# Patient Record
Sex: MALE | Race: WHITE | NOT HISPANIC OR LATINO | Employment: OTHER | URBAN - METROPOLITAN AREA
[De-identification: names, ages, dates, MRNs, and addresses within clinical notes are randomized per-mention and may not be internally consistent; named-entity substitution may affect disease eponyms.]

---

## 2017-01-05 ENCOUNTER — GENERIC CONVERSION - ENCOUNTER (OUTPATIENT)
Dept: OTHER | Facility: OTHER | Age: 67
End: 2017-01-05

## 2017-02-06 ENCOUNTER — GENERIC CONVERSION - ENCOUNTER (OUTPATIENT)
Dept: OTHER | Facility: OTHER | Age: 67
End: 2017-02-06

## 2017-02-28 ENCOUNTER — GENERIC CONVERSION - ENCOUNTER (OUTPATIENT)
Dept: OTHER | Facility: OTHER | Age: 67
End: 2017-02-28

## 2017-03-20 ENCOUNTER — GENERIC CONVERSION - ENCOUNTER (OUTPATIENT)
Dept: OTHER | Facility: OTHER | Age: 67
End: 2017-03-20

## 2017-03-20 ENCOUNTER — ALLSCRIPTS OFFICE VISIT (OUTPATIENT)
Dept: OTHER | Facility: OTHER | Age: 67
End: 2017-03-20

## 2017-03-30 ENCOUNTER — GENERIC CONVERSION - ENCOUNTER (OUTPATIENT)
Dept: OTHER | Facility: OTHER | Age: 67
End: 2017-03-30

## 2017-03-30 LAB
LEFT EYE DIABETIC RETINOPATHY: NORMAL
RIGHT EYE DIABETIC RETINOPATHY: NORMAL

## 2017-04-06 ENCOUNTER — ALLSCRIPTS OFFICE VISIT (OUTPATIENT)
Dept: OTHER | Facility: OTHER | Age: 67
End: 2017-04-06

## 2017-04-06 DIAGNOSIS — E11.9 TYPE 2 DIABETES MELLITUS WITHOUT COMPLICATIONS (HCC): ICD-10-CM

## 2017-04-06 DIAGNOSIS — I95.1 ORTHOSTATIC HYPOTENSION: ICD-10-CM

## 2017-04-06 DIAGNOSIS — N18.30 CHRONIC KIDNEY DISEASE, STAGE III (MODERATE) (HCC): ICD-10-CM

## 2017-04-06 DIAGNOSIS — E11.40 TYPE 2 DIABETES MELLITUS WITH DIABETIC NEUROPATHY (HCC): ICD-10-CM

## 2017-04-06 LAB
GLUCOSE SERPL-MCNC: 200 MG/DL
HBA1C MFR BLD HPLC: 6.8 %

## 2017-04-11 ENCOUNTER — LAB CONVERSION - ENCOUNTER (OUTPATIENT)
Dept: OTHER | Facility: OTHER | Age: 67
End: 2017-04-11

## 2017-04-11 LAB
25(OH)D3 SERPL-MCNC: 24 NG/ML (ref 30–100)
HBA1C MFR BLD HPLC: 6.9 % OF TOTAL HGB

## 2017-04-17 ENCOUNTER — GENERIC CONVERSION - ENCOUNTER (OUTPATIENT)
Dept: OTHER | Facility: OTHER | Age: 67
End: 2017-04-17

## 2017-05-10 ENCOUNTER — GENERIC CONVERSION - ENCOUNTER (OUTPATIENT)
Dept: OTHER | Facility: OTHER | Age: 67
End: 2017-05-10

## 2017-05-31 ENCOUNTER — GENERIC CONVERSION - ENCOUNTER (OUTPATIENT)
Dept: OTHER | Facility: OTHER | Age: 67
End: 2017-05-31

## 2017-06-07 ENCOUNTER — GENERIC CONVERSION - ENCOUNTER (OUTPATIENT)
Dept: OTHER | Facility: OTHER | Age: 67
End: 2017-06-07

## 2017-06-08 ENCOUNTER — GENERIC CONVERSION - ENCOUNTER (OUTPATIENT)
Dept: OTHER | Facility: OTHER | Age: 67
End: 2017-06-08

## 2017-06-19 ENCOUNTER — GENERIC CONVERSION - ENCOUNTER (OUTPATIENT)
Dept: OTHER | Facility: OTHER | Age: 67
End: 2017-06-19

## 2017-07-18 ENCOUNTER — GENERIC CONVERSION - ENCOUNTER (OUTPATIENT)
Dept: OTHER | Facility: OTHER | Age: 67
End: 2017-07-18

## 2017-07-18 ENCOUNTER — ALLSCRIPTS OFFICE VISIT (OUTPATIENT)
Dept: OTHER | Facility: OTHER | Age: 67
End: 2017-07-18

## 2017-07-30 ENCOUNTER — OFFICE VISIT (OUTPATIENT)
Dept: URGENT CARE | Facility: MEDICAL CENTER | Age: 67
End: 2017-07-30
Payer: MEDICARE

## 2017-07-30 PROCEDURE — 12013 RPR F/E/E/N/L/M 2.6-5.0 CM: CPT

## 2017-07-30 PROCEDURE — 99203 OFFICE O/P NEW LOW 30 MIN: CPT

## 2017-07-30 PROCEDURE — G0463 HOSPITAL OUTPT CLINIC VISIT: HCPCS

## 2017-08-07 ENCOUNTER — ALLSCRIPTS OFFICE VISIT (OUTPATIENT)
Dept: OTHER | Facility: OTHER | Age: 67
End: 2017-08-07

## 2017-08-07 ENCOUNTER — GENERIC CONVERSION - ENCOUNTER (OUTPATIENT)
Dept: OTHER | Facility: OTHER | Age: 67
End: 2017-08-07

## 2017-08-07 DIAGNOSIS — R42 DIZZINESS AND GIDDINESS: ICD-10-CM

## 2017-08-07 DIAGNOSIS — I95.1 ORTHOSTATIC HYPOTENSION: ICD-10-CM

## 2017-08-07 DIAGNOSIS — E11.40 TYPE 2 DIABETES MELLITUS WITH DIABETIC NEUROPATHY (HCC): ICD-10-CM

## 2017-08-07 DIAGNOSIS — N18.30 CHRONIC KIDNEY DISEASE, STAGE III (MODERATE) (HCC): ICD-10-CM

## 2017-09-08 ENCOUNTER — ALLSCRIPTS OFFICE VISIT (OUTPATIENT)
Dept: OTHER | Facility: OTHER | Age: 67
End: 2017-09-08

## 2017-09-20 ENCOUNTER — GENERIC CONVERSION - ENCOUNTER (OUTPATIENT)
Dept: OTHER | Facility: OTHER | Age: 67
End: 2017-09-20

## 2017-09-30 ENCOUNTER — HOSPITAL ENCOUNTER (INPATIENT)
Facility: HOSPITAL | Age: 67
LOS: 4 days | Discharge: HOME | DRG: 074 | End: 2017-10-04
Attending: EMERGENCY MEDICINE | Admitting: FAMILY MEDICINE
Payer: MEDICARE

## 2017-09-30 DIAGNOSIS — Z93.59 SUPRAPUBIC CATHETER (HCC): ICD-10-CM

## 2017-09-30 DIAGNOSIS — N39.0 UTI (URINARY TRACT INFECTION): Primary | ICD-10-CM

## 2017-09-30 DIAGNOSIS — N17.9 AKI (ACUTE KIDNEY INJURY) (HCC): ICD-10-CM

## 2017-09-30 LAB
ANION GAP SERPL CALCULATED.3IONS-SCNC: 5 MMOL/L (ref 4–13)
BACTERIA UR QL AUTO: ABNORMAL /HPF
BASOPHILS # BLD AUTO: 0.02 THOUSANDS/ΜL (ref 0–0.1)
BASOPHILS NFR BLD AUTO: 0 % (ref 0–1)
BILIRUB UR QL STRIP: NEGATIVE
BUN SERPL-MCNC: 26 MG/DL (ref 5–25)
CALCIUM SERPL-MCNC: 9.4 MG/DL (ref 8.3–10.1)
CHLORIDE SERPL-SCNC: 100 MMOL/L (ref 100–108)
CLARITY UR: CLEAR
CO2 SERPL-SCNC: 32 MMOL/L (ref 21–32)
COLOR UR: YELLOW
CREAT SERPL-MCNC: 1.71 MG/DL (ref 0.6–1.3)
EOSINOPHIL # BLD AUTO: 0.3 THOUSAND/ΜL (ref 0–0.61)
EOSINOPHIL NFR BLD AUTO: 4 % (ref 0–6)
ERYTHROCYTE [DISTWIDTH] IN BLOOD BY AUTOMATED COUNT: 13.9 % (ref 11.6–15.1)
GFR SERPL CREATININE-BSD FRML MDRD: 40 ML/MIN/1.73SQ M
GLUCOSE SERPL-MCNC: 192 MG/DL (ref 65–140)
GLUCOSE SERPL-MCNC: 303 MG/DL (ref 65–140)
GLUCOSE UR STRIP-MCNC: ABNORMAL MG/DL
HCT VFR BLD AUTO: 37.3 % (ref 36.5–49.3)
HGB BLD-MCNC: 12.3 G/DL (ref 12–17)
HGB UR QL STRIP.AUTO: ABNORMAL
KETONES UR STRIP-MCNC: NEGATIVE MG/DL
LACTATE SERPL-SCNC: 1.2 MMOL/L (ref 0.5–2)
LEUKOCYTE ESTERASE UR QL STRIP: ABNORMAL
LYMPHOCYTES # BLD AUTO: 2.04 THOUSANDS/ΜL (ref 0.6–4.47)
LYMPHOCYTES NFR BLD AUTO: 27 % (ref 14–44)
MCH RBC QN AUTO: 31.1 PG (ref 26.8–34.3)
MCHC RBC AUTO-ENTMCNC: 33 G/DL (ref 31.4–37.4)
MCV RBC AUTO: 94 FL (ref 82–98)
MONOCYTES # BLD AUTO: 0.55 THOUSAND/ΜL (ref 0.17–1.22)
MONOCYTES NFR BLD AUTO: 7 % (ref 4–12)
NEUTROPHILS # BLD AUTO: 4.69 THOUSANDS/ΜL (ref 1.85–7.62)
NEUTS SEG NFR BLD AUTO: 62 % (ref 43–75)
NITRITE UR QL STRIP: NEGATIVE
NON-SQ EPI CELLS URNS QL MICRO: ABNORMAL /HPF
NRBC BLD AUTO-RTO: 0 /100 WBCS
OTHER STN SPEC: ABNORMAL
PH UR STRIP.AUTO: 6.5 [PH] (ref 4.5–8)
PLATELET # BLD AUTO: 225 THOUSANDS/UL (ref 149–390)
PMV BLD AUTO: 10.9 FL (ref 8.9–12.7)
POTASSIUM SERPL-SCNC: 4.6 MMOL/L (ref 3.5–5.3)
PROT UR STRIP-MCNC: ABNORMAL MG/DL
RBC # BLD AUTO: 3.96 MILLION/UL (ref 3.88–5.62)
RBC #/AREA URNS AUTO: ABNORMAL /HPF
SODIUM SERPL-SCNC: 137 MMOL/L (ref 136–145)
SP GR UR STRIP.AUTO: 1.01 (ref 1–1.03)
UROBILINOGEN UR QL STRIP.AUTO: 0.2 E.U./DL
WBC # BLD AUTO: 7.6 THOUSAND/UL (ref 4.31–10.16)
WBC #/AREA URNS AUTO: ABNORMAL /HPF

## 2017-09-30 PROCEDURE — 87086 URINE CULTURE/COLONY COUNT: CPT

## 2017-09-30 PROCEDURE — 82948 REAGENT STRIP/BLOOD GLUCOSE: CPT

## 2017-09-30 PROCEDURE — 36415 COLL VENOUS BLD VENIPUNCTURE: CPT | Performed by: EMERGENCY MEDICINE

## 2017-09-30 PROCEDURE — 81001 URINALYSIS AUTO W/SCOPE: CPT

## 2017-09-30 PROCEDURE — 87040 BLOOD CULTURE FOR BACTERIA: CPT | Performed by: EMERGENCY MEDICINE

## 2017-09-30 PROCEDURE — 85025 COMPLETE CBC W/AUTO DIFF WBC: CPT | Performed by: EMERGENCY MEDICINE

## 2017-09-30 PROCEDURE — 80048 BASIC METABOLIC PNL TOTAL CA: CPT | Performed by: EMERGENCY MEDICINE

## 2017-09-30 PROCEDURE — 99285 EMERGENCY DEPT VISIT HI MDM: CPT

## 2017-09-30 PROCEDURE — 83605 ASSAY OF LACTIC ACID: CPT | Performed by: EMERGENCY MEDICINE

## 2017-09-30 PROCEDURE — 87186 SC STD MICRODIL/AGAR DIL: CPT

## 2017-09-30 PROCEDURE — 93005 ELECTROCARDIOGRAM TRACING: CPT | Performed by: EMERGENCY MEDICINE

## 2017-09-30 PROCEDURE — 96365 THER/PROPH/DIAG IV INF INIT: CPT

## 2017-09-30 PROCEDURE — 87077 CULTURE AEROBIC IDENTIFY: CPT

## 2017-09-30 RX ORDER — MORPHINE SULFATE 15 MG/1
TABLET, FILM COATED, EXTENDED RELEASE ORAL
COMMUNITY
Start: 2014-12-08 | End: 2018-03-22 | Stop reason: ALTCHOICE

## 2017-09-30 RX ORDER — GABAPENTIN 600 MG/1
TABLET ORAL
COMMUNITY
Start: 2011-02-26 | End: 2018-02-21

## 2017-09-30 RX ORDER — BACLOFEN 10 MG/1
TABLET ORAL
COMMUNITY
Start: 2017-06-19 | End: 2018-03-22 | Stop reason: SDUPTHER

## 2017-09-30 RX ORDER — DULOXETIN HYDROCHLORIDE 30 MG/1
CAPSULE, DELAYED RELEASE ORAL
COMMUNITY
Start: 2016-08-04 | End: 2018-03-22 | Stop reason: DRUGHIGH

## 2017-09-30 RX ORDER — TRAZODONE HYDROCHLORIDE 50 MG/1
TABLET ORAL
COMMUNITY
Start: 2013-10-23 | End: 2018-04-05 | Stop reason: SDUPTHER

## 2017-09-30 RX ORDER — NICOTINE 21 MG/24HR
1 PATCH, TRANSDERMAL 24 HOURS TRANSDERMAL DAILY
Status: DISCONTINUED | OUTPATIENT
Start: 2017-10-01 | End: 2017-10-04 | Stop reason: HOSPADM

## 2017-09-30 RX ADMIN — SODIUM CHLORIDE 1000 ML: 0.9 INJECTION, SOLUTION INTRAVENOUS at 20:40

## 2017-09-30 RX ADMIN — CEFTRIAXONE 1000 MG: 1 INJECTION, POWDER, FOR SOLUTION INTRAMUSCULAR; INTRAVENOUS at 20:34

## 2017-10-01 ENCOUNTER — APPOINTMENT (INPATIENT)
Dept: CT IMAGING | Facility: HOSPITAL | Age: 67
DRG: 074 | End: 2017-10-01
Payer: MEDICARE

## 2017-10-01 PROBLEM — N39.0 UTI (URINARY TRACT INFECTION): Status: ACTIVE | Noted: 2017-10-01

## 2017-10-01 PROBLEM — N17.9 AKI (ACUTE KIDNEY INJURY) (HCC): Status: ACTIVE | Noted: 2017-10-01

## 2017-10-01 PROBLEM — E78.5 TYPE 2 DIABETES MELLITUS WITH HYPERLIPIDEMIA (HCC): Status: ACTIVE | Noted: 2017-10-01

## 2017-10-01 PROBLEM — I10 ESSENTIAL HYPERTENSION: Status: ACTIVE | Noted: 2017-10-01

## 2017-10-01 PROBLEM — E11.69 TYPE 2 DIABETES MELLITUS WITH HYPERLIPIDEMIA (HCC): Status: ACTIVE | Noted: 2017-10-01

## 2017-10-01 PROBLEM — R29.6 FREQUENT FALLS: Status: ACTIVE | Noted: 2017-10-01

## 2017-10-01 PROBLEM — Z93.59 SUPRAPUBIC CATHETER (HCC): Status: ACTIVE | Noted: 2017-10-01

## 2017-10-01 PROBLEM — G60.0 CHARCOT-MARIE-TOOTH DISEASE: Status: ACTIVE | Noted: 2017-10-01

## 2017-10-01 PROBLEM — R53.1 WEAKNESS: Status: ACTIVE | Noted: 2017-10-01

## 2017-10-01 LAB
ANION GAP SERPL CALCULATED.3IONS-SCNC: 7 MMOL/L (ref 4–13)
BUN SERPL-MCNC: 22 MG/DL (ref 5–25)
CALCIUM SERPL-MCNC: 9.1 MG/DL (ref 8.3–10.1)
CHLORIDE SERPL-SCNC: 103 MMOL/L (ref 100–108)
CO2 SERPL-SCNC: 30 MMOL/L (ref 21–32)
CREAT SERPL-MCNC: 1.52 MG/DL (ref 0.6–1.3)
ERYTHROCYTE [DISTWIDTH] IN BLOOD BY AUTOMATED COUNT: 13.8 % (ref 11.6–15.1)
GFR SERPL CREATININE-BSD FRML MDRD: 46 ML/MIN/1.73SQ M
GLUCOSE SERPL-MCNC: 142 MG/DL (ref 65–140)
GLUCOSE SERPL-MCNC: 145 MG/DL (ref 65–140)
GLUCOSE SERPL-MCNC: 152 MG/DL (ref 65–140)
GLUCOSE SERPL-MCNC: 162 MG/DL (ref 65–140)
GLUCOSE SERPL-MCNC: 183 MG/DL (ref 65–140)
GLUCOSE SERPL-MCNC: 192 MG/DL (ref 65–140)
HCT VFR BLD AUTO: 35.4 % (ref 36.5–49.3)
HGB BLD-MCNC: 11.6 G/DL (ref 12–17)
MCH RBC QN AUTO: 30.8 PG (ref 26.8–34.3)
MCHC RBC AUTO-ENTMCNC: 32.8 G/DL (ref 31.4–37.4)
MCV RBC AUTO: 94 FL (ref 82–98)
PLATELET # BLD AUTO: 189 THOUSANDS/UL (ref 149–390)
PMV BLD AUTO: 10.7 FL (ref 8.9–12.7)
POTASSIUM SERPL-SCNC: 3.9 MMOL/L (ref 3.5–5.3)
RBC # BLD AUTO: 3.77 MILLION/UL (ref 3.88–5.62)
SODIUM SERPL-SCNC: 140 MMOL/L (ref 136–145)
WBC # BLD AUTO: 7.11 THOUSAND/UL (ref 4.31–10.16)

## 2017-10-01 PROCEDURE — 70450 CT HEAD/BRAIN W/O DYE: CPT

## 2017-10-01 PROCEDURE — 82948 REAGENT STRIP/BLOOD GLUCOSE: CPT

## 2017-10-01 PROCEDURE — 85027 COMPLETE CBC AUTOMATED: CPT | Performed by: FAMILY MEDICINE

## 2017-10-01 PROCEDURE — 80048 BASIC METABOLIC PNL TOTAL CA: CPT | Performed by: FAMILY MEDICINE

## 2017-10-01 RX ORDER — DULOXETIN HYDROCHLORIDE 30 MG/1
30 CAPSULE, DELAYED RELEASE ORAL DAILY
Status: DISCONTINUED | OUTPATIENT
Start: 2017-10-01 | End: 2017-10-04 | Stop reason: HOSPADM

## 2017-10-01 RX ORDER — GABAPENTIN 300 MG/1
600 CAPSULE ORAL
Status: DISCONTINUED | OUTPATIENT
Start: 2017-10-02 | End: 2017-10-04 | Stop reason: HOSPADM

## 2017-10-01 RX ORDER — HYDRALAZINE HYDROCHLORIDE 20 MG/ML
10 INJECTION INTRAMUSCULAR; INTRAVENOUS EVERY 6 HOURS PRN
Status: DISCONTINUED | OUTPATIENT
Start: 2017-10-01 | End: 2017-10-03

## 2017-10-01 RX ORDER — TRAZODONE HYDROCHLORIDE 50 MG/1
50 TABLET ORAL
Status: DISCONTINUED | OUTPATIENT
Start: 2017-10-01 | End: 2017-10-04 | Stop reason: HOSPADM

## 2017-10-01 RX ORDER — SODIUM CHLORIDE 450 MG/100ML
75 INJECTION, SOLUTION INTRAVENOUS CONTINUOUS
Status: DISCONTINUED | OUTPATIENT
Start: 2017-10-01 | End: 2017-10-03

## 2017-10-01 RX ORDER — MINERAL OIL AND PETROLATUM 150; 830 MG/G; MG/G
OINTMENT OPHTHALMIC
Status: DISCONTINUED | OUTPATIENT
Start: 2017-10-01 | End: 2017-10-01

## 2017-10-01 RX ORDER — BACLOFEN 10 MG/1
10 TABLET ORAL 3 TIMES DAILY PRN
Status: DISCONTINUED | OUTPATIENT
Start: 2017-10-01 | End: 2017-10-04 | Stop reason: HOSPADM

## 2017-10-01 RX ORDER — GABAPENTIN 300 MG/1
600 CAPSULE ORAL 2 TIMES DAILY
Status: DISCONTINUED | OUTPATIENT
Start: 2017-10-01 | End: 2017-10-01

## 2017-10-01 RX ORDER — INSULIN ASPART 100 [IU]/ML
15 INJECTION, SUSPENSION SUBCUTANEOUS
Status: DISCONTINUED | OUTPATIENT
Start: 2017-10-01 | End: 2017-10-04 | Stop reason: HOSPADM

## 2017-10-01 RX ORDER — ACETAMINOPHEN 325 MG/1
650 TABLET ORAL EVERY 6 HOURS PRN
Status: DISCONTINUED | OUTPATIENT
Start: 2017-10-01 | End: 2017-10-04 | Stop reason: HOSPADM

## 2017-10-01 RX ORDER — POLYVINYL ALCOHOL 14 MG/ML
1 SOLUTION/ DROPS OPHTHALMIC 4 TIMES DAILY
Status: DISCONTINUED | OUTPATIENT
Start: 2017-10-01 | End: 2017-10-04 | Stop reason: HOSPADM

## 2017-10-01 RX ORDER — AMLODIPINE BESYLATE 5 MG/1
5 TABLET ORAL DAILY
Status: DISCONTINUED | OUTPATIENT
Start: 2017-10-01 | End: 2017-10-02

## 2017-10-01 RX ORDER — ERYTHROMYCIN 5 MG/G
OINTMENT OPHTHALMIC
Status: COMPLETED | OUTPATIENT
Start: 2017-10-01 | End: 2017-10-01

## 2017-10-01 RX ORDER — OXYCODONE HYDROCHLORIDE 10 MG/1
10 TABLET ORAL EVERY 8 HOURS PRN
Status: DISCONTINUED | OUTPATIENT
Start: 2017-10-01 | End: 2017-10-04 | Stop reason: HOSPADM

## 2017-10-01 RX ORDER — MORPHINE SULFATE 15 MG/1
15 TABLET, FILM COATED, EXTENDED RELEASE ORAL EVERY 12 HOURS SCHEDULED
Status: DISCONTINUED | OUTPATIENT
Start: 2017-10-01 | End: 2017-10-04 | Stop reason: HOSPADM

## 2017-10-01 RX ORDER — ONDANSETRON 2 MG/ML
4 INJECTION INTRAMUSCULAR; INTRAVENOUS EVERY 4 HOURS PRN
Status: DISCONTINUED | OUTPATIENT
Start: 2017-10-01 | End: 2017-10-03

## 2017-10-01 RX ORDER — SODIUM CHLORIDE 9 MG/ML
100 INJECTION, SOLUTION INTRAVENOUS CONTINUOUS
Status: DISCONTINUED | OUTPATIENT
Start: 2017-10-01 | End: 2017-10-01

## 2017-10-01 RX ORDER — BACLOFEN 10 MG/1
10 TABLET ORAL 2 TIMES DAILY
Status: DISCONTINUED | OUTPATIENT
Start: 2017-10-01 | End: 2017-10-01

## 2017-10-01 RX ORDER — LABETALOL HYDROCHLORIDE 5 MG/ML
20 INJECTION, SOLUTION INTRAVENOUS ONCE
Status: COMPLETED | OUTPATIENT
Start: 2017-10-01 | End: 2017-10-01

## 2017-10-01 RX ADMIN — INSULIN LISPRO 2 UNITS: 100 INJECTION, SOLUTION INTRAVENOUS; SUBCUTANEOUS at 12:03

## 2017-10-01 RX ADMIN — INSULIN LISPRO 1 UNITS: 100 INJECTION, SOLUTION INTRAVENOUS; SUBCUTANEOUS at 02:06

## 2017-10-01 RX ADMIN — TRAZODONE HYDROCHLORIDE 50 MG: 50 TABLET ORAL at 21:11

## 2017-10-01 RX ADMIN — GABAPENTIN 1800 MG: 400 CAPSULE ORAL at 21:11

## 2017-10-01 RX ADMIN — MORPHINE SULFATE 15 MG: 15 TABLET, EXTENDED RELEASE ORAL at 21:11

## 2017-10-01 RX ADMIN — SODIUM CHLORIDE 75 ML/HR: 0.45 INJECTION, SOLUTION INTRAVENOUS at 22:19

## 2017-10-01 RX ADMIN — LABETALOL 20 MG/4 ML (5 MG/ML) INTRAVENOUS SYRINGE 20 MG: at 00:38

## 2017-10-01 RX ADMIN — OXYCODONE HYDROCHLORIDE 10 MG: 10 TABLET ORAL at 14:07

## 2017-10-01 RX ADMIN — CEFTRIAXONE 1000 MG: 1 INJECTION, POWDER, FOR SOLUTION INTRAMUSCULAR; INTRAVENOUS at 02:00

## 2017-10-01 RX ADMIN — BACLOFEN 10 MG: 10 TABLET ORAL at 08:15

## 2017-10-01 RX ADMIN — SODIUM CHLORIDE 75 ML/HR: 0.45 INJECTION, SOLUTION INTRAVENOUS at 08:16

## 2017-10-01 RX ADMIN — BACLOFEN 10 MG: 10 TABLET ORAL at 17:20

## 2017-10-01 RX ADMIN — ENOXAPARIN SODIUM 40 MG: 40 INJECTION SUBCUTANEOUS at 08:15

## 2017-10-01 RX ADMIN — MINERAL OIL AND WHITE PETROLATUM: 150; 830 OINTMENT OPHTHALMIC at 01:54

## 2017-10-01 RX ADMIN — DULOXETINE 30 MG: 30 CAPSULE, DELAYED RELEASE ORAL at 08:15

## 2017-10-01 RX ADMIN — INSULIN LISPRO 2 UNITS: 100 INJECTION, SOLUTION INTRAVENOUS; SUBCUTANEOUS at 17:16

## 2017-10-01 RX ADMIN — TRAZODONE HYDROCHLORIDE 50 MG: 50 TABLET ORAL at 01:25

## 2017-10-01 RX ADMIN — GABAPENTIN 600 MG: 300 CAPSULE ORAL at 17:16

## 2017-10-01 RX ADMIN — MORPHINE SULFATE 15 MG: 15 TABLET, EXTENDED RELEASE ORAL at 01:25

## 2017-10-01 RX ADMIN — INSULIN ASPART 15 UNITS: 100 INJECTION, SUSPENSION SUBCUTANEOUS at 17:19

## 2017-10-01 RX ADMIN — INSULIN LISPRO 1 UNITS: 100 INJECTION, SOLUTION INTRAVENOUS; SUBCUTANEOUS at 21:11

## 2017-10-01 RX ADMIN — AMLODIPINE BESYLATE 5 MG: 5 TABLET ORAL at 19:00

## 2017-10-01 RX ADMIN — ERYTHROMYCIN: 5 OINTMENT OPHTHALMIC at 01:55

## 2017-10-01 RX ADMIN — INSULIN ASPART 15 UNITS: 100 INJECTION, SUSPENSION SUBCUTANEOUS at 08:15

## 2017-10-01 RX ADMIN — MORPHINE SULFATE 15 MG: 15 TABLET, EXTENDED RELEASE ORAL at 08:15

## 2017-10-01 RX ADMIN — GABAPENTIN 600 MG: 300 CAPSULE ORAL at 08:15

## 2017-10-01 NOTE — CASE MANAGEMENT
Initial Clinical Review    Admission: Date/Time/Statement: 9/30/17 @ 2116     Orders Placed This Encounter   Procedures    Inpatient Admission (expected length of stay for this patient is greater than two midnights)     Standing Status:   Standing     Number of Occurrences:   1     Order Specific Question:   Admitting Physician     Answer:   Adriana Kee [[id number]]     Order Specific Question:   Level of Care     Answer:   Med Surg [16]     Order Specific Question:   Estimated length of stay     Answer:   More than 2 Midnights     Order Specific Question:   Certification     Answer:   I certify that inpatient services are medically necessary for this patient for a duration of greater than two midnights  See H&P and MD Progress Notes for additional information about the patient's course of treatment  ED: Date/Time/Mode of Arrival:   ED Arrival Information     Expected Arrival Acuity Means of Arrival Escorted By Service Admission Type    - 9/30/2017 19:26 Urgent Ambulance [facility] Medicine Urgent    Arrival Complaint    Weakness          Chief Complaint:   Chief Complaint   Patient presents with    Weakness - Generalized     fell yesterday at home onto corner of furniture, arrives with healing laceration to left eye brow, also c/o burning with urination and generalized weakness and fatigue  states "I passed out for a second"       History of Illness: Yahir Edge is a 75 y o  male   Patient states he noted some redness around the suprapubic catheter tube insertion site with purulent discharge  He is to have some discomfort in the suprapubic area  He felt cold and shaky  Yesterday he had blacked out and hit his head on his furniture  Sustained a laceration on his face above his left eyebrow  He got right up and  called for his brother  However he did not come to the emergency room    Patient states he feels weak      Mild suprapubic tenderness to palpation   Redness around site of suprapubic catheter insertion   Laceration above the left eye brow   ED Vital Signs:   ED Triage Vitals   Temperature Pulse Respirations Blood Pressure SpO2   09/30/17 1927 09/30/17 1927 09/30/17 1927 09/30/17 1927 09/30/17 1927   99 1 °F (37 3 °C) 84 16 (!) 194/92 95 %      Temp Source Heart Rate Source Patient Position - Orthostatic VS BP Location FiO2 (%)   09/30/17 1927 09/30/17 1927 09/30/17 2043 09/30/17 1927 --   Oral Monitor Lying Right arm       Pain Score       09/30/17 1927       7        Wt Readings from Last 1 Encounters:   09/30/17 86 3 kg (190 lb 4 1 oz)       Vital Signs (abnormal):   10/01/17 0700   96 5 °F (35 8 °C)  70  18  119/58  95 %  None (Room air)  Lying - Orthostatic VS   10/01/17 0124  --  77  --  170/85  --  --  Lying   09/30/17 2329  --  --  --   202/102  --  --  --   09/30/17 2159   96 9 °F (36 1 °C)  82  18   187/96  97 %  None (Room air)  Sitting   09/30/17 2133  --  83  16   182/102  99 %  None (Room air)  Lying   09/30/17 2043  98 4 °F (36 9 °C)  86  16   172/96           Abnormal Labs/Diagnostic Test Results:   CT head: nothing acute  9/30: bun 26   Creat 1 71   Gluc 303, 192  UA: 500 gluc   Tr bld   sm leuks   +2 prot   1-2 rbc   20-30 wbc   occ bacteria  +yeast  Occ epithelials  bld c&s pending  Urine c&s pending    10/1: gluc 162, 145, 142, 192    Creat 1 52   hgb 11 6   hct 35 4  ED Treatment:   Medication Administration from 09/30/2017 1926 to 09/30/2017 2158       Date/Time Order Dose Route Action Action by Comments     09/30/2017 2040 sodium chloride 0 9 % bolus 1,000 mL 1,000 mL Intravenous Shadi Izaguirre RN      09/30/2017 2034 ceftriaxone (ROCEPHIN) 1 g/50 mL in dextrose IVPB 1,000 mg Intravenous New Bag Denice Izaguirre RN           Past Medical/Surgical History:    Active Ambulatory Problems     Diagnosis Date Noted    No Active Ambulatory Problems     Resolved Ambulatory Problems     Diagnosis Date Noted    No Resolved Ambulatory Problems     Past Medical History:   Diagnosis Date    Diabetes mellitus     Neuropathy        Admitting Diagnosis: UTI (urinary tract infection) [N39 0]  Weakness [R53 1]  Suprapubic catheter [Z93 59]  CYDNEY (acute kidney injury) [N17 9]    Age/Sex: 75 y o  male    Assessment/Plan: Principal Problem:  UTI (urinary tract infection)  Active Problems:  CYDNEY (acute kidney injury)    Suprapubic catheter    Weakness   Syncope status post fall/ head laceration  Patient has had recurrent syncope and was recently worked up for syncope in a different hospital   Will place patient on fall precaution  Will check orthostatic vitals  Will request records from the outside hospital  Will  Get a head CT without contrast     Hypertensive crisis  Patient is not on any antihypertensive  States his blood pressure only elevated in  hospitals and doctors offices  Control BP  IV antiherpertensives as necessary  Allow for permissive hypertension     Urinary tract infection  Continue ceftriaxone  Follow-up urine cultures     Acute kidney injury  Likely due to volume depletion  Hydrate and monitor     Diabetes mellitus  Poor control  Resume Novolog 70/30  Start Sliding scale insulin     Neuropathy  Resume Neurontin, duloxetine,      Chronic back pain  On narcotics     VTE Prophylaxis: Heparin  / sequential compression device   Code Status:  Full code  POLST: POLST form is not discussed and not completed at this time      Anticipated Length of Stay:  Patient will be admitted on an Inpatient basis with an anticipated length of stay of  > 2 midnights        Admission Orders:  Scheduled Meds:   baclofen 10 mg Oral BID   cefTRIAXone 1,000 mg Intravenous Q24H   DULoxetine 30 mg Oral Daily   enoxaparin 40 mg Subcutaneous Daily   gabapentin 600 mg Oral BID   insulin aspart protamine-insulin aspart 15 Units Subcutaneous BID AC   insulin lispro 1-6 Units Subcutaneous HS   insulin lispro 2-12 Units Subcutaneous TID AC   morphine 15 mg Oral Q12H [address]   nicotine 1 patch Transdermal Daily   polyvinyl alcohol 1 drop Both Eyes 4x Daily   traZODone 50 mg Oral HS     Continuous Infusions:   sodium chloride 75 mL/hr Last Rate: 75 mL/hr (10/01/17 0816)     PRN Meds: hydrALAZINE    oxyCODONE    hse diet  Fall prec  Bedrest/activity as rhina  Gluc checks ac / hs  Orthostatics x 1  Cons case mgmt  SCD's

## 2017-10-01 NOTE — PROGRESS NOTES
[facility] Progress Note  Patient: Sherine Caba 75 y o  male   MRN: [id number]  PCP: Abi Natarajan MD  Unit/Bed#: [place] [address] 2 [facility] [id number] Encounter: [id number]  Date Of Visit: 10/01/17    Assessment  Principal Problem:    CYDNEY (acute kidney injury)  Active Problems:    Suprapubic catheter    UTI (urinary tract infection)    Charcot-Bre-Tooth disease    Frequent falls    Type 2 diabetes mellitus with hyperlipidemia  Resolved Problems:    * No resolved hospital problems  *        Plan  1  Acute kidney injury  Secondary to possible urinary tract infection and possible dehydration however orthostasis negative  Improving, continue IV fluids  2  Possible urine tract infection versus asymptomatic bacteriuria  Patient has been feeling lousy prior to admission  Status post suprapubic catheter, follow up on cultures and continue empiric ceftriaxone  3  Frequent falls have Physical therapy evaluate  4  Charcot-Bre-Tooth disease  Continue gabapentin and Cymbalta for neuropathy  5  Type 2 diabetes mellitus with hyperlipidemia  Continue 70/30  6  Continues opioid dependence  Continue MS Contin  7  Essential hypertension  No history of blood pressure elevation  Start amlodipine given persistence      VTE Prophylaxis: Enoxaparin (Lovenox)  Education and Discussions:   Discharge Plan:  Have Physical therapy evaluate for discharge needs  Time Spent for Care:  30 Mins  More than 50% of total time spent on counseling and coordination of care as described above    ______________________________________________________________________________    Subjective:   Patient seen and examined  States that fell on Thursday on to a antiperspirant and hit his head  Has been feeling weak since then but denies poor appetite  Objective:   Vitals: Blood pressure 163/74, pulse 67, temperature (!) 97 1 °F (36 2 °C), temperature source Tympanic, resp   rate 18, height 6' 1" (1 854 m), weight 86 3 kg (190 lb 4 1 oz), SpO2 96 %  Physical Exam:   General appearance: alert, appears stated age and cooperative  Head: Normocephalic, without obvious abnormality, atraumatic  Lungs: clear to auscultation bilaterally  Heart: regular rate and rhythm  Abdomen: Soft nontender nondistended  Back: negative, range of motion normal  Extremities: atrophy of lower extremities  Neurologic: Grossly normal    Additional Data:   Labs:    Results from last 7 days  Lab Units 10/01/17  0512 09/30/17 2029   WBC Thousand/uL 7 11 7 60   HEMOGLOBIN g/dL 11 6* 12 3   HEMATOCRIT % 35 4* 37 3   MCV fL 94 94   PLATELETS Thousands/uL 189 225       Results from last 7 days  Lab Units 10/01/17  0512 09/30/17 2029   SODIUM mmol/L 140 137   POTASSIUM mmol/L 3 9 4 6   CHLORIDE mmol/L 103 100   CO2 mmol/L 30 32   ANION GAP mmol/L 7 5   BUN mg/dL 22 26*   CREATININE mg/dL 1 52* 1 71*   CALCIUM mg/dL 9 1 9 4   EGFR ml/min/1 73sq m 46 40   GLUCOSE RANDOM mg/dL 145* 303*                    Results from last 7 days  Lab Units 09/30/17  2031   LACTIC ACID mmol/L 1 2       Results from last 7 days  Lab Units 10/01/17  1617 10/01/17  1110 10/01/17  0810 10/01/17  0206 09/30/17  2310   POC GLUCOSE mg/dl 183* 192* 142* 162* 192*             * I Have Reviewed All Lab Data Listed Above      Cultures:     Results from last 7 days  Lab Units 09/30/17  2002   URINE CULTURE  Culture too young- will reincubate         Imaging:  Imaging Reports Reviewed Today Include:       Scheduled Meds:  cefTRIAXone 1,000 mg Intravenous Q24H   DULoxetine 30 mg Oral Daily   enoxaparin 40 mg Subcutaneous Daily   gabapentin 1,800 mg Oral HS   [START ON 10/2/2017] gabapentin 600 mg Oral BID (AM & Afternoon)   insulin aspart protamine-insulin aspart 15 Units Subcutaneous BID AC   insulin lispro 1-6 Units Subcutaneous HS   insulin lispro 2-12 Units Subcutaneous TID AC   morphine 15 mg Oral Q12H [facility]   nicotine 1 patch Transdermal Daily   polyvinyl alcohol 1 drop Both Eyes 4x Daily   traZODone 50 mg Oral HS     Continuous Infusions:  sodium chloride 75 mL/hr Last Rate: 75 mL/hr (10/01/17 0816)     PRN Meds:    acetaminophen    baclofen    hydrALAZINE    ondansetron    oxyCODONE     Carie Melton DO

## 2017-10-01 NOTE — PLAN OF CARE
DISCHARGE PLANNING     Discharge to home or other facility with appropriate resources Progressing        GENITOURINARY - ADULT     Maintains or returns to baseline urinary function Progressing     Absence of urinary retention Progressing     Urinary catheter remains patent Progressing        INFECTION - ADULT     Absence or prevention of progression during hospitalization Progressing     Absence of fever/infection during neutropenic period Progressing        Knowledge Deficit     Patient/family/caregiver demonstrates understanding of disease process, treatment plan, medications, and discharge instructions Progressing        METABOLIC, FLUID AND ELECTROLYTES - ADULT     Glucose maintained within target range Progressing        PAIN - ADULT     Verbalizes/displays adequate comfort level or baseline comfort level Progressing        Potential for Falls     Patient will remain free of falls Progressing        SAFETY ADULT     Maintain or return to baseline ADL function Progressing     Maintain or return mobility status to optimal level Progressing

## 2017-10-01 NOTE — ED ATTENDING ATTESTATION
Severo Molina MD, saw and evaluated the patient  I have discussed the patient with the resident/non-physician practitioner and agree with the resident's/non-physician practitioner's findings, Plan of Care, and MDM as documented in the resident's/non-physician practitioner's note, except where noted  All available labs and Radiology studies were reviewed  At this point I agree with the current assessment done in the Emergency Department  I have conducted an independent evaluation of this patient a history and physical is as follows:    74 YO male presents with generalized weakness, fatigue, suprapubic discomfort, lightheadedness for the last day  Pt notes he became lightheaded yesterday morning while walking and fell, denies LOC  Pt states his fatigue and weakness has increased since that time  Pt denies fevers  Pt has a suprapubic catheter, denies erythema around the site  Pt denies CP/SOB/F/C/N/V/D/C, no dysuria, burning on urination or blood in urine  Gen: Pt is in NAD  HEENT: Head is atraumatic, EOM's intact, neck has FROM, Healing abrasion to the forehead, cervical tenderness which pt states are at baseline  Chest: CTAB, non-tender  Heart: RRR  Abdomen: Soft, catheter site without erythema, suprapubic tenderness, mild drainage from the site  Musculoskeletal: FROM in all extremities  Skin: No rash, no ecchymosis  Neuro: Awake, alert, oriented x4; Cranial nerves II-XII intact  Psych: Normal affect    MDM - Pt with indwelling SPC, Likely UTI  Concern as pt had lightheadedness yesterday with collapse   Will admit for IV Abx as pt has history of urosepsis from similar in the past      Critical Care Time  CritCare Time

## 2017-10-01 NOTE — ED PROVIDER NOTES
History  Chief Complaint   Patient presents with    Weakness - Generalized     fell yesterday at home onto corner of furniture, arrives with healing laceration to left eye brow, also c/o burning with urination and generalized weakness and fatigue  states "I passed out for a second"     75year-old man with a history of diabetes and BPH with chronic indwelling suprapubic catheter presents for evaluation of generalized weakness  Onset of symptoms was yesterday morning after the patient had a fall  He reports feeling lightheaded before falling, hitting his head on an object on the counter without LOC  No blood thinner use  No preceding chest pain, dyspnea, or palpitations  He subsequently developed generalized weakness and suprapubic pain  No fevers, chills, or purulence/erythema involving the catheter site  Patient follows with Dr Essie Prakash of urology with last catheter change 3 weeks ago  On arrival, patient is afebrile, hypertensive to 194/92, with otherwise normal vital signs  Physical exam shows mild TTP of the suprapubic region without peritoneal signs  He has a 1 cm healing abrasion on the forehead with a non-focal neuro exam  There is no purulence or streaking erythema from the catheter site  Will check urine dip and basic labs  Will give dose of ceftriaxone with further disposition based on above workup  Prior to Admission Medications   Prescriptions Last Dose Informant Patient Reported? Taking?    DULoxetine (CYMBALTA) 30 mg delayed release capsule   Yes Yes   Sig: Take by mouth   ERYTHROMYCIN OP  Self Yes Yes   Sig: Apply 5 mg to eye Medrol Dose Pack scheduling ONLY   OXYCODONE HCL PO   Yes Yes   Sig: Take by mouth   Polyethylene Glycol 3350 (MIRALAX PO)   Yes No   Sig: Take by mouth   baclofen 10 mg tablet   Yes Yes   Sig: Take by mouth   gabapentin (NEURONTIN) 600 MG tablet   Yes Yes   Sig: Take by mouth   insulin NPH-insulin regular (NOVOLIN 70/30) 100 units/mL subcutaneous injection   Yes Yes Sig: Inject under the skin   morphine (MS CONTIN) 15 mg 12 hr tablet   Yes Yes   Sig: Take by mouth   polyethylene glycol-propylene glycol (SYSTANE ULTRA) 0 4-0 3 %  Self Yes Yes   Sig: Apply to eye 3 (three) times a day   traZODone (DESYREL) 50 mg tablet   Yes Yes   Sig: Take by mouth      Facility-Administered Medications: None       Past Medical History:   Diagnosis Date    Diabetes mellitus     Neuropathy     charcot- Bre tooth       History reviewed  No pertinent surgical history  History reviewed  No pertinent family history  I have reviewed and agree with the history as documented  Social History   Substance Use Topics    Smoking status: Current Some Day Smoker     Types: Cigars    Smokeless tobacco: Never Used    Alcohol use No        Review of Systems   Constitutional: Positive for fatigue  Negative for chills and fever  HENT: Negative for rhinorrhea and sore throat  Eyes: Negative for photophobia and visual disturbance  Respiratory: Negative for cough and shortness of breath  Cardiovascular: Negative for chest pain and leg swelling  Gastrointestinal: Positive for abdominal pain  Negative for diarrhea, nausea and vomiting  Genitourinary: Negative for dysuria, frequency and hematuria  Musculoskeletal: Positive for neck pain (Chronic and unchanged)  Negative for back pain  Skin: Negative for rash and wound  Neurological: Positive for weakness and light-headedness  Negative for syncope and headaches         Physical Exam  ED Triage Vitals   Temperature Pulse Respirations Blood Pressure SpO2   09/30/17 1927 09/30/17 1927 09/30/17 1927 09/30/17 1927 09/30/17 1927   99 1 °F (37 3 °C) 84 16 (!) 194/92 95 %      Temp Source Heart Rate Source Patient Position - Orthostatic VS BP Location FiO2 (%)   09/30/17 1927 09/30/17 1927 09/30/17 2043 09/30/17 1927 --   Oral Monitor Lying Right arm       Pain Score       09/30/17 1927       7           Physical Exam   Constitutional: He is oriented to person, place, and time  He appears well-developed and well-nourished  No distress  HENT:   Head: Normocephalic  1 cm healing abrasion to left forehead, no hemotympanum bilaterally   Eyes: EOM are normal  Pupils are equal, round, and reactive to light  Neck: Neck supple  TTP to left paraspinal muscles which is his baseline   Cardiovascular: Normal rate, regular rhythm and normal heart sounds  Exam reveals no gallop and no friction rub  No murmur heard  Pulmonary/Chest: Effort normal and breath sounds normal  No respiratory distress  He has no wheezes  He has no rales  Abdominal: Soft  He exhibits no distension  There is tenderness (Suprapubic)  There is no rebound and no guarding  Genitourinary:   Genitourinary Comments: Suprapubic catheter site without purulence or streaking erythema   Musculoskeletal: He exhibits no edema or tenderness  Neurological: He is alert and oriented to person, place, and time  No cranial nerve deficit  No gross motor or sensory deficits, normal cerebellar testing, visual fields intact   Skin: Skin is warm and dry  He is not diaphoretic  Psychiatric: He has a normal mood and affect  His behavior is normal  Thought content normal    Vitals reviewed        ED Medications  Medications   nicotine (NICODERM CQ) 14 mg/24hr TD 24 hr patch 1 patch (1 patch Transdermal Not Given 10/1/17 0818)   enoxaparin (LOVENOX) subcutaneous injection 40 mg (40 mg Subcutaneous Given 10/1/17 0815)   baclofen tablet 10 mg (10 mg Oral Given 10/1/17 0815)   DULoxetine (CYMBALTA) delayed release capsule 30 mg (30 mg Oral Given 10/1/17 0815)   gabapentin (NEURONTIN) capsule 600 mg (600 mg Oral Given 10/1/17 0815)   morphine (MS CONTIN) ER tablet 15 mg (15 mg Oral Given 10/1/17 0815)   insulin aspart protamine-insulin aspart (NovoLOG 70/30) 100 units/mL subcutaneous injection 15 Units (15 Units Subcutaneous Given 10/1/17 0815)   oxyCODONE (ROXICODONE) immediate release tablet 10 mg (not administered)   traZODone (DESYREL) tablet 50 mg (50 mg Oral Given 10/1/17 0125)   artificial tear (LUBRIFRESH P M ) ophthalmic ointment ( Both Eyes Given 10/1/17 0154)   cefTRIAXone (ROCEPHIN) 1,000 mg in dextrose 5 % 50 mL IVPB (1,000 mg Intravenous New Bag 10/1/17 0200)   insulin lispro (HumaLOG) 100 units/mL subcutaneous injection 2-12 Units (2 Units Subcutaneous Not Given 10/1/17 0818)   insulin lispro (HumaLOG) 100 units/mL subcutaneous injection 1-6 Units (1 Units Subcutaneous Given 10/1/17 0206)   hydrALAZINE (APRESOLINE) injection 10 mg (not administered)   sodium chloride infusion 0 45 % (75 mL/hr Intravenous New Bag 10/1/17 0816)   sodium chloride 0 9 % bolus 1,000 mL (0 mL Intravenous Stopped 9/30/17 2259)   ceftriaxone (ROCEPHIN) 1 g/50 mL in dextrose IVPB (0 mg Intravenous Stopped 9/30/17 2131)   labetalol (NORMODYNE) injection 20 mg (20 mg Intravenous Given 10/1/17 0038)   erythromycin (ILOTYCIN) 0 5 % ophthalmic ointment ( Both Eyes Given 10/1/17 0155)       Diagnostic Studies  Labs Reviewed   URINE MICROSCOPIC - Abnormal        Result Value Ref Range Status    RBC, UA 1-2 (*) None Seen, 0-5 /hpf Final    WBC, UA 20-30 (*) None Seen, 0-5, 5-55, 5-65 /hpf Final    Epithelial Cells Occasional  None Seen, Occasional /hpf Final    Bacteria, UA Occasional  None Seen, Occasional /hpf Final    OTHER OBSERVATIONS Yeast Cells Present   Final   BASIC METABOLIC PANEL - Abnormal     BUN 26 (*) 5 - 25 mg/dL Final    Creatinine 1 71 (*) 0 60 - 1 30 mg/dL Final    Comment: Standardized to IDMS reference method    Glucose 303 (*) 65 - 140 mg/dL Final    Comment:   If the patient is fasting, the ADA then defines impaired fasting glucose as > 100 mg/dL and diabetes as > or equal to 123 mg/dL  Specimen collection should occur prior to Sulfasalazine administration due to the potential for falsely depressed results   Specimen collection should occur prior to Sulfapyridine administration due to the potential for falsely elevated results  Sodium 137  136 - 145 mmol/L Final    Potassium 4 6  3 5 - 5 3 mmol/L Final    Chloride 100  100 - 108 mmol/L Final    CO2 32  21 - 32 mmol/L Final    Anion Gap 5  4 - 13 mmol/L Final    Calcium 9 4  8 3 - 10 1 mg/dL Final    eGFR 40  ml/min/1 73sq m Final    Narrative:     National Kidney Disease Education Program recommendations are as follows:  GFR calculation is accurate only with a steady state creatinine  Chronic Kidney disease less than 60 ml/min/1 73 sq  meters  Kidney failure less than 15 ml/min/1 73 sq  meters     ED URINE MACROSCOPIC - Abnormal     Leukocytes, UA Small (*) Negative Final    Protein,  (2+) (*) Negative mg/dl Final    Glucose,  (1/2%) (*) Negative mg/dl Final    Blood, UA Trace (*) Negative Final    Color, UA Yellow   Final    Clarity, UA Clear   Final    pH, UA 6 5  4 5 - 8 0 Final    Nitrite, UA Negative  Negative Final    Ketones, UA Negative  Negative mg/dl Final    Urobilinogen, UA 0 2  0 2, 1 0 E U /dl E U /dl Final    Bilirubin, UA Negative  Negative Final    Specific Sentinel, UA 1 015  1 003 - 1 030 Final    Narrative:     CLINITEK RESULT   CBC AND DIFFERENTIAL - Normal    WBC 7 60  4 31 - 10 16 Thousand/uL Final    RBC 3 96  3 88 - 5 62 Million/uL Final    Hemoglobin 12 3  12 0 - 17 0 g/dL Final    Hematocrit 37 3  36 5 - 49 3 % Final    MCV 94  82 - 98 fL Final    MCH 31 1  26 8 - 34 3 pg Final    MCHC 33 0  31 4 - 37 4 g/dL Final    RDW 13 9  11 6 - 15 1 % Final    MPV 10 9  8 9 - 12 7 fL Final    Platelets 598  694 - 390 Thousands/uL Final    nRBC 0  /100 WBCs Final    Neutrophils Relative 62  43 - 75 % Final    Lymphocytes Relative 27  14 - 44 % Final    Monocytes Relative 7  4 - 12 % Final    Eosinophils Relative 4  0 - 6 % Final    Basophils Relative 0  0 - 1 % Final    Neutrophils Absolute 4 69  1 85 - 7 62 Thousands/µL Final    Lymphocytes Absolute 2 04  0 60 - 4 47 Thousands/µL Final    Monocytes Absolute 0 55  0 17 - 1 22 Thousand/µL Final    Eosinophils Absolute 0 30  0 00 - 0 61 Thousand/µL Final    Basophils Absolute 0 02  0 00 - 0 10 Thousands/µL Final   LACTIC ACID, PLASMA - Normal    LACTIC ACID 1 2  0 5 - 2 0 mmol/L Final    Narrative:     Result may be elevated if tourniquet was used during collection  URINE CULTURE   BLOOD CULTURE   BLOOD CULTURE       CT head wo contrast    (Results Pending)       Procedures  Procedures      Phone Consults  ED Phone Contact    ED Course  ED Course                                MDM  Number of Diagnoses or Management Options  CYDNEY (acute kidney injury):   Suprapubic catheter:   UTI (urinary tract infection):   Diagnosis management comments: Workup remarkable for UTI and CYDNEY with creatinine greater than 1 7 from normal baseline on last check  No signs of infection of suprapubic catheter site  Patient afebrile with an otherwise normal laboratory workup  He was given a dose of ceftriaxone, IV fluids, and admitted to [facility] for further management  CritCare Time    Disposition  Final diagnoses:   UTI (urinary tract infection)   CYDNEY (acute kidney injury)   Suprapubic catheter     ED Disposition     ED Disposition Condition Comment    Admit  Case was discussed with Dr Yael Reddy and the patient's admission status was agreed to be Admission Status: inpatient status to the service of Dr Yael Reddy           Follow-up Information    None       Current Discharge Medication List      CONTINUE these medications which have NOT CHANGED    Details   baclofen 10 mg tablet Take by mouth      DULoxetine (CYMBALTA) 30 mg delayed release capsule Take by mouth      ERYTHROMYCIN OP Apply 5 mg to eye Medrol Dose Pack scheduling ONLY      gabapentin (NEURONTIN) 600 MG tablet Take by mouth      insulin NPH-insulin regular (NOVOLIN 70/30) 100 units/mL subcutaneous injection Inject under the skin      morphine (MS CONTIN) 15 mg 12 hr tablet Take by mouth      OXYCODONE HCL PO Take by mouth      polyethylene glycol-propylene glycol (SYSTANE ULTRA) 0 4-0 3 % Apply to eye 3 (three) times a day      traZODone (DESYREL) 50 mg tablet Take by mouth      Polyethylene Glycol 3350 (MIRALAX PO) Take by mouth           No discharge procedures on file  ED Provider  Attending physically available and evaluated Bruce Browne I managed the patient along with the ED Attending      Electronically Signed by       Serafin Reyes MD  Resident  10/01/17 0223

## 2017-10-01 NOTE — H&P
History and Physical - [facility]    Patient Information: Yeyo Lucio 75 y o  male MRN: [id number]  Unit/Bed#: [facility] 87 221-01 Encounter: [id number]  Admitting Physician: Kathi Mckenna MD  PCP: Sulaiman Valenzuela MD  Date of Admission:  10/01/17    Assessment/Plan:    Hospital Problem List:     Principal Problem:    UTI (urinary tract infection)  Active Problems:    CYDNEY (acute kidney injury)    Suprapubic catheter    Weakness    Syncope status post fall/ head laceration  Patient has had recurrent syncope and was recently worked up for syncope in a different hospital   Will place patient on fall precaution  Will check orthostatic vitals  Will request records from the outside hospital  Will  Get a head CT without contrast    Hypertensive crisis  Patient is not on any antihypertensive  States his blood pressure only elevated in  hospitals and doctors offices  Control BP  IV antiherpertensives as necessary  Allow for permissive hypertension    Urinary tract infection  Continue ceftriaxone  Follow-up urine cultures    Acute kidney injury  Likely due to volume depletion  Hydrate and monitor    Diabetes mellitus  Poor control  Resume Novolog 70/30  Start Sliding scale insulin    Neuropathy  Resume Neurontin, duloxetine,     Chronic back pain  On narcotics    VTE Prophylaxis: Heparin  / sequential compression device   Code Status:  Full code  POLST: POLST form is not discussed and not completed at this time  Anticipated Length of Stay:  Patient will be admitted on an Inpatient basis with an anticipated length of stay of  > 2 midnights  Justification for Hospital Stay:     Total Time for Visit, including Counseling / Coordination of Care: 1 hour  Greater than 50% of this total time spent on direct patient counseling and coordination of care      Chief Complaint:   syncope, suprapubic pain,  redness and discharge at site of insertion of suprapubic catheter    History of Present Illness:    [place] Elie Morales is a 75 y o  male who presents with above chief complaints  Patient states he noted some redness around the suprapubic catheter tube insertion site with purulent discharge  He is to have some discomfort in the suprapubic area  He felt cold and shaky  Yesterday he had blacked out and hit his head on his furniture  Sustained a laceration on his face above his left eyebrow  He got right up and  called for his brother  However he did not come to the emergency room  Patient states he feels weak  Review of Systems:    Review of Systems   Constitutional: Positive for chills and fatigue  Gastrointestinal:        Suprapubic discomfort   Skin: Positive for color change  Redness around site of suprapubic catheter insertion   All other systems reviewed and are negative  Past Medical and Surgical History:     Past Medical History:   Diagnosis Date    Diabetes mellitus     Neuropathy     charcot- Bre tooth       History reviewed  No pertinent surgical history  Meds/Allergies:    Prior to Admission medications    Medication Sig Start Date End Date Taking?  Authorizing Provider   baclofen 10 mg tablet Take by mouth 6/19/17  Yes Historical Provider, MD   DULoxetine (CYMBALTA) 30 mg delayed release capsule Take by mouth 8/4/16  Yes Historical Provider, MD   ERYTHROMYCIN OP Apply 5 mg to eye Medrol Dose Pack scheduling ONLY   Yes Historical Provider, MD   gabapentin (NEURONTIN) 600 MG tablet Take by mouth 2/26/11  Yes Historical Provider, MD   insulin NPH-insulin regular (NOVOLIN 70/30) 100 units/mL subcutaneous injection Inject under the skin 12/23/11  Yes Historical Provider, MD   morphine (MS CONTIN) 15 mg 12 hr tablet Take by mouth 12/8/14  Yes Historical Provider, MD   OXYCODONE HCL PO Take by mouth 4/7/12  Yes Historical Provider, MD   polyethylene glycol-propylene glycol (SYSTANE ULTRA) 0 4-0 3 % Apply to eye 3 (three) times a day   Yes Historical Provider, MD   traZODone (DESYREL) 50 mg tablet Take by mouth 10/23/13  Yes Historical Provider, MD   Polyethylene Glycol 3350 (MIRALAX PO) Take by mouth    Historical Provider, MD     I have reviewed home medications with patient personally  Allergies: Allergies   Allergen Reactions    Temazepam        Social History:     Marital Status: Single   Occupation:   Patient Pre-hospital Living Situation:   Patient Pre-hospital Level of Mobility:   Patient Pre-hospital Diet Restrictions:   Substance Use History:   History   Alcohol Use No     History   Smoking Status    Current Some Day Smoker    Types: Cigars   Smokeless Tobacco    Never Used     History   Drug Use No       Family History:    History reviewed  No pertinent family history  Physical Exam:     Vitals:   Blood Pressure: (!) 202/102 (09/30/17 2329)  Pulse: 82 (09/30/17 2159)  Temperature: (!) 96 9 °F (36 1 °C) (09/30/17 2159)  Temp Source: Tympanic (09/30/17 2159)  Respirations: 18 (09/30/17 2159)  Height: 6' 1" (185 4 cm) (09/30/17 2159)  Weight - Scale: 86 3 kg (190 lb 4 1 oz) (09/30/17 2159)  SpO2: 97 % (09/30/17 2159)    Physical Exam   Constitutional: He is oriented to person, place, and time  He appears well-developed and well-nourished  HENT:   Head: Normocephalic  Right Ear: External ear normal    Left Ear: External ear normal    Laceration above the left eye brow   Eyes: Conjunctivae and EOM are normal  Pupils are equal, round, and reactive to light  Neck: Normal range of motion  Neck supple  Cardiovascular: Normal rate, regular rhythm, normal heart sounds and intact distal pulses  Pulmonary/Chest: Effort normal and breath sounds normal    Abdominal: Soft  Bowel sounds are normal  There is tenderness  Mild suprapubic tenderness to palpation   Musculoskeletal: Normal range of motion  Neurological: He is alert and oriented to person, place, and time  He has normal reflexes  Skin: Skin is warm and dry  There is erythema     Erythema around the site of insertion of suprapubic catheter   Psychiatric: He has a normal mood and affect  His behavior is normal  Judgment and thought content normal    Nursing note and vitals reviewed  Additional Data:     Lab Results: I have personally reviewed pertinent reports  Results from last 7 days  Lab Units 09/30/17 2029   WBC Thousand/uL 7 60   HEMOGLOBIN g/dL 12 3   HEMATOCRIT % 37 3   PLATELETS Thousands/uL 225   NEUTROS PCT % 62   LYMPHS PCT % 27   MONOS PCT % 7   EOS PCT % 4       Results from last 7 days  Lab Units 09/30/17 2029   SODIUM mmol/L 137   POTASSIUM mmol/L 4 6   CHLORIDE mmol/L 100   CO2 mmol/L 32   BUN mg/dL 26*   CREATININE mg/dL 1 71*   CALCIUM mg/dL 9 4   GLUCOSE RANDOM mg/dL 303*           Imaging: I have personally reviewed pertinent reports  No results found  EKG, Pathology, and Other Studies Reviewed on Admission:   · EKG:     Allscripts/Epic Records Reviewed: Yes     ** Please Note: Dragon 360 Dictation voice to text software may have been used in the creation of this document   **

## 2017-10-02 ENCOUNTER — APPOINTMENT (INPATIENT)
Dept: MRI IMAGING | Facility: HOSPITAL | Age: 67
DRG: 074 | End: 2017-10-02
Payer: MEDICARE

## 2017-10-02 LAB
ANION GAP SERPL CALCULATED.3IONS-SCNC: 5 MMOL/L (ref 4–13)
BUN SERPL-MCNC: 22 MG/DL (ref 5–25)
CALCIUM SERPL-MCNC: 9.2 MG/DL (ref 8.3–10.1)
CHLORIDE SERPL-SCNC: 103 MMOL/L (ref 100–108)
CO2 SERPL-SCNC: 32 MMOL/L (ref 21–32)
CREAT SERPL-MCNC: 1.36 MG/DL (ref 0.6–1.3)
ERYTHROCYTE [DISTWIDTH] IN BLOOD BY AUTOMATED COUNT: 13.9 % (ref 11.6–15.1)
GFR SERPL CREATININE-BSD FRML MDRD: 53 ML/MIN/1.73SQ M
GLUCOSE SERPL-MCNC: 130 MG/DL (ref 65–140)
GLUCOSE SERPL-MCNC: 143 MG/DL (ref 65–140)
GLUCOSE SERPL-MCNC: 159 MG/DL (ref 65–140)
GLUCOSE SERPL-MCNC: 237 MG/DL (ref 65–140)
HCT VFR BLD AUTO: 35.8 % (ref 36.5–49.3)
HGB BLD-MCNC: 11.8 G/DL (ref 12–17)
MCH RBC QN AUTO: 31.1 PG (ref 26.8–34.3)
MCHC RBC AUTO-ENTMCNC: 33 G/DL (ref 31.4–37.4)
MCV RBC AUTO: 94 FL (ref 82–98)
PLATELET # BLD AUTO: 203 THOUSANDS/UL (ref 149–390)
PMV BLD AUTO: 10.6 FL (ref 8.9–12.7)
POTASSIUM SERPL-SCNC: 4 MMOL/L (ref 3.5–5.3)
RBC # BLD AUTO: 3.8 MILLION/UL (ref 3.88–5.62)
SODIUM SERPL-SCNC: 140 MMOL/L (ref 136–145)
WBC # BLD AUTO: 7.03 THOUSAND/UL (ref 4.31–10.16)

## 2017-10-02 PROCEDURE — 72141 MRI NECK SPINE W/O DYE: CPT

## 2017-10-02 PROCEDURE — 85027 COMPLETE CBC AUTOMATED: CPT | Performed by: INTERNAL MEDICINE

## 2017-10-02 PROCEDURE — 97110 THERAPEUTIC EXERCISES: CPT

## 2017-10-02 PROCEDURE — 97163 PT EVAL HIGH COMPLEX 45 MIN: CPT

## 2017-10-02 PROCEDURE — 80048 BASIC METABOLIC PNL TOTAL CA: CPT | Performed by: INTERNAL MEDICINE

## 2017-10-02 PROCEDURE — G8978 MOBILITY CURRENT STATUS: HCPCS

## 2017-10-02 PROCEDURE — G8979 MOBILITY GOAL STATUS: HCPCS

## 2017-10-02 PROCEDURE — 82948 REAGENT STRIP/BLOOD GLUCOSE: CPT

## 2017-10-02 RX ADMIN — AMLODIPINE BESYLATE 5 MG: 5 TABLET ORAL at 08:37

## 2017-10-02 RX ADMIN — INSULIN ASPART 15 UNITS: 100 INJECTION, SUSPENSION SUBCUTANEOUS at 08:38

## 2017-10-02 RX ADMIN — POLYVINYL ALCOHOL 1 DROP: 14 SOLUTION/ DROPS OPHTHALMIC at 18:17

## 2017-10-02 RX ADMIN — SODIUM CHLORIDE 75 ML/HR: 0.45 INJECTION, SOLUTION INTRAVENOUS at 22:31

## 2017-10-02 RX ADMIN — SODIUM CHLORIDE 75 ML/HR: 0.45 INJECTION, SOLUTION INTRAVENOUS at 13:23

## 2017-10-02 RX ADMIN — MORPHINE SULFATE 15 MG: 15 TABLET, EXTENDED RELEASE ORAL at 21:52

## 2017-10-02 RX ADMIN — ENOXAPARIN SODIUM 40 MG: 40 INJECTION SUBCUTANEOUS at 08:36

## 2017-10-02 RX ADMIN — MORPHINE SULFATE 15 MG: 15 TABLET, EXTENDED RELEASE ORAL at 08:37

## 2017-10-02 RX ADMIN — DULOXETINE 30 MG: 30 CAPSULE, DELAYED RELEASE ORAL at 18:17

## 2017-10-02 RX ADMIN — GABAPENTIN 600 MG: 300 CAPSULE ORAL at 13:26

## 2017-10-02 RX ADMIN — OXYCODONE HYDROCHLORIDE 10 MG: 10 TABLET ORAL at 08:44

## 2017-10-02 RX ADMIN — BACLOFEN 10 MG: 10 TABLET ORAL at 22:09

## 2017-10-02 RX ADMIN — INSULIN LISPRO 4 UNITS: 100 INJECTION, SOLUTION INTRAVENOUS; SUBCUTANEOUS at 18:18

## 2017-10-02 RX ADMIN — OXYCODONE HYDROCHLORIDE 10 MG: 10 TABLET ORAL at 18:17

## 2017-10-02 RX ADMIN — POLYVINYL ALCOHOL 1 DROP: 14 SOLUTION/ DROPS OPHTHALMIC at 11:45

## 2017-10-02 RX ADMIN — CEFTRIAXONE 1000 MG: 1 INJECTION, POWDER, FOR SOLUTION INTRAMUSCULAR; INTRAVENOUS at 02:05

## 2017-10-02 RX ADMIN — GABAPENTIN 600 MG: 300 CAPSULE ORAL at 08:37

## 2017-10-02 RX ADMIN — BACLOFEN 10 MG: 10 TABLET ORAL at 08:44

## 2017-10-02 RX ADMIN — INSULIN LISPRO 2 UNITS: 100 INJECTION, SOLUTION INTRAVENOUS; SUBCUTANEOUS at 11:45

## 2017-10-02 RX ADMIN — POLYVINYL ALCOHOL 1 DROP: 14 SOLUTION/ DROPS OPHTHALMIC at 08:44

## 2017-10-02 RX ADMIN — INSULIN ASPART 15 UNITS: 100 INJECTION, SUSPENSION SUBCUTANEOUS at 18:18

## 2017-10-02 RX ADMIN — POLYVINYL ALCOHOL 1 DROP: 14 SOLUTION/ DROPS OPHTHALMIC at 22:01

## 2017-10-02 RX ADMIN — TRAZODONE HYDROCHLORIDE 50 MG: 50 TABLET ORAL at 21:53

## 2017-10-02 RX ADMIN — GABAPENTIN 1800 MG: 400 CAPSULE ORAL at 21:52

## 2017-10-02 NOTE — PHYSICAL THERAPY NOTE
PT Progress Note (15min)  (7:50-8:05)       10/02/17 0805   Pain Assessment   Pain Assessment 0-10   Pain Score 9   Pain Type Chronic pain   Pain Location Back   Pain Orientation Bilateral   Hospital Pain Intervention(s) Repositioned   Restrictions/Precautions   Other Precautions Multiple lines; Fall Risk;Pain   General   Chart Reviewed Yes   Response to Previous Treatment Patient with no complaints from previous session  Family/Caregiver Present No   Cognition   Orientation Level Oriented X4   Subjective   Subjective pt agreeable to ther ex education  sitting EOB upon arrival  "I'd like to sit up and the exercises"  Bed Mobility   Sit to Supine 6  Modified independent   Balance   Static Sitting Good   Dynamic Sitting Good   Activity Tolerance   Activity Tolerance Patient limited by fatigue;Patient limited by pain   Exercises   Heelslides Sitting;10 reps;AROM; Bilateral   Glute Sets Sitting;10 reps;AROM; Bilateral   Hip Abduction Sitting;10 reps;AROM; Bilateral   Knee AROM Long Arc Quad Sitting;10 reps;AROM; Bilateral   Ankle Pumps Sitting;10 reps;AROM; Bilateral   Marching Sitting;10 reps;AROM; Bilateral   Assessment   Prognosis Good   Problem List Decreased strength;Decreased endurance; Impaired balance;Decreased mobility; Impaired sensation   Assessment pt able to initiate B/L LE ther ex x10 reps c AROM  min verbal cues required for technique  able to return self to supine mod (I)  will cont skilled PT to maximize functional mobility + improve quality of life  upon d/c, recommend hhpt for home safely eval    Barriers to Discharge Inaccessible home environment;Decreased caregiver support   Goals   Patient Goals "to go home"  STG Expiration Date 10/12/17   Treatment Day 1   Plan   Treatment/Interventions Functional transfer training;LE strengthening/ROM; Elevations; Therapeutic exercise; Endurance training;Patient/family training;Gait training;Bed mobility;Spoke to nursing   Progress Progressing toward goals   PT Frequency 5x/wk   Recommendation   Recommendation Home PT   PT - OK to Discharge Yes     Ismael Goodson, PT

## 2017-10-02 NOTE — PLAN OF CARE
Problem: PHYSICAL THERAPY ADULT  Goal: Performs mobility at highest level of function for planned discharge setting  See evaluation for individualized goals  Treatment/Interventions: Functional transfer training, LE strengthening/ROM, Elevations, Therapeutic exercise, Endurance training, Patient/family training, Bed mobility, Gait training, Spoke to nursing          See flowsheet documentation for full assessment, interventions and recommendations  Prognosis: Good  Problem List: Decreased strength, Decreased endurance, Impaired balance, Decreased mobility, Impaired sensation, Pain  Assessment: pt is a 66y/o m who presents to [facility] c CYDNEY  PMH significant for suprapubic catheter, charcot vel tooth disease, + falls  pt s/p syncopal episode PTA resulting in forehead laceration  at baseline, ambulates mod (I) c RW  resides c brother in ranch home c 1 RADHA  admits to h/o 3-4 falls in past 6 months  currently requires min (A)x1 for functional mobility tasks OOB 2* deficits in strength, balance, gait quality, sensation + activity tolerance noted in PT exam above  Barthel Index 55/100  at high fall risk evidenced by Tinetti score of 18/28  ambulated distance of 100'x2 c RW c min verbal cues for technique  reports being limited by back pain  would benefit from skilled PT to maximize functional mobility + return home safely  upon d/c, recommend hhpt for home safety eval  PT eval of high complexity 2* above impairments + pt requiring ongoing medical management s/p syncopal episode  pt has multiple co-morbidities above impacting PT  alone during the day while brother at work + has 1 RADHA  also c h/o multiple falls  Barriers to Discharge: Inaccessible home environment, Decreased caregiver support     Recommendation: Home PT     PT - OK to Discharge: Yes    See flowsheet documentation for full assessment

## 2017-10-02 NOTE — PHYSICIAN ADVISOR
Current patient class: Inpatient  The patient is currently on Hospital Day: 3      The patient was admitted to the hospital at 2116 on 9/30/17 for the following diagnosis:  UTI (urinary tract infection) [N39 0]  Weakness [R53 1]  Suprapubic catheter [Z93 59]  CYDNEY (acute kidney injury) [N17 9]       There is documentation in the medical record of an expected length of stay of at least 2 midnights  The patient is therefore expected to satisfy the 2 midnight benchmark and given the 2 midnight presumption is appropriate for INPATIENT ADMISSION  Given this expectation of a satisfying stay, CMS instructs us that the patient is most often appropriate for inpatient admission under part A provided medical necessity is documented in the chart  After review of the relevant documentation, labs, vital signs and test results, the patient is appropriate for INPATIENT ADMISSION  Admission to the hospital as an inpatient is a complex decision making process which requires the practitioner to consider the patients presenting complaint, history and physical examination and all relevant testing  With this in mind, in this case, the patient was deemed appropriate for INPATIENT ADMISSION  After review of the documentation and testing available at the time of the admission I concur with this clinical determination of medical necessity  Rationale is as follows: The patient is a 75 yrs old Male who presented to the ED at 9/30/2017  7:26 PM with a chief complaint of Weakness - Generalized (fell yesterday at home onto corner of furniture, arrives with healing laceration to left eye brow, also c/o burning with urination and generalized weakness and fatigue   states "I passed out for a second")    The patients vitals on arrival were ED Triage Vitals   Temperature Pulse Respirations Blood Pressure SpO2   09/30/17 1927 09/30/17 1927 09/30/17 1927 09/30/17 1927 09/30/17 1927   99 1 °F (37 3 °C) 84 16 (!) 194/92 95 %      Temp Source Heart Rate Source Patient Position - Orthostatic VS BP Location FiO2 (%)   09/30/17 1927 09/30/17 1927 09/30/17 2043 09/30/17 1927 --   Oral Monitor Lying Right arm       Pain Score       09/30/17 1927       7           Past Medical History:   Diagnosis Date    Diabetes mellitus     Neuropathy     charcot- Bre tooth     History reviewed  No pertinent surgical history          Consults have been placed to:   IP CONSULT TO CASE MANAGEMENT    Vitals:    10/01/17 0702 10/01/17 0705 10/01/17 1515 10/01/17 2330   BP: 128/57 129/59 163/74 154/81   Pulse:   67 67   Resp:   18 16   Temp:   (!) 97 1 °F (36 2 °C) (!) 96 4 °F (35 8 °C)   TempSrc:   Tympanic Tympanic   SpO2:   96% 94%   Weight:       Height:           Most recent labs:    Recent Labs      10/01/17   0512   WBC  7 11   HGB  11 6*   HCT  35 4*   PLT  189   K  3 9   NA  140   CALCIUM  9 1   BUN  22   CREATININE  1 52*       Scheduled Meds:  amLODIPine 5 mg Oral Daily   cefTRIAXone 1,000 mg Intravenous Q24H   DULoxetine 30 mg Oral Daily   enoxaparin 40 mg Subcutaneous Daily   gabapentin 1,800 mg Oral HS   gabapentin 600 mg Oral BID (AM & Afternoon)   insulin aspart protamine-insulin aspart 15 Units Subcutaneous BID AC   insulin lispro 1-6 Units Subcutaneous HS   insulin lispro 2-12 Units Subcutaneous TID AC   morphine 15 mg Oral Q12H KIKA   nicotine 1 patch Transdermal Daily   polyvinyl alcohol 1 drop Both Eyes 4x Daily   traZODone 50 mg Oral HS     Continuous Infusions:  sodium chloride 75 mL/hr Last Rate: 75 mL/hr (10/01/17 2219)     PRN Meds:   acetaminophen    baclofen    hydrALAZINE    ondansetron    oxyCODONE    Surgical procedures (if appropriate):

## 2017-10-02 NOTE — PLAN OF CARE
Problem: PHYSICAL THERAPY ADULT  Goal: Performs mobility at highest level of function for planned discharge setting  See evaluation for individualized goals  Treatment/Interventions: Functional transfer training, LE strengthening/ROM, Elevations, Therapeutic exercise, Endurance training, Patient/family training, Bed mobility, Gait training, Spoke to nursing          See flowsheet documentation for full assessment, interventions and recommendations  Outcome: Progressing  Prognosis: Good  Problem List: Decreased strength, Decreased endurance, Impaired balance, Decreased mobility, Impaired sensation  Assessment: pt able to initiate B/L LE ther ex x10 reps c AROM  min verbal cues required for technique  able to return self to supine mod (I)  will cont skilled PT to maximize functional mobility + improve quality of life  upon d/c, recommend hhpt for home safely eval   Barriers to Discharge: Inaccessible home environment, Decreased caregiver support     Recommendation: Home PT     PT - OK to Discharge: Yes    See flowsheet documentation for full assessment

## 2017-10-02 NOTE — PHYSICAL THERAPY NOTE
PT Evaluation (15min)  (7:35-7:50)    Past Medical History:   Diagnosis Date    Diabetes mellitus     Neuropathy     charcot- Bre tooth      10/02/17 0750   Note Type   Note type Eval/Treat   Pain Assessment   Pain Assessment 0-10   Pain Score 9   Pain Type Chronic pain   Pain Location Back   Pain Orientation Bilateral   Hospital Pain Intervention(s) Ambulation/increased activity;Repositioned   Home Living   Type of [address] One level;Stairs to enter without rails  (1 steep RADHA)   Home Equipment Walker;Cane   Prior Function   Level of [place] Independent with ADLs and functional mobility  (ambulates c RW)   Lives With Family  (resides c brother who works)   [address] [place] [address] in the last 6 months (pt reports 3-4)   Vocational Retired   Comments (-) drive 2* poor vision   Restrictions/Precautions   Other Precautions Multiple lines; Fall Risk;Pain   General   Additional Pertinent History pt presents to [facility] s/p syncopal episode c pt falling fwd + hitting head  pt reports h/o multiple falls  PT consulted for mobility + d/c planning  activity as tolerated  Family/Caregiver Present No   RUE Assessment   RUE Assessment WFL  (4+/5)   LUE Assessment   LUE Assessment WFL  (4+/5)   RLE Assessment   RLE Assessment WFL  (4/5)   LLE Assessment   LLE Assessment WFL  (4/5)   Coordination   Sensation X   Light Touch   RLE Light Touch Impaired   RLE Light Touch Comments 2* charcot foot   LLE Light Touch Impaired   LLE Light Touch Comments 2* charcot foot   Proprioception   RLE Proprioception Grossly intact   LLE Proprioception Grossly Intact   Wound 10/01/17 Abrasion(s) Face   Date First Assessed/Time First Assessed: 10/01/17 0815   Pre-Existing Wound: Yes  Wound Type: Abrasion(s)  Location: Face   Wound Description Dry; Intact   Dressing Open to air   Bed Mobility   Supine to Sit 5  Supervision   Sit to Supine 5  Supervision   Transfers   Sit to Stand 4  Minimal assistance   Additional items Assist x 1;Verbal cues   Stand to Sit 5  Supervision   Additional items Verbal cues   Ambulation/Elevation   Gait pattern Narrow DOUG; Forward Flexion;Decreased foot clearance   Gait Assistance 4  Minimal assist   Additional items Assist x 1;Verbal cues   Assistive Device Rolling walker   Distance 100'x2   Balance   Static Sitting Good   Dynamic Sitting Good   Static Standing Poor +   Dynamic Standing Poor +   Ambulatory Poor +   Higher level balance (Tinetti score: 18/28 (high fall risk))   Activity Tolerance   Activity Tolerance Patient limited by fatigue;Patient limited by pain   Nurse Made Aware Mary   Assessment   Prognosis Good   Problem List Decreased strength;Decreased endurance; Impaired balance;Decreased mobility; Impaired sensation;Pain   Assessment pt is a 66y/o m who presents to [facility] c CYDNEY  PMH significant for suprapubic catheter, charcot vel tooth disease, + falls  pt s/p syncopal episode PTA resulting in forehead laceration  at baseline, ambulates mod (I) c RW  resides c brother in ranch home c 1 RADHA  admits to h/o 3-4 falls in past 6 months  currently requires min (A)x1 for functional mobility tasks OOB 2* deficits in strength, balance, gait quality, sensation + activity tolerance noted in PT exam above  Barthel Index 55/100  at high fall risk evidenced by Tinetti score of 18/28  ambulated distance of 100'x2 c RW c min verbal cues for technique  reports being limited by back pain  would benefit from skilled PT to maximize functional mobility + return home safely  upon d/c, recommend hhpt for home safety eval  PT eval of high complexity 2* above impairments + pt requiring ongoing medical management s/p syncopal episode  pt has multiple co-morbidities above impacting PT  alone during the day while brother at work + has 1 RADHA  also c h/o multiple falls  Barriers to Discharge Inaccessible home environment;Decreased caregiver support   Goals   Patient Goals "to go home"     STG Expiration Date 10/12/17   Short Term Goal #1 1  increase strength 1/2 grade, 2  perform transfers mod (I), 3  ambulate 300' mod (I) c RW s overt loss of balance, 4  negotiate 1 step mod (I) to safely enter home, 5  improve Tinetti score to 22/28 to decrease fall risk   Plan   Treatment/Interventions Functional transfer training;LE strengthening/ROM; Elevations; Therapeutic exercise; Endurance training;Patient/family training;Bed mobility;Gait training;Spoke to nursing   PT Frequency 5x/wk   Recommendation   Recommendation Home PT   PT - OK to Discharge Yes   Barthel Index   Feeding 10   Bathing 0   Grooming Score 5   Dressing Score 5   Bladder Score 0   Bowels Score 10   Toilet Use Score 5   Transfers (Bed/Chair) Score 10   Mobility (Level Surface) Score 10   Stairs Score 0   Barthel Index Score 55     Zuleika Distance, PT

## 2017-10-02 NOTE — PLAN OF CARE

## 2017-10-02 NOTE — PROGRESS NOTES
[facility] Progress Note  Patient: Yahir Edge 75 y o  male   MRN: [id number]  PCP: Patricia Calderon MD  Unit/Bed#: [place] [address] [id number] Encounter: [id number]  Date Of Visit: 10/02/17    Assessment:    Principal Problem:    CYDNEY (acute kidney injury)  Active Problems:    Suprapubic catheter    UTI (urinary tract infection)    Charcot-Bre-Tooth disease    Frequent falls    Type 2 diabetes mellitus with hyperlipidemia    Essential hypertension      Plan:    · CYDNEY - 2/2 possible urinary tract infection, and possible dehydration  Improving, will continue with IV fluids  · Possible urinary tract infection versus asymptomatic bacteriuria - S/p post suprapubic catheter  Blood Cx NG24H  Urine Cx will reincubate  C/w empiric ceftriaxone  · Frequent falls - Physical therapy evaluation pending  · Charcot Bre tooth disease - C/w gabapentin and Cymbalta for autonomic neuropathy  · Type 2 diabetes mellitus with hyperlipidemia - Continue 70/30  · Continue opioid dependence - C/w MS Contin  · Essential hypertension - Stable  Will discontinue amlodipine  · Neck pain - Cervical spine MRI ordered  Results pending  VTE Pharmacologic Prophylaxis:   Pharmacologic: Enoxaparin (Lovenox)  Education and Discussions with Family / Patient: Yes  Time Spent for Care: 15 minutes  More than 50% of total time spent on counseling and coordination of care as described above  Current Length of Stay: 2 day(s)  Current Patient Status: Inpatient   Discharge Plan / Estimated Discharge Date: Pending physical therapy recommendation     Code Status: Level 3 - DNAR and DNI      Subjective:   Patient seen and examined at bedside  In no acute distress, nontoxic in appearance  Complains of neck pain today  He states that he had always had neck pain, but had gotten worse since he fell on Friday morning  He denies any recent nausea, vomiting, fever, chills, shortness of breath, or chest pains      Objective:     Vitals:   Temp (24hrs), Av °F (36 1 °C), Min:96 4 °F (35 8 °C), Max:97 4 °F (36 3 °C)    HR:  [63-67] 63  Resp:  [16-18] 17  BP: (131-163)/(60-81) 131/60  SpO2:  [94 %-96 %] 94 %  Body mass index is 25 1 kg/m²  Input and Output Summary (last 24 hours): Intake/Output Summary (Last 24 hours) at 10/02/17 1008  Last data filed at 10/02/17 0801   Gross per 24 hour   Intake             1360 ml   Output             3400 ml   Net            -2040 ml       Physical Exam:     Physical Exam    General appearance:  Alert, and cooperative  Head:  normocephalic, without obvious abnormality  Lungs:  clear to auscultation bilaterally  No wheezing, or rales noted bilaterally  Heart:  Regular rate and rhythm  Abdomen:  Soft, nontender, no masses noted  Back:  Within normal limits, normal range of motion  Extremities:  atrophy of the lower extremities bilaterally  Neurologic:  gross sensation is intact bilaterally    Additional Data:     Labs:      Results from last 7 days  Lab Units 10/02/17  0454  09/30/17  2029   WBC Thousand/uL 7 03  < > 7 60   HEMOGLOBIN g/dL 11 8*  < > 12 3   HEMATOCRIT % 35 8*  < > 37 3   PLATELETS Thousands/uL 203  < > 225   NEUTROS PCT %  --   --  62   LYMPHS PCT %  --   --  27   MONOS PCT %  --   --  7   EOS PCT %  --   --  4   < > = values in this interval not displayed  Results from last 7 days  Lab Units 10/02/17  0454   SODIUM mmol/L 140   POTASSIUM mmol/L 4 0   CHLORIDE mmol/L 103   CO2 mmol/L 32   BUN mg/dL 22   CREATININE mg/dL 1 36*   CALCIUM mg/dL 9 2   GLUCOSE RANDOM mg/dL 143*           * I Have Reviewed All Lab Data Listed Above  Imaging:    Imaging Reports Reviewed Today Include:     Recent Cultures (last 7 days):       Results from last 7 days  Lab Units 17   BLOOD CULTURE  No Growth at 24 hrs   No Growth at 24 hrs   --    URINE CULTURE   --   --  Culture too young- will reincubate       Last 24 Hours Medication List:     amLODIPine 5 mg Oral Daily cefTRIAXone 1,000 mg Intravenous Q24H   DULoxetine 30 mg Oral Daily   enoxaparin 40 mg Subcutaneous Daily   gabapentin 1,800 mg Oral HS   gabapentin 600 mg Oral BID (AM & Afternoon)   insulin aspart protamine-insulin aspart 15 Units Subcutaneous BID AC   insulin lispro 1-6 Units Subcutaneous HS   insulin lispro 2-12 Units Subcutaneous TID AC   morphine 15 mg Oral Q12H [address]   nicotine 1 patch Transdermal Daily   polyvinyl alcohol 1 drop Both Eyes 4x Daily   traZODone 50 mg Oral HS        Today, Patient Was Seen By: Eilene Schwab, DPM    ** Please Note: This note has been constructed using a voice recognition system   **

## 2017-10-02 NOTE — SOCIAL WORK
CM met with patient to address d/c needs  Patient lives in a home with his brother and sister  He is independent with ADLs, uses rw, has hx of VNA through [facility]  Patient drives and has transportation home upon d/c  Has help at home and rx coverage, uses [facility] [address]  PCP is Dr Marli Stern  PT recommending HHPT, however patient declined  CM to follow as needed

## 2017-10-03 LAB
GLUCOSE SERPL-MCNC: 116 MG/DL (ref 65–140)
GLUCOSE SERPL-MCNC: 175 MG/DL (ref 65–140)
GLUCOSE SERPL-MCNC: 205 MG/DL (ref 65–140)
GLUCOSE SERPL-MCNC: 98 MG/DL (ref 65–140)

## 2017-10-03 PROCEDURE — 97116 GAIT TRAINING THERAPY: CPT

## 2017-10-03 PROCEDURE — 97110 THERAPEUTIC EXERCISES: CPT

## 2017-10-03 PROCEDURE — 82948 REAGENT STRIP/BLOOD GLUCOSE: CPT

## 2017-10-03 RX ORDER — BACITRACIN 500 [USP'U]/G
OINTMENT OPHTHALMIC 2 TIMES DAILY
Status: DISCONTINUED | OUTPATIENT
Start: 2017-10-03 | End: 2017-10-04 | Stop reason: HOSPADM

## 2017-10-03 RX ADMIN — POLYVINYL ALCOHOL 1 DROP: 14 SOLUTION/ DROPS OPHTHALMIC at 21:21

## 2017-10-03 RX ADMIN — DULOXETINE 30 MG: 30 CAPSULE, DELAYED RELEASE ORAL at 18:22

## 2017-10-03 RX ADMIN — INSULIN LISPRO 2 UNITS: 100 INJECTION, SOLUTION INTRAVENOUS; SUBCUTANEOUS at 16:47

## 2017-10-03 RX ADMIN — GABAPENTIN 600 MG: 300 CAPSULE ORAL at 13:06

## 2017-10-03 RX ADMIN — ENOXAPARIN SODIUM 40 MG: 40 INJECTION SUBCUTANEOUS at 09:05

## 2017-10-03 RX ADMIN — POLYVINYL ALCOHOL 1 DROP: 14 SOLUTION/ DROPS OPHTHALMIC at 09:05

## 2017-10-03 RX ADMIN — MORPHINE SULFATE 15 MG: 15 TABLET, EXTENDED RELEASE ORAL at 09:05

## 2017-10-03 RX ADMIN — TRAZODONE HYDROCHLORIDE 50 MG: 50 TABLET ORAL at 21:17

## 2017-10-03 RX ADMIN — GABAPENTIN 600 MG: 300 CAPSULE ORAL at 09:06

## 2017-10-03 RX ADMIN — GABAPENTIN 1800 MG: 400 CAPSULE ORAL at 21:16

## 2017-10-03 RX ADMIN — POLYVINYL ALCOHOL 1 DROP: 14 SOLUTION/ DROPS OPHTHALMIC at 18:22

## 2017-10-03 RX ADMIN — BACLOFEN 10 MG: 10 TABLET ORAL at 09:30

## 2017-10-03 RX ADMIN — INSULIN ASPART 15 UNITS: 100 INJECTION, SUSPENSION SUBCUTANEOUS at 16:46

## 2017-10-03 RX ADMIN — OXYCODONE HYDROCHLORIDE 10 MG: 10 TABLET ORAL at 16:55

## 2017-10-03 RX ADMIN — BACLOFEN 10 MG: 10 TABLET ORAL at 23:25

## 2017-10-03 RX ADMIN — BACITRACIN: 500 OINTMENT OPHTHALMIC at 18:22

## 2017-10-03 RX ADMIN — INSULIN LISPRO 4 UNITS: 100 INJECTION, SOLUTION INTRAVENOUS; SUBCUTANEOUS at 13:03

## 2017-10-03 RX ADMIN — CEFTRIAXONE 1000 MG: 1 INJECTION, POWDER, FOR SOLUTION INTRAMUSCULAR; INTRAVENOUS at 02:20

## 2017-10-03 RX ADMIN — INSULIN ASPART 15 UNITS: 100 INJECTION, SUSPENSION SUBCUTANEOUS at 09:06

## 2017-10-03 RX ADMIN — POLYVINYL ALCOHOL 1 DROP: 14 SOLUTION/ DROPS OPHTHALMIC at 13:03

## 2017-10-03 RX ADMIN — MORPHINE SULFATE 15 MG: 15 TABLET, EXTENDED RELEASE ORAL at 20:25

## 2017-10-03 NOTE — PLAN OF CARE
Problem: PHYSICAL THERAPY ADULT  Goal: Performs mobility at highest level of function for planned discharge setting  See evaluation for individualized goals  Treatment/Interventions: Functional transfer training, LE strengthening/ROM, Elevations, Therapeutic exercise, Endurance training, Patient/family training, Bed mobility, Gait training, Spoke to nursing          See flowsheet documentation for full assessment, interventions and recommendations  Outcome: Progressing  Prognosis: Good  Problem List: Decreased strength, Decreased endurance, Impaired balance, Decreased mobility, Impaired sensation, Decreased skin integrity, Pain  Assessment: pt demonstrating progress c PT  performs mobility tasks c (S)  ambulated distances of 110'x2 c RW  upon initiating ambulation during PT session, pt requested seated rest 2* weakness + ?dizziness  BP stable at 118/61 c pt able to commence c ambulation  pt reported just feeling "off" today  performed B/L LE ther ex in sitting x15 reps c AROM in pain free range  will cont skilled PT to further maximize functional mobility + return to PLOF  upon d/c, would benefit from hhpt, however pt declining at this time  will initiate stairs next session  Barriers to Discharge: Inaccessible home environment, Decreased caregiver support     Recommendation: Home PT (pt declining)     PT - OK to Discharge: Yes    See flowsheet documentation for full assessment

## 2017-10-03 NOTE — PHYSICAL THERAPY NOTE
PT Progress Note (42min)  (10:50-11:32)       10/03/17 1132   Pain Assessment   Pain Assessment 0-10   Pain Score 4   Pain Location Abdomen   Pain Orientation Bilateral   Hospital Pain Intervention(s) Ambulation/increased activity   Restrictions/Precautions   Other Precautions Multiple lines; Fall Risk;Pain   General   Chart Reviewed Yes   Response to Previous Treatment Patient with no complaints from previous session  Family/Caregiver Present No   Cognition   Orientation Level Oriented X4   Subjective   Subjective pt agreeable to PT  "I'm worried there is something going on with my eyes "   Transfers   Sit to Stand 5  Supervision  (/71 seated, p standing 118/61)   Additional items Armrests; Verbal cues   Stand to Sit 5  Supervision   Additional items Armrests; Verbal cues   Ambulation/Elevation   Gait pattern Forward Flexion;Decreased foot clearance   Gait Assistance 5  Supervision   Additional items Verbal cues   Assistive Device Rolling walker   Distance 20'x2 + 5'x2 + 110'x2 c seated rests  (pt requested to sit c mobility trials; BP stable at 118/61)   Balance   Static Sitting Good   Dynamic Sitting Good   Static Standing Fair -   Dynamic Standing Fair -   Ambulatory Fair -   Endurance Deficit   Endurance Deficit Yes   Endurance Deficit Description weakness, ?dizziness   Activity Tolerance   Activity Tolerance Patient limited by fatigue;Patient limited by pain   Nurse Made Aware Damion   Exercises   Heelslides Sitting;15 reps;AROM; Bilateral   Hip Abduction Sitting;15 reps;AROM; Bilateral   Knee AROM Long Arc Quad Sitting;15 reps;AROM; Bilateral   Marching Sitting;15 reps;AROM; Bilateral   Assessment   Prognosis Good   Problem List Decreased strength;Decreased endurance; Impaired balance;Decreased mobility; Impaired sensation;Decreased skin integrity;Pain   Assessment pt demonstrating progress c PT  performs mobility tasks c (S)   ambulated distances of 110'x2 c RW  upon initiating ambulation during PT session, pt requested seated rest 2* weakness + ?dizziness  BP stable at 118/61 c pt able to commence c ambulation  pt reported just feeling "off" today  performed B/L LE ther ex in sitting x15 reps c AROM in pain free range  will cont skilled PT to further maximize functional mobility + return to PLOF  upon d/c, would benefit from hhpt, however pt declining at this time  will initiate stairs next session  Barriers to Discharge Inaccessible home environment;Decreased caregiver support   Goals   Patient Goals "to go home"  STG Expiration Date 10/12/17   Treatment Day 2   Plan   Treatment/Interventions Functional transfer training;LE strengthening/ROM; Elevations; Therapeutic exercise; Endurance training;Patient/family training;Bed mobility;Gait training;Spoke to nursing   Progress Progressing toward goals   PT Frequency 5x/wk   Recommendation   Recommendation Home PT  (pt declining)   PT - OK to Discharge Yes     Lázaro Walker, PT

## 2017-10-03 NOTE — PROGRESS NOTES
[facility] Progress Note  Patient: Robin Nolasco 75 y o  male   MRN: [id number]  PCP: Celina Faye MD  Unit/Bed#: [facility] 222-02 Encounter: [id number]  Date Of Visit: 10/03/17    Assessment:    Principal Problem:    CYDNEY (acute kidney injury)  Active Problems:    Suprapubic catheter    UTI (urinary tract infection)    Charcot-Bre-Tooth disease    Frequent falls    Type 2 diabetes mellitus with hyperlipidemia    Essential hypertension      Plan:    · CYDNEY - 2/2 possible urinary tract infection, and possible dehydration  Improving, will continue with IV fluids  C/w antibiotics  · Possible urinary tract infection versus asymptomatic bacteriuria - S/p post suprapubic catheter  Blood Cx NG48H  Urine Cx show preliminary results of yeast, and GNR  · Frequent falls - Physical therapy recommends home Pt, but patient is declining   · Charcot Bre tooth disease - C/w gabapentin and Cymbalta for autonomic neuropathy  · Type 2 diabetes mellitus with hyperlipidemia - Continue 70/30  · Continue opioid dependence - C/w MS Contin  · Essential hypertension - Stable  Will discontinue amlodipine  · Neck pain - Cervical spine MRI ordered  Results show mild compression of the cord at C2-C3, and dural flattening at C3-C4, and indentation on the right lateral cord at C5-C6  No associated cord signal abnormality  Moderate to severe degree of foraminal stenosis  VTE Pharmacologic Prophylaxis:   Pharmacologic: Enoxaparin (Lovenox)    Education and Discussions with Family / Patient: Yes    Time Spent for Care: 20 minutes  More than 50% of total time spent on counseling and coordination of care as described above  Current Length of Stay: 3 day(s)    Current Patient Status: Inpatient     Discharge Plan / Estimated Discharge Date:     Code Status: Level 3 - DNAR and DNI      Subjective:   Patient seen and examined at bedside  In no acute distress, nontoxic in appearance    Patient still complains of neck pain today  He states that the MRI was done yesterday afternoon  He states that he had a fever this morning of 100 9, but has gone down to 99 3  He states that he has not received any Tylenol to reduce the fever  He also states that he has had some minor chills  He denies any recent nausea, vomiting, shortness of breath, chest pains  Objective:     Vitals:   Temp (24hrs), Av 6 °F (37 6 °C), Min:98 7 °F (37 1 °C), Max:100 9 °F (38 3 °C)    HR:  [81-85] 81  Resp:  [18-19] 19  BP: (129-177)/(60-84) 129/60  SpO2:  [94 %-96 %] 94 %  Body mass index is 25 1 kg/m²  Input and Output Summary (last 24 hours): Intake/Output Summary (Last 24 hours) at 10/03/17 1343  Last data filed at 10/03/17 0608   Gross per 24 hour   Intake                0 ml   Output             3200 ml   Net            -3200 ml       Physical Exam:     Physical Exam 10/3/17    General appearance:  Alert, and cooperative  Head:  normocephalic, without obvious abnormality  Lungs:  clear to auscultation bilaterally  No wheezing, or rales noted bilaterally  Heart:  Regular rate and rhythm  Abdomen:  Soft, nontender, no masses noted  Back:  Within normal limits, normal range of motion  Extremities:  atrophy of the lower extremities bilaterally  Neurologic:  gross sensation is intact bilaterally    Additional Data:     Labs:      Results from last 7 days  Lab Units 10/02/17  0454  17  2029   WBC Thousand/uL 7 03  < > 7 60   HEMOGLOBIN g/dL 11 8*  < > 12 3   HEMATOCRIT % 35 8*  < > 37 3   PLATELETS Thousands/uL 203  < > 225   NEUTROS PCT %  --   --  62   LYMPHS PCT %  --   --  27   MONOS PCT %  --   --  7   EOS PCT %  --   --  4   < > = values in this interval not displayed      Results from last 7 days  Lab Units 10/02/17  0454   SODIUM mmol/L 140   POTASSIUM mmol/L 4 0   CHLORIDE mmol/L 103   CO2 mmol/L 32   BUN mg/dL 22   CREATININE mg/dL 1 36*   CALCIUM mg/dL 9 2   GLUCOSE RANDOM mg/dL 143*           * I Have Reviewed All Lab Data Listed Above  * Additional Pertinent Lab Tests Reviewed: All Labs Within Last 24 Hours Reviewed    Imaging:    Imaging Reports Reviewed Today Include: MRI  Results above  Recent Cultures (last 7 days):       Results from last 7 days  Lab Units 09/30/17 2034 09/30/17 2031 09/30/17 2002   BLOOD CULTURE  No Growth at 48 hrs  No Growth at 48 hrs  --    URINE CULTURE   --   --  >100,000 cfu/ml Yeast*  50,000-59,000 cfu/ml Gram Negative Moris       Last 24 Hours Medication List:     cefTRIAXone 1,000 mg Intravenous Q24H   DULoxetine 30 mg Oral Daily   enoxaparin 40 mg Subcutaneous Daily   gabapentin 1,800 mg Oral HS   gabapentin 600 mg Oral BID (AM & Afternoon)   insulin aspart protamine-insulin aspart 15 Units Subcutaneous BID AC   insulin lispro 1-6 Units Subcutaneous HS   insulin lispro 2-12 Units Subcutaneous TID AC   morphine 15 mg Oral Q12H KIKA   nicotine 1 patch Transdermal Daily   polyvinyl alcohol 1 drop Both Eyes 4x Daily   traZODone 50 mg Oral HS        Today, Patient Was Seen By: Andrew Byers DPM    ** Please Note: This note has been constructed using a voice recognition system   **

## 2017-10-03 NOTE — CONSULTS
Consultation - Urology   Adelfo Cuellar 75 y o  male MRN: [id number]  Unit/Bed#: [facility] [address] 222-02 Encounter: [id number] 10/3/2017           Assessment/Plan   Chronic urinary colonization due to indwelling SP tube for > 3 yrs  Not certain if has truly symptomatic UTI at this time  Culture - GNR, and yeast   On ceftriaxone  SP due to be changed in one week, we will follow while here and change tube before discharge  Pt says he used to take cipro as needed at home, will discourage this to prevent reisistant organisms  History of Present Illness     Attending: Saw Resendiz MD    Reason for Consult / SP tube    Medications  Meds/Allergies        acetaminophen    baclofen    oxyCODONE  Prior to Admission Medications   Prescriptions Last Dose Informant Patient Reported? Taking?    DULoxetine (CYMBALTA) 30 mg delayed release capsule   Yes Yes   Sig: Take by mouth   ERYTHROMYCIN OP  Self Yes Yes   Sig: Apply 5 mg to eye Medrol Dose Pack scheduling ONLY   OXYCODONE HCL PO   Yes Yes   Sig: Take by mouth   Polyethylene Glycol 3350 (MIRALAX PO)   Yes No   Sig: Take by mouth   baclofen 10 mg tablet   Yes Yes   Sig: Take by mouth   gabapentin (NEURONTIN) 600 MG tablet   Yes Yes   Sig: Take by mouth   insulin NPH-insulin regular (NOVOLIN 70/30) 100 units/mL subcutaneous injection   Yes Yes   Sig: Inject under the skin   morphine (MS CONTIN) 15 mg 12 hr tablet   Yes Yes   Sig: Take by mouth   polyethylene glycol-propylene glycol (SYSTANE ULTRA) 0 4-0 3 %  Self Yes Yes   Sig: Apply to eye 3 (three) times a day   traZODone (DESYREL) 50 mg tablet   Yes Yes   Sig: Take by mouth      Facility-Administered Medications: None       Current Facility-Administered Medications:     acetaminophen (TYLENOL) tablet 650 mg, 650 mg, Oral, Q6H PRN, Mari Luna DO    bacitracin ophthalmic ointment, , Both Eyes, BID, Saw Resendiz MD    baclofen tablet 10 mg, 10 mg, Oral, TID PRN, Magali Ordoñez DO, 10 mg at 10/03/17 0930   cefTRIAXone (ROCEPHIN) 1,000 mg in dextrose 5 % 50 mL IVPB, 1,000 mg, Intravenous, Q24H, Kary Grajeda MD, Stopped at 10/03/17 0250    DULoxetine (CYMBALTA) delayed release capsule 30 mg, 30 mg, Oral, Daily, Kary Grajeda MD, 30 mg at 10/02/17 1817    enoxaparin (LOVENOX) subcutaneous injection 40 mg, 40 mg, Subcutaneous, Daily, Kary Grajeda MD, 40 mg at 10/03/17 0905    gabapentin (NEURONTIN) capsule 1,800 mg, 1,800 mg, Oral, HS, Maxi Ordoñez DO, 1,800 mg at 10/02/17 2152    gabapentin (NEURONTIN) capsule 600 mg, 600 mg, Oral, BID (AM & Afternoon), Maxi Ordoñez DO, 600 mg at 10/03/17 1306    insulin aspart protamine-insulin aspart (NovoLOG 70/30) 100 units/mL subcutaneous injection 15 Units, 15 Units, Subcutaneous, BID AC, Lanny Quinones MD, 15 Units at 10/03/17 0906    insulin lispro (HumaLOG) 100 units/mL subcutaneous injection 1-6 Units, 1-6 Units, Subcutaneous, HS, Lanny Quinones MD, 1 Units at 10/01/17 2111    insulin lispro (HumaLOG) 100 units/mL subcutaneous injection 2-12 Units, 2-12 Units, Subcutaneous, TID AC, 4 Units at 10/03/17 1303 **AND** Fingerstick Glucose (POCT), , , TID AC, Kary Grajeda MD    morphine (MS CONTIN) ER tablet 15 mg, 15 mg, Oral, Q12H KIKA, Kary Grajeda MD, 15 mg at 10/03/17 0905    nicotine (NICODERM CQ) 14 mg/24hr TD 24 hr patch 1 patch, 1 patch, Transdermal, Daily, Kary Grajeda MD    oxyCODONE (ROXICODONE) immediate release tablet 10 mg, 10 mg, Oral, Q8H PRN, Lanny Quinones MD, 10 mg at 10/02/17 1817    polyvinyl alcohol (LIQUIFILM TEARS) 1 4 % ophthalmic solution 1 drop, 1 drop, Both Eyes, 4x Daily, Maxi Ordoñez DO, 1 drop at 10/03/17 1303    traZODone (DESYREL) tablet 50 mg, 50 mg, Oral, HS, Kary Reed MD, 50 mg at 10/02/17 9653  Review of Systems  10 point review of systems negative except as noted in HPI  Allergies  No Known Allergies  PMH  Past Medical History:   Diagnosis Date    Diabetes mellitus     Neuropathy     charcotMario Phillip tooth     Past surgical history  History reviewed  No pertinent surgical history  Social history  Social History   Substance Use Topics    Smoking status: Current Some Day Smoker     Types: Cigars    Smokeless tobacco: Never Used    Alcohol use No     ?  Physical Exam  General appearance: alert, appears stated age and cooperative  Head: Normocephalic, without obvious abnormality, atraumatic  Neck: no adenopathy, no carotid bruit, no JVD, supple, symmetrical, trachea midline and thyroid not enlarged, symmetric, no tenderness/mass/nodules  Lungs: clear to auscultation bilaterally  Heart: regular rate and rhythm, S1, S2 normal, no murmur, click, rub or gallop  Abdomen: soft, non-tender; bowel sounds normal; no masses,  no organomegaly and SP site clean  Male genitalia: normal    Consults    Review of Systems    Historical Information   No Known Allergies  Past Medical History:   Diagnosis Date    Diabetes mellitus     Neuropathy     marincojason- Bre tooth     History reviewed  No pertinent surgical history  Social History   History   Alcohol Use No     History   Drug Use No     History   Smoking Status    Current Some Day Smoker    Types: Cigars   Smokeless Tobacco    Never Used     Family History: non-contributory        Lab Results: CBC: No results found for: WBC, HGB, HCT, MCV, PLT, ADJUSTEDWBC, MCH, MCHC, RDW, MPV, NRBC  CMP: No results found for: NA, CL, CO2, ANIONGAP, BUN, CREATININE, GLUCOSE, CALCIUM, AST, ALT, ALKPHOS, PROT, ALBUMIN, BILITOT, EGFR    Imaging Studies: I have personally reviewed pertinent reports  Ct Head Wo Contrast    Result Date: 10/1/2017  Narrative: CT BRAIN - WITHOUT CONTRAST INDICATION:  Head injury COMPARISON:  None  TECHNIQUE:  CT examination of the brain was performed  In addition to axial images, coronal reformatted images were created and submitted for interpretation  Radiation dose length product (DLP) for this visit:  1047 mGy-cm     This examination, like all CT scans performed in the [facility], was performed utilizing techniques to minimize radiation dose exposure, including the use of iterative reconstruction and automated exposure control  IMAGE QUALITY:  Diagnostic  FINDINGS:  PARENCHYMA:  No intracranial mass, mass effect or midline shift  No CT signs of acute infarction  There is no parenchymal hemorrhage  Age-appropriate volume loss  Chronic small vessel disease  VENTRICLES AND EXTRA-AXIAL SPACES:  Normal for patient's age  VISUALIZED ORBITS AND PARANASAL SINUSES:  Scattered trace mucosal disease CALVARIUM AND EXTRACRANIAL SOFT TISSUES:  Chronic White root deviation of the nasal bridge likely related to remote injury  Impression: No acute intracranial abnormality  Workstation performed: [id number]     Mri Cervical Spine Wo Contrast    Result Date: 10/2/2017  Narrative: MRI CERVICAL SPINE WITHOUT CONTRAST INDICATION:  Neck pain COMPARISON:  None  TECHNIQUE:  Sagittal T1, sagittal T2, sagittal inversion recovery, axial T2, axial  2D merge     IMAGE QUALITY:  Diagnostic FINDINGS: ALIGNMENT:  Reversal of the normal cervical lordosis  There is fusion of the C4-C7 vertebral bodies  Large bulky anterior osteophyte at C3-C4 without clear osseous fusion  Articular pillars appear relatively maintained  MARROW SIGNAL:  Marrow signal is relatively preserved  No evidence for marrow edema pattern  CERVICAL AND VISUALIZED THORACIC CORD:  The thoracic cord, while compressed as it appeared normal in signal  PREVERTEBRAL AND PARASPINAL SOFT TISSUES:   Normal         VISUALIZED POSTERIOR FOSSA:  The visualized posterior fossa demonstrates no abnormal signal  CERVICAL DISC SPACES:     C2-C3:  Disc osteophyte complex, uncovertebral hypertrophy, and facet arthropathy  Mild compression on the cord  Moderate right and moderate left neural foraminal stenosis   C3-C4:  Disc osteophyte complex, uncovertebral hypertrophy, and facet arthropathy  Flattening of the cord without compression  Moderate right and severe left neural foraminal stenosis  C4-C5:  Dorsal bony hypertrophy from fusion  Indentation on the ventral and dorsal thecal sac without cord deformity  Moderate right and mild left neural foraminal stenosis  C5-C6:  Dorsal bony hypertrophy from fusion  Indentation on the right ventral cord  Moderate to severe right and moderate to severe left neural foraminal stenosis  C6-C7:  Dorsal bony hypertrophy from fusion  No significant spinal canal stenosis  Moderate right and moderate left neural foraminal stenosis  C7-T1:  Disc osteophyte complex, uncovertebral hypertrophy, and facet arthropathy  No significant spinal canal stenosis  Moderate right and moderate left neural foraminal stenosis  UPPER THORACIC DISC SPACES:  Scattered spondylotic changes without cord compression  Impression: Multilevel spondylosis as detailed level by level  Mild compression of the cord at C2-C3, ventral flattening at C3-C4, and indentation on the right lateral cord at C5-C6  No associated cord signal abnormality  Moderate to severe degrees of foraminal stenosis  Workstation performed: [id number]       EKG, Pathology, and Other Studies: I have personally reviewed pertinent reports  Counseling / Coordination of Care  Total floor / unit time spent today 30 minutes  Greater than 50% of total time was spent with the patient and / or family counseling and / or coordination of care       Jayden Joiner MD

## 2017-10-04 VITALS
RESPIRATION RATE: 19 BRPM | DIASTOLIC BLOOD PRESSURE: 80 MMHG | HEART RATE: 75 BPM | OXYGEN SATURATION: 94 % | BODY MASS INDEX: 25.22 KG/M2 | WEIGHT: 190.26 LBS | HEIGHT: 73 IN | TEMPERATURE: 98.8 F | SYSTOLIC BLOOD PRESSURE: 165 MMHG

## 2017-10-04 LAB
BACTERIA UR CULT: ABNORMAL
BACTERIA UR CULT: ABNORMAL
GLUCOSE SERPL-MCNC: 115 MG/DL (ref 65–140)
GLUCOSE SERPL-MCNC: 181 MG/DL (ref 65–140)
GLUCOSE SERPL-MCNC: 220 MG/DL (ref 65–140)

## 2017-10-04 PROCEDURE — 82948 REAGENT STRIP/BLOOD GLUCOSE: CPT

## 2017-10-04 PROCEDURE — 97116 GAIT TRAINING THERAPY: CPT

## 2017-10-04 PROCEDURE — 97530 THERAPEUTIC ACTIVITIES: CPT

## 2017-10-04 PROCEDURE — 90686 IIV4 VACC NO PRSV 0.5 ML IM: CPT | Performed by: INTERNAL MEDICINE

## 2017-10-04 PROCEDURE — G0008 ADMIN INFLUENZA VIRUS VAC: HCPCS | Performed by: INTERNAL MEDICINE

## 2017-10-04 RX ORDER — NICOTINE 21 MG/24HR
1 PATCH, TRANSDERMAL 24 HOURS TRANSDERMAL DAILY
Qty: 28 PATCH | Refills: 0 | Status: SHIPPED | OUTPATIENT
Start: 2017-10-05 | End: 2018-03-22 | Stop reason: ALTCHOICE

## 2017-10-04 RX ADMIN — POLYVINYL ALCOHOL 1 DROP: 14 SOLUTION/ DROPS OPHTHALMIC at 18:06

## 2017-10-04 RX ADMIN — INSULIN LISPRO 4 UNITS: 100 INJECTION, SOLUTION INTRAVENOUS; SUBCUTANEOUS at 11:51

## 2017-10-04 RX ADMIN — POLYVINYL ALCOHOL 1 DROP: 14 SOLUTION/ DROPS OPHTHALMIC at 08:35

## 2017-10-04 RX ADMIN — CEFTRIAXONE 1000 MG: 1 INJECTION, POWDER, FOR SOLUTION INTRAMUSCULAR; INTRAVENOUS at 01:54

## 2017-10-04 RX ADMIN — INSULIN ASPART 15 UNITS: 100 INJECTION, SUSPENSION SUBCUTANEOUS at 09:33

## 2017-10-04 RX ADMIN — INSULIN ASPART 15 UNITS: 100 INJECTION, SUSPENSION SUBCUTANEOUS at 16:57

## 2017-10-04 RX ADMIN — ACETAMINOPHEN 650 MG: 325 TABLET, FILM COATED ORAL at 08:30

## 2017-10-04 RX ADMIN — GABAPENTIN 600 MG: 300 CAPSULE ORAL at 08:31

## 2017-10-04 RX ADMIN — INSULIN LISPRO 2 UNITS: 100 INJECTION, SOLUTION INTRAVENOUS; SUBCUTANEOUS at 16:58

## 2017-10-04 RX ADMIN — MORPHINE SULFATE 15 MG: 15 TABLET, EXTENDED RELEASE ORAL at 08:30

## 2017-10-04 RX ADMIN — ENOXAPARIN SODIUM 40 MG: 40 INJECTION SUBCUTANEOUS at 08:31

## 2017-10-04 RX ADMIN — BACITRACIN: 500 OINTMENT OPHTHALMIC at 08:35

## 2017-10-04 RX ADMIN — DULOXETINE 30 MG: 30 CAPSULE, DELAYED RELEASE ORAL at 18:05

## 2017-10-04 RX ADMIN — BACLOFEN 10 MG: 10 TABLET ORAL at 08:35

## 2017-10-04 RX ADMIN — POLYVINYL ALCOHOL 1 DROP: 14 SOLUTION/ DROPS OPHTHALMIC at 11:51

## 2017-10-04 RX ADMIN — GABAPENTIN 600 MG: 300 CAPSULE ORAL at 13:47

## 2017-10-04 RX ADMIN — INFLUENZA VIRUS VACCINE 0.5 ML: 15; 15; 15; 15 SUSPENSION INTRAMUSCULAR at 18:06

## 2017-10-04 RX ADMIN — BACITRACIN: 500 OINTMENT OPHTHALMIC at 18:05

## 2017-10-04 NOTE — DISCHARGE SUMMARY
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  7  Chronic pain syndrome related to previous cervical spine fracture    Consulting Physicians:  None    Procedures Performed:   None    HPI:  The patient is a 50-year-old man with a long history of type 2 diabetes complicated by peripheral and autonomic neuropathy  He has had long history of falls  Many of these have been caused by syncope  On the day of admission the patient had a syncopal episode and fell  He was brought to the emergency room for further evaluation and was admitted  The patient does have a history of a neurogenic bladder and has a chronic suprapubic tube in  The possibility of urinary tract infection contributing to his symptoms was considered  Hospital Course: The patient was admitted to the hospital and hydrated with intravenous fluid  With this, his acute kidney injury resolved  Urine culture was obtained  The patient was started empirically on ceftriaxone  Ultimately, the patient's urine culture grew E bacteria resistant to ceftriaxone  One hundred thousand colonies of yeast also grew  I do not believe that the patient's clinical improvement during his stay was related to antibiotic therapy  I suspect that he has asymptomatic bacteriuria  The patient was noted to be hypertensive  He was given 1 dose of amlodipine because of this    Later that day, he was noted to have a blood pressure of 70 systolic while standing  This is consistent with his clinical course over many years  I therefore stopped all antihypertensives  Unfortunately, I believe there is no alternative but tolerates supine hypertension in order to minimize the patient's syncopal episodes  The patient was given physical therapy during his stay and his functional status improved  His diabetic control was adequate during his stay  The patient felt reasonably well on the day of discharge  Vital signs were stable  Lungs were clear  Cardiac exam was normal   The abdomen was soft and nontender  There was no edema  Disposition:  The patient was discharged home on October 4th  He was asked to continue a diabetic diet  His activity will be as tolerated  He will follow up in our office within 1 week  He was given an influenza vaccine during his hospitalization  Discharge instructions/Information to patient and family:   See after visit summary for information provided to patient and family  Provisions for Follow-Up Care:  See after visit summary for information related to follow-up care and any pertinent home health orders  Planned Readmission: No    Discharge Statement   I spent 40 minutes discharging the patient  This time was spent on the day of discharge  I had direct contact with the patient on the day of discharge  Discharge Medications:  See after visit summary for reconciled discharge medications provided to patient and family

## 2017-10-04 NOTE — PROGRESS NOTES
Patient discharged from Dr Laine Case standpoint  He was awaiting urology to see him and possible change out his super pubic catheter  Made several attempts to touch base with Dr Stan Dempsey office and paged the NP Janusz Bush  No response, patient informed me of his appt  In the office on Friday  Patient discharged

## 2017-10-04 NOTE — PHYSICAL THERAPY NOTE
Physical Therapy Treatment Note     10/04/17 1105   Pain Assessment   Pain Assessment 0-10   Pain Score 8   Pain Type Chronic pain   Pain Location Back;Neck   Pain Orientation Bilateral   Response to Interventions tolerated satisified   Restrictions/Precautions   Other Precautions Multiple lines; Fall Risk;Pain  (suprapubic catheter)   General   Chart Reviewed Yes   Response to Previous Treatment Patient with no complaints from previous session  Family/Caregiver Present No   Cognition   Overall Cognitive Status WFL   Arousal/Participation Alert; Cooperative   Attention Within functional limits   Orientation Level Oriented X4   Memory Within functional limits   Following Commands Follows all commands and directions without difficulty   Subjective   Subjective Pt offers no new complaints  Pt  agreeable to PT  Transfers   Sit to Stand 5  Supervision   Additional items Assist x 1; Armrests; Verbal cues   Stand to Sit 5  Supervision   Additional items Assist x 1; Armrests; Verbal cues   Ambulation/Elevation   Gait pattern Forward Flexion;Decreased foot clearance; Ataxia; Inconsistent princess   Gait Assistance 4  Minimal assist  (min- close supervision)   Additional items Assist x 1;Verbal cues   Assistive Device Rolling walker   Distance 245'x2   Stair Management Assistance 5  Supervision  (close supervision  )   Additional items Assist x 1;Verbal cues   Stair Management Technique Two rails; Foreward;Reciprocal   Number of Stairs 5  (5 steps x 2   )   Balance   Static Sitting Normal   Dynamic Sitting Normal   Static Standing Fair +   Dynamic Standing Fair   Ambulatory Fair   Endurance Deficit   Endurance Deficit Yes   Activity Tolerance   Activity Tolerance Patient limited by pain   Nurse [id number] [id number]   Equipment Use   Comments transer, stair training   Assessment   Prognosis Good   Problem List Decreased strength;Decreased endurance; Impaired balance;Decreased mobility; Decreased coordination; Impaired sensation;Decreased skin integrity;Pain   Assessment PT demonstrates ability to transfer to and from various surface heights with supervision and verbal cues  PT demonstrates decreased balance and decreased coordination evidenced by ataxia in b/l le's with ambulation, though no gross lob noted  tp  Requiring cg- min assist occasionally when ambulating due to ataxia, close supervision approximately 80% of the time  PT progressed with ambulation distances to 245'x2, no sob noted,mild fatigue requiring 2 brief rest breaks  Pt  Progressed to stair climbing with use of b/l rails and close supervision  PT encouraged to ambulate with nursing staff with use of rw  Anticipate pt will continue to improve and recommend d/c to home at d/c with HHPT and family support and assist PRN  Goals   Patient Goals " to go home "   STG Expiration Date 10/12/17   Treatment Day 3   Plan   Treatment/Interventions Functional transfer training;LE strengthening/ROM; Elevations; Therapeutic exercise; Endurance training;Patient/family training;Equipment eval/education; Bed mobility;Gait training;Spoke to nursing   Progress Progressing toward goals   PT Frequency 5x/wk   Recommendation   Recommendation Home PT; Home with family support   PT - OK to Discharge (to home when medically cleared  )       Deanna Sepulveda, PTA

## 2017-10-04 NOTE — PROGRESS NOTES
[facility] Internal Medicine Progress Note  Patient: Andrew Delacruz 75 y o  male   MRN: [id number]  PCP: Barrington William MD  Unit/Bed#: [facility] 2 [facility] 87 222-02 Encounter: [id number]  Date Of Visit: 10/04/17    Assessment:    Principal Problem:    CYDNEY (acute kidney injury) ([facility] [address] )  Active Problems:    Suprapubic catheter ([facility] [address] )    UTI (urinary tract infection)    Charcot-Bre-Tooth disease    Frequent falls    Type 2 diabetes mellitus with hyperlipidemia    Essential hypertension    Low grade fever    Plan:  - CYDNEY - 2/2 possible urinary tract infection, and possible dehydration   IV fluids are discontinued  C/w antibiotics  - Possible urinary tract infection versus asymptomatic bacteriuria - S/p post suprapubic catheter  Blood Cx NG72H  Urine Cx show preliminary results of over 100,000 colonies of yeast, and 50,000-59,000 cfu/ml Stenotrophomonas maltophilia (A)  - Frequent falls - Physical therapy recommends home Pt, but patient is declining   - Charcot Bre tooth disease - C/w gabapentin and Cymbalta for autonomic neuropathy  - Type 2 diabetes mellitus with hyperlipidemia - Continue 70/30  - Continue opioid dependence - C/w MS Contin  - Essential hypertension - Stable  - Neck pain - Cervical spine MRI ordered  Results show mild compression of the cord at C2-C3, and dural flattening at C3-C4, and indentation on the right lateral cord at C5-C6  No associated cord signal abnormality  Moderate to severe degree of foraminal stenosis  - Low grade fever has resolved since yesterday  Currently 97 5  VTE Pharmacologic Prophylaxis:   Pharmacologic: Enoxaparin (Lovenox)    Education and Discussions with Family / Patient: Yes    Time Spent for Care: 20 minutes  More than 50% of total time spent on counseling and coordination of care as described above      Current Length of Stay: 4 day(s)    Current Patient Status: Inpatient     Discharge Plan / Estimated Discharge Date:     Code Status: Level 3 - DNAR and DNI      Subjective:   Patient seen and examined at bedside  He states he had no overnight events  He states that he is feeling much better than yesterday  He states that his fever has gone away, and he does not feel lethargic anymore  He feels that he is ready to be discharged from the hospital   He denies any recent nausea, vomiting, fever, chills, shortness of breath, chest pains  Objective:     Vitals:   Temp (24hrs), Av 2 °F (36 8 °C), Min:97 3 °F (36 3 °C), Max:99 3 °F (37 4 °C)    HR:  [63-80] 63  Resp:  [17-18] 17  BP: (142-176)/(69-87) 142/69  SpO2:  [94 %-96 %] 96 %  Body mass index is 25 1 kg/m²  Input and Output Summary (last 24 hours): Intake/Output Summary (Last 24 hours) at 10/04/17 0830  Last data filed at 10/04/17 0501   Gross per 24 hour   Intake                0 ml   Output             3700 ml   Net            -3700 ml       Physical Exam: 10/4/17    Physical Exam   Constitutional: He is oriented to person, place, and time  He appears well-developed and well-nourished  HENT:   Head: Normocephalic  Eyes: Right eye exhibits no discharge  Left eye exhibits no discharge  Neck: Normal range of motion  Cardiovascular: Normal rate, regular rhythm and normal heart sounds  Pulmonary/Chest: Effort normal and breath sounds normal  No respiratory distress  He has no wheezes  He has no rales  He exhibits no tenderness  Abdominal: Soft  He exhibits no distension  There is no tenderness  Musculoskeletal: Normal range of motion  He exhibits no edema or tenderness  Neurological: He is alert and oriented to person, place, and time  No cranial nerve deficit  Coordination normal    Skin: Skin is warm  No erythema  Psychiatric: He has a normal mood and affect  His behavior is normal  Judgment and thought content normal    Vitals reviewed        Additional Data:     Labs:      Results from last 7 days  Lab Units 10/02/17  0454  17   WBC Thousand/uL 7 03  < > 7 60 HEMOGLOBIN g/dL 11 8*  < > 12 3   HEMATOCRIT % 35 8*  < > 37 3   PLATELETS Thousands/uL 203  < > 225   NEUTROS PCT %  --   --  62   LYMPHS PCT %  --   --  27   MONOS PCT %  --   --  7   EOS PCT %  --   --  4   < > = values in this interval not displayed  Results from last 7 days  Lab Units 10/02/17  0454   SODIUM mmol/L 140   POTASSIUM mmol/L 4 0   CHLORIDE mmol/L 103   CO2 mmol/L 32   BUN mg/dL 22   CREATININE mg/dL 1 36*   CALCIUM mg/dL 9 2   GLUCOSE RANDOM mg/dL 143*           * I Have Reviewed All Lab Data Listed Above  * Additional Pertinent Lab Tests Reviewed: All Labs Within Last 24 Hours Reviewed    Imaging:    Imaging Reports Reviewed Today Include:       Recent Cultures (last 7 days):       Results from last 7 days  Lab Units 09/30/17 2034 09/30/17 2031 09/30/17 2002   BLOOD CULTURE  No Growth at 72 hrs  No Growth at 72 hrs   --    URINE CULTURE   --   --  >100,000 cfu/ml Yeast*  50,000-59,000 cfu/ml Stenotrophomonas maltophilia*       Last 24 Hours Medication List:     bacitracin  Both Eyes BID   cefTRIAXone 1,000 mg Intravenous Q24H   DULoxetine 30 mg Oral Daily   enoxaparin 40 mg Subcutaneous Daily   gabapentin 1,800 mg Oral HS   gabapentin 600 mg Oral BID (AM & Afternoon)   insulin aspart protamine-insulin aspart 15 Units Subcutaneous BID AC   insulin lispro 1-6 Units Subcutaneous HS   insulin lispro 2-12 Units Subcutaneous TID AC   morphine 15 mg Oral Q12H [address]   nicotine 1 patch Transdermal Daily   polyvinyl alcohol 1 drop Both Eyes 4x Daily   traZODone 50 mg Oral HS        Today, Patient Was Seen By: Romayne Piedra, DPM    ** Please Note: This note has been constructed using a voice recognition system   **

## 2017-10-04 NOTE — PLAN OF CARE
Problem: PHYSICAL THERAPY ADULT  Goal: Performs mobility at highest level of function for planned discharge setting  See evaluation for individualized goals  Treatment/Interventions: Functional transfer training, LE strengthening/ROM, Elevations, Therapeutic exercise, Endurance training, Patient/family training, Bed mobility, Gait training, Spoke to nursing          See flowsheet documentation for full assessment, interventions and recommendations  Outcome: Progressing  Prognosis: Good  Problem List: Decreased strength, Decreased endurance, Impaired balance, Decreased mobility, Decreased coordination, Impaired sensation, Decreased skin integrity, Pain  Assessment: PT demonstrates ability to transfer to and from various surface heights with supervision and verbal cues  PT demonstrates decreased balance and decreased coordination evidenced by ataxia in b/l le's with ambulation, though no gross lob noted  tp  Requiring cg- min assist occasionally when ambulating due to ataxia, close supervision approximately 80% of the time  PT progressed with ambulation distances to 245'x2, no sob noted,mild fatigue requiring 2 brief rest breaks  Pt  Progressed to stair climbing with use of b/l rails and close supervision  PT encouraged to ambulate with nursing staff with use of rw  Anticipate pt will continue to improve and recommend d/c to home at d/c with HHPT and family support and assist PRN  Barriers to Discharge: Inaccessible home environment, Decreased caregiver support     Recommendation: Home PT, Home with family support     PT - OK to Discharge:  (to home when medically cleared  )    See flowsheet documentation for full assessment

## 2017-10-04 NOTE — PROGRESS NOTES
Spubic tube draining, due to be changed within week  Let urology know, we will change before discharge

## 2017-10-04 NOTE — PROGRESS NOTES
Patient assessed at the bedside  Pt AOX3  Pleasant and cooperative with his care  No significant changes from previous shift assessment  Suprapubic catheter intact with yellow output/sediment  Bedside table and call bell within reach to promote safety and prevent injury  Pt has been ringing appropriately  Will continue to monitor   SD 10/3/2017

## 2017-10-04 NOTE — CASE MANAGEMENT
Continued Stay Review    Date:    10/4/2017    Vital Signs: /69   Pulse 63   Temp 97 5 °F (36 4 °C) (Tympanic)   Resp 17   Ht 6' 1" (1 854 m)   Wt 86 3 kg (190 lb 4 1 oz)   SpO2 96%   BMI 25 10 kg/m²     Medications:   Scheduled Meds:   bacitracin  Both Eyes BID   cefTRIAXone 1,000 mg Intravenous Q24H   DULoxetine 30 mg Oral Daily   enoxaparin 40 mg Subcutaneous Daily   gabapentin 1,800 mg Oral HS   gabapentin 600 mg Oral BID (AM & Afternoon)   insulin aspart protamine-insulin aspart 15 Units Subcutaneous BID AC   insulin lispro 1-6 Units Subcutaneous HS   insulin lispro 2-12 Units Subcutaneous TID AC   morphine 15 mg Oral Q12H KIKA   nicotine 1 patch Transdermal Daily   polyvinyl alcohol 1 drop Both Eyes 4x Daily   traZODone 50 mg Oral HS     Continuous Infusions:    PRN Meds:   acetaminophen    baclofen    oxyCODONE    Abnormal Labs/Diagnostic Results:    BS  220    Age/Sex: 75 y o  male     · Assessment/Plan:    CYDNEY - 2/2 possible urinary tract infection, and possible dehydration   Improving, will continue with IV fluids  C/w antibiotics  · Possible urinary tract infection versus asymptomatic bacteriuria - S/p post suprapubic catheter  Blood Cx NG48H  Urine Cx show preliminary results of yeast, and GNR  · Frequent falls - Physical therapy recommends home Pt, but patient is declining   · Charcot Bre tooth disease - C/w gabapentin and Cymbalta for autonomic neuropathy  · Type 2 diabetes mellitus with hyperlipidemia - Continue 70/30  · Continue opioid dependence - C/w MS Contin  · Essential hypertension - Stable  Will discontinue amlodipine  Neck pain - Cervical spine MRI ordered  Results show mild compression of the cord at C2-C3, and dural flattening at C3-C4, and indentation on the right lateral cord at C5-C6  No associated cord signal abnormality  Moderate to severe degree of foraminal stenosis      Discharge Plan:     home

## 2017-10-05 LAB
BACTERIA BLD CULT: NORMAL
BACTERIA BLD CULT: NORMAL

## 2017-10-09 ENCOUNTER — GENERIC CONVERSION - ENCOUNTER (OUTPATIENT)
Dept: OTHER | Facility: OTHER | Age: 67
End: 2017-10-09

## 2017-10-11 ENCOUNTER — ALLSCRIPTS OFFICE VISIT (OUTPATIENT)
Dept: OTHER | Facility: OTHER | Age: 67
End: 2017-10-11

## 2017-10-11 ENCOUNTER — GENERIC CONVERSION - ENCOUNTER (OUTPATIENT)
Dept: OTHER | Facility: OTHER | Age: 67
End: 2017-10-11

## 2017-10-19 ENCOUNTER — GENERIC CONVERSION - ENCOUNTER (OUTPATIENT)
Dept: OTHER | Facility: OTHER | Age: 67
End: 2017-10-19

## 2017-11-10 ENCOUNTER — ALLSCRIPTS OFFICE VISIT (OUTPATIENT)
Dept: OTHER | Facility: OTHER | Age: 67
End: 2017-11-10

## 2017-12-28 ENCOUNTER — ALLSCRIPTS OFFICE VISIT (OUTPATIENT)
Dept: OTHER | Facility: OTHER | Age: 67
End: 2017-12-28

## 2018-01-01 ENCOUNTER — APPOINTMENT (EMERGENCY)
Dept: CT IMAGING | Facility: HOSPITAL | Age: 68
DRG: 698 | End: 2018-01-01
Payer: MEDICARE

## 2018-01-01 ENCOUNTER — TELEPHONE (OUTPATIENT)
Dept: UROLOGY | Facility: MEDICAL CENTER | Age: 68
End: 2018-01-01

## 2018-01-01 ENCOUNTER — TELEPHONE (OUTPATIENT)
Dept: OTHER | Facility: HOSPITAL | Age: 68
End: 2018-01-01

## 2018-01-01 ENCOUNTER — APPOINTMENT (EMERGENCY)
Dept: RADIOLOGY | Facility: HOSPITAL | Age: 68
DRG: 698 | End: 2018-01-01
Payer: MEDICARE

## 2018-01-01 ENCOUNTER — CLINICAL SUPPORT (OUTPATIENT)
Dept: UROLOGY | Facility: MEDICAL CENTER | Age: 68
End: 2018-01-01
Payer: MEDICARE

## 2018-01-01 ENCOUNTER — APPOINTMENT (INPATIENT)
Dept: RADIOLOGY | Facility: HOSPITAL | Age: 68
DRG: 871 | End: 2018-01-01
Payer: MEDICARE

## 2018-01-01 ENCOUNTER — TRANSCRIBE ORDERS (OUTPATIENT)
Dept: ADMINISTRATIVE | Facility: HOSPITAL | Age: 68
End: 2018-01-01

## 2018-01-01 ENCOUNTER — TRANSITIONAL CARE MANAGEMENT (OUTPATIENT)
Dept: INTERNAL MEDICINE CLINIC | Facility: CLINIC | Age: 68
End: 2018-01-01

## 2018-01-01 ENCOUNTER — APPOINTMENT (INPATIENT)
Dept: MRI IMAGING | Facility: HOSPITAL | Age: 68
DRG: 871 | End: 2018-01-01
Payer: MEDICARE

## 2018-01-01 ENCOUNTER — HOSPITAL ENCOUNTER (EMERGENCY)
Facility: HOSPITAL | Age: 68
Discharge: HOME | End: 2018-08-28
Attending: EMERGENCY MEDICINE | Admitting: EMERGENCY MEDICINE
Payer: MEDICARE

## 2018-01-01 ENCOUNTER — OFFICE VISIT (OUTPATIENT)
Dept: UROLOGY | Facility: MEDICAL CENTER | Age: 68
End: 2018-01-01
Payer: MEDICARE

## 2018-01-01 ENCOUNTER — APPOINTMENT (EMERGENCY)
Dept: RADIOLOGY | Facility: HOSPITAL | Age: 68
DRG: 871 | End: 2018-01-01
Payer: MEDICARE

## 2018-01-01 ENCOUNTER — OFFICE VISIT (OUTPATIENT)
Dept: INTERNAL MEDICINE CLINIC | Facility: CLINIC | Age: 68
End: 2018-01-01
Payer: MEDICARE

## 2018-01-01 ENCOUNTER — HOSPITAL ENCOUNTER (INPATIENT)
Facility: HOSPITAL | Age: 68
LOS: 8 days | Discharge: HOME | DRG: 871 | End: 2018-11-30
Attending: EMERGENCY MEDICINE | Admitting: INTERNAL MEDICINE
Payer: MEDICARE

## 2018-01-01 ENCOUNTER — HOSPITAL ENCOUNTER (INPATIENT)
Facility: HOSPITAL | Age: 68
LOS: 6 days | Discharge: NURSING, REHAB OR LONG-TERM CARE | DRG: 698 | End: 2018-11-16
Attending: EMERGENCY MEDICINE | Admitting: INTERNAL MEDICINE
Payer: MEDICARE

## 2018-01-01 ENCOUNTER — APPOINTMENT (INPATIENT)
Dept: CT IMAGING | Facility: HOSPITAL | Age: 68
DRG: 871 | End: 2018-01-01
Payer: MEDICARE

## 2018-01-01 VITALS
TEMPERATURE: 97.7 F | WEIGHT: 176 LBS | HEART RATE: 64 BPM | DIASTOLIC BLOOD PRESSURE: 87 MMHG | BODY MASS INDEX: 23.22 KG/M2 | OXYGEN SATURATION: 97 % | RESPIRATION RATE: 19 BRPM | SYSTOLIC BLOOD PRESSURE: 141 MMHG

## 2018-01-01 VITALS
WEIGHT: 199.74 LBS | OXYGEN SATURATION: 91 % | HEIGHT: 72 IN | HEART RATE: 74 BPM | RESPIRATION RATE: 20 BRPM | BODY MASS INDEX: 27.05 KG/M2 | TEMPERATURE: 98.4 F | SYSTOLIC BLOOD PRESSURE: 145 MMHG | DIASTOLIC BLOOD PRESSURE: 72 MMHG

## 2018-01-01 VITALS
HEIGHT: 72 IN | BODY MASS INDEX: 23.49 KG/M2 | HEART RATE: 96 BPM | TEMPERATURE: 98.5 F | RESPIRATION RATE: 16 BRPM | WEIGHT: 173.4 LBS | SYSTOLIC BLOOD PRESSURE: 120 MMHG | DIASTOLIC BLOOD PRESSURE: 70 MMHG

## 2018-01-01 VITALS
WEIGHT: 190 LBS | HEIGHT: 73 IN | DIASTOLIC BLOOD PRESSURE: 74 MMHG | BODY MASS INDEX: 25.18 KG/M2 | SYSTOLIC BLOOD PRESSURE: 116 MMHG

## 2018-01-01 VITALS
SYSTOLIC BLOOD PRESSURE: 112 MMHG | DIASTOLIC BLOOD PRESSURE: 68 MMHG | BODY MASS INDEX: 24.78 KG/M2 | HEIGHT: 73 IN | WEIGHT: 187 LBS

## 2018-01-01 VITALS
HEART RATE: 80 BPM | RESPIRATION RATE: 15 BRPM | WEIGHT: 177 LBS | BODY MASS INDEX: 23.46 KG/M2 | DIASTOLIC BLOOD PRESSURE: 82 MMHG | HEIGHT: 73 IN | SYSTOLIC BLOOD PRESSURE: 132 MMHG | TEMPERATURE: 97.5 F

## 2018-01-01 VITALS
SYSTOLIC BLOOD PRESSURE: 138 MMHG | WEIGHT: 176 LBS | HEIGHT: 72 IN | BODY MASS INDEX: 23.84 KG/M2 | DIASTOLIC BLOOD PRESSURE: 98 MMHG | HEART RATE: 88 BPM

## 2018-01-01 VITALS
OXYGEN SATURATION: 94 % | DIASTOLIC BLOOD PRESSURE: 67 MMHG | SYSTOLIC BLOOD PRESSURE: 140 MMHG | HEART RATE: 77 BPM | TEMPERATURE: 98.1 F | RESPIRATION RATE: 18 BRPM

## 2018-01-01 VITALS
SYSTOLIC BLOOD PRESSURE: 130 MMHG | DIASTOLIC BLOOD PRESSURE: 64 MMHG | WEIGHT: 175 LBS | HEIGHT: 73 IN | BODY MASS INDEX: 23.19 KG/M2

## 2018-01-01 DIAGNOSIS — N13.8 BENIGN PROSTATIC HYPERPLASIA WITH URINARY OBSTRUCTION: ICD-10-CM

## 2018-01-01 DIAGNOSIS — E11.42 DIABETIC POLYNEUROPATHY ASSOCIATED WITH TYPE 2 DIABETES MELLITUS (HCC): ICD-10-CM

## 2018-01-01 DIAGNOSIS — K21.9 ESOPHAGEAL REFLUX: ICD-10-CM

## 2018-01-01 DIAGNOSIS — Z23 NEED FOR INFLUENZA VACCINATION: Primary | ICD-10-CM

## 2018-01-01 DIAGNOSIS — N18.30 CHRONIC KIDNEY DISEASE, STAGE 3 (HCC): ICD-10-CM

## 2018-01-01 DIAGNOSIS — I10 ESSENTIAL HYPERTENSION: ICD-10-CM

## 2018-01-01 DIAGNOSIS — J18.9 PNEUMONIA DUE TO INFECTIOUS ORGANISM, UNSPECIFIED LATERALITY, UNSPECIFIED PART OF LUNG: Primary | ICD-10-CM

## 2018-01-01 DIAGNOSIS — J96.01 ACUTE RESPIRATORY FAILURE WITH HYPOXIA (HCC): ICD-10-CM

## 2018-01-01 DIAGNOSIS — R09.02 HYPOXIA: ICD-10-CM

## 2018-01-01 DIAGNOSIS — N31.9 NEUROGENIC BLADDER: ICD-10-CM

## 2018-01-01 DIAGNOSIS — E78.5 TYPE 2 DIABETES MELLITUS WITH HYPERLIPIDEMIA (HCC): ICD-10-CM

## 2018-01-01 DIAGNOSIS — R33.8 URINARY RETENTION DUE TO BENIGN PROSTATIC HYPERPLASIA: Primary | ICD-10-CM

## 2018-01-01 DIAGNOSIS — J18.9 PNEUMONIA DUE TO INFECTIOUS ORGANISM: ICD-10-CM

## 2018-01-01 DIAGNOSIS — Z93.59 SUPRAPUBIC CATHETER (HCC): ICD-10-CM

## 2018-01-01 DIAGNOSIS — G62.9 NEUROPATHY: ICD-10-CM

## 2018-01-01 DIAGNOSIS — M47.812 CERVICAL SPONDYLOSIS WITHOUT MYELOPATHY: ICD-10-CM

## 2018-01-01 DIAGNOSIS — E11.69 TYPE 2 DIABETES MELLITUS WITH HYPERLIPIDEMIA (HCC): ICD-10-CM

## 2018-01-01 DIAGNOSIS — A41.9 SEVERE SEPSIS (HCC): ICD-10-CM

## 2018-01-01 DIAGNOSIS — N28.9 RENAL INSUFFICIENCY: ICD-10-CM

## 2018-01-01 DIAGNOSIS — S81.811A LACERATION OF RIGHT LOWER EXTREMITY, INITIAL ENCOUNTER: Primary | ICD-10-CM

## 2018-01-01 DIAGNOSIS — R33.9 URINARY RETENTION: Primary | ICD-10-CM

## 2018-01-01 DIAGNOSIS — R41.0 CONFUSION: ICD-10-CM

## 2018-01-01 DIAGNOSIS — M48.00 SPINAL STENOSIS, UNSPECIFIED SPINAL REGION: ICD-10-CM

## 2018-01-01 DIAGNOSIS — M79.671 RIGHT FOOT PAIN: ICD-10-CM

## 2018-01-01 DIAGNOSIS — N39.0 URINARY TRACT INFECTION ASSOCIATED WITH CYSTOSTOMY CATHETER, INITIAL ENCOUNTER (HCC): ICD-10-CM

## 2018-01-01 DIAGNOSIS — R33.8 ACUTE URINARY RETENTION: Primary | ICD-10-CM

## 2018-01-01 DIAGNOSIS — N40.1 URINARY RETENTION DUE TO BENIGN PROSTATIC HYPERPLASIA: Primary | ICD-10-CM

## 2018-01-01 DIAGNOSIS — R65.20 SEVERE SEPSIS (HCC): ICD-10-CM

## 2018-01-01 DIAGNOSIS — T83.510A URINARY TRACT INFECTION ASSOCIATED WITH CYSTOSTOMY CATHETER, INITIAL ENCOUNTER (HCC): ICD-10-CM

## 2018-01-01 DIAGNOSIS — G89.4 CHRONIC PAIN SYNDROME: ICD-10-CM

## 2018-01-01 DIAGNOSIS — B18.2 CHRONIC HEPATITIS C WITHOUT HEPATIC COMA (HCC): ICD-10-CM

## 2018-01-01 DIAGNOSIS — H04.129 DRY EYE: ICD-10-CM

## 2018-01-01 DIAGNOSIS — G47.00 INSOMNIA, UNSPECIFIED TYPE: ICD-10-CM

## 2018-01-01 DIAGNOSIS — N17.9 AKI (ACUTE KIDNEY INJURY) (HCC): ICD-10-CM

## 2018-01-01 DIAGNOSIS — R53.81 PHYSICAL DEBILITY: ICD-10-CM

## 2018-01-01 DIAGNOSIS — N40.1 BENIGN PROSTATIC HYPERPLASIA WITH URINARY OBSTRUCTION: ICD-10-CM

## 2018-01-01 DIAGNOSIS — J18.9 PNEUMONIA: Primary | ICD-10-CM

## 2018-01-01 DIAGNOSIS — R29.6 RECURRENT FALLS: ICD-10-CM

## 2018-01-01 DIAGNOSIS — G60.0 CHARCOT-MARIE-TOOTH DISEASE: ICD-10-CM

## 2018-01-01 DIAGNOSIS — N17.9 AKI (ACUTE KIDNEY INJURY) (HCC): Primary | ICD-10-CM

## 2018-01-01 DIAGNOSIS — M54.17 LUMBOSACRAL NEURITIS: Primary | ICD-10-CM

## 2018-01-01 LAB
ALBUMIN SERPL BCP-MCNC: 2 G/DL (ref 3.5–5)
ALBUMIN SERPL BCP-MCNC: 2.9 G/DL (ref 3.5–5)
ALBUMIN SERPL BCP-MCNC: 2.9 G/DL (ref 3.5–5)
ALBUMIN SERPL BCP-MCNC: 3.5 G/DL (ref 3.5–5)
ALP SERPL-CCNC: 100 U/L (ref 46–116)
ALP SERPL-CCNC: 78 U/L (ref 46–116)
ALP SERPL-CCNC: 82 U/L (ref 46–116)
ALP SERPL-CCNC: 83 U/L (ref 46–116)
ALT SERPL W P-5'-P-CCNC: 16 U/L (ref 12–78)
ALT SERPL W P-5'-P-CCNC: 19 U/L (ref 12–78)
ALT SERPL W P-5'-P-CCNC: 20 U/L (ref 12–78)
ALT SERPL W P-5'-P-CCNC: 24 U/L (ref 12–78)
AMMONIA PLAS-SCNC: 16 UMOL/L (ref 11–35)
AMMONIA PLAS-SCNC: 19 UMOL/L (ref 11–35)
ANION GAP SERPL CALCULATED.3IONS-SCNC: 10 MMOL/L (ref 4–13)
ANION GAP SERPL CALCULATED.3IONS-SCNC: 10 MMOL/L (ref 4–13)
ANION GAP SERPL CALCULATED.3IONS-SCNC: 7 MMOL/L (ref 4–13)
ANION GAP SERPL CALCULATED.3IONS-SCNC: 7 MMOL/L (ref 4–13)
ANION GAP SERPL CALCULATED.3IONS-SCNC: 8 MMOL/L (ref 4–13)
ANION GAP SERPL CALCULATED.3IONS-SCNC: 9 MMOL/L (ref 4–13)
APTT PPP: 33 SECONDS (ref 26–38)
APTT PPP: 34 SECONDS (ref 24–36)
AST SERPL W P-5'-P-CCNC: 17 U/L (ref 5–45)
AST SERPL W P-5'-P-CCNC: 23 U/L (ref 5–45)
AST SERPL W P-5'-P-CCNC: 25 U/L (ref 5–45)
AST SERPL W P-5'-P-CCNC: 28 U/L (ref 5–45)
ATRIAL RATE: 73 BPM
ATRIAL RATE: 99 BPM
BACTERIA BLD CULT: NORMAL
BACTERIA SPT RESP CULT: ABNORMAL
BACTERIA SPT RESP CULT: ABNORMAL
BACTERIA UR CULT: ABNORMAL
BACTERIA UR CULT: ABNORMAL
BACTERIA UR CULT: NORMAL
BACTERIA UR QL AUTO: ABNORMAL /HPF
BACTERIA UR QL AUTO: ABNORMAL /HPF
BASOPHILS # BLD AUTO: 0.02 THOUSANDS/ΜL (ref 0–0.1)
BASOPHILS # BLD AUTO: 0.02 THOUSANDS/ΜL (ref 0–0.1)
BASOPHILS # BLD AUTO: 0.03 THOUSANDS/ΜL (ref 0–0.1)
BASOPHILS # BLD MANUAL: 0 THOUSAND/UL (ref 0–0.1)
BASOPHILS NFR BLD AUTO: 0 % (ref 0–1)
BASOPHILS NFR MAR MANUAL: 0 % (ref 0–1)
BILIRUB SERPL-MCNC: 0.4 MG/DL (ref 0.2–1)
BILIRUB SERPL-MCNC: 0.41 MG/DL (ref 0.2–1)
BILIRUB SERPL-MCNC: 0.44 MG/DL (ref 0.2–1)
BILIRUB SERPL-MCNC: 0.45 MG/DL (ref 0.2–1)
BILIRUB UR QL STRIP: NEGATIVE
BILIRUB UR QL STRIP: NEGATIVE
BUN SERPL-MCNC: 29 MG/DL (ref 5–25)
BUN SERPL-MCNC: 35 MG/DL (ref 5–25)
BUN SERPL-MCNC: 35 MG/DL (ref 5–25)
BUN SERPL-MCNC: 36 MG/DL (ref 5–25)
BUN SERPL-MCNC: 37 MG/DL (ref 5–25)
BUN SERPL-MCNC: 37 MG/DL (ref 5–25)
BUN SERPL-MCNC: 38 MG/DL (ref 5–25)
BUN SERPL-MCNC: 40 MG/DL (ref 5–25)
BUN SERPL-MCNC: 40 MG/DL (ref 5–25)
BUN SERPL-MCNC: 42 MG/DL (ref 5–25)
BUN SERPL-MCNC: 45 MG/DL (ref 5–25)
BUN SERPL-MCNC: 46 MG/DL (ref 5–25)
CA-I BLD-SCNC: 1.11 MMOL/L (ref 1.12–1.32)
CALCIUM SERPL-MCNC: 8.4 MG/DL (ref 8.3–10.1)
CALCIUM SERPL-MCNC: 8.7 MG/DL (ref 8.3–10.1)
CALCIUM SERPL-MCNC: 8.8 MG/DL (ref 8.3–10.1)
CALCIUM SERPL-MCNC: 8.9 MG/DL (ref 8.3–10.1)
CALCIUM SERPL-MCNC: 9 MG/DL (ref 8.3–10.1)
CALCIUM SERPL-MCNC: 9 MG/DL (ref 8.3–10.1)
CALCIUM SERPL-MCNC: 9.2 MG/DL (ref 8.3–10.1)
CALCIUM SERPL-MCNC: 9.3 MG/DL (ref 8.3–10.1)
CALCIUM SERPL-MCNC: 9.4 MG/DL (ref 8.3–10.1)
CALCIUM SERPL-MCNC: 9.5 MG/DL (ref 8.3–10.1)
CHLORIDE SERPL-SCNC: 100 MMOL/L (ref 100–108)
CHLORIDE SERPL-SCNC: 101 MMOL/L (ref 100–108)
CHLORIDE SERPL-SCNC: 101 MMOL/L (ref 100–108)
CHLORIDE SERPL-SCNC: 102 MMOL/L (ref 100–108)
CHLORIDE SERPL-SCNC: 103 MMOL/L (ref 100–108)
CHLORIDE SERPL-SCNC: 103 MMOL/L (ref 100–108)
CHLORIDE SERPL-SCNC: 104 MMOL/L (ref 100–108)
CHLORIDE SERPL-SCNC: 106 MMOL/L (ref 100–108)
CHLORIDE SERPL-SCNC: 99 MMOL/L (ref 100–108)
CHLORIDE SERPL-SCNC: 99 MMOL/L (ref 100–108)
CLARITY UR: ABNORMAL
CLARITY UR: CLEAR
CO2 SERPL-SCNC: 23 MMOL/L (ref 21–32)
CO2 SERPL-SCNC: 24 MMOL/L (ref 21–32)
CO2 SERPL-SCNC: 25 MMOL/L (ref 21–32)
CO2 SERPL-SCNC: 26 MMOL/L (ref 21–32)
CO2 SERPL-SCNC: 26 MMOL/L (ref 21–32)
CO2 SERPL-SCNC: 27 MMOL/L (ref 21–32)
CO2 SERPL-SCNC: 28 MMOL/L (ref 21–32)
CO2 SERPL-SCNC: 29 MMOL/L (ref 21–32)
COLOR UR: YELLOW
COLOR UR: YELLOW
CREAT SERPL-MCNC: 1.86 MG/DL (ref 0.6–1.3)
CREAT SERPL-MCNC: 1.89 MG/DL (ref 0.6–1.3)
CREAT SERPL-MCNC: 1.96 MG/DL (ref 0.6–1.3)
CREAT SERPL-MCNC: 2.04 MG/DL (ref 0.6–1.3)
CREAT SERPL-MCNC: 2.05 MG/DL (ref 0.6–1.3)
CREAT SERPL-MCNC: 2.06 MG/DL (ref 0.6–1.3)
CREAT SERPL-MCNC: 2.2 MG/DL (ref 0.6–1.3)
CREAT SERPL-MCNC: 2.32 MG/DL (ref 0.6–1.3)
CREAT SERPL-MCNC: 2.33 MG/DL (ref 0.6–1.3)
CREAT SERPL-MCNC: 2.34 MG/DL (ref 0.6–1.3)
CREAT SERPL-MCNC: 2.35 MG/DL (ref 0.6–1.3)
CREAT SERPL-MCNC: 2.47 MG/DL (ref 0.6–1.3)
CREAT UR-MCNC: 91.7 MG/DL
EOSINOPHIL # BLD AUTO: 0.1 THOUSAND/ΜL (ref 0–0.61)
EOSINOPHIL # BLD AUTO: 0.3 THOUSAND/ΜL (ref 0–0.61)
EOSINOPHIL # BLD AUTO: 0.32 THOUSAND/ΜL (ref 0–0.61)
EOSINOPHIL # BLD AUTO: 0.63 THOUSAND/ΜL (ref 0–0.61)
EOSINOPHIL # BLD AUTO: 0.69 THOUSAND/ΜL (ref 0–0.61)
EOSINOPHIL # BLD MANUAL: 0 THOUSAND/UL (ref 0–0.4)
EOSINOPHIL # BLD MANUAL: 0 THOUSAND/UL (ref 0–0.4)
EOSINOPHIL # BLD MANUAL: 0.21 THOUSAND/UL (ref 0–0.4)
EOSINOPHIL # BLD MANUAL: 0.35 THOUSAND/UL (ref 0–0.4)
EOSINOPHIL NFR BLD AUTO: 1 % (ref 0–6)
EOSINOPHIL NFR BLD AUTO: 3 % (ref 0–6)
EOSINOPHIL NFR BLD AUTO: 3 % (ref 0–6)
EOSINOPHIL NFR BLD AUTO: 8 % (ref 0–6)
EOSINOPHIL NFR BLD AUTO: 8 % (ref 0–6)
EOSINOPHIL NFR BLD MANUAL: 0 % (ref 0–6)
EOSINOPHIL NFR BLD MANUAL: 0 % (ref 0–6)
EOSINOPHIL NFR BLD MANUAL: 1 % (ref 0–6)
EOSINOPHIL NFR BLD MANUAL: 3 % (ref 0–6)
ERYTHROCYTE [DISTWIDTH] IN BLOOD BY AUTOMATED COUNT: 13.5 % (ref 11.6–15.1)
ERYTHROCYTE [DISTWIDTH] IN BLOOD BY AUTOMATED COUNT: 13.7 % (ref 11.6–15.1)
ERYTHROCYTE [DISTWIDTH] IN BLOOD BY AUTOMATED COUNT: 13.8 % (ref 11.6–15.1)
ERYTHROCYTE [DISTWIDTH] IN BLOOD BY AUTOMATED COUNT: 14.4 % (ref 11.6–15.1)
ERYTHROCYTE [DISTWIDTH] IN BLOOD BY AUTOMATED COUNT: 14.5 % (ref 11.6–15.1)
EST. AVERAGE GLUCOSE BLD GHB EST-MCNC: 134 MG/DL
EST. AVERAGE GLUCOSE BLD GHB EST-MCNC: 146 MG/DL
FLUAV AG SPEC QL IA: NEGATIVE
FLUAV AG SPEC QL: NORMAL
FLUBV AG SPEC QL IA: NEGATIVE
FLUBV AG SPEC QL: NORMAL
GFR SERPL CREATININE-BSD FRML MDRD: 26 ML/MIN/1.73SQ M
GFR SERPL CREATININE-BSD FRML MDRD: 27 ML/MIN/1.73SQ M
GFR SERPL CREATININE-BSD FRML MDRD: 28 ML/MIN/1.73SQ M
GFR SERPL CREATININE-BSD FRML MDRD: 29 ML/MIN/1.73SQ M
GFR SERPL CREATININE-BSD FRML MDRD: 32 ML/MIN/1.73SQ M
GFR SERPL CREATININE-BSD FRML MDRD: 34 ML/MIN/1.73SQ M
GFR SERPL CREATININE-BSD FRML MDRD: 35 ML/MIN/1.73SQ M
GFR SERPL CREATININE-BSD FRML MDRD: 36 ML/MIN/1.73SQ M
GLUCOSE SERPL-MCNC: 102 MG/DL (ref 65–140)
GLUCOSE SERPL-MCNC: 103 MG/DL (ref 65–140)
GLUCOSE SERPL-MCNC: 103 MG/DL (ref 65–140)
GLUCOSE SERPL-MCNC: 105 MG/DL (ref 65–140)
GLUCOSE SERPL-MCNC: 106 MG/DL (ref 65–140)
GLUCOSE SERPL-MCNC: 107 MG/DL (ref 65–140)
GLUCOSE SERPL-MCNC: 107 MG/DL (ref 65–140)
GLUCOSE SERPL-MCNC: 108 MG/DL (ref 65–140)
GLUCOSE SERPL-MCNC: 110 MG/DL (ref 65–140)
GLUCOSE SERPL-MCNC: 111 MG/DL (ref 65–140)
GLUCOSE SERPL-MCNC: 111 MG/DL (ref 65–140)
GLUCOSE SERPL-MCNC: 112 MG/DL (ref 65–140)
GLUCOSE SERPL-MCNC: 113 MG/DL (ref 65–140)
GLUCOSE SERPL-MCNC: 114 MG/DL (ref 65–140)
GLUCOSE SERPL-MCNC: 115 MG/DL (ref 65–140)
GLUCOSE SERPL-MCNC: 117 MG/DL (ref 65–140)
GLUCOSE SERPL-MCNC: 118 MG/DL (ref 65–140)
GLUCOSE SERPL-MCNC: 122 MG/DL (ref 65–140)
GLUCOSE SERPL-MCNC: 125 MG/DL (ref 65–140)
GLUCOSE SERPL-MCNC: 125 MG/DL (ref 65–140)
GLUCOSE SERPL-MCNC: 126 MG/DL (ref 65–140)
GLUCOSE SERPL-MCNC: 127 MG/DL (ref 65–140)
GLUCOSE SERPL-MCNC: 127 MG/DL (ref 65–140)
GLUCOSE SERPL-MCNC: 130 MG/DL (ref 65–140)
GLUCOSE SERPL-MCNC: 132 MG/DL (ref 65–140)
GLUCOSE SERPL-MCNC: 134 MG/DL (ref 65–140)
GLUCOSE SERPL-MCNC: 135 MG/DL (ref 65–140)
GLUCOSE SERPL-MCNC: 136 MG/DL (ref 65–140)
GLUCOSE SERPL-MCNC: 139 MG/DL (ref 65–140)
GLUCOSE SERPL-MCNC: 140 MG/DL (ref 65–140)
GLUCOSE SERPL-MCNC: 143 MG/DL (ref 65–140)
GLUCOSE SERPL-MCNC: 145 MG/DL (ref 65–140)
GLUCOSE SERPL-MCNC: 147 MG/DL (ref 65–140)
GLUCOSE SERPL-MCNC: 148 MG/DL (ref 65–140)
GLUCOSE SERPL-MCNC: 148 MG/DL (ref 65–140)
GLUCOSE SERPL-MCNC: 149 MG/DL (ref 65–140)
GLUCOSE SERPL-MCNC: 150 MG/DL (ref 65–140)
GLUCOSE SERPL-MCNC: 151 MG/DL (ref 65–140)
GLUCOSE SERPL-MCNC: 153 MG/DL (ref 65–140)
GLUCOSE SERPL-MCNC: 157 MG/DL (ref 65–140)
GLUCOSE SERPL-MCNC: 158 MG/DL (ref 65–140)
GLUCOSE SERPL-MCNC: 167 MG/DL (ref 65–140)
GLUCOSE SERPL-MCNC: 170 MG/DL (ref 65–140)
GLUCOSE SERPL-MCNC: 181 MG/DL (ref 65–140)
GLUCOSE SERPL-MCNC: 185 MG/DL (ref 65–140)
GLUCOSE SERPL-MCNC: 191 MG/DL (ref 65–140)
GLUCOSE SERPL-MCNC: 211 MG/DL (ref 65–140)
GLUCOSE SERPL-MCNC: 215 MG/DL (ref 65–140)
GLUCOSE SERPL-MCNC: 33 MG/DL (ref 65–140)
GLUCOSE SERPL-MCNC: 42 MG/DL (ref 65–140)
GLUCOSE SERPL-MCNC: 53 MG/DL (ref 65–140)
GLUCOSE SERPL-MCNC: 61 MG/DL (ref 65–140)
GLUCOSE SERPL-MCNC: 67 MG/DL (ref 65–140)
GLUCOSE SERPL-MCNC: 68 MG/DL (ref 65–140)
GLUCOSE SERPL-MCNC: 68 MG/DL (ref 65–140)
GLUCOSE SERPL-MCNC: 74 MG/DL (ref 65–140)
GLUCOSE SERPL-MCNC: 76 MG/DL (ref 65–140)
GLUCOSE SERPL-MCNC: 78 MG/DL (ref 65–140)
GLUCOSE SERPL-MCNC: 78 MG/DL (ref 65–140)
GLUCOSE SERPL-MCNC: 81 MG/DL (ref 65–140)
GLUCOSE SERPL-MCNC: 83 MG/DL (ref 65–140)
GLUCOSE SERPL-MCNC: 83 MG/DL (ref 65–140)
GLUCOSE SERPL-MCNC: 84 MG/DL (ref 65–140)
GLUCOSE SERPL-MCNC: 85 MG/DL (ref 65–140)
GLUCOSE SERPL-MCNC: 86 MG/DL (ref 65–140)
GLUCOSE SERPL-MCNC: 86 MG/DL (ref 65–140)
GLUCOSE SERPL-MCNC: 91 MG/DL (ref 65–140)
GLUCOSE SERPL-MCNC: 92 MG/DL (ref 65–140)
GLUCOSE SERPL-MCNC: 96 MG/DL (ref 65–140)
GLUCOSE UR STRIP-MCNC: NEGATIVE MG/DL
GLUCOSE UR STRIP-MCNC: NEGATIVE MG/DL
GRAM STN SPEC: ABNORMAL
HBA1C MFR BLD: 6.3 % (ref 4.2–6.3)
HBA1C MFR BLD: 6.7 % (ref 4.2–6.3)
HBV CORE AB SER QL: REACTIVE
HBV CORE IGM SER QL: ABNORMAL
HBV SURFACE AB SER-ACNC: 7.67 MIU/ML
HBV SURFACE AG SER QL: ABNORMAL
HCT VFR BLD AUTO: 24.4 % (ref 36.5–49.3)
HCT VFR BLD AUTO: 24.7 % (ref 36.5–49.3)
HCT VFR BLD AUTO: 24.9 % (ref 36.5–49.3)
HCT VFR BLD AUTO: 25.1 % (ref 36.5–49.3)
HCT VFR BLD AUTO: 26 % (ref 36.5–49.3)
HCT VFR BLD AUTO: 26.2 % (ref 36.5–49.3)
HCT VFR BLD AUTO: 26.9 % (ref 36.5–49.3)
HCT VFR BLD AUTO: 29.8 % (ref 36.5–49.3)
HCT VFR BLD AUTO: 33 % (ref 36.5–49.3)
HCT VFR BLD AUTO: 33.5 % (ref 36.5–49.3)
HCT VFR BLD AUTO: 36.2 % (ref 36.5–49.3)
HCV AB SER QL: ABNORMAL
HCV RNA SERPL NAA+PROBE-ACNC: NORMAL IU/ML
HCV RNA SERPL NAA+PROBE-ACNC: NORMAL IU/ML
HCV RNA SERPL NAA+PROBE-LOG IU: 7.06 LOG10 IU/ML
HGB BLD-MCNC: 10.5 G/DL (ref 12–17)
HGB BLD-MCNC: 10.6 G/DL (ref 12–17)
HGB BLD-MCNC: 11.5 G/DL (ref 12–17)
HGB BLD-MCNC: 7.8 G/DL (ref 12–17)
HGB BLD-MCNC: 7.9 G/DL (ref 12–17)
HGB BLD-MCNC: 8 G/DL (ref 12–17)
HGB BLD-MCNC: 8.3 G/DL (ref 12–17)
HGB BLD-MCNC: 8.3 G/DL (ref 12–17)
HGB BLD-MCNC: 9.6 G/DL (ref 12–17)
HGB UR QL STRIP.AUTO: ABNORMAL
HGB UR QL STRIP.AUTO: ABNORMAL
IMM GRANULOCYTES # BLD AUTO: 0.04 THOUSAND/UL (ref 0–0.2)
IMM GRANULOCYTES # BLD AUTO: 0.07 THOUSAND/UL (ref 0–0.2)
IMM GRANULOCYTES # BLD AUTO: 0.07 THOUSAND/UL (ref 0–0.2)
IMM GRANULOCYTES # BLD AUTO: 0.09 THOUSAND/UL (ref 0–0.2)
IMM GRANULOCYTES # BLD AUTO: 0.09 THOUSAND/UL (ref 0–0.2)
IMM GRANULOCYTES NFR BLD AUTO: 0 % (ref 0–2)
IMM GRANULOCYTES NFR BLD AUTO: 1 % (ref 0–2)
INR PPP: 1.05 (ref 0.86–1.17)
INR PPP: 1.09 (ref 0.86–1.17)
KETONES UR STRIP-MCNC: NEGATIVE MG/DL
KETONES UR STRIP-MCNC: NEGATIVE MG/DL
L PNEUMO1 AG UR QL IA.RAPID: NEGATIVE
LACTATE SERPL-SCNC: 1.5 MMOL/L (ref 0.5–2)
LEUKOCYTE ESTERASE UR QL STRIP: ABNORMAL
LEUKOCYTE ESTERASE UR QL STRIP: ABNORMAL
LIPASE SERPL-CCNC: 178 U/L (ref 73–393)
LYMPHOCYTES # BLD AUTO: 0.69 THOUSANDS/ΜL (ref 0.6–4.47)
LYMPHOCYTES # BLD AUTO: 0.83 THOUSAND/UL (ref 0.6–4.47)
LYMPHOCYTES # BLD AUTO: 0.93 THOUSAND/UL (ref 0.6–4.47)
LYMPHOCYTES # BLD AUTO: 1.27 THOUSAND/UL (ref 0.6–4.47)
LYMPHOCYTES # BLD AUTO: 1.27 THOUSANDS/ΜL (ref 0.6–4.47)
LYMPHOCYTES # BLD AUTO: 1.46 THOUSAND/UL (ref 0.6–4.47)
LYMPHOCYTES # BLD AUTO: 1.47 THOUSANDS/ΜL (ref 0.6–4.47)
LYMPHOCYTES # BLD AUTO: 1.62 THOUSANDS/ΜL (ref 0.6–4.47)
LYMPHOCYTES # BLD AUTO: 2.1 THOUSANDS/ΜL (ref 0.6–4.47)
LYMPHOCYTES # BLD AUTO: 4 % (ref 14–44)
LYMPHOCYTES # BLD AUTO: 6 % (ref 14–44)
LYMPHOCYTES # BLD AUTO: 8 % (ref 14–44)
LYMPHOCYTES # BLD AUTO: 8 % (ref 14–44)
LYMPHOCYTES NFR BLD AUTO: 12 % (ref 14–44)
LYMPHOCYTES NFR BLD AUTO: 16 % (ref 14–44)
LYMPHOCYTES NFR BLD AUTO: 19 % (ref 14–44)
LYMPHOCYTES NFR BLD AUTO: 21 % (ref 14–44)
LYMPHOCYTES NFR BLD AUTO: 5 % (ref 14–44)
MAGNESIUM SERPL-MCNC: 1.6 MG/DL (ref 1.6–2.6)
MAGNESIUM SERPL-MCNC: 1.7 MG/DL (ref 1.6–2.6)
MCH RBC QN AUTO: 29.5 PG (ref 26.8–34.3)
MCH RBC QN AUTO: 29.8 PG (ref 26.8–34.3)
MCH RBC QN AUTO: 30 PG (ref 26.8–34.3)
MCH RBC QN AUTO: 30 PG (ref 26.8–34.3)
MCH RBC QN AUTO: 30.1 PG (ref 26.8–34.3)
MCH RBC QN AUTO: 30.2 PG (ref 26.8–34.3)
MCH RBC QN AUTO: 30.3 PG (ref 26.8–34.3)
MCH RBC QN AUTO: 30.5 PG (ref 26.8–34.3)
MCH RBC QN AUTO: 30.5 PG (ref 26.8–34.3)
MCHC RBC AUTO-ENTMCNC: 30.5 G/DL (ref 31.4–37.4)
MCHC RBC AUTO-ENTMCNC: 30.9 G/DL (ref 31.4–37.4)
MCHC RBC AUTO-ENTMCNC: 31.3 G/DL (ref 31.4–37.4)
MCHC RBC AUTO-ENTMCNC: 31.3 G/DL (ref 31.4–37.4)
MCHC RBC AUTO-ENTMCNC: 31.5 G/DL (ref 31.4–37.4)
MCHC RBC AUTO-ENTMCNC: 31.6 G/DL (ref 31.4–37.4)
MCHC RBC AUTO-ENTMCNC: 31.8 G/DL (ref 31.4–37.4)
MCHC RBC AUTO-ENTMCNC: 31.9 G/DL (ref 31.4–37.4)
MCHC RBC AUTO-ENTMCNC: 32 G/DL (ref 31.4–37.4)
MCHC RBC AUTO-ENTMCNC: 32.1 G/DL (ref 31.4–37.4)
MCHC RBC AUTO-ENTMCNC: 32.2 G/DL (ref 31.4–37.4)
MCV RBC AUTO: 93 FL (ref 82–98)
MCV RBC AUTO: 94 FL (ref 82–98)
MCV RBC AUTO: 95 FL (ref 82–98)
MCV RBC AUTO: 97 FL (ref 82–98)
MICROALBUMIN UR-MCNC: 1410 MG/L (ref 0–20)
MICROALBUMIN/CREAT 24H UR: 1538 MG/G CREATININE (ref 0–30)
MONOCYTES # BLD AUTO: 0 THOUSAND/UL (ref 0–1.22)
MONOCYTES # BLD AUTO: 0.48 THOUSAND/UL (ref 0–1.22)
MONOCYTES # BLD AUTO: 0.72 THOUSAND/ΜL (ref 0.17–1.22)
MONOCYTES # BLD AUTO: 0.82 THOUSAND/ΜL (ref 0.17–1.22)
MONOCYTES # BLD AUTO: 0.93 THOUSAND/UL (ref 0–1.22)
MONOCYTES # BLD AUTO: 1 THOUSAND/ΜL (ref 0.17–1.22)
MONOCYTES # BLD AUTO: 1 THOUSAND/ΜL (ref 0.17–1.22)
MONOCYTES # BLD AUTO: 1.11 THOUSAND/ΜL (ref 0.17–1.22)
MONOCYTES # BLD AUTO: 1.24 THOUSAND/UL (ref 0–1.22)
MONOCYTES NFR BLD AUTO: 11 % (ref 4–12)
MONOCYTES NFR BLD AUTO: 12 % (ref 4–12)
MONOCYTES NFR BLD AUTO: 13 % (ref 4–12)
MONOCYTES NFR BLD AUTO: 5 % (ref 4–12)
MONOCYTES NFR BLD AUTO: 7 % (ref 4–12)
MONOCYTES NFR BLD: 0 % (ref 4–12)
MONOCYTES NFR BLD: 3 % (ref 4–12)
MONOCYTES NFR BLD: 6 % (ref 4–12)
MONOCYTES NFR BLD: 8 % (ref 4–12)
MRSA NOSE QL CULT: NORMAL
NEUTROPHILS # BLD AUTO: 11.68 THOUSANDS/ΜL (ref 1.85–7.62)
NEUTROPHILS # BLD AUTO: 4.78 THOUSANDS/ΜL (ref 1.85–7.62)
NEUTROPHILS # BLD AUTO: 5.04 THOUSANDS/ΜL (ref 1.85–7.62)
NEUTROPHILS # BLD AUTO: 6.6 THOUSANDS/ΜL (ref 1.85–7.62)
NEUTROPHILS # BLD AUTO: 9.69 THOUSANDS/ΜL (ref 1.85–7.62)
NEUTROPHILS # BLD MANUAL: 14.12 THOUSAND/UL (ref 1.85–7.62)
NEUTROPHILS # BLD MANUAL: 18.36 THOUSAND/UL (ref 1.85–7.62)
NEUTROPHILS # BLD MANUAL: 22.95 THOUSAND/UL (ref 1.85–7.62)
NEUTROPHILS # BLD MANUAL: 9.4 THOUSAND/UL (ref 1.85–7.62)
NEUTS BAND NFR BLD MANUAL: 3 % (ref 0–8)
NEUTS BAND NFR BLD MANUAL: 4 % (ref 0–8)
NEUTS BAND NFR BLD MANUAL: 5 % (ref 0–8)
NEUTS SEG NFR BLD AUTO: 60 % (ref 43–75)
NEUTS SEG NFR BLD AUTO: 62 % (ref 43–75)
NEUTS SEG NFR BLD AUTO: 65 % (ref 43–75)
NEUTS SEG NFR BLD AUTO: 77 % (ref 43–75)
NEUTS SEG NFR BLD AUTO: 81 % (ref 43–75)
NEUTS SEG NFR BLD AUTO: 84 % (ref 43–75)
NEUTS SEG NFR BLD AUTO: 86 % (ref 43–75)
NEUTS SEG NFR BLD AUTO: 88 % (ref 43–75)
NEUTS SEG NFR BLD AUTO: 90 % (ref 43–75)
NITRITE UR QL STRIP: NEGATIVE
NITRITE UR QL STRIP: NEGATIVE
NON-SQ EPI CELLS URNS QL MICRO: ABNORMAL /HPF
NON-SQ EPI CELLS URNS QL MICRO: ABNORMAL /HPF
NRBC BLD AUTO-RTO: 0 /100 WBCS
NT-PROBNP SERPL-MCNC: 1922 PG/ML
P AXIS: 68 DEGREES
P AXIS: 86 DEGREES
PH UR STRIP.AUTO: 5 [PH] (ref 4.5–8)
PH UR STRIP.AUTO: 6 [PH] (ref 4.5–8)
PHOSPHATE SERPL-MCNC: 3.8 MG/DL (ref 2.3–4.1)
PLATELET # BLD AUTO: 228 THOUSANDS/UL (ref 149–390)
PLATELET # BLD AUTO: 231 THOUSANDS/UL (ref 149–390)
PLATELET # BLD AUTO: 232 THOUSANDS/UL (ref 149–390)
PLATELET # BLD AUTO: 234 THOUSANDS/UL (ref 149–390)
PLATELET # BLD AUTO: 234 THOUSANDS/UL (ref 149–390)
PLATELET # BLD AUTO: 244 THOUSANDS/UL (ref 149–390)
PLATELET # BLD AUTO: 249 THOUSANDS/UL (ref 149–390)
PLATELET # BLD AUTO: 262 THOUSANDS/UL (ref 149–390)
PLATELET # BLD AUTO: 269 THOUSANDS/UL (ref 149–390)
PLATELET # BLD AUTO: 278 THOUSANDS/UL (ref 149–390)
PLATELET # BLD AUTO: 328 THOUSANDS/UL (ref 149–390)
PLATELET # BLD AUTO: 334 THOUSANDS/UL (ref 149–390)
PLATELET BLD QL SMEAR: ADEQUATE
PMV BLD AUTO: 8.8 FL (ref 8.9–12.7)
PMV BLD AUTO: 9.1 FL (ref 8.9–12.7)
PMV BLD AUTO: 9.4 FL (ref 8.9–12.7)
PMV BLD AUTO: 9.5 FL (ref 8.9–12.7)
PMV BLD AUTO: 9.5 FL (ref 8.9–12.7)
PMV BLD AUTO: 9.6 FL (ref 8.9–12.7)
PMV BLD AUTO: 9.7 FL (ref 8.9–12.7)
PMV BLD AUTO: 9.8 FL (ref 8.9–12.7)
PMV BLD AUTO: 9.9 FL (ref 8.9–12.7)
PMV BLD AUTO: 9.9 FL (ref 8.9–12.7)
POTASSIUM SERPL-SCNC: 3.8 MMOL/L (ref 3.5–5.3)
POTASSIUM SERPL-SCNC: 3.8 MMOL/L (ref 3.5–5.3)
POTASSIUM SERPL-SCNC: 3.9 MMOL/L (ref 3.5–5.3)
POTASSIUM SERPL-SCNC: 4 MMOL/L (ref 3.5–5.3)
POTASSIUM SERPL-SCNC: 4.1 MMOL/L (ref 3.5–5.3)
POTASSIUM SERPL-SCNC: 4.2 MMOL/L (ref 3.5–5.3)
POTASSIUM SERPL-SCNC: 4.2 MMOL/L (ref 3.5–5.3)
POTASSIUM SERPL-SCNC: 4.3 MMOL/L (ref 3.5–5.3)
POTASSIUM SERPL-SCNC: 4.7 MMOL/L (ref 3.5–5.3)
PR INTERVAL: 178 MS
PR INTERVAL: 186 MS
PROCALCITONIN SERPL-MCNC: 0.38 NG/ML
PROCALCITONIN SERPL-MCNC: 0.55 NG/ML
PROCALCITONIN SERPL-MCNC: 1 NG/ML
PROCALCITONIN SERPL-MCNC: 2.8 NG/ML
PROCALCITONIN SERPL-MCNC: 2.95 NG/ML
PROCALCITONIN SERPL-MCNC: 4.31 NG/ML
PROCALCITONIN SERPL-MCNC: 5.95 NG/ML
PROT SERPL-MCNC: 6.5 G/DL (ref 6.4–8.2)
PROT SERPL-MCNC: 7.3 G/DL (ref 6.4–8.2)
PROT SERPL-MCNC: 7.3 G/DL (ref 6.4–8.2)
PROT SERPL-MCNC: 8.1 G/DL (ref 6.4–8.2)
PROT UR STRIP-MCNC: ABNORMAL MG/DL
PROT UR STRIP-MCNC: ABNORMAL MG/DL
PROTHROMBIN TIME: 13.8 SECONDS (ref 11.8–14.2)
PROTHROMBIN TIME: 14.2 SECONDS (ref 11.8–14.2)
QRS AXIS: 18 DEGREES
QRS AXIS: 27 DEGREES
QRSD INTERVAL: 130 MS
QRSD INTERVAL: 144 MS
QT INTERVAL: 358 MS
QT INTERVAL: 450 MS
QTC INTERVAL: 459 MS
QTC INTERVAL: 495 MS
RBC # BLD AUTO: 2.56 MILLION/UL (ref 3.88–5.62)
RBC # BLD AUTO: 2.59 MILLION/UL (ref 3.88–5.62)
RBC # BLD AUTO: 2.62 MILLION/UL (ref 3.88–5.62)
RBC # BLD AUTO: 2.63 MILLION/UL (ref 3.88–5.62)
RBC # BLD AUTO: 2.71 MILLION/UL (ref 3.88–5.62)
RBC # BLD AUTO: 2.76 MILLION/UL (ref 3.88–5.62)
RBC # BLD AUTO: 2.77 MILLION/UL (ref 3.88–5.62)
RBC # BLD AUTO: 3.19 MILLION/UL (ref 3.88–5.62)
RBC # BLD AUTO: 3.46 MILLION/UL (ref 3.88–5.62)
RBC # BLD AUTO: 3.48 MILLION/UL (ref 3.88–5.62)
RBC # BLD AUTO: 3.81 MILLION/UL (ref 3.88–5.62)
RBC #/AREA URNS AUTO: ABNORMAL /HPF
RBC #/AREA URNS AUTO: ABNORMAL /HPF
RBC MORPH BLD: NORMAL
RSV B RNA SPEC QL NAA+PROBE: NORMAL
S PNEUM AG UR QL: NEGATIVE
SODIUM SERPL-SCNC: 133 MMOL/L (ref 136–145)
SODIUM SERPL-SCNC: 134 MMOL/L (ref 136–145)
SODIUM SERPL-SCNC: 135 MMOL/L (ref 136–145)
SODIUM SERPL-SCNC: 135 MMOL/L (ref 136–145)
SODIUM SERPL-SCNC: 136 MMOL/L (ref 136–145)
SODIUM SERPL-SCNC: 137 MMOL/L (ref 136–145)
SODIUM SERPL-SCNC: 137 MMOL/L (ref 136–145)
SODIUM SERPL-SCNC: 138 MMOL/L (ref 136–145)
SODIUM SERPL-SCNC: 139 MMOL/L (ref 136–145)
SODIUM SERPL-SCNC: 141 MMOL/L (ref 136–145)
SP GR UR STRIP.AUTO: 1.02 (ref 1–1.03)
SP GR UR STRIP.AUTO: 1.02 (ref 1–1.03)
T WAVE AXIS: 38 DEGREES
T WAVE AXIS: 54 DEGREES
TEST INFORMATION: NORMAL
TOTAL CELLS COUNTED SPEC: 100
TROPONIN I SERPL-MCNC: 0.02 NG/ML
TROPONIN I SERPL-MCNC: <0.02 NG/ML
UROBILINOGEN UR QL STRIP.AUTO: 0.2 E.U./DL
UROBILINOGEN UR QL STRIP.AUTO: 0.2 E.U./DL
VENTRICULAR RATE: 73 BPM
VENTRICULAR RATE: 99 BPM
WBC # BLD AUTO: 10.17 THOUSAND/UL (ref 4.31–10.16)
WBC # BLD AUTO: 10.8 THOUSAND/UL (ref 4.31–10.16)
WBC # BLD AUTO: 11.61 THOUSAND/UL (ref 4.31–10.16)
WBC # BLD AUTO: 12.4 THOUSAND/UL (ref 4.31–10.16)
WBC # BLD AUTO: 13.28 THOUSAND/UL (ref 4.31–10.16)
WBC # BLD AUTO: 15.86 THOUSAND/UL (ref 4.31–10.16)
WBC # BLD AUTO: 20.63 THOUSAND/UL (ref 4.31–10.16)
WBC # BLD AUTO: 24.41 THOUSAND/UL (ref 4.31–10.16)
WBC # BLD AUTO: 7.8 THOUSAND/UL (ref 4.31–10.16)
WBC # BLD AUTO: 8.02 THOUSAND/UL (ref 4.31–10.16)
WBC # BLD AUTO: 8.47 THOUSAND/UL (ref 4.31–10.16)
WBC #/AREA URNS AUTO: ABNORMAL /HPF
WBC #/AREA URNS AUTO: ABNORMAL /HPF

## 2018-01-01 PROCEDURE — 87086 URINE CULTURE/COLONY COUNT: CPT | Performed by: EMERGENCY MEDICINE

## 2018-01-01 PROCEDURE — 51705 CHANGE OF BLADDER TUBE: CPT | Performed by: NURSE PRACTITIONER

## 2018-01-01 PROCEDURE — 82948 REAGENT STRIP/BLOOD GLUCOSE: CPT

## 2018-01-01 PROCEDURE — 99223 1ST HOSP IP/OBS HIGH 75: CPT | Performed by: INTERNAL MEDICINE

## 2018-01-01 PROCEDURE — 99232 SBSQ HOSP IP/OBS MODERATE 35: CPT | Performed by: INTERNAL MEDICINE

## 2018-01-01 PROCEDURE — 97530 THERAPEUTIC ACTIVITIES: CPT

## 2018-01-01 PROCEDURE — G8978 MOBILITY CURRENT STATUS: HCPCS

## 2018-01-01 PROCEDURE — 87081 CULTURE SCREEN ONLY: CPT | Performed by: NURSE PRACTITIONER

## 2018-01-01 PROCEDURE — 97535 SELF CARE MNGMENT TRAINING: CPT

## 2018-01-01 PROCEDURE — 94668 MNPJ CHEST WALL SBSQ: CPT

## 2018-01-01 PROCEDURE — 99211 OFF/OP EST MAY X REQ PHY/QHP: CPT

## 2018-01-01 PROCEDURE — 86705 HEP B CORE ANTIBODY IGM: CPT | Performed by: INTERNAL MEDICINE

## 2018-01-01 PROCEDURE — G8988 SELF CARE GOAL STATUS: HCPCS

## 2018-01-01 PROCEDURE — G0515 COGNITIVE SKILLS DEVELOPMENT: HCPCS

## 2018-01-01 PROCEDURE — 94760 N-INVAS EAR/PLS OXIMETRY 1: CPT

## 2018-01-01 PROCEDURE — 83036 HEMOGLOBIN GLYCOSYLATED A1C: CPT | Performed by: NURSE PRACTITIONER

## 2018-01-01 PROCEDURE — 97110 THERAPEUTIC EXERCISES: CPT

## 2018-01-01 PROCEDURE — 80048 BASIC METABOLIC PNL TOTAL CA: CPT | Performed by: INTERNAL MEDICINE

## 2018-01-01 PROCEDURE — 83605 ASSAY OF LACTIC ACID: CPT | Performed by: EMERGENCY MEDICINE

## 2018-01-01 PROCEDURE — 85730 THROMBOPLASTIN TIME PARTIAL: CPT | Performed by: EMERGENCY MEDICINE

## 2018-01-01 PROCEDURE — 93010 ELECTROCARDIOGRAM REPORT: CPT | Performed by: INTERNAL MEDICINE

## 2018-01-01 PROCEDURE — 84484 ASSAY OF TROPONIN QUANT: CPT | Performed by: EMERGENCY MEDICINE

## 2018-01-01 PROCEDURE — 84145 PROCALCITONIN (PCT): CPT | Performed by: NURSE PRACTITIONER

## 2018-01-01 PROCEDURE — 99239 HOSP IP/OBS DSCHRG MGMT >30: CPT | Performed by: PHYSICIAN ASSISTANT

## 2018-01-01 PROCEDURE — 87086 URINE CULTURE/COLONY COUNT: CPT

## 2018-01-01 PROCEDURE — 85610 PROTHROMBIN TIME: CPT | Performed by: EMERGENCY MEDICINE

## 2018-01-01 PROCEDURE — 71046 X-RAY EXAM CHEST 2 VIEWS: CPT

## 2018-01-01 PROCEDURE — 85025 COMPLETE CBC W/AUTO DIFF WBC: CPT | Performed by: EMERGENCY MEDICINE

## 2018-01-01 PROCEDURE — 82140 ASSAY OF AMMONIA: CPT | Performed by: EMERGENCY MEDICINE

## 2018-01-01 PROCEDURE — 86803 HEPATITIS C AB TEST: CPT | Performed by: INTERNAL MEDICINE

## 2018-01-01 PROCEDURE — 99214 OFFICE O/P EST MOD 30 MIN: CPT | Performed by: INTERNAL MEDICINE

## 2018-01-01 PROCEDURE — 99232 SBSQ HOSP IP/OBS MODERATE 35: CPT | Performed by: PHYSICIAN ASSISTANT

## 2018-01-01 PROCEDURE — 85025 COMPLETE CBC W/AUTO DIFF WBC: CPT | Performed by: PHYSICIAN ASSISTANT

## 2018-01-01 PROCEDURE — 80053 COMPREHEN METABOLIC PANEL: CPT | Performed by: EMERGENCY MEDICINE

## 2018-01-01 PROCEDURE — 94640 AIRWAY INHALATION TREATMENT: CPT

## 2018-01-01 PROCEDURE — 86706 HEP B SURFACE ANTIBODY: CPT | Performed by: PHYSICIAN ASSISTANT

## 2018-01-01 PROCEDURE — 97116 GAIT TRAINING THERAPY: CPT

## 2018-01-01 PROCEDURE — 87449 NOS EACH ORGANISM AG IA: CPT | Performed by: NURSE PRACTITIONER

## 2018-01-01 PROCEDURE — 85049 AUTOMATED PLATELET COUNT: CPT | Performed by: NURSE PRACTITIONER

## 2018-01-01 PROCEDURE — G8996 SWALLOW CURRENT STATUS: HCPCS

## 2018-01-01 PROCEDURE — 87631 RESP VIRUS 3-5 TARGETS: CPT | Performed by: EMERGENCY MEDICINE

## 2018-01-01 PROCEDURE — 99222 1ST HOSP IP/OBS MODERATE 55: CPT | Performed by: UROLOGY

## 2018-01-01 PROCEDURE — 73521 X-RAY EXAM HIPS BI 2 VIEWS: CPT

## 2018-01-01 PROCEDURE — 85027 COMPLETE CBC AUTOMATED: CPT | Performed by: PHYSICIAN ASSISTANT

## 2018-01-01 PROCEDURE — 0T2BX0Z CHANGE DRAINAGE DEVICE IN BLADDER, EXTERNAL APPROACH: ICD-10-PCS | Performed by: UROLOGY

## 2018-01-01 PROCEDURE — G8979 MOBILITY GOAL STATUS: HCPCS

## 2018-01-01 PROCEDURE — 80048 BASIC METABOLIC PNL TOTAL CA: CPT | Performed by: PHYSICIAN ASSISTANT

## 2018-01-01 PROCEDURE — 84145 PROCALCITONIN (PCT): CPT | Performed by: PHYSICIAN ASSISTANT

## 2018-01-01 PROCEDURE — 73610 X-RAY EXAM OF ANKLE: CPT

## 2018-01-01 PROCEDURE — 70450 CT HEAD/BRAIN W/O DYE: CPT

## 2018-01-01 PROCEDURE — 84145 PROCALCITONIN (PCT): CPT | Performed by: EMERGENCY MEDICINE

## 2018-01-01 PROCEDURE — 74176 CT ABD & PELVIS W/O CONTRAST: CPT

## 2018-01-01 PROCEDURE — 83735 ASSAY OF MAGNESIUM: CPT | Performed by: NURSE PRACTITIONER

## 2018-01-01 PROCEDURE — 96360 HYDRATION IV INFUSION INIT: CPT

## 2018-01-01 PROCEDURE — 99214 OFFICE O/P EST MOD 30 MIN: CPT | Performed by: UROLOGY

## 2018-01-01 PROCEDURE — 72125 CT NECK SPINE W/O DYE: CPT

## 2018-01-01 PROCEDURE — 85027 COMPLETE CBC AUTOMATED: CPT | Performed by: INTERNAL MEDICINE

## 2018-01-01 PROCEDURE — 97167 OT EVAL HIGH COMPLEX 60 MIN: CPT

## 2018-01-01 PROCEDURE — 94761 N-INVAS EAR/PLS OXIMETRY MLT: CPT

## 2018-01-01 PROCEDURE — 74230 X-RAY XM SWLNG FUNCJ C+: CPT

## 2018-01-01 PROCEDURE — 85007 BL SMEAR W/DIFF WBC COUNT: CPT | Performed by: NURSE PRACTITIONER

## 2018-01-01 PROCEDURE — 87205 SMEAR GRAM STAIN: CPT | Performed by: NURSE PRACTITIONER

## 2018-01-01 PROCEDURE — 93005 ELECTROCARDIOGRAM TRACING: CPT

## 2018-01-01 PROCEDURE — 82570 ASSAY OF URINE CREATININE: CPT | Performed by: INTERNAL MEDICINE

## 2018-01-01 PROCEDURE — 51710 CHANGE OF BLADDER TUBE: CPT | Performed by: UROLOGY

## 2018-01-01 PROCEDURE — G8997 SWALLOW GOAL STATUS: HCPCS

## 2018-01-01 PROCEDURE — G8987 SELF CARE CURRENT STATUS: HCPCS

## 2018-01-01 PROCEDURE — 71250 CT THORAX DX C-: CPT

## 2018-01-01 PROCEDURE — 92610 EVALUATE SWALLOWING FUNCTION: CPT

## 2018-01-01 PROCEDURE — 87070 CULTURE OTHR SPECIMN AEROBIC: CPT | Performed by: NURSE PRACTITIONER

## 2018-01-01 PROCEDURE — 72148 MRI LUMBAR SPINE W/O DYE: CPT

## 2018-01-01 PROCEDURE — 99285 EMERGENCY DEPT VISIT HI MDM: CPT

## 2018-01-01 PROCEDURE — 99282 EMERGENCY DEPT VISIT SF MDM: CPT

## 2018-01-01 PROCEDURE — G0439 PPPS, SUBSEQ VISIT: HCPCS | Performed by: INTERNAL MEDICINE

## 2018-01-01 PROCEDURE — 85007 BL SMEAR W/DIFF WBC COUNT: CPT | Performed by: PHYSICIAN ASSISTANT

## 2018-01-01 PROCEDURE — 80048 BASIC METABOLIC PNL TOTAL CA: CPT | Performed by: NURSE PRACTITIONER

## 2018-01-01 PROCEDURE — 81001 URINALYSIS AUTO W/SCOPE: CPT

## 2018-01-01 PROCEDURE — 80053 COMPREHEN METABOLIC PANEL: CPT | Performed by: INTERNAL MEDICINE

## 2018-01-01 PROCEDURE — G0008 ADMIN INFLUENZA VIRUS VAC: HCPCS | Performed by: INTERNAL MEDICINE

## 2018-01-01 PROCEDURE — 96374 THER/PROPH/DIAG INJ IV PUSH: CPT

## 2018-01-01 PROCEDURE — 83880 ASSAY OF NATRIURETIC PEPTIDE: CPT | Performed by: EMERGENCY MEDICINE

## 2018-01-01 PROCEDURE — 90662 IIV NO PRSV INCREASED AG IM: CPT | Performed by: INTERNAL MEDICINE

## 2018-01-01 PROCEDURE — 87340 HEPATITIS B SURFACE AG IA: CPT | Performed by: INTERNAL MEDICINE

## 2018-01-01 PROCEDURE — 73564 X-RAY EXAM KNEE 4 OR MORE: CPT

## 2018-01-01 PROCEDURE — 97163 PT EVAL HIGH COMPLEX 45 MIN: CPT

## 2018-01-01 PROCEDURE — 82330 ASSAY OF CALCIUM: CPT | Performed by: NURSE PRACTITIONER

## 2018-01-01 PROCEDURE — 86704 HEP B CORE ANTIBODY TOTAL: CPT | Performed by: INTERNAL MEDICINE

## 2018-01-01 PROCEDURE — 87186 SC STD MICRODIL/AGAR DIL: CPT | Performed by: EMERGENCY MEDICINE

## 2018-01-01 PROCEDURE — 87147 CULTURE TYPE IMMUNOLOGIC: CPT | Performed by: EMERGENCY MEDICINE

## 2018-01-01 PROCEDURE — 85027 COMPLETE CBC AUTOMATED: CPT | Performed by: NURSE PRACTITIONER

## 2018-01-01 PROCEDURE — 85025 COMPLETE CBC W/AUTO DIFF WBC: CPT | Performed by: INTERNAL MEDICINE

## 2018-01-01 PROCEDURE — 99495 TRANSJ CARE MGMT MOD F2F 14D: CPT | Performed by: INTERNAL MEDICINE

## 2018-01-01 PROCEDURE — 99221 1ST HOSP IP/OBS SF/LOW 40: CPT | Performed by: INTERNAL MEDICINE

## 2018-01-01 PROCEDURE — 83735 ASSAY OF MAGNESIUM: CPT | Performed by: EMERGENCY MEDICINE

## 2018-01-01 PROCEDURE — 83036 HEMOGLOBIN GLYCOSYLATED A1C: CPT | Performed by: INTERNAL MEDICINE

## 2018-01-01 PROCEDURE — 51705 CHANGE OF BLADDER TUBE: CPT

## 2018-01-01 PROCEDURE — 84145 PROCALCITONIN (PCT): CPT | Performed by: INTERNAL MEDICINE

## 2018-01-01 PROCEDURE — 87077 CULTURE AEROBIC IDENTIFY: CPT | Performed by: EMERGENCY MEDICINE

## 2018-01-01 PROCEDURE — 82043 UR ALBUMIN QUANTITATIVE: CPT | Performed by: INTERNAL MEDICINE

## 2018-01-01 PROCEDURE — 92611 MOTION FLUOROSCOPY/SWALLOW: CPT

## 2018-01-01 PROCEDURE — 81001 URINALYSIS AUTO W/SCOPE: CPT | Performed by: EMERGENCY MEDICINE

## 2018-01-01 PROCEDURE — 99239 HOSP IP/OBS DSCHRG MGMT >30: CPT | Performed by: INTERNAL MEDICINE

## 2018-01-01 PROCEDURE — 36415 COLL VENOUS BLD VENIPUNCTURE: CPT

## 2018-01-01 PROCEDURE — 87522 HEPATITIS C REVRS TRNSCRPJ: CPT | Performed by: PHYSICIAN ASSISTANT

## 2018-01-01 PROCEDURE — 36415 COLL VENOUS BLD VENIPUNCTURE: CPT | Performed by: EMERGENCY MEDICINE

## 2018-01-01 PROCEDURE — 87040 BLOOD CULTURE FOR BACTERIA: CPT | Performed by: INTERNAL MEDICINE

## 2018-01-01 PROCEDURE — 84100 ASSAY OF PHOSPHORUS: CPT | Performed by: NURSE PRACTITIONER

## 2018-01-01 PROCEDURE — 87040 BLOOD CULTURE FOR BACTERIA: CPT | Performed by: EMERGENCY MEDICINE

## 2018-01-01 PROCEDURE — 83690 ASSAY OF LIPASE: CPT | Performed by: EMERGENCY MEDICINE

## 2018-01-01 RX ORDER — ACETAMINOPHEN 325 MG/1
650 TABLET ORAL EVERY 6 HOURS PRN
Status: DISCONTINUED | OUTPATIENT
Start: 2018-01-01 | End: 2018-01-01 | Stop reason: HOSPADM

## 2018-01-01 RX ORDER — GUAIFENESIN 600 MG
600 TABLET, EXTENDED RELEASE 12 HR ORAL EVERY 12 HOURS SCHEDULED
Status: DISCONTINUED | OUTPATIENT
Start: 2018-01-01 | End: 2018-01-01 | Stop reason: HOSPADM

## 2018-01-01 RX ORDER — POLYETHYLENE GLYCOL 3350 17 G/17G
17 POWDER, FOR SOLUTION ORAL DAILY PRN
Status: DISCONTINUED | OUTPATIENT
Start: 2018-01-01 | End: 2018-01-01 | Stop reason: HOSPADM

## 2018-01-01 RX ORDER — DEXTROSE MONOHYDRATE 25 G/50ML
50 INJECTION, SOLUTION INTRAVENOUS ONCE
Status: COMPLETED | OUTPATIENT
Start: 2018-01-01 | End: 2018-01-01

## 2018-01-01 RX ORDER — MORPHINE SULFATE 15 MG/1
15 TABLET, FILM COATED, EXTENDED RELEASE ORAL EVERY 12 HOURS
Status: DISCONTINUED | OUTPATIENT
Start: 2018-01-01 | End: 2018-01-01

## 2018-01-01 RX ORDER — DULOXETIN HYDROCHLORIDE 60 MG/1
60 CAPSULE, DELAYED RELEASE ORAL DAILY
Status: DISCONTINUED | OUTPATIENT
Start: 2018-01-01 | End: 2018-01-01 | Stop reason: HOSPADM

## 2018-01-01 RX ORDER — INSULIN ASPART 100 [IU]/ML
INJECTION, SUSPENSION SUBCUTANEOUS 4 TIMES DAILY
COMMUNITY
End: 2018-01-01 | Stop reason: SDUPTHER

## 2018-01-01 RX ORDER — LACTULOSE 20 G/30ML
20 SOLUTION ORAL 3 TIMES DAILY
Status: COMPLETED | OUTPATIENT
Start: 2018-01-01 | End: 2018-01-01

## 2018-01-01 RX ORDER — CEFEPIME HYDROCHLORIDE 2 G/1
1000 INJECTION, POWDER, FOR SOLUTION INTRAVENOUS EVERY 12 HOURS
Status: DISCONTINUED | OUTPATIENT
Start: 2018-01-01 | End: 2018-01-01 | Stop reason: SDUPTHER

## 2018-01-01 RX ORDER — OXYCODONE HYDROCHLORIDE 15 MG/1
15 TABLET ORAL 3 TIMES DAILY PRN
Refills: 0 | COMMUNITY
Start: 2018-01-01 | End: 2019-01-01 | Stop reason: HOSPADM

## 2018-01-01 RX ORDER — ONDANSETRON 2 MG/ML
4 INJECTION INTRAMUSCULAR; INTRAVENOUS EVERY 4 HOURS PRN
Status: DISCONTINUED | OUTPATIENT
Start: 2018-01-01 | End: 2018-01-01 | Stop reason: HOSPADM

## 2018-01-01 RX ORDER — ALBUTEROL SULFATE 2.5 MG/3ML
5 SOLUTION RESPIRATORY (INHALATION) ONCE
Status: COMPLETED | OUTPATIENT
Start: 2018-01-01 | End: 2018-01-01

## 2018-01-01 RX ORDER — MORPHINE SULFATE 15 MG/1
15 TABLET, FILM COATED, EXTENDED RELEASE ORAL EVERY 12 HOURS
Qty: 10 TABLET | Refills: 0 | Status: SHIPPED | OUTPATIENT
Start: 2018-01-01 | End: 2018-01-01 | Stop reason: HOSPADM

## 2018-01-01 RX ORDER — FAMOTIDINE 10 MG
10 TABLET ORAL 2 TIMES DAILY
Qty: 60 TABLET | Refills: 0 | Status: ON HOLD | OUTPATIENT
Start: 2018-01-01 | End: 2019-01-01 | Stop reason: CLARIF

## 2018-01-01 RX ORDER — FAMOTIDINE 10 MG
10 TABLET ORAL 2 TIMES DAILY
Qty: 60 TABLET | Refills: 0 | Status: SHIPPED | OUTPATIENT
Start: 2018-01-01 | End: 2018-01-01

## 2018-01-01 RX ORDER — INSULIN ASPART 100 [IU]/ML
20 INJECTION, SUSPENSION SUBCUTANEOUS
Refills: 0
Start: 2018-01-01 | End: 2019-01-01

## 2018-01-01 RX ORDER — GABAPENTIN 600 MG/1
TABLET ORAL
Qty: 450 TABLET | Refills: 0 | Status: SHIPPED | OUTPATIENT
Start: 2018-01-01 | End: 2018-01-01 | Stop reason: SDUPTHER

## 2018-01-01 RX ORDER — SODIUM CHLORIDE 9 MG/ML
75 INJECTION, SOLUTION INTRAVENOUS CONTINUOUS
Status: DISCONTINUED | OUTPATIENT
Start: 2018-01-01 | End: 2018-01-01

## 2018-01-01 RX ORDER — TRAZODONE HYDROCHLORIDE 100 MG/1
100 TABLET ORAL
Status: DISCONTINUED | OUTPATIENT
Start: 2018-01-01 | End: 2018-01-01 | Stop reason: HOSPADM

## 2018-01-01 RX ORDER — FAMOTIDINE 10 MG/1
TABLET, FILM COATED ORAL
Refills: 0 | Status: ON HOLD | COMMUNITY
Start: 2018-01-01 | End: 2019-01-01 | Stop reason: CLARIF

## 2018-01-01 RX ORDER — ACETAMINOPHEN 325 MG/1
650 TABLET ORAL EVERY 4 HOURS PRN
Qty: 30 TABLET | Refills: 0
Start: 2018-01-01 | End: 2018-01-01

## 2018-01-01 RX ORDER — ACETAMINOPHEN 325 MG/1
650 TABLET ORAL ONCE
Status: COMPLETED | OUTPATIENT
Start: 2018-01-01 | End: 2018-01-01

## 2018-01-01 RX ORDER — OXYCODONE HYDROCHLORIDE 10 MG/1
10 TABLET ORAL EVERY 6 HOURS PRN
Status: DISCONTINUED | OUTPATIENT
Start: 2018-01-01 | End: 2018-01-01 | Stop reason: HOSPADM

## 2018-01-01 RX ORDER — DEXTROSE MONOHYDRATE 25 G/50ML
INJECTION, SOLUTION INTRAVENOUS
Status: COMPLETED
Start: 2018-01-01 | End: 2018-01-01

## 2018-01-01 RX ORDER — OXYCODONE HYDROCHLORIDE 10 MG/1
10 TABLET ORAL EVERY 6 HOURS PRN
Status: DISCONTINUED | OUTPATIENT
Start: 2018-01-01 | End: 2018-01-01

## 2018-01-01 RX ORDER — FAMOTIDINE 20 MG/1
10 TABLET, FILM COATED ORAL 2 TIMES DAILY
Status: DISCONTINUED | OUTPATIENT
Start: 2018-01-01 | End: 2018-01-01 | Stop reason: HOSPADM

## 2018-01-01 RX ORDER — SODIUM CHLORIDE 9 MG/ML
50 INJECTION, SOLUTION INTRAVENOUS CONTINUOUS
Status: DISCONTINUED | OUTPATIENT
Start: 2018-01-01 | End: 2018-01-01

## 2018-01-01 RX ORDER — SODIUM CHLORIDE FOR INHALATION 0.9 %
3 VIAL, NEBULIZER (ML) INHALATION
Status: DISCONTINUED | OUTPATIENT
Start: 2018-01-01 | End: 2018-01-01 | Stop reason: HOSPADM

## 2018-01-01 RX ORDER — GABAPENTIN 300 MG/1
300 CAPSULE ORAL 3 TIMES DAILY
Status: DISCONTINUED | OUTPATIENT
Start: 2018-01-01 | End: 2018-01-01

## 2018-01-01 RX ORDER — LEVOFLOXACIN 500 MG/1
500 TABLET, FILM COATED ORAL EVERY 24 HOURS
Qty: 5 TABLET | Refills: 0 | Status: SHIPPED | OUTPATIENT
Start: 2018-01-01 | End: 2018-01-01 | Stop reason: HOSPADM

## 2018-01-01 RX ORDER — TRAZODONE HYDROCHLORIDE 100 MG/1
100 TABLET ORAL
Qty: 90 TABLET | Refills: 1 | Status: SHIPPED | OUTPATIENT
Start: 2018-01-01 | End: 2019-01-01 | Stop reason: SDUPTHER

## 2018-01-01 RX ORDER — FUROSEMIDE 10 MG/ML
20 INJECTION INTRAMUSCULAR; INTRAVENOUS ONCE
Status: COMPLETED | OUTPATIENT
Start: 2018-01-01 | End: 2018-01-01

## 2018-01-01 RX ORDER — OXYCODONE HYDROCHLORIDE 10 MG/1
10 TABLET ORAL EVERY 6 HOURS PRN
Qty: 10 TABLET | Refills: 0 | Status: SHIPPED | OUTPATIENT
Start: 2018-01-01 | End: 2018-01-01 | Stop reason: HOSPADM

## 2018-01-01 RX ORDER — HYDROMORPHONE HCL/PF 1 MG/ML
0.5 SYRINGE (ML) INJECTION EVERY 4 HOURS PRN
Status: DISCONTINUED | OUTPATIENT
Start: 2018-01-01 | End: 2018-01-01

## 2018-01-01 RX ORDER — PANTOPRAZOLE SODIUM 40 MG/1
40 TABLET, DELAYED RELEASE ORAL
Qty: 30 TABLET | Refills: 0 | Status: SHIPPED | OUTPATIENT
Start: 2018-01-01 | End: 2018-01-01

## 2018-01-01 RX ORDER — PANTOPRAZOLE SODIUM 40 MG/1
40 TABLET, DELAYED RELEASE ORAL
Status: DISCONTINUED | OUTPATIENT
Start: 2018-01-01 | End: 2018-01-01 | Stop reason: HOSPADM

## 2018-01-01 RX ORDER — BACLOFEN 10 MG/1
10 TABLET ORAL 3 TIMES DAILY
Qty: 270 TABLET | Refills: 1 | Status: SHIPPED | OUTPATIENT
Start: 2018-01-01 | End: 2018-01-01 | Stop reason: HOSPADM

## 2018-01-01 RX ORDER — INSULIN ASPART 100 [IU]/ML
20 INJECTION, SUSPENSION SUBCUTANEOUS
Status: DISCONTINUED | OUTPATIENT
Start: 2018-01-01 | End: 2018-01-01

## 2018-01-01 RX ORDER — PANTOPRAZOLE SODIUM 40 MG/1
40 TABLET, DELAYED RELEASE ORAL
Qty: 30 TABLET | Refills: 0 | Status: SHIPPED | OUTPATIENT
Start: 2018-01-01 | End: 2019-01-01

## 2018-01-01 RX ORDER — HYDRALAZINE HYDROCHLORIDE 20 MG/ML
15 INJECTION INTRAMUSCULAR; INTRAVENOUS ONCE
Status: COMPLETED | OUTPATIENT
Start: 2018-01-01 | End: 2018-01-01

## 2018-01-01 RX ORDER — METOPROLOL SUCCINATE 25 MG/1
25 TABLET, EXTENDED RELEASE ORAL DAILY
Status: DISCONTINUED | OUTPATIENT
Start: 2018-01-01 | End: 2018-01-01 | Stop reason: HOSPADM

## 2018-01-01 RX ORDER — BIOTIN 1 MG
TABLET ORAL
Status: ON HOLD | COMMUNITY
End: 2019-01-01 | Stop reason: CLARIF

## 2018-01-01 RX ORDER — HEPARIN SODIUM 5000 [USP'U]/ML
5000 INJECTION, SOLUTION INTRAVENOUS; SUBCUTANEOUS EVERY 8 HOURS SCHEDULED
Status: DISCONTINUED | OUTPATIENT
Start: 2018-01-01 | End: 2018-01-01 | Stop reason: HOSPADM

## 2018-01-01 RX ORDER — INSULIN GLARGINE 100 [IU]/ML
30 INJECTION, SOLUTION SUBCUTANEOUS
Status: DISCONTINUED | OUTPATIENT
Start: 2018-01-01 | End: 2018-01-01

## 2018-01-01 RX ORDER — GABAPENTIN 600 MG/1
TABLET ORAL
Qty: 450 TABLET | Refills: 0 | Status: SHIPPED | OUTPATIENT
Start: 2018-01-01 | End: 2019-01-01 | Stop reason: SDUPTHER

## 2018-01-01 RX ORDER — INSULIN ASPART 100 [IU]/ML
18 INJECTION, SUSPENSION SUBCUTANEOUS
Status: DISCONTINUED | OUTPATIENT
Start: 2018-01-01 | End: 2018-01-01

## 2018-01-01 RX ORDER — GABAPENTIN 300 MG/1
600 CAPSULE ORAL 3 TIMES DAILY
Status: DISCONTINUED | OUTPATIENT
Start: 2018-01-01 | End: 2018-01-01 | Stop reason: HOSPADM

## 2018-01-01 RX ORDER — SODIUM CHLORIDE FOR INHALATION 0.9 %
VIAL, NEBULIZER (ML) INHALATION
Status: DISPENSED
Start: 2018-01-01 | End: 2018-01-01

## 2018-01-01 RX ORDER — MORPHINE SULFATE 15 MG/1
15 TABLET, FILM COATED, EXTENDED RELEASE ORAL EVERY 12 HOURS SCHEDULED
Status: DISCONTINUED | OUTPATIENT
Start: 2018-01-01 | End: 2018-01-01 | Stop reason: HOSPADM

## 2018-01-01 RX ORDER — GABAPENTIN 300 MG/1
600 CAPSULE ORAL 2 TIMES DAILY
Status: DISCONTINUED | OUTPATIENT
Start: 2018-01-01 | End: 2018-01-01 | Stop reason: HOSPADM

## 2018-01-01 RX ORDER — INSULIN ASPART 100 [IU]/ML
18 INJECTION, SUSPENSION SUBCUTANEOUS
Status: DISCONTINUED | OUTPATIENT
Start: 2018-01-01 | End: 2018-01-01 | Stop reason: HOSPADM

## 2018-01-01 RX ORDER — SODIUM CHLORIDE 9 MG/ML
125 INJECTION, SOLUTION INTRAVENOUS CONTINUOUS
Status: DISCONTINUED | OUTPATIENT
Start: 2018-01-01 | End: 2018-01-01

## 2018-01-01 RX ORDER — AMLODIPINE BESYLATE 5 MG/1
5 TABLET ORAL DAILY
Refills: 0 | Status: ON HOLD
Start: 2018-01-01 | End: 2019-01-01 | Stop reason: CLARIF

## 2018-01-01 RX ORDER — SODIUM CHLORIDE FOR INHALATION 0.9 %
3 VIAL, NEBULIZER (ML) INHALATION
Status: DISCONTINUED | OUTPATIENT
Start: 2018-01-01 | End: 2018-01-01

## 2018-01-01 RX ORDER — INSULIN GLARGINE 100 [IU]/ML
15 INJECTION, SOLUTION SUBCUTANEOUS
Status: DISCONTINUED | OUTPATIENT
Start: 2018-01-01 | End: 2018-01-01 | Stop reason: HOSPADM

## 2018-01-01 RX ORDER — DULOXETIN HYDROCHLORIDE 60 MG/1
CAPSULE, DELAYED RELEASE ORAL
Qty: 90 CAPSULE | Refills: 1 | Status: SHIPPED | OUTPATIENT
Start: 2018-01-01 | End: 2019-01-01 | Stop reason: SDUPTHER

## 2018-01-01 RX ORDER — SODIUM CHLORIDE 9 MG/ML
125 INJECTION, SOLUTION INTRAVENOUS CONTINUOUS
Status: CANCELLED | OUTPATIENT
Start: 2018-01-01

## 2018-01-01 RX ORDER — AMOXICILLIN 250 MG
1 CAPSULE ORAL 2 TIMES DAILY
Status: DISCONTINUED | OUTPATIENT
Start: 2018-01-01 | End: 2018-01-01 | Stop reason: HOSPADM

## 2018-01-01 RX ORDER — VANCOMYCIN HYDROCHLORIDE 1 G/200ML
12.5 INJECTION, SOLUTION INTRAVENOUS EVERY 24 HOURS
Status: DISCONTINUED | OUTPATIENT
Start: 2018-01-01 | End: 2018-01-01

## 2018-01-01 RX ORDER — FAMOTIDINE 20 MG/1
20 TABLET, FILM COATED ORAL 2 TIMES DAILY
Status: DISCONTINUED | OUTPATIENT
Start: 2018-01-01 | End: 2018-01-01 | Stop reason: DRUGHIGH

## 2018-01-01 RX ORDER — CHLORHEXIDINE GLUCONATE 0.12 MG/ML
15 RINSE ORAL EVERY 12 HOURS SCHEDULED
Status: DISCONTINUED | OUTPATIENT
Start: 2018-01-01 | End: 2018-01-01

## 2018-01-01 RX ORDER — LEVOFLOXACIN 500 MG/1
500 TABLET, FILM COATED ORAL EVERY 24 HOURS
Status: DISCONTINUED | OUTPATIENT
Start: 2018-01-01 | End: 2018-01-01 | Stop reason: HOSPADM

## 2018-01-01 RX ORDER — AMLODIPINE BESYLATE 5 MG/1
5 TABLET ORAL DAILY
Status: DISCONTINUED | OUTPATIENT
Start: 2018-01-01 | End: 2018-01-01 | Stop reason: HOSPADM

## 2018-01-01 RX ORDER — MORPHINE SULFATE 15 MG/1
15 TABLET, FILM COATED, EXTENDED RELEASE ORAL EVERY 12 HOURS
Refills: 0 | COMMUNITY
Start: 2018-01-01 | End: 2019-01-01 | Stop reason: HOSPADM

## 2018-01-01 RX ORDER — METOPROLOL SUCCINATE 25 MG/1
25 TABLET, EXTENDED RELEASE ORAL DAILY
Refills: 0
Start: 2018-01-01 | End: 2019-01-01

## 2018-01-01 RX ORDER — PRAVASTATIN SODIUM 40 MG
40 TABLET ORAL
Status: DISCONTINUED | OUTPATIENT
Start: 2018-01-01 | End: 2018-01-01 | Stop reason: HOSPADM

## 2018-01-01 RX ORDER — SODIUM CHLORIDE, SODIUM LACTATE, POTASSIUM CHLORIDE, CALCIUM CHLORIDE 600; 310; 30; 20 MG/100ML; MG/100ML; MG/100ML; MG/100ML
125 INJECTION, SOLUTION INTRAVENOUS CONTINUOUS
Status: DISCONTINUED | OUTPATIENT
Start: 2018-01-01 | End: 2018-01-01

## 2018-01-01 RX ORDER — LEVALBUTEROL 1.25 MG/.5ML
1.25 SOLUTION, CONCENTRATE RESPIRATORY (INHALATION)
Status: DISCONTINUED | OUTPATIENT
Start: 2018-01-01 | End: 2018-01-01

## 2018-01-01 RX ORDER — BACLOFEN 10 MG/1
10 TABLET ORAL 3 TIMES DAILY
Status: DISCONTINUED | OUTPATIENT
Start: 2018-01-01 | End: 2018-01-01

## 2018-01-01 RX ORDER — LEVALBUTEROL 1.25 MG/.5ML
1.25 SOLUTION, CONCENTRATE RESPIRATORY (INHALATION)
Status: DISCONTINUED | OUTPATIENT
Start: 2018-01-01 | End: 2018-01-01 | Stop reason: HOSPADM

## 2018-01-01 RX ORDER — BACLOFEN 10 MG/1
10 TABLET ORAL 3 TIMES DAILY PRN
Refills: 0
Start: 2018-01-01 | End: 2019-01-01 | Stop reason: HOSPADM

## 2018-01-01 RX ADMIN — AMLODIPINE BESYLATE 5 MG: 5 TABLET ORAL at 09:21

## 2018-01-01 RX ADMIN — INSULIN ASPART 20 UNITS: 100 INJECTION, SUSPENSION SUBCUTANEOUS at 16:59

## 2018-01-01 RX ADMIN — INSULIN LISPRO 10 UNITS: 100 INJECTION, SOLUTION INTRAVENOUS; SUBCUTANEOUS at 12:13

## 2018-01-01 RX ADMIN — INSULIN ASPART 18 UNITS: 100 INJECTION, SUSPENSION SUBCUTANEOUS at 09:21

## 2018-01-01 RX ADMIN — GABAPENTIN 300 MG: 300 CAPSULE ORAL at 16:41

## 2018-01-01 RX ADMIN — HEPARIN SODIUM 5000 UNITS: 5000 INJECTION INTRAVENOUS; SUBCUTANEOUS at 15:56

## 2018-01-01 RX ADMIN — ISODIUM CHLORIDE 3 ML: 0.03 SOLUTION RESPIRATORY (INHALATION) at 09:14

## 2018-01-01 RX ADMIN — INSULIN GLARGINE 30 UNITS: 100 INJECTION, SOLUTION SUBCUTANEOUS at 21:11

## 2018-01-01 RX ADMIN — METOPROLOL SUCCINATE 25 MG: 25 TABLET, EXTENDED RELEASE ORAL at 09:21

## 2018-01-01 RX ADMIN — GABAPENTIN 600 MG: 300 CAPSULE ORAL at 17:39

## 2018-01-01 RX ADMIN — SODIUM CHLORIDE 50 ML/HR: 0.9 INJECTION, SOLUTION INTRAVENOUS at 22:52

## 2018-01-01 RX ADMIN — PIPERACILLIN SODIUM,TAZOBACTAM SODIUM 2.25 G: 2; .25 INJECTION, POWDER, FOR SOLUTION INTRAVENOUS at 13:08

## 2018-01-01 RX ADMIN — ISODIUM CHLORIDE 3 ML: 0.03 SOLUTION RESPIRATORY (INHALATION) at 08:35

## 2018-01-01 RX ADMIN — LACTULOSE 20 G: 10 SOLUTION ORAL at 21:41

## 2018-01-01 RX ADMIN — HEPARIN SODIUM 5000 UNITS: 5000 INJECTION INTRAVENOUS; SUBCUTANEOUS at 21:42

## 2018-01-01 RX ADMIN — INSULIN LISPRO 10 UNITS: 100 INJECTION, SOLUTION INTRAVENOUS; SUBCUTANEOUS at 17:34

## 2018-01-01 RX ADMIN — METOPROLOL SUCCINATE 25 MG: 25 TABLET, EXTENDED RELEASE ORAL at 08:07

## 2018-01-01 RX ADMIN — CEFAZOLIN SODIUM 1000 MG: 1 SOLUTION INTRAVENOUS at 18:07

## 2018-01-01 RX ADMIN — ISODIUM CHLORIDE 3 ML: 0.03 SOLUTION RESPIRATORY (INHALATION) at 19:25

## 2018-01-01 RX ADMIN — SODIUM CHLORIDE 75 ML/HR: 0.9 INJECTION, SOLUTION INTRAVENOUS at 08:05

## 2018-01-01 RX ADMIN — LEVALBUTEROL 1.25 MG: 1.25 SOLUTION, CONCENTRATE RESPIRATORY (INHALATION) at 13:13

## 2018-01-01 RX ADMIN — DEXTROSE MONOHYDRATE 25 ML: 25 INJECTION, SOLUTION INTRAVENOUS at 14:39

## 2018-01-01 RX ADMIN — GABAPENTIN 600 MG: 300 CAPSULE ORAL at 07:55

## 2018-01-01 RX ADMIN — TRAZODONE HYDROCHLORIDE 100 MG: 100 TABLET ORAL at 21:13

## 2018-01-01 RX ADMIN — INSULIN LISPRO 10 UNITS: 100 INJECTION, SOLUTION INTRAVENOUS; SUBCUTANEOUS at 11:49

## 2018-01-01 RX ADMIN — LACTULOSE 20 G: 10 SOLUTION ORAL at 08:13

## 2018-01-01 RX ADMIN — INSULIN LISPRO 1 UNITS: 100 INJECTION, SOLUTION INTRAVENOUS; SUBCUTANEOUS at 21:43

## 2018-01-01 RX ADMIN — MORPHINE SULFATE 15 MG: 15 TABLET, EXTENDED RELEASE ORAL at 10:33

## 2018-01-01 RX ADMIN — STANDARDIZED SENNA CONCENTRATE AND DOCUSATE SODIUM 1 TABLET: 8.6; 5 TABLET, FILM COATED ORAL at 08:13

## 2018-01-01 RX ADMIN — GABAPENTIN 300 MG: 300 CAPSULE ORAL at 21:20

## 2018-01-01 RX ADMIN — AMLODIPINE BESYLATE 5 MG: 5 TABLET ORAL at 08:56

## 2018-01-01 RX ADMIN — MORPHINE SULFATE 15 MG: 15 TABLET, EXTENDED RELEASE ORAL at 21:20

## 2018-01-01 RX ADMIN — LEVALBUTEROL HYDROCHLORIDE 1.25 MG: 1.25 SOLUTION, CONCENTRATE RESPIRATORY (INHALATION) at 14:28

## 2018-01-01 RX ADMIN — DULOXETINE HYDROCHLORIDE 60 MG: 30 CAPSULE, DELAYED RELEASE ORAL at 10:32

## 2018-01-01 RX ADMIN — MORPHINE SULFATE 15 MG: 15 TABLET, EXTENDED RELEASE ORAL at 08:06

## 2018-01-01 RX ADMIN — INSULIN LISPRO 4 UNITS: 100 INJECTION, SOLUTION INTRAVENOUS; SUBCUTANEOUS at 12:49

## 2018-01-01 RX ADMIN — OXYCODONE HYDROCHLORIDE 10 MG: 10 TABLET ORAL at 14:13

## 2018-01-01 RX ADMIN — ISODIUM CHLORIDE 3 ML: 0.03 SOLUTION RESPIRATORY (INHALATION) at 13:48

## 2018-01-01 RX ADMIN — TRAZODONE HYDROCHLORIDE 100 MG: 100 TABLET ORAL at 21:41

## 2018-01-01 RX ADMIN — LEVALBUTEROL HYDROCHLORIDE 1.25 MG: 1.25 SOLUTION, CONCENTRATE RESPIRATORY (INHALATION) at 20:06

## 2018-01-01 RX ADMIN — GABAPENTIN 300 MG: 300 CAPSULE ORAL at 21:41

## 2018-01-01 RX ADMIN — DULOXETINE HYDROCHLORIDE 60 MG: 30 CAPSULE, DELAYED RELEASE ORAL at 08:49

## 2018-01-01 RX ADMIN — GABAPENTIN 600 MG: 300 CAPSULE ORAL at 08:11

## 2018-01-01 RX ADMIN — HEPARIN SODIUM 5000 UNITS: 5000 INJECTION INTRAVENOUS; SUBCUTANEOUS at 05:08

## 2018-01-01 RX ADMIN — OXYCODONE HYDROCHLORIDE 10 MG: 10 TABLET ORAL at 03:47

## 2018-01-01 RX ADMIN — DEXTRAN 70 AND HYPROMELLOSE 2910 1 DROP: 1; 3 SOLUTION/ DROPS OPHTHALMIC at 10:28

## 2018-01-01 RX ADMIN — GABAPENTIN 300 MG: 300 CAPSULE ORAL at 15:30

## 2018-01-01 RX ADMIN — HEPARIN SODIUM 5000 UNITS: 5000 INJECTION INTRAVENOUS; SUBCUTANEOUS at 13:05

## 2018-01-01 RX ADMIN — GABAPENTIN 300 MG: 300 CAPSULE ORAL at 08:10

## 2018-01-01 RX ADMIN — PRAVASTATIN SODIUM 40 MG: 40 TABLET ORAL at 16:41

## 2018-01-01 RX ADMIN — DULOXETINE HYDROCHLORIDE 60 MG: 60 CAPSULE, DELAYED RELEASE ORAL at 08:00

## 2018-01-01 RX ADMIN — HEPARIN SODIUM 5000 UNITS: 5000 INJECTION INTRAVENOUS; SUBCUTANEOUS at 13:13

## 2018-01-01 RX ADMIN — INSULIN LISPRO 2 UNITS: 100 INJECTION, SOLUTION INTRAVENOUS; SUBCUTANEOUS at 12:42

## 2018-01-01 RX ADMIN — GABAPENTIN 300 MG: 300 CAPSULE ORAL at 09:21

## 2018-01-01 RX ADMIN — DULOXETINE HYDROCHLORIDE 60 MG: 30 CAPSULE, DELAYED RELEASE ORAL at 07:55

## 2018-01-01 RX ADMIN — LEVALBUTEROL HYDROCHLORIDE 1.25 MG: 1.25 SOLUTION, CONCENTRATE RESPIRATORY (INHALATION) at 08:11

## 2018-01-01 RX ADMIN — ISODIUM CHLORIDE 3 ML: 0.03 SOLUTION RESPIRATORY (INHALATION) at 08:31

## 2018-01-01 RX ADMIN — HEPARIN SODIUM 5000 UNITS: 5000 INJECTION INTRAVENOUS; SUBCUTANEOUS at 22:00

## 2018-01-01 RX ADMIN — CHLORHEXIDINE GLUCONATE 0.12% ORAL RINSE 15 ML: 1.2 LIQUID ORAL at 10:32

## 2018-01-01 RX ADMIN — LEVALBUTEROL 1.25 MG: 1.25 SOLUTION, CONCENTRATE RESPIRATORY (INHALATION) at 13:50

## 2018-01-01 RX ADMIN — INSULIN ASPART 18 UNITS: 100 INJECTION, SUSPENSION SUBCUTANEOUS at 16:55

## 2018-01-01 RX ADMIN — CEFAZOLIN SODIUM 1000 MG: 1 SOLUTION INTRAVENOUS at 06:07

## 2018-01-01 RX ADMIN — FAMOTIDINE 10 MG: 20 TABLET ORAL at 18:11

## 2018-01-01 RX ADMIN — GABAPENTIN 600 MG: 300 CAPSULE ORAL at 09:32

## 2018-01-01 RX ADMIN — MORPHINE SULFATE 15 MG: 15 TABLET, EXTENDED RELEASE ORAL at 08:10

## 2018-01-01 RX ADMIN — ONDANSETRON 4 MG: 2 INJECTION INTRAMUSCULAR; INTRAVENOUS at 06:56

## 2018-01-01 RX ADMIN — CEFAZOLIN SODIUM 1000 MG: 1 SOLUTION INTRAVENOUS at 05:46

## 2018-01-01 RX ADMIN — OXYCODONE HYDROCHLORIDE 10 MG: 10 TABLET ORAL at 08:52

## 2018-01-01 RX ADMIN — OXYCODONE HYDROCHLORIDE 10 MG: 10 TABLET ORAL at 12:34

## 2018-01-01 RX ADMIN — DEXTROSE MONOHYDRATE 25 ML: 25 INJECTION, SOLUTION INTRAVENOUS at 14:27

## 2018-01-01 RX ADMIN — LACTULOSE 20 G: 10 SOLUTION ORAL at 08:06

## 2018-01-01 RX ADMIN — PRAVASTATIN SODIUM 40 MG: 40 TABLET ORAL at 18:44

## 2018-01-01 RX ADMIN — HEPARIN SODIUM 5000 UNITS: 5000 INJECTION INTRAVENOUS; SUBCUTANEOUS at 05:29

## 2018-01-01 RX ADMIN — GUAIFENESIN 600 MG: 600 TABLET, EXTENDED RELEASE ORAL at 22:40

## 2018-01-01 RX ADMIN — AMLODIPINE BESYLATE 5 MG: 5 TABLET ORAL at 13:46

## 2018-01-01 RX ADMIN — LEVALBUTEROL HYDROCHLORIDE 1.25 MG: 1.25 SOLUTION, CONCENTRATE RESPIRATORY (INHALATION) at 13:47

## 2018-01-01 RX ADMIN — PIPERACILLIN SODIUM,TAZOBACTAM SODIUM 2.25 G: 2; .25 INJECTION, POWDER, FOR SOLUTION INTRAVENOUS at 06:22

## 2018-01-01 RX ADMIN — SODIUM CHLORIDE, SODIUM LACTATE, POTASSIUM CHLORIDE, AND CALCIUM CHLORIDE 125 ML/HR: .6; .31; .03; .02 INJECTION, SOLUTION INTRAVENOUS at 07:56

## 2018-01-01 RX ADMIN — INSULIN LISPRO 1 UNITS: 100 INJECTION, SOLUTION INTRAVENOUS; SUBCUTANEOUS at 08:17

## 2018-01-01 RX ADMIN — STANDARDIZED SENNA CONCENTRATE AND DOCUSATE SODIUM 1 TABLET: 8.6; 5 TABLET, FILM COATED ORAL at 09:21

## 2018-01-01 RX ADMIN — INSULIN ASPART 18 UNITS: 100 INJECTION, SUSPENSION SUBCUTANEOUS at 16:41

## 2018-01-01 RX ADMIN — GABAPENTIN 600 MG: 300 CAPSULE ORAL at 18:11

## 2018-01-01 RX ADMIN — ONDANSETRON 4 MG: 2 INJECTION INTRAMUSCULAR; INTRAVENOUS at 10:14

## 2018-01-01 RX ADMIN — INSULIN GLARGINE 30 UNITS: 100 INJECTION, SOLUTION SUBCUTANEOUS at 22:39

## 2018-01-01 RX ADMIN — HYDRALAZINE HYDROCHLORIDE 15 MG: 20 INJECTION INTRAMUSCULAR; INTRAVENOUS at 17:38

## 2018-01-01 RX ADMIN — ISODIUM CHLORIDE 3 ML: 0.03 SOLUTION RESPIRATORY (INHALATION) at 13:50

## 2018-01-01 RX ADMIN — HEPARIN SODIUM 5000 UNITS: 5000 INJECTION INTRAVENOUS; SUBCUTANEOUS at 14:07

## 2018-01-01 RX ADMIN — TRAZODONE HYDROCHLORIDE 100 MG: 100 TABLET ORAL at 21:35

## 2018-01-01 RX ADMIN — DEXTRAN 70 AND HYPROMELLOSE 2910 1 DROP: 1; 3 SOLUTION/ DROPS OPHTHALMIC at 10:10

## 2018-01-01 RX ADMIN — AMLODIPINE BESYLATE 5 MG: 5 TABLET ORAL at 08:49

## 2018-01-01 RX ADMIN — MORPHINE SULFATE 15 MG: 15 TABLET, EXTENDED RELEASE ORAL at 09:39

## 2018-01-01 RX ADMIN — LEVALBUTEROL 1.25 MG: 1.25 SOLUTION, CONCENTRATE RESPIRATORY (INHALATION) at 09:15

## 2018-01-01 RX ADMIN — HEPARIN SODIUM 5000 UNITS: 5000 INJECTION INTRAVENOUS; SUBCUTANEOUS at 06:19

## 2018-01-01 RX ADMIN — INSULIN LISPRO 2 UNITS: 100 INJECTION, SOLUTION INTRAVENOUS; SUBCUTANEOUS at 15:09

## 2018-01-01 RX ADMIN — HEPARIN SODIUM 5000 UNITS: 5000 INJECTION INTRAVENOUS; SUBCUTANEOUS at 06:22

## 2018-01-01 RX ADMIN — GABAPENTIN 600 MG: 300 CAPSULE ORAL at 17:06

## 2018-01-01 RX ADMIN — LEVALBUTEROL HYDROCHLORIDE 1.25 MG: 1.25 SOLUTION, CONCENTRATE RESPIRATORY (INHALATION) at 20:18

## 2018-01-01 RX ADMIN — INSULIN LISPRO 10 UNITS: 100 INJECTION, SOLUTION INTRAVENOUS; SUBCUTANEOUS at 12:43

## 2018-01-01 RX ADMIN — GABAPENTIN 300 MG: 300 CAPSULE ORAL at 08:00

## 2018-01-01 RX ADMIN — OXYCODONE HYDROCHLORIDE 10 MG: 10 TABLET ORAL at 20:58

## 2018-01-01 RX ADMIN — METOPROLOL SUCCINATE 25 MG: 25 TABLET, EXTENDED RELEASE ORAL at 09:32

## 2018-01-01 RX ADMIN — LACTULOSE 20 G: 10 SOLUTION ORAL at 16:41

## 2018-01-01 RX ADMIN — INSULIN LISPRO 10 UNITS: 100 INJECTION, SOLUTION INTRAVENOUS; SUBCUTANEOUS at 12:42

## 2018-01-01 RX ADMIN — TRAZODONE HYDROCHLORIDE 100 MG: 100 TABLET ORAL at 21:48

## 2018-01-01 RX ADMIN — ISODIUM CHLORIDE 3 ML: 0.03 SOLUTION RESPIRATORY (INHALATION) at 19:07

## 2018-01-01 RX ADMIN — HEPARIN SODIUM 5000 UNITS: 5000 INJECTION INTRAVENOUS; SUBCUTANEOUS at 14:30

## 2018-01-01 RX ADMIN — SODIUM CHLORIDE 50 ML/HR: 0.9 INJECTION, SOLUTION INTRAVENOUS at 13:07

## 2018-01-01 RX ADMIN — INSULIN ASPART 18 UNITS: 100 INJECTION, SUSPENSION SUBCUTANEOUS at 17:55

## 2018-01-01 RX ADMIN — DULOXETINE HYDROCHLORIDE 60 MG: 60 CAPSULE, DELAYED RELEASE ORAL at 08:41

## 2018-01-01 RX ADMIN — INSULIN ASPART 20 UNITS: 100 INJECTION, SUSPENSION SUBCUTANEOUS at 08:13

## 2018-01-01 RX ADMIN — MORPHINE SULFATE 15 MG: 15 TABLET, EXTENDED RELEASE ORAL at 08:41

## 2018-01-01 RX ADMIN — MORPHINE SULFATE 15 MG: 15 TABLET, EXTENDED RELEASE ORAL at 08:00

## 2018-01-01 RX ADMIN — METOPROLOL SUCCINATE 25 MG: 25 TABLET, EXTENDED RELEASE ORAL at 08:41

## 2018-01-01 RX ADMIN — HEPARIN SODIUM 5000 UNITS: 5000 INJECTION INTRAVENOUS; SUBCUTANEOUS at 13:20

## 2018-01-01 RX ADMIN — ISODIUM CHLORIDE 3 ML: 0.03 SOLUTION RESPIRATORY (INHALATION) at 20:05

## 2018-01-01 RX ADMIN — INSULIN GLARGINE 30 UNITS: 100 INJECTION, SOLUTION SUBCUTANEOUS at 21:35

## 2018-01-01 RX ADMIN — LEVALBUTEROL 1.25 MG: 1.25 SOLUTION, CONCENTRATE RESPIRATORY (INHALATION) at 19:07

## 2018-01-01 RX ADMIN — TRAZODONE HYDROCHLORIDE 100 MG: 100 TABLET ORAL at 21:04

## 2018-01-01 RX ADMIN — LEVOFLOXACIN 500 MG: 500 TABLET, FILM COATED ORAL at 08:41

## 2018-01-01 RX ADMIN — AMLODIPINE BESYLATE 5 MG: 5 TABLET ORAL at 18:44

## 2018-01-01 RX ADMIN — INSULIN ASPART 18 UNITS: 100 INJECTION, SUSPENSION SUBCUTANEOUS at 08:59

## 2018-01-01 RX ADMIN — INSULIN LISPRO 10 UNITS: 100 INJECTION, SOLUTION INTRAVENOUS; SUBCUTANEOUS at 09:05

## 2018-01-01 RX ADMIN — GABAPENTIN 600 MG: 300 CAPSULE ORAL at 08:47

## 2018-01-01 RX ADMIN — MORPHINE SULFATE 15 MG: 15 TABLET, EXTENDED RELEASE ORAL at 09:53

## 2018-01-01 RX ADMIN — PIPERACILLIN SODIUM,TAZOBACTAM SODIUM 2.25 G: 2; .25 INJECTION, POWDER, FOR SOLUTION INTRAVENOUS at 14:31

## 2018-01-01 RX ADMIN — PIPERACILLIN SODIUM,TAZOBACTAM SODIUM 2.25 G: 2; .25 INJECTION, POWDER, FOR SOLUTION INTRAVENOUS at 13:25

## 2018-01-01 RX ADMIN — PIPERACILLIN SODIUM,TAZOBACTAM SODIUM 2.25 G: 2; .25 INJECTION, POWDER, FOR SOLUTION INTRAVENOUS at 12:42

## 2018-01-01 RX ADMIN — TRAZODONE HYDROCHLORIDE 100 MG: 100 TABLET ORAL at 21:38

## 2018-01-01 RX ADMIN — HEPARIN SODIUM 5000 UNITS: 5000 INJECTION INTRAVENOUS; SUBCUTANEOUS at 21:49

## 2018-01-01 RX ADMIN — AMLODIPINE BESYLATE 5 MG: 5 TABLET ORAL at 08:00

## 2018-01-01 RX ADMIN — OXYCODONE HYDROCHLORIDE 10 MG: 10 TABLET ORAL at 03:52

## 2018-01-01 RX ADMIN — ISODIUM CHLORIDE 3 ML: 0.03 SOLUTION RESPIRATORY (INHALATION) at 14:08

## 2018-01-01 RX ADMIN — GUAIFENESIN 600 MG: 600 TABLET, EXTENDED RELEASE ORAL at 08:49

## 2018-01-01 RX ADMIN — GUAIFENESIN 600 MG: 600 TABLET, EXTENDED RELEASE ORAL at 20:04

## 2018-01-01 RX ADMIN — INSULIN LISPRO 10 UNITS: 100 INJECTION, SOLUTION INTRAVENOUS; SUBCUTANEOUS at 17:06

## 2018-01-01 RX ADMIN — GABAPENTIN 600 MG: 300 CAPSULE ORAL at 08:56

## 2018-01-01 RX ADMIN — BACLOFEN 10 MG: 10 TABLET ORAL at 08:00

## 2018-01-01 RX ADMIN — LACTULOSE 20 G: 10 SOLUTION ORAL at 16:13

## 2018-01-01 RX ADMIN — OXYCODONE HYDROCHLORIDE 10 MG: 10 TABLET ORAL at 18:44

## 2018-01-01 RX ADMIN — HEPARIN SODIUM 5000 UNITS: 5000 INJECTION INTRAVENOUS; SUBCUTANEOUS at 06:37

## 2018-01-01 RX ADMIN — METOPROLOL SUCCINATE 25 MG: 25 TABLET, EXTENDED RELEASE ORAL at 08:12

## 2018-01-01 RX ADMIN — LEVALBUTEROL HYDROCHLORIDE 1.25 MG: 1.25 SOLUTION, CONCENTRATE RESPIRATORY (INHALATION) at 00:01

## 2018-01-01 RX ADMIN — HEPARIN SODIUM 5000 UNITS: 5000 INJECTION INTRAVENOUS; SUBCUTANEOUS at 13:31

## 2018-01-01 RX ADMIN — ISODIUM CHLORIDE 3 ML: 0.03 SOLUTION RESPIRATORY (INHALATION) at 08:50

## 2018-01-01 RX ADMIN — LEVALBUTEROL 1.25 MG: 1.25 SOLUTION, CONCENTRATE RESPIRATORY (INHALATION) at 08:35

## 2018-01-01 RX ADMIN — OXYCODONE HYDROCHLORIDE 10 MG: 10 TABLET ORAL at 00:17

## 2018-01-01 RX ADMIN — DEXTRAN 70 AND HYPROMELLOSE 2910 1 DROP: 1; 3 SOLUTION/ DROPS OPHTHALMIC at 20:50

## 2018-01-01 RX ADMIN — DEXTRAN 70 AND HYPROMELLOSE 2910 1 DROP: 1; 3 SOLUTION/ DROPS OPHTHALMIC at 18:14

## 2018-01-01 RX ADMIN — DEXTRAN 70 AND HYPROMELLOSE 2910 1 DROP: 1; 3 SOLUTION/ DROPS OPHTHALMIC at 10:46

## 2018-01-01 RX ADMIN — ONDANSETRON 4 MG: 2 INJECTION INTRAMUSCULAR; INTRAVENOUS at 19:09

## 2018-01-01 RX ADMIN — HEPARIN SODIUM 5000 UNITS: 5000 INJECTION INTRAVENOUS; SUBCUTANEOUS at 21:11

## 2018-01-01 RX ADMIN — OXYCODONE HYDROCHLORIDE 10 MG: 10 TABLET ORAL at 13:43

## 2018-01-01 RX ADMIN — MORPHINE SULFATE 15 MG: 15 TABLET, EXTENDED RELEASE ORAL at 10:41

## 2018-01-01 RX ADMIN — ISODIUM CHLORIDE 3 ML: 0.03 SOLUTION RESPIRATORY (INHALATION) at 13:13

## 2018-01-01 RX ADMIN — TRAZODONE HYDROCHLORIDE 100 MG: 100 TABLET ORAL at 22:41

## 2018-01-01 RX ADMIN — PIPERACILLIN SODIUM,TAZOBACTAM SODIUM 2.25 G: 2; .25 INJECTION, POWDER, FOR SOLUTION INTRAVENOUS at 18:07

## 2018-01-01 RX ADMIN — INSULIN LISPRO 5 UNITS: 100 INJECTION, SOLUTION INTRAVENOUS; SUBCUTANEOUS at 12:20

## 2018-01-01 RX ADMIN — HEPARIN SODIUM 5000 UNITS: 5000 INJECTION INTRAVENOUS; SUBCUTANEOUS at 21:29

## 2018-01-01 RX ADMIN — LEVALBUTEROL 1.25 MG: 1.25 SOLUTION, CONCENTRATE RESPIRATORY (INHALATION) at 08:50

## 2018-01-01 RX ADMIN — PIPERACILLIN SODIUM,TAZOBACTAM SODIUM 2.25 G: 2; .25 INJECTION, POWDER, FOR SOLUTION INTRAVENOUS at 06:01

## 2018-01-01 RX ADMIN — VANCOMYCIN HYDROCHLORIDE 750 MG: 750 INJECTION, SOLUTION INTRAVENOUS at 11:03

## 2018-01-01 RX ADMIN — GUAIFENESIN 600 MG: 600 TABLET, EXTENDED RELEASE ORAL at 09:32

## 2018-01-01 RX ADMIN — INSULIN ASPART 18 UNITS: 100 INJECTION, SUSPENSION SUBCUTANEOUS at 15:30

## 2018-01-01 RX ADMIN — HEPARIN SODIUM 5000 UNITS: 5000 INJECTION INTRAVENOUS; SUBCUTANEOUS at 21:20

## 2018-01-01 RX ADMIN — HEPARIN SODIUM 5000 UNITS: 5000 INJECTION INTRAVENOUS; SUBCUTANEOUS at 21:36

## 2018-01-01 RX ADMIN — GABAPENTIN 600 MG: 300 CAPSULE ORAL at 17:18

## 2018-01-01 RX ADMIN — HEPARIN SODIUM 5000 UNITS: 5000 INJECTION INTRAVENOUS; SUBCUTANEOUS at 06:29

## 2018-01-01 RX ADMIN — AMLODIPINE BESYLATE 5 MG: 5 TABLET ORAL at 08:41

## 2018-01-01 RX ADMIN — HEPARIN SODIUM 5000 UNITS: 5000 INJECTION INTRAVENOUS; SUBCUTANEOUS at 14:09

## 2018-01-01 RX ADMIN — TRAZODONE HYDROCHLORIDE 100 MG: 100 TABLET ORAL at 21:49

## 2018-01-01 RX ADMIN — HEPARIN SODIUM 5000 UNITS: 5000 INJECTION INTRAVENOUS; SUBCUTANEOUS at 14:11

## 2018-01-01 RX ADMIN — PIPERACILLIN SODIUM,TAZOBACTAM SODIUM 2.25 G: 2; .25 INJECTION, POWDER, FOR SOLUTION INTRAVENOUS at 00:30

## 2018-01-01 RX ADMIN — PRAVASTATIN SODIUM 40 MG: 40 TABLET ORAL at 16:13

## 2018-01-01 RX ADMIN — LEVALBUTEROL HYDROCHLORIDE 1.25 MG: 1.25 SOLUTION, CONCENTRATE RESPIRATORY (INHALATION) at 13:33

## 2018-01-01 RX ADMIN — AMLODIPINE BESYLATE 5 MG: 5 TABLET ORAL at 09:05

## 2018-01-01 RX ADMIN — GABAPENTIN 600 MG: 300 CAPSULE ORAL at 10:33

## 2018-01-01 RX ADMIN — MORPHINE SULFATE 15 MG: 15 TABLET, EXTENDED RELEASE ORAL at 08:13

## 2018-01-01 RX ADMIN — MORPHINE SULFATE 15 MG: 15 TABLET, EXTENDED RELEASE ORAL at 21:11

## 2018-01-01 RX ADMIN — TRAZODONE HYDROCHLORIDE 100 MG: 100 TABLET ORAL at 21:56

## 2018-01-01 RX ADMIN — PRAVASTATIN SODIUM 40 MG: 40 TABLET ORAL at 16:55

## 2018-01-01 RX ADMIN — AMLODIPINE BESYLATE 5 MG: 5 TABLET ORAL at 07:48

## 2018-01-01 RX ADMIN — GABAPENTIN 600 MG: 300 CAPSULE ORAL at 07:47

## 2018-01-01 RX ADMIN — GUAIFENESIN 600 MG: 600 TABLET, EXTENDED RELEASE ORAL at 21:27

## 2018-01-01 RX ADMIN — DULOXETINE HYDROCHLORIDE 60 MG: 30 CAPSULE, DELAYED RELEASE ORAL at 09:31

## 2018-01-01 RX ADMIN — LEVOFLOXACIN 500 MG: 500 TABLET, FILM COATED ORAL at 08:00

## 2018-01-01 RX ADMIN — MORPHINE SULFATE 15 MG: 15 TABLET, EXTENDED RELEASE ORAL at 22:28

## 2018-01-01 RX ADMIN — ISODIUM CHLORIDE 3 ML: 0.03 SOLUTION RESPIRATORY (INHALATION) at 08:11

## 2018-01-01 RX ADMIN — GABAPENTIN 300 MG: 300 CAPSULE ORAL at 08:13

## 2018-01-01 RX ADMIN — TRAZODONE HYDROCHLORIDE 100 MG: 100 TABLET ORAL at 21:43

## 2018-01-01 RX ADMIN — PIPERACILLIN SODIUM,TAZOBACTAM SODIUM 2.25 G: 2; .25 INJECTION, POWDER, FOR SOLUTION INTRAVENOUS at 06:06

## 2018-01-01 RX ADMIN — HEPARIN SODIUM 5000 UNITS: 5000 INJECTION INTRAVENOUS; SUBCUTANEOUS at 21:50

## 2018-01-01 RX ADMIN — ISODIUM CHLORIDE 3 ML: 0.03 SOLUTION RESPIRATORY (INHALATION) at 20:18

## 2018-01-01 RX ADMIN — MORPHINE SULFATE 15 MG: 15 TABLET, EXTENDED RELEASE ORAL at 21:49

## 2018-01-01 RX ADMIN — ACETAMINOPHEN 650 MG: 325 TABLET, FILM COATED ORAL at 04:30

## 2018-01-01 RX ADMIN — PRAVASTATIN SODIUM 40 MG: 40 TABLET ORAL at 17:55

## 2018-01-01 RX ADMIN — STANDARDIZED SENNA CONCENTRATE AND DOCUSATE SODIUM 1 TABLET: 8.6; 5 TABLET, FILM COATED ORAL at 18:06

## 2018-01-01 RX ADMIN — FUROSEMIDE 20 MG: 10 INJECTION, SOLUTION INTRAMUSCULAR; INTRAVENOUS at 01:44

## 2018-01-01 RX ADMIN — METOPROLOL SUCCINATE 25 MG: 25 TABLET, EXTENDED RELEASE ORAL at 08:10

## 2018-01-01 RX ADMIN — OXYCODONE HYDROCHLORIDE 10 MG: 10 TABLET ORAL at 06:19

## 2018-01-01 RX ADMIN — LEVALBUTEROL HYDROCHLORIDE 1.25 MG: 1.25 SOLUTION, CONCENTRATE RESPIRATORY (INHALATION) at 08:31

## 2018-01-01 RX ADMIN — FAMOTIDINE 10 MG: 20 TABLET ORAL at 07:47

## 2018-01-01 RX ADMIN — SODIUM CHLORIDE 500 ML: 0.9 INJECTION, SOLUTION INTRAVENOUS at 08:05

## 2018-01-01 RX ADMIN — SODIUM CHLORIDE, SODIUM LACTATE, POTASSIUM CHLORIDE, AND CALCIUM CHLORIDE 125 ML/HR: .6; .31; .03; .02 INJECTION, SOLUTION INTRAVENOUS at 00:17

## 2018-01-01 RX ADMIN — STANDARDIZED SENNA CONCENTRATE AND DOCUSATE SODIUM 1 TABLET: 8.6; 5 TABLET, FILM COATED ORAL at 08:41

## 2018-01-01 RX ADMIN — DULOXETINE HYDROCHLORIDE 60 MG: 60 CAPSULE, DELAYED RELEASE ORAL at 09:22

## 2018-01-01 RX ADMIN — GABAPENTIN 300 MG: 300 CAPSULE ORAL at 21:28

## 2018-01-01 RX ADMIN — INSULIN ASPART 20 UNITS: 100 INJECTION, SUSPENSION SUBCUTANEOUS at 08:17

## 2018-01-01 RX ADMIN — HEPARIN SODIUM 5000 UNITS: 5000 INJECTION INTRAVENOUS; SUBCUTANEOUS at 06:27

## 2018-01-01 RX ADMIN — INSULIN LISPRO 5 UNITS: 100 INJECTION, SOLUTION INTRAVENOUS; SUBCUTANEOUS at 09:05

## 2018-01-01 RX ADMIN — AMLODIPINE BESYLATE 5 MG: 5 TABLET ORAL at 08:11

## 2018-01-01 RX ADMIN — MORPHINE SULFATE 15 MG: 15 TABLET, EXTENDED RELEASE ORAL at 09:21

## 2018-01-01 RX ADMIN — ISODIUM CHLORIDE 3 ML: 0.03 SOLUTION RESPIRATORY (INHALATION) at 14:28

## 2018-01-01 RX ADMIN — LEVALBUTEROL HYDROCHLORIDE 1.25 MG: 1.25 SOLUTION, CONCENTRATE RESPIRATORY (INHALATION) at 13:53

## 2018-01-01 RX ADMIN — METOPROLOL SUCCINATE 25 MG: 25 TABLET, EXTENDED RELEASE ORAL at 13:08

## 2018-01-01 RX ADMIN — MORPHINE SULFATE 15 MG: 15 TABLET, EXTENDED RELEASE ORAL at 09:31

## 2018-01-01 RX ADMIN — SODIUM CHLORIDE 500 ML: 0.9 INJECTION, SOLUTION INTRAVENOUS at 06:50

## 2018-01-01 RX ADMIN — INSULIN LISPRO 10 UNITS: 100 INJECTION, SOLUTION INTRAVENOUS; SUBCUTANEOUS at 08:12

## 2018-01-01 RX ADMIN — OXYCODONE HYDROCHLORIDE 10 MG: 10 TABLET ORAL at 23:46

## 2018-01-01 RX ADMIN — PIPERACILLIN SODIUM,TAZOBACTAM SODIUM 2.25 G: 2; .25 INJECTION, POWDER, FOR SOLUTION INTRAVENOUS at 13:15

## 2018-01-01 RX ADMIN — SODIUM CHLORIDE 125 ML/HR: 0.9 INJECTION, SOLUTION INTRAVENOUS at 08:07

## 2018-01-01 RX ADMIN — PIPERACILLIN SODIUM,TAZOBACTAM SODIUM 2.25 G: 2; .25 INJECTION, POWDER, FOR SOLUTION INTRAVENOUS at 06:19

## 2018-01-01 RX ADMIN — MORPHINE SULFATE 15 MG: 15 TABLET, EXTENDED RELEASE ORAL at 21:43

## 2018-01-01 RX ADMIN — DULOXETINE HYDROCHLORIDE 60 MG: 30 CAPSULE, DELAYED RELEASE ORAL at 08:11

## 2018-01-01 RX ADMIN — PIPERACILLIN SODIUM,TAZOBACTAM SODIUM 2.25 G: 2; .25 INJECTION, POWDER, FOR SOLUTION INTRAVENOUS at 06:29

## 2018-01-01 RX ADMIN — TRAZODONE HYDROCHLORIDE 100 MG: 100 TABLET ORAL at 21:20

## 2018-01-01 RX ADMIN — HEPARIN SODIUM 5000 UNITS: 5000 INJECTION INTRAVENOUS; SUBCUTANEOUS at 22:40

## 2018-01-01 RX ADMIN — DEXTRAN 70 AND HYPROMELLOSE 2910 1 DROP: 1; 3 SOLUTION/ DROPS OPHTHALMIC at 03:53

## 2018-01-01 RX ADMIN — HEPARIN SODIUM 5000 UNITS: 5000 INJECTION INTRAVENOUS; SUBCUTANEOUS at 14:31

## 2018-01-01 RX ADMIN — GABAPENTIN 600 MG: 300 CAPSULE ORAL at 08:06

## 2018-01-01 RX ADMIN — INSULIN LISPRO 10 UNITS: 100 INJECTION, SOLUTION INTRAVENOUS; SUBCUTANEOUS at 07:55

## 2018-01-01 RX ADMIN — INSULIN LISPRO 10 UNITS: 100 INJECTION, SOLUTION INTRAVENOUS; SUBCUTANEOUS at 12:49

## 2018-01-01 RX ADMIN — PRAVASTATIN SODIUM 40 MG: 40 TABLET ORAL at 15:36

## 2018-01-01 RX ADMIN — PIPERACILLIN SODIUM,TAZOBACTAM SODIUM 2.25 G: 2; .25 INJECTION, POWDER, FOR SOLUTION INTRAVENOUS at 06:44

## 2018-01-01 RX ADMIN — GABAPENTIN 600 MG: 300 CAPSULE ORAL at 09:05

## 2018-01-01 RX ADMIN — LEVALBUTEROL HYDROCHLORIDE 1.25 MG: 1.25 SOLUTION, CONCENTRATE RESPIRATORY (INHALATION) at 00:12

## 2018-01-01 RX ADMIN — PIPERACILLIN SODIUM,TAZOBACTAM SODIUM 2.25 G: 2; .25 INJECTION, POWDER, FOR SOLUTION INTRAVENOUS at 00:35

## 2018-01-01 RX ADMIN — ISODIUM CHLORIDE 3 ML: 0.03 SOLUTION RESPIRATORY (INHALATION) at 13:33

## 2018-01-01 RX ADMIN — PANTOPRAZOLE SODIUM 40 MG: 40 TABLET, DELAYED RELEASE ORAL at 06:29

## 2018-01-01 RX ADMIN — MORPHINE SULFATE 15 MG: 15 TABLET, EXTENDED RELEASE ORAL at 21:29

## 2018-01-01 RX ADMIN — INSULIN GLARGINE 30 UNITS: 100 INJECTION, SOLUTION SUBCUTANEOUS at 21:43

## 2018-01-01 RX ADMIN — HEPARIN SODIUM 5000 UNITS: 5000 INJECTION INTRAVENOUS; SUBCUTANEOUS at 06:15

## 2018-01-01 RX ADMIN — STANDARDIZED SENNA CONCENTRATE AND DOCUSATE SODIUM 1 TABLET: 8.6; 5 TABLET, FILM COATED ORAL at 08:00

## 2018-01-01 RX ADMIN — GABAPENTIN 300 MG: 300 CAPSULE ORAL at 16:55

## 2018-01-01 RX ADMIN — LEVALBUTEROL HYDROCHLORIDE 1.25 MG: 1.25 SOLUTION, CONCENTRATE RESPIRATORY (INHALATION) at 19:03

## 2018-01-01 RX ADMIN — GABAPENTIN 1800 MG: 400 CAPSULE ORAL at 23:11

## 2018-01-01 RX ADMIN — VANCOMYCIN HYDROCHLORIDE 1250 MG: 1 INJECTION, POWDER, LYOPHILIZED, FOR SOLUTION INTRAVENOUS at 09:05

## 2018-01-01 RX ADMIN — ISODIUM CHLORIDE 3 ML: 0.03 SOLUTION RESPIRATORY (INHALATION) at 07:21

## 2018-01-01 RX ADMIN — STANDARDIZED SENNA CONCENTRATE AND DOCUSATE SODIUM 1 TABLET: 8.6; 5 TABLET, FILM COATED ORAL at 18:44

## 2018-01-01 RX ADMIN — SODIUM CHLORIDE, SODIUM LACTATE, POTASSIUM CHLORIDE, AND CALCIUM CHLORIDE 125 ML/HR: .6; .31; .03; .02 INJECTION, SOLUTION INTRAVENOUS at 14:11

## 2018-01-01 RX ADMIN — STANDARDIZED SENNA CONCENTRATE AND DOCUSATE SODIUM 1 TABLET: 8.6; 5 TABLET, FILM COATED ORAL at 18:14

## 2018-01-01 RX ADMIN — OXYCODONE HYDROCHLORIDE 10 MG: 10 TABLET ORAL at 19:52

## 2018-01-01 RX ADMIN — GUAIFENESIN 600 MG: 600 TABLET, EXTENDED RELEASE ORAL at 08:56

## 2018-01-01 RX ADMIN — ISODIUM CHLORIDE 3 ML: 0.03 SOLUTION RESPIRATORY (INHALATION) at 13:56

## 2018-01-01 RX ADMIN — ISODIUM CHLORIDE 3 ML: 0.03 SOLUTION RESPIRATORY (INHALATION) at 19:19

## 2018-01-01 RX ADMIN — LEVALBUTEROL HYDROCHLORIDE 1.25 MG: 1.25 SOLUTION, CONCENTRATE RESPIRATORY (INHALATION) at 20:14

## 2018-01-01 RX ADMIN — PIPERACILLIN SODIUM,TAZOBACTAM SODIUM 2.25 G: 2; .25 INJECTION, POWDER, FOR SOLUTION INTRAVENOUS at 18:43

## 2018-01-01 RX ADMIN — GUAIFENESIN 600 MG: 600 TABLET, EXTENDED RELEASE ORAL at 21:49

## 2018-01-01 RX ADMIN — INSULIN LISPRO 10 UNITS: 100 INJECTION, SOLUTION INTRAVENOUS; SUBCUTANEOUS at 12:22

## 2018-01-01 RX ADMIN — GABAPENTIN 600 MG: 300 CAPSULE ORAL at 18:22

## 2018-01-01 RX ADMIN — SODIUM CHLORIDE 75 ML/HR: 0.9 INJECTION, SOLUTION INTRAVENOUS at 18:41

## 2018-01-01 RX ADMIN — MORPHINE SULFATE 15 MG: 15 TABLET, EXTENDED RELEASE ORAL at 10:09

## 2018-01-01 RX ADMIN — CEFEPIME HYDROCHLORIDE 1000 MG: 1 INJECTION, POWDER, FOR SOLUTION INTRAMUSCULAR; INTRAVENOUS at 05:39

## 2018-01-01 RX ADMIN — GUAIFENESIN 600 MG: 600 TABLET, EXTENDED RELEASE ORAL at 08:11

## 2018-01-01 RX ADMIN — MORPHINE SULFATE 15 MG: 15 TABLET, EXTENDED RELEASE ORAL at 22:41

## 2018-01-01 RX ADMIN — ISODIUM CHLORIDE 3 ML: 0.03 SOLUTION RESPIRATORY (INHALATION) at 00:11

## 2018-01-01 RX ADMIN — HEPARIN SODIUM 5000 UNITS: 5000 INJECTION INTRAVENOUS; SUBCUTANEOUS at 21:26

## 2018-01-01 RX ADMIN — LEVALBUTEROL HYDROCHLORIDE 1.25 MG: 1.25 SOLUTION, CONCENTRATE RESPIRATORY (INHALATION) at 19:28

## 2018-01-01 RX ADMIN — INSULIN LISPRO 10 UNITS: 100 INJECTION, SOLUTION INTRAVENOUS; SUBCUTANEOUS at 18:22

## 2018-01-01 RX ADMIN — INSULIN LISPRO 4 UNITS: 100 INJECTION, SOLUTION INTRAVENOUS; SUBCUTANEOUS at 12:43

## 2018-01-01 RX ADMIN — ISODIUM CHLORIDE 3 ML: 0.03 SOLUTION RESPIRATORY (INHALATION) at 19:03

## 2018-01-01 RX ADMIN — METOPROLOL SUCCINATE 25 MG: 25 TABLET, EXTENDED RELEASE ORAL at 07:48

## 2018-01-01 RX ADMIN — MORPHINE SULFATE 15 MG: 15 TABLET, EXTENDED RELEASE ORAL at 21:03

## 2018-01-01 RX ADMIN — AMLODIPINE BESYLATE 5 MG: 5 TABLET ORAL at 09:32

## 2018-01-01 RX ADMIN — GABAPENTIN 300 MG: 300 CAPSULE ORAL at 16:13

## 2018-01-01 RX ADMIN — OXYCODONE HYDROCHLORIDE 10 MG: 10 TABLET ORAL at 07:55

## 2018-01-01 RX ADMIN — STANDARDIZED SENNA CONCENTRATE AND DOCUSATE SODIUM 1 TABLET: 8.6; 5 TABLET, FILM COATED ORAL at 18:42

## 2018-01-01 RX ADMIN — DULOXETINE HYDROCHLORIDE 60 MG: 30 CAPSULE, DELAYED RELEASE ORAL at 08:56

## 2018-01-01 RX ADMIN — HEPARIN SODIUM 5000 UNITS: 5000 INJECTION INTRAVENOUS; SUBCUTANEOUS at 05:22

## 2018-01-01 RX ADMIN — SODIUM CHLORIDE 1400 ML: 0.9 INJECTION, SOLUTION INTRAVENOUS at 08:21

## 2018-01-01 RX ADMIN — METOPROLOL SUCCINATE 25 MG: 25 TABLET, EXTENDED RELEASE ORAL at 08:49

## 2018-01-01 RX ADMIN — METOPROLOL SUCCINATE 25 MG: 25 TABLET, EXTENDED RELEASE ORAL at 08:56

## 2018-01-01 RX ADMIN — GABAPENTIN 600 MG: 300 CAPSULE ORAL at 17:25

## 2018-01-01 RX ADMIN — PIPERACILLIN SODIUM,TAZOBACTAM SODIUM 2.25 G: 2; .25 INJECTION, POWDER, FOR SOLUTION INTRAVENOUS at 01:47

## 2018-01-01 RX ADMIN — PIPERACILLIN SODIUM,TAZOBACTAM SODIUM 2.25 G: 2; .25 INJECTION, POWDER, FOR SOLUTION INTRAVENOUS at 18:25

## 2018-01-01 RX ADMIN — GABAPENTIN 600 MG: 300 CAPSULE ORAL at 21:35

## 2018-01-01 RX ADMIN — DULOXETINE HYDROCHLORIDE 60 MG: 30 CAPSULE, DELAYED RELEASE ORAL at 08:47

## 2018-01-01 RX ADMIN — LEVALBUTEROL HYDROCHLORIDE 1.25 MG: 1.25 SOLUTION, CONCENTRATE RESPIRATORY (INHALATION) at 19:25

## 2018-01-01 RX ADMIN — SODIUM CHLORIDE 75 ML/HR: 0.9 INJECTION, SOLUTION INTRAVENOUS at 21:40

## 2018-01-01 RX ADMIN — LEVALBUTEROL HYDROCHLORIDE 1.25 MG: 1.25 SOLUTION, CONCENTRATE RESPIRATORY (INHALATION) at 13:56

## 2018-01-01 RX ADMIN — STANDARDIZED SENNA CONCENTRATE AND DOCUSATE SODIUM 1 TABLET: 8.6; 5 TABLET, FILM COATED ORAL at 17:56

## 2018-01-01 RX ADMIN — ISODIUM CHLORIDE 3 ML: 0.03 SOLUTION RESPIRATORY (INHALATION) at 01:27

## 2018-01-01 RX ADMIN — HEPARIN SODIUM 5000 UNITS: 5000 INJECTION INTRAVENOUS; SUBCUTANEOUS at 05:39

## 2018-01-01 RX ADMIN — GABAPENTIN 300 MG: 300 CAPSULE ORAL at 17:00

## 2018-01-01 RX ADMIN — ISODIUM CHLORIDE 3 ML: 0.03 SOLUTION RESPIRATORY (INHALATION) at 07:45

## 2018-01-01 RX ADMIN — TRAZODONE HYDROCHLORIDE 100 MG: 100 TABLET ORAL at 21:27

## 2018-01-01 RX ADMIN — MORPHINE SULFATE 15 MG: 15 TABLET, EXTENDED RELEASE ORAL at 12:20

## 2018-01-01 RX ADMIN — STANDARDIZED SENNA CONCENTRATE AND DOCUSATE SODIUM 1 TABLET: 8.6; 5 TABLET, FILM COATED ORAL at 08:07

## 2018-01-01 RX ADMIN — MORPHINE SULFATE 15 MG: 15 TABLET, EXTENDED RELEASE ORAL at 21:53

## 2018-01-01 RX ADMIN — LEVOFLOXACIN 500 MG: 500 TABLET, FILM COATED ORAL at 08:10

## 2018-01-01 RX ADMIN — INSULIN LISPRO 10 UNITS: 100 INJECTION, SOLUTION INTRAVENOUS; SUBCUTANEOUS at 08:53

## 2018-01-01 RX ADMIN — DULOXETINE HYDROCHLORIDE 60 MG: 60 CAPSULE, DELAYED RELEASE ORAL at 08:10

## 2018-01-01 RX ADMIN — HEPARIN SODIUM 5000 UNITS: 5000 INJECTION INTRAVENOUS; SUBCUTANEOUS at 05:40

## 2018-01-01 RX ADMIN — BACLOFEN 10 MG: 10 TABLET ORAL at 21:41

## 2018-01-01 RX ADMIN — INSULIN LISPRO 10 UNITS: 100 INJECTION, SOLUTION INTRAVENOUS; SUBCUTANEOUS at 15:09

## 2018-01-01 RX ADMIN — SODIUM CHLORIDE, SODIUM LACTATE, POTASSIUM CHLORIDE, AND CALCIUM CHLORIDE 500 ML: 600; 310; 30; 20 INJECTION, SOLUTION INTRAVENOUS at 23:11

## 2018-01-01 RX ADMIN — POLYETHYLENE GLYCOL (3350) 17 G: 17 POWDER, FOR SOLUTION ORAL at 21:33

## 2018-01-01 RX ADMIN — HEPARIN SODIUM 5000 UNITS: 5000 INJECTION INTRAVENOUS; SUBCUTANEOUS at 13:25

## 2018-01-01 RX ADMIN — GUAIFENESIN 600 MG: 600 TABLET, EXTENDED RELEASE ORAL at 07:47

## 2018-01-01 RX ADMIN — OXYCODONE HYDROCHLORIDE 10 MG: 10 TABLET ORAL at 20:03

## 2018-01-01 RX ADMIN — DEXTRAN 70 AND HYPROMELLOSE 2910 1 DROP: 1; 3 SOLUTION/ DROPS OPHTHALMIC at 21:36

## 2018-01-01 RX ADMIN — DULOXETINE HYDROCHLORIDE 60 MG: 30 CAPSULE, DELAYED RELEASE ORAL at 09:05

## 2018-01-01 RX ADMIN — PIPERACILLIN SODIUM,TAZOBACTAM SODIUM 2.25 G: 2; .25 INJECTION, POWDER, FOR SOLUTION INTRAVENOUS at 06:15

## 2018-01-01 RX ADMIN — MORPHINE SULFATE 15 MG: 15 TABLET, EXTENDED RELEASE ORAL at 21:48

## 2018-01-01 RX ADMIN — HEPARIN SODIUM 5000 UNITS: 5000 INJECTION INTRAVENOUS; SUBCUTANEOUS at 13:43

## 2018-01-01 RX ADMIN — LEVALBUTEROL HYDROCHLORIDE 1.25 MG: 1.25 SOLUTION, CONCENTRATE RESPIRATORY (INHALATION) at 07:45

## 2018-01-01 RX ADMIN — TRAZODONE HYDROCHLORIDE 100 MG: 100 TABLET ORAL at 21:29

## 2018-01-01 RX ADMIN — CEFAZOLIN SODIUM 1000 MG: 1 SOLUTION INTRAVENOUS at 18:44

## 2018-01-01 RX ADMIN — LEVALBUTEROL HYDROCHLORIDE 1.25 MG: 1.25 SOLUTION, CONCENTRATE RESPIRATORY (INHALATION) at 01:27

## 2018-01-01 RX ADMIN — PIPERACILLIN SODIUM,TAZOBACTAM SODIUM 2.25 G: 2; .25 INJECTION, POWDER, FOR SOLUTION INTRAVENOUS at 01:18

## 2018-01-01 RX ADMIN — ACETAMINOPHEN 650 MG: 325 TABLET, FILM COATED ORAL at 06:58

## 2018-01-01 RX ADMIN — GABAPENTIN 600 MG: 300 CAPSULE ORAL at 17:13

## 2018-01-01 RX ADMIN — MORPHINE SULFATE 15 MG: 15 TABLET, EXTENDED RELEASE ORAL at 10:59

## 2018-01-01 RX ADMIN — INSULIN LISPRO 10 UNITS: 100 INJECTION, SOLUTION INTRAVENOUS; SUBCUTANEOUS at 17:25

## 2018-01-01 RX ADMIN — SODIUM CHLORIDE 50 ML/HR: 0.9 INJECTION, SOLUTION INTRAVENOUS at 16:58

## 2018-01-01 RX ADMIN — PIPERACILLIN SODIUM,TAZOBACTAM SODIUM 2.25 G: 2; .25 INJECTION, POWDER, FOR SOLUTION INTRAVENOUS at 18:19

## 2018-01-01 RX ADMIN — MORPHINE SULFATE 15 MG: 15 TABLET, EXTENDED RELEASE ORAL at 21:37

## 2018-01-01 RX ADMIN — ISODIUM CHLORIDE 3 ML: 0.03 SOLUTION RESPIRATORY (INHALATION) at 13:55

## 2018-01-01 RX ADMIN — GABAPENTIN 600 MG: 300 CAPSULE ORAL at 08:49

## 2018-01-01 RX ADMIN — LACTULOSE 20 G: 10 SOLUTION ORAL at 21:04

## 2018-01-01 RX ADMIN — MORPHINE SULFATE 15 MG: 15 TABLET, EXTENDED RELEASE ORAL at 21:27

## 2018-01-01 RX ADMIN — PIPERACILLIN SODIUM,TAZOBACTAM SODIUM 2.25 G: 2; .25 INJECTION, POWDER, FOR SOLUTION INTRAVENOUS at 21:26

## 2018-01-01 RX ADMIN — LEVOFLOXACIN 500 MG: 500 TABLET, FILM COATED ORAL at 08:06

## 2018-01-01 RX ADMIN — GABAPENTIN 600 MG: 300 CAPSULE ORAL at 17:34

## 2018-01-01 RX ADMIN — MORPHINE SULFATE 15 MG: 15 TABLET, EXTENDED RELEASE ORAL at 11:50

## 2018-01-01 RX ADMIN — INSULIN LISPRO 2 UNITS: 100 INJECTION, SOLUTION INTRAVENOUS; SUBCUTANEOUS at 18:22

## 2018-01-01 RX ADMIN — CEFEPIME HYDROCHLORIDE 2000 MG: 2 INJECTION, POWDER, FOR SOLUTION INTRAVENOUS at 08:06

## 2018-01-01 RX ADMIN — DULOXETINE HYDROCHLORIDE 60 MG: 60 CAPSULE, DELAYED RELEASE ORAL at 08:13

## 2018-01-01 RX ADMIN — FUROSEMIDE 20 MG: 10 INJECTION, SOLUTION INTRAMUSCULAR; INTRAVENOUS at 09:36

## 2018-01-01 RX ADMIN — TRAZODONE HYDROCHLORIDE 100 MG: 100 TABLET ORAL at 21:53

## 2018-01-01 RX ADMIN — GABAPENTIN 300 MG: 300 CAPSULE ORAL at 21:04

## 2018-01-01 RX ADMIN — GABAPENTIN 300 MG: 300 CAPSULE ORAL at 21:48

## 2018-01-01 RX ADMIN — SODIUM CHLORIDE 125 ML/HR: 0.9 INJECTION, SOLUTION INTRAVENOUS at 11:16

## 2018-01-01 RX ADMIN — VANCOMYCIN HYDROCHLORIDE 1500 MG: 1 INJECTION, POWDER, LYOPHILIZED, FOR SOLUTION INTRAVENOUS at 12:12

## 2018-01-01 RX ADMIN — LEVALBUTEROL HYDROCHLORIDE 1.25 MG: 1.25 SOLUTION, CONCENTRATE RESPIRATORY (INHALATION) at 14:08

## 2018-01-01 RX ADMIN — PIPERACILLIN SODIUM,TAZOBACTAM SODIUM 2.25 G: 2; .25 INJECTION, POWDER, FOR SOLUTION INTRAVENOUS at 01:05

## 2018-01-01 RX ADMIN — INSULIN LISPRO 10 UNITS: 100 INJECTION, SOLUTION INTRAVENOUS; SUBCUTANEOUS at 17:18

## 2018-01-01 RX ADMIN — STANDARDIZED SENNA CONCENTRATE AND DOCUSATE SODIUM 1 TABLET: 8.6; 5 TABLET, FILM COATED ORAL at 17:00

## 2018-01-01 RX ADMIN — OXYCODONE HYDROCHLORIDE 10 MG: 10 TABLET ORAL at 21:42

## 2018-01-01 RX ADMIN — ISODIUM CHLORIDE 3 ML: 0.03 SOLUTION RESPIRATORY (INHALATION) at 20:14

## 2018-01-01 RX ADMIN — PIPERACILLIN SODIUM,TAZOBACTAM SODIUM 2.25 G: 2; .25 INJECTION, POWDER, FOR SOLUTION INTRAVENOUS at 01:07

## 2018-01-01 RX ADMIN — GUAIFENESIN 600 MG: 600 TABLET, EXTENDED RELEASE ORAL at 13:08

## 2018-01-01 RX ADMIN — SODIUM CHLORIDE 50 ML/HR: 0.9 INJECTION, SOLUTION INTRAVENOUS at 12:47

## 2018-01-01 RX ADMIN — PIPERACILLIN SODIUM,TAZOBACTAM SODIUM 2.25 G: 2; .25 INJECTION, POWDER, FOR SOLUTION INTRAVENOUS at 01:01

## 2018-01-01 RX ADMIN — ALBUTEROL SULFATE 5 MG: 2.5 SOLUTION RESPIRATORY (INHALATION) at 06:58

## 2018-01-01 RX ADMIN — INSULIN GLARGINE 30 UNITS: 100 INJECTION, SOLUTION SUBCUTANEOUS at 21:49

## 2018-01-01 RX ADMIN — INSULIN ASPART 18 UNITS: 100 INJECTION, SUSPENSION SUBCUTANEOUS at 07:39

## 2018-01-01 RX ADMIN — ISODIUM CHLORIDE 3 ML: 0.03 SOLUTION RESPIRATORY (INHALATION) at 19:28

## 2018-01-01 RX ADMIN — METOPROLOL SUCCINATE 25 MG: 25 TABLET, EXTENDED RELEASE ORAL at 16:41

## 2018-01-01 RX ADMIN — MORPHINE SULFATE 15 MG: 15 TABLET, EXTENDED RELEASE ORAL at 21:57

## 2018-01-01 RX ADMIN — PIPERACILLIN SODIUM,TAZOBACTAM SODIUM 2.25 G: 2; .25 INJECTION, POWDER, FOR SOLUTION INTRAVENOUS at 20:00

## 2018-01-01 RX ADMIN — GABAPENTIN 300 MG: 300 CAPSULE ORAL at 08:41

## 2018-01-01 RX ADMIN — HEPARIN SODIUM 5000 UNITS: 5000 INJECTION INTRAVENOUS; SUBCUTANEOUS at 13:15

## 2018-01-01 RX ADMIN — AMLODIPINE BESYLATE 5 MG: 5 TABLET ORAL at 08:07

## 2018-01-01 RX ADMIN — INSULIN LISPRO 10 UNITS: 100 INJECTION, SOLUTION INTRAVENOUS; SUBCUTANEOUS at 08:46

## 2018-01-01 RX ADMIN — DULOXETINE HYDROCHLORIDE 60 MG: 30 CAPSULE, DELAYED RELEASE ORAL at 07:47

## 2018-01-01 RX ADMIN — HEPARIN SODIUM 5000 UNITS: 5000 INJECTION INTRAVENOUS; SUBCUTANEOUS at 21:04

## 2018-01-01 RX ADMIN — LEVALBUTEROL 1.25 MG: 1.25 SOLUTION, CONCENTRATE RESPIRATORY (INHALATION) at 19:19

## 2018-01-01 RX ADMIN — AMLODIPINE BESYLATE 5 MG: 5 TABLET ORAL at 08:13

## 2018-01-01 RX ADMIN — CEFAZOLIN SODIUM 1000 MG: 1 SOLUTION INTRAVENOUS at 18:14

## 2018-01-01 RX ADMIN — INSULIN ASPART 18 UNITS: 100 INJECTION, SUSPENSION SUBCUTANEOUS at 08:06

## 2018-01-01 RX ADMIN — PIPERACILLIN SODIUM,TAZOBACTAM SODIUM 2.25 G: 2; .25 INJECTION, POWDER, FOR SOLUTION INTRAVENOUS at 13:03

## 2018-01-01 RX ADMIN — LEVALBUTEROL HYDROCHLORIDE 1.25 MG: 1.25 SOLUTION, CONCENTRATE RESPIRATORY (INHALATION) at 07:21

## 2018-01-01 RX ADMIN — INSULIN LISPRO 2 UNITS: 100 INJECTION, SOLUTION INTRAVENOUS; SUBCUTANEOUS at 12:32

## 2018-01-01 RX ADMIN — ISODIUM CHLORIDE 3 ML: 0.03 SOLUTION RESPIRATORY (INHALATION) at 00:01

## 2018-01-01 RX ADMIN — INSULIN LISPRO 10 UNITS: 100 INJECTION, SOLUTION INTRAVENOUS; SUBCUTANEOUS at 09:32

## 2018-01-01 RX ADMIN — DULOXETINE HYDROCHLORIDE 60 MG: 60 CAPSULE, DELAYED RELEASE ORAL at 08:06

## 2018-01-01 RX ADMIN — MORPHINE SULFATE 15 MG: 15 TABLET, EXTENDED RELEASE ORAL at 21:41

## 2018-01-01 RX ADMIN — GUAIFENESIN 600 MG: 600 TABLET, EXTENDED RELEASE ORAL at 21:43

## 2018-01-01 RX ADMIN — GABAPENTIN 1800 MG: 300 CAPSULE ORAL at 21:34

## 2018-01-08 DIAGNOSIS — N18.30 CHRONIC KIDNEY DISEASE, STAGE III (MODERATE) (HCC): ICD-10-CM

## 2018-01-08 DIAGNOSIS — E11.9 TYPE 2 DIABETES MELLITUS WITHOUT COMPLICATIONS (HCC): ICD-10-CM

## 2018-01-09 NOTE — PROGRESS NOTES
Chief Complaint  Patient here for follow up appt after the ER and S/P Tube change      Active Problems    1  Acute lower urinary tract infection (599 0) (N39 0)   2  Benign localized prostatic hyperplasia with lower urinary tract symptoms (LUTS)   (600 21,599 69) (N40 1)   3  Blepharitis (373 00) (H01 009)   4  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)   5  Chronic kidney disease, stage 3 (585 3) (N18 3)   6  Closed nondisplaced fracture of first cervical vertebra with routine healing, unspecified   fracture morphology, subsequent encounter (V54 17) (S12 001D)   7  Colon cancer screening (V76 51) (Z12 11)   8  Dizziness (780 4) (R42)   9  Dysphagia, oropharyngeal phase (787 22) (R13 12)   10  Esophageal reflux (530 81) (K21 9)   11  Fecal incontinence (787 60) (R15 9)   12  Hepatitis, B Virus (070 30)   13  Hepatitis, C Virus (070 70)   14  Hypercholesterolemia (272 0) (E78 00)   15  Insomnia (780 52) (G47 00)   16  Laceration of left eyebrow, initial encounter (873 42) (S01 112A)   17  Lumbago (724 2) (M54 5)   18  Neuropathy in diabetes (250 60,357 2) (E11 40)   19  Orthostatic hypotension (458 0) (I95 1)   20  Pain, joint, shoulder (719 41) (M25 519)   21  Preoperative clearance (V72 84) (Z01 818)   22  Screening for genitourinary condition (V81 6) (Z13 89)   23  Screening for neurological condition (V80 09) (Z13 89)   24  Spinal stenosis (724 00) (M48 00)   25  Type 2 diabetes mellitus (250 00) (E11 9)   26  Urinary retention (788 20) (R33 9)    Current Meds   1  Accu-Chek Softclix Lancets Miscellaneous; test twice daily (lancets for accucheck saida); Therapy: 99KAK2355 to (Last Rx:90Jpv6618)  Requested for: 06Loq8451 Ordered   2  Baclofen 10 MG Oral Tablet; TAKE 1 TABLET 3 TIMES DAILY AS NEEDED FOR MUSCLE   SPASM; Therapy: 68UID6116 to (Evaluate:59Nsx2359)  Requested for: 75JYF5718; Last   Rx:23Gne4635 Ordered   3   BD Insulin Syringe Ultrafine 30G X 1/2" 1 ML Miscellaneous; INJECT INSULIN TWICE A DAY;   Therapy: 26Zjx9236 to (Evaluate:17Jun2018)  Requested for: 88WSX7297; Last   Rx:76Efk3129 Ordered   4  Blood Glucose Test In Vitro Strip; TEST TWICE DAILY; Therapy: 10DMM1765 to (Evaluate:39Psd8750)  Requested for: 19Pof2866; Last   Rx:42Wio9308 Ordered   5  DULoxetine HCl - 30 MG Oral Capsule Delayed Release Particles; take 1 capsule daily; Therapy: 94DUT3417 to (Evaluate:01Nov2017)  Requested for: 40YXK9641; Last   VY:17WSC4076 Ordered   6  Erythromycin 5 MG/GM Ophthalmic Ointment; APPLY A SMALL AMOUNT OF OINTMENT   ONCE DAILY TO AFFECTED EYE(S); Therapy: 31Okv9379 to (Evaluate:28May2017)  Requested for: 74ELN5782; Last   Rx:28Apr2017 Ordered   7  Gabapentin 600 MG Oral Tablet; TAKE 1 TABLET TWICE DAILY AND TAKE 3 TABLETS   AT BEDTIME; Therapy: 61JAX6258 to (Evaluate:19Oct2017)  Requested for: 23Zlm4636; Last   Rx:88Bac2519 Ordered   8  Hydrocortisone 1 % External Cream; APPLY SPARINGLY TO eye lid & eye brow area   TWICE DAILY; Therapy: 66IOC0609 to (Last Rx:01Zdp5147)  Requested for: 34MDR4639 Ordered   9  MiraLax Oral Powder; Therapy: (Recorded:07Mar2016) to Recorded   10  Morphine Sulfate ER 15 MG Oral Tablet Extended Release; 1 TAB EVERY 12 HRS; Therapy: 54RQP7993 to (Last Rx:41Gzo5931) Ordered   11  Mupirocin 2 % External Ointment; APPLY SPARINGLY TO AFFECTED AREA(S) TWICE    DAILY; Therapy: 11RWR5344 to (Last Rx:28Oct2015)  Requested for: 28Oct2015 Ordered   12  NovoLIN 70/30 (70-30) 100 UNIT/ML Subcutaneous Suspension; 15 U in the morning    and 10 U in the afternoon; Therapy: 02GSP3986 to (Nhi Anders)  Requested for: 57TIU7688; Last    Rx:10Oct2017; Status: ACTIVE - Retrospective Authorization Ordered   13  OxyCODONE HCl - 5 MG Oral Tablet; TAKE 2  TABLETS THREE TIMES A DAY AS    NEEDED FOR PAIN;    Therapy: 07Apr2012 to  Requested for: 96GTN1406 Recorded   14  Simvastatin 40 MG Oral Tablet; Take 1 tablet daily;     Therapy: 57JAB7000 to (Evaluate:25Mar2015)  Requested for: 69FJM4660; Last    Rx:32Mfs9027 Ordered   15  TraZODone HCl - 50 MG Oral Tablet; Take 1 tablet by mouth at bedtime; Therapy: 73NVU3904 to (Evaluate:10Oct2017)  Requested for: 63Sho1301; Last    Rx:13Apr2017 Ordered    Allergies    1  Temazepam CAPS    Vitals  Signs    Systolic: 736  Diastolic: 80  Height: 6 ft 1 in  Weight: 188 lb   BMI Calculated: 24 8  BSA Calculated: 2 1    Plan  Acute lower urinary tract infection    · Nitrofurantoin Macrocrystal 100 MG Oral Capsule; TAKE 1 CAPSULE Twice daily  when symptoms start, take one pill twice per day for 2 days   Rx By: Marcial Almonte; Dispense: 30 Days ; #:60 Capsule; Refill: 0; For: Acute lower urinary tract infection; MARCELLE = N; Verified Transmission to [facility]/PHARMACY #[id number] Last Updated By: System, GoBeMe; 10/11/2017 2:32:16 PM    Future Appointments    Date/Time Provider Specialty Site   11/10/2017 01:00 PM Nurse, Lucy Jiménez [address] [place] [address]   10/19/2017 03:30 PM ARACELI Fields   Internal Medicine [facility]     Signatures   Electronically signed by : Risa Awad, ; Oct 17 2017  9:11AM EST                       (Co-author)    Electronically signed by : Jozef Coker MD; Oct 18 2017  1:32PM EST                       (Author)

## 2018-01-09 NOTE — MISCELLANEOUS
Message   Recorded as Task   Date: 09/20/2017 12:04 PM, Created By: Jignesh Palomino   Task Name: Medical Complaint Callback   Assigned To: [place] FOR [facility]RO CATHLEENBowmanKAREN 101B,TEAM   Regarding Patient: Kaya Abdi, Status: In Progress   Comment:    Serene Hernandez - 20 Sep 2017 12:04 PM     TASK CREATED  Caller: Self; Medical Complaint; [phone] (Home)  patient called saying he is having problems with his s/p tube please call   Christie Mathis - 20 Sep 2017 12:15 PM     TASK EDITED  LMOM FOR PT TO CALL  DelChristie - 20 Sep 2017 12:16 PM     TASK IN PROGRESS   Litzy Meehan - 20 Sep 2017 12:39 PM     TASK EDITED  Pt returning call back   Christie Mathis - 20 Sep 2017 1:06 PM     TASK EDITED  SP CHANGED 2 WEEKS AGO  STATES HE HAS URGE TO GO, FEELS WEAK  AFEBRILE  ALREADY STARTED CIPRO 500 MG ONLY HAD 2 TABLETS, REQUESTING ADDITIONAL MEDICATION  [facility] AND [place]  WILL DIRECT TO DR Narciso Caballero  PER DR WHITAKER PT TO TAKE CEPHALEXIN 500MG TID X'S 5 DAYS  [facility] TO PHARM  PT NOTIFIED  1        1 Amended By: Shari Macias; Sep 20 2017 1:26 PM EST    Active Problems   1  Benign localized prostatic hyperplasia with lower urinary tract symptoms (LUTS)   (600 21,599 69) (N40 1)  2  Blepharitis (373 00) (H01 009)  3  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)  4  Chronic kidney disease, stage 3 (585 3) (N18 3)  5  Closed nondisplaced fracture of first cervical vertebra with routine healing, unspecified   fracture morphology, subsequent encounter (V54 17) (S12 001D)  6  Colon cancer screening (V76 51) (Z12 11)  7  Dizziness (780 4) (R42)  8  Dysphagia, oropharyngeal phase (787 22) (R13 12)  9  Esophageal reflux (530 81) (K21 9)  10  Fecal incontinence (787 60) (R15 9)  11  Hepatitis, B Virus (070 30)  12  Hepatitis, C Virus (070 70)  13  Hypercholesterolemia (272 0) (E78 00)  14  Insomnia (780 52) (G47 00)  15  Laceration of left eyebrow, initial encounter (873 42) (S01 112A)  16  Lumbago (724 2) (M54 5)  17  Neuropathy in diabetes (250 60,357 2) (E11 40)  18  Orthostatic hypotension (458 0) (I95 1)  19  Pain, joint, shoulder (719 41) (M25 519)  20  Preoperative clearance (V72 84) (Z01 818)  21  Screening for genitourinary condition (V81 6) (Z13 89)  22  Screening for neurological condition (V80 09) (Z13 89)  23  Spinal stenosis (724 00) (M48 00)  24  Type 2 diabetes mellitus (250 00) (E11 9)  25  Urinary retention (788 20) (R33 9)    Current Meds  1  Accu-Chek Softclix Lancets Miscellaneous; test twice daily (lancets for accucheck saida); Therapy: 97JAN4160 to (Last Rx:59Kzo8855)  Requested for: 50Whu4172 Ordered  2  Baclofen 10 MG Oral Tablet; TAKE 1 TABLET 3 TIMES DAILY AS NEEDED FOR MUSCLE   SPASM; Therapy: 11IDL1530 to (Evaluate:31Oct2017)  Requested for: 70Lhy1980; Last   Rx:79Sgt8147 Ordered  3  BD Insulin Syringe Ultrafine 30G X 1/2" 1 ML Miscellaneous; INJECT INSULIN TWICE A   DAY; Therapy: 16Drb6611 to (Evaluate:22Jan2017)  Requested for: 28Apr2016; Last   Rx:27Apr2016 Ordered  4  Blood Glucose Test In Vitro Strip; TEST TWICE DAILY; Therapy: 02GNB8481 to (Evaluate:97Zfu0842)  Requested for: 76Nkw1947; Last   Rx:05Zqy8556 Ordered  5  DULoxetine HCl - 30 MG Oral Capsule Delayed Release Particles; take 1 capsule daily; Therapy: 65OHD0056 to (Evaluate:01Nov2017)  Requested for: 02GIO4426; Last   DL:48UJG5266 Ordered  6  Erythromycin 5 MG/GM Ophthalmic Ointment; APPLY A SMALL AMOUNT OF OINTMENT   ONCE DAILY TO AFFECTED EYE(S); Therapy: 28Otc5133 to (Evaluate:93Tle4369)  Requested for: 67KDP7861; Last   Rx:28Apr2017 Ordered  7  Gabapentin 600 MG Oral Tablet; TAKE 1 TABLET TWICE DAILY AND TAKE 3 TABLETS   AT BEDTIME; Therapy: 99TDE1216 to (Evaluate:19Oct2017)  Requested for: 11Xto5764; Last   Rx:92Ebt0796 Ordered  8  Hydrocortisone 1 % External Cream; APPLY SPARINGLY TO eye lid & eye brow area   TWICE DAILY; Therapy: 16THU6775 to (Last Rx:35Rkm1415)  Requested for: 79TIY7396 Ordered  9   MiraLax Oral Powder (Polyethylene Glycol 3350); Therapy: (Recorded:07Mar2016) to Recorded  10  Morphine Sulfate ER 15 MG Oral Tablet Extended Release; 1 TAB EVERY 12 HRS; Therapy: 96FVR0937 to (Last Rx:98Uzw6684) Ordered  11  Mupirocin 2 % External Ointment; APPLY SPARINGLY TO AFFECTED AREA(S) TWICE    DAILY; Therapy: 94GHP6950 to (Last Rx:28Oct2015)  Requested for: 28Oct2015 Ordered  12  NovoLIN 70/30 (70-30) 100 UNIT/ML Subcutaneous Suspension; 15 U in the morning    and 10 U in the afternoon; Therapy: 95QPV8065 to (Evaluate:15Jan2017)  Requested for: 74Lsj9124; Last    Rx:96Lar0577 Ordered  13  OxyCODONE HCl - 5 MG Oral Tablet; TAKE 2  TABLETS THREE TIMES A DAY AS    NEEDED FOR PAIN;    Therapy: 07Apr2012 to  Requested for: 88MCE1561 Recorded  14  Simvastatin 40 MG Oral Tablet; Take 1 tablet daily; Therapy: 12GQJ2485 to (Norma Blanco)  Requested for: 87YYU2241; Last    Rx:73Kuo9735 Ordered  15  TraZODone HCl - 50 MG Oral Tablet; Take 1 tablet by mouth at bedtime; Therapy: 68UMP0245 to (Evaluate:10Oct2017)  Requested for: 07Jpt4409; Last    Rx:46Khu0234 Ordered    Allergies   1   Temazepam CAPS    Signatures   Electronically signed by : William Bonilla RN; Sep 20 2017  1:26PM EST                       (Author)

## 2018-01-11 NOTE — MISCELLANEOUS
Message   Date: 07 Jun 2017 11:20 AM EST, Recorded By: Simona Oleary For: Leidy Varelaape: Tyler Montgomery, Self  Phone: [phone] (Home)  Reason: Medical Complaint  back pain/discomfort last couple of days, patient requesting renewal of Methocarbamol 750 mg     Dr Linda Betancourt renewed request for Methocarbamol 750 mg  Patient will  at pharmacy  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)   4  Chronic kidney disease, stage 3 (585 3) (N18 3)   5  Closed nondisplaced fracture of first cervical vertebra with routine healing, unspecified   fracture morphology, subsequent encounter (V54 17) (S12 001D)   6  Colon cancer screening (V76 51) (Z12 11)   7  Dysphagia, oropharyngeal phase (787 22) (R13 12)   8  Esophageal reflux (530 81) (K21 9)   9  Fecal incontinence (787 60) (R15 9)   10  Hepatitis, B Virus (070 30)   11  Hepatitis, C Virus (070 70)   12  Hypercholesterolemia (272 0) (E78 00)   13  Insomnia (780 52) (G47 00)   14  Lumbago (724 2) (M54 5)   15  Neuropathy in diabetes (250 60,357 2) (E11 40)   16  Orthostatic hypotension (458 0) (I95 1)   17  Pain, joint, shoulder (719 41) (M25 519)   18  Preoperative clearance (V72 84) (Z01 818)   19  Screening for genitourinary condition (V81 6) (Z13 89)   20  Screening for neurological condition (V80 09) (Z13 89)   21  Spinal stenosis (724 00) (M48 00)   22  Type 2 diabetes mellitus (250 00) (E11 9)   23  Urinary retention (788 20) (R33 9)    Current Meds   1  Accu-Chek Softclix Lancets Miscellaneous; test twice daily (lancets for accucheck saida); Therapy: 81WWI5863 to (Last Rx:48Zxj2013)  Requested for: 45Vsa5105 Ordered   2  BD Insulin Syringe Ultrafine 30G X 1/2" 1 ML Miscellaneous; INJECT INSULIN TWICE A   DAY;    Therapy: 27Apr2016 to (Evaluate:22Jan2017)  Requested for: 13WDJ1443; Last   Rx:27Apr2016 Ordered   3  Blood Glucose Test In Vitro Strip; TEST TWICE DAILY; Therapy: 50EFB4726 to (Evaluate:31Slr9612)  Requested for: 58Cat9690; Last   Rx:60Kfy9907 Ordered   4  DULoxetine HCl - 30 MG Oral Capsule Delayed Release Particles; take 1 capsule daily; Therapy: 94DFO5631 to (Evaluate:01Nov2017)  Requested for: 86KLR9578; Last   PW:70BJS5195 Ordered   5  Erythromycin 5 MG/GM Ophthalmic Ointment; APPLY A SMALL AMOUNT OF OINTMENT   ONCE DAILY TO AFFECTED EYE(S); Therapy: 38Dnj1260 to (Evaluate:87Hjf6903)  Requested for: 05BMF3943; Last   Rx:28Apr2017 Ordered   6  Gabapentin 600 MG Oral Tablet; TAKE 1 TABLET TWICE DAILY AND TAKE 3 TABLETS   AT BEDTIME; Therapy: 20KDT2586 to (26 407183)  Requested for: 01JOG9461; Last   Rx:05Jan2017 Ordered   7  Hydrocortisone 1 % External Cream; APPLY SPARINGLY TO eye lid & eye brow area   TWICE DAILY; Therapy: 02FBQ4827 to (Last Rx:28Feb2017)  Requested for: 70TWE0572 Ordered   8  Methocarbamol 750 MG Oral Tablet; TAKE 1 TABLET EVERY 6 HOURS AS NEEDED    Requested for: 75NTY3182; Last Rx:07Jun2017 Ordered   9  MiraLax Oral Powder (Polyethylene Glycol 3350); Therapy: (Recorded:07Mar2016) to Recorded   10  Morphine Sulfate ER 15 MG Oral Tablet Extended Release; 1 TAB EVERY 12 HRS; Therapy: 61ABX5206 to (Last Rx:29Wqv0578) Ordered   11  Mupirocin 2 % External Ointment; APPLY SPARINGLY TO AFFECTED AREA(S) TWICE    DAILY; Therapy: 17MFY3137 to (Last Rx:28Oct2015)  Requested for: 28Oct2015 Ordered   12  NovoLIN 70/30 (70-30) 100 UNIT/ML Subcutaneous Suspension; 15 U in the morning    and 10 U in the afternoon; Therapy: 02RAR1825 to (Evaluate:15Jan2017)  Requested for: 44Cxu8757; Last    Rx:36Lcs6877 Ordered   13  OxyCODONE HCl - 5 MG Oral Tablet; TAKE 2  TABLETS THREE TIMES A DAY AS    NEEDED FOR PAIN;    Therapy: 07Apr2012 to  Requested for: 76IQB5628 Recorded   14  Simvastatin 40 MG Oral Tablet; Take 1 tablet daily;     Therapy: 30HNI2901 to (Myesha Cain)  Requested for: 39BVS2574; Last    Rx:63Vvc4358 Ordered   15  TraZODone HCl - 50 MG Oral Tablet; Take 1 tablet by mouth at bedtime; Therapy: 25NLU6508 to (Evaluate:07Cau4594)  Requested for: 78Aqn7642; Last    Rx:98Rde4555 Ordered    Allergies    1   Temazepam CAPS    Signatures   Electronically signed by : ARACELI Gonzalez ; Jun 8 2017 12:34PM EST

## 2018-01-11 NOTE — PROGRESS NOTES
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)   5  Chronic kidney disease, stage 3 (585 3) (N18 3)   6  Closed nondisplaced fracture of first cervical vertebra with routine healing, unspecified   fracture morphology, subsequent encounter (V54 17) (S12 001D)   7  Colon cancer screening (V76 51) (Z12 11)   8  Dizziness (780 4) (R42)   9  Dysphagia, oropharyngeal phase (787 22) (R13 12)   10  Esophageal reflux (530 81) (K21 9)   11  Fecal incontinence (787 60) (R15 9)   12  Hepatitis, B Virus (070 30)   13  Hepatitis, C Virus (070 70)   14  Hypercholesterolemia (272 0) (E78 00)   15  Insomnia (780 52) (G47 00)   16  Laceration of left eyebrow, initial encounter (873 42) (S01 112A)   17  Lumbago (724 2) (M54 5)   18  Neuropathy in diabetes (250 60,357 2) (E11 40)   19  Orthostatic hypotension (458 0) (I95 1)   20  Pain, joint, shoulder (719 41) (M25 519)   21  Preoperative clearance (V72 84) (Z01 818)   22  Screening for genitourinary condition (V81 6) (Z13 89)   23  Screening for neurological condition (V80 09) (Z13 89)   24  Spinal stenosis (724 00) (M48 00)   25  Type 2 diabetes mellitus (250 00) (E11 9)   26  Urinary retention (788 20) (R33 9)    Current Meds   1  Accu-Chek Softclix Lancets Miscellaneous; test twice daily (lancets for accucheck saida); Therapy: 30MKJ1590 to (Last Rx:22Cps1746)  Requested for: 05Aku4430 Ordered   2  Baclofen 10 MG Oral Tablet; TAKE 1 TABLET 3 TIMES DAILY AS NEEDED FOR MUSCLE   SPASM; Therapy: 16PPJ5761 to (Evaluate:04Ehp3681)  Requested for: 83RJD0197; Last   Rx:52Ktk0185 Ordered   3   BD Insulin Syringe Ultrafine 30G X 1/2" 1 ML Miscellaneous; INJECT INSULIN TWICE A DAY;   Therapy: 04Nmz5828 to (Evaluate:17Jun2018)  Requested for: 96YII1333; Last   Rx:33Trk8139 Ordered   4  Blood Glucose Test In Vitro Strip; TEST TWICE DAILY; Therapy: 59ATE8455 to (Evaluate:23Xwp6859)  Requested for: 80Tps9176; Last   Rx:46Xir7955 Ordered   5  DULoxetine HCl - 30 MG Oral Capsule Delayed Release Particles; take 1 capsule daily; Therapy: 87PIZ9928 to (Evaluate:01Nov2017)  Requested for: 55RTA8943; Last   OZ:00JEE9316 Ordered   6  Erythromycin 5 MG/GM Ophthalmic Ointment; APPLY A SMALL AMOUNT OF OINTMENT   ONCE DAILY TO AFFECTED EYE(S); Therapy: 99Zbv6413 to (Evaluate:28May2017)  Requested for: 95CAS0102; Last   Rx:28Apr2017 Ordered   7  Gabapentin 600 MG Oral Tablet; TAKE 1 TABLET TWICE DAILY AND TAKE 3 TABLETS   AT BEDTIME; Therapy: 82URD7123 to (Evaluate:19Oct2017)  Requested for: 17Whl4513; Last   Rx:97Qfm3601 Ordered   8  Hydrocortisone 1 % External Cream; APPLY SPARINGLY TO eye lid & eye brow area   TWICE DAILY; Therapy: 26KZB2001 to (Last Rx:05Ryd4351)  Requested for: 77BAV8563 Ordered   9  MiraLax Oral Powder; Therapy: (Recorded:07Mar2016) to Recorded   10  Morphine Sulfate ER 15 MG Oral Tablet Extended Release; 1 TAB EVERY 12 HRS; Therapy: 41SNS7440 to (Last Rx:27Jlj0614) Ordered   11  Mupirocin 2 % External Ointment; APPLY SPARINGLY TO AFFECTED AREA(S) TWICE    DAILY; Therapy: 12OZD8013 to (Last Rx:28Oct2015)  Requested for: 28Oct2015 Ordered   12  NovoLIN 70/30 (70-30) 100 UNIT/ML Subcutaneous Suspension; 15 U in the morning    and 10 U in the afternoon; Therapy: 28JOO3141 to (Isabel Santoyo)  Requested for: 55PPW7307; Last    Rx:10Oct2017; Status: ACTIVE - Retrospective Authorization Ordered   13  OxyCODONE HCl - 5 MG Oral Tablet; TAKE 2  TABLETS THREE TIMES A DAY AS    NEEDED FOR PAIN;    Therapy: 07Apr2012 to  Requested for: 88VXW1159 Recorded   14  Simvastatin 40 MG Oral Tablet; Take 1 tablet daily;     Therapy: 63SUW6502 to (Evaluate:25Mar2015)  Requested for: 24DMW5796; Last    Rx:79Ntb6254 Ordered   15  TraZODone HCl - 50 MG Oral Tablet; Take 1 tablet by mouth at bedtime; Therapy: 50MUJ5246 to (Evaluate:10Oct2017)  Requested for: 07Mgk2758; Last    Rx:13Apr2017 Ordered    Allergies    1  Temazepam CAPS    Vitals  Signs    Systolic: 573  Diastolic: 80  Height: 6 ft 1 in  Weight: 188 lb   BMI Calculated: 24 8  BSA Calculated: 2 1    Procedure    Procedure: Suprapubic Tube Change   The patient's indwelling SP tube was carefully removed  A 18 Thai Gomez Catheter was inserted into the bladder using sterile technique  SP site was cleaned and had no debris around the stoma  A catheter plug was connected  Plan  Acute lower urinary tract infection    · Nitrofurantoin Macrocrystal 100 MG Oral Capsule; TAKE 1 CAPSULE Twice daily  when symptoms start, take one pill twice per day for 2 days   Rx By: Margot Mayes; Dispense: 30 Days ; #:60 Capsule; Refill: 0; For: Acute lower urinary tract infection; MARCELLE = N; Verified Transmission to [facility]/PHARMACY #[id number] Last Updated By: System, [employer]; 10/11/2017 2:32:16 PM    4 weeks S/P tube change nurse visit  Future Appointments    Date/Time Provider Specialty Site   11/10/2017 01:00 PM NurseNacho [address] [place] [address]   10/19/2017 03:30 PM ARACELI Rodriguez   Internal Medicine [facility]     Signatures   Electronically signed by : João José, ; Oct 11 2017  3:04PM EST                       (Co-author)    Electronically signed by : Ric Patrick MD; Oct 11 2017  4:30PM EST                       (Author)

## 2018-01-12 VITALS
SYSTOLIC BLOOD PRESSURE: 142 MMHG | WEIGHT: 188 LBS | BODY MASS INDEX: 24.92 KG/M2 | DIASTOLIC BLOOD PRESSURE: 80 MMHG | HEIGHT: 73 IN

## 2018-01-12 NOTE — PROGRESS NOTES
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)   4  Chronic kidney disease, stage 3 (585 3) (N18 3)   5  Closed nondisplaced fracture of first cervical vertebra with routine healing, unspecified   fracture morphology, subsequent encounter (V54 17) (S12 001D)   6  Colon cancer screening (V76 51) (Z12 11)   7  Dysphagia, oropharyngeal phase (787 22) (R13 12)   8  Esophageal reflux (530 81) (K21 9)   9  Fecal incontinence (787 60) (R15 9)   10  Hepatitis, B Virus (070 30)   11  Hepatitis, C Virus (070 70)   12  Hypercholesterolemia (272 0) (E78 00)   13  Insomnia (780 52) (G47 00)   14  Lumbago (724 2) (M54 5)   15  Neuropathy in diabetes (250 60,357 2) (E11 40)   16  Orthostatic hypotension (458 0) (I95 1)   17  Pain, joint, shoulder (719 41) (M25 519)   18  Preoperative clearance (V72 84) (Z01 818)   19  Screening for genitourinary condition (V81 6) (Z13 89)   20  Screening for neurological condition (V80 09) (Z13 89)   21  Spinal stenosis (724 00) (M48 00)   22  Type 2 diabetes mellitus (250 00) (E11 9)   23  Urinary retention (788 20) (R33 9)    Current Meds   1  Accu-Chek Softclix Lancets Miscellaneous; test twice daily (lancets for accucheck saida); Therapy: 30BVB2337 to (Last Rx:24Uvf6859)  Requested for: 28Ssr1646 Ordered   2  Baclofen 10 MG Oral Tablet; TAKE 1 TABLET 3 TIMES DAILY AS NEEDED FOR MUSCLE   SPASM; Therapy: 20RNK5089 to (Evaluate:84Exw4197)  Requested for: 21Jun2017; Last   Rx:86Hmi7109 Ordered   3  BD Insulin Syringe Ultrafine 30G X 1/2" 1 ML Miscellaneous; INJECT INSULIN TWICE A   DAY; Therapy: 99Jhg9873 to (Evaluate:22Jan2017)  Requested for: 28Apr2016; Last   Rx:27Apr2016 Ordered   4  Blood Glucose Test In Vitro Strip; TEST TWICE DAILY;    Therapy: 13UZG3194 to (Evaluate:82Gyf5879)  Requested for: 13Dec2016; Last Rx: 29AQI6192 Ordered   5  DULoxetine HCl - 30 MG Oral Capsule Delayed Release Particles; take 1 capsule daily; Therapy: 82OJG8382 to (Evaluate:01Nov2017)  Requested for: 96GFY4918; Last   UQ:48WKF8766 Ordered   6  Erythromycin 5 MG/GM Ophthalmic Ointment; APPLY A SMALL AMOUNT OF OINTMENT   ONCE DAILY TO AFFECTED EYE(S); Therapy: 67Zgg3942 to (Evaluate:28May2017)  Requested for: 29YYL1215; Last   Rx:28Apr2017 Ordered   7  Gabapentin 600 MG Oral Tablet; TAKE 1 TABLET TWICE DAILY AND TAKE 3 TABLETS   AT BEDTIME; Therapy: 59WLZ5377 to ([place])  Requested for: 57GLK4431; Last   Rx:05Jan2017 Ordered   8  Hydrocortisone 1 % External Cream; APPLY SPARINGLY TO eye lid & eye brow area   TWICE DAILY; Therapy: 26OEL8761 to (Last Rx:28Feb2017)  Requested for: 42PAP6789 Ordered   9  MiraLax Oral Powder (Polyethylene Glycol 3350); Therapy: (Recorded:07Mar2016) to Recorded   10  Morphine Sulfate ER 15 MG Oral Tablet Extended Release; 1 TAB EVERY 12 HRS; Therapy: 23ZJT9920 to (Last Rx:72Fxa9369) Ordered   11  Mupirocin 2 % External Ointment; APPLY SPARINGLY TO AFFECTED AREA(S) TWICE    DAILY; Therapy: 50GBY2771 to (Last Rx:28Oct2015)  Requested for: 28Oct2015 Ordered   12  NovoLIN 70/30 (70-30) 100 UNIT/ML Subcutaneous Suspension; 15 U in the morning    and 10 U in the afternoon; Therapy: 61ZWI6302 to (Evaluate:15Jan2017)  Requested for: 10Ceg7582; Last    Rx:16Nmp3232 Ordered   13  OxyCODONE HCl - 5 MG Oral Tablet; TAKE 2  TABLETS THREE TIMES A DAY AS    NEEDED FOR PAIN;    Therapy: 07Apr2012 to  Requested for: 70KUF9915 Recorded   14  Simvastatin 40 MG Oral Tablet; Take 1 tablet daily; Therapy: 81FQH2397 to ()  Requested for: 78HWD2521; Last    Rx:81Pyz2315 Ordered   15  TraZODone HCl - 50 MG Oral Tablet; Take 1 tablet by mouth at bedtime; Therapy: 34ZDI2375 to (Evaluate:10Oct2017)  Requested for: 95Kvn9840; Last    Rx:13Apr2017 Ordered    Allergies    1   Temazepam CAPS    Procedure    Procedure: Suprapubic Tube Change The patient's indwelling SP tube was carefully removed  A 18 Slovak hernandez catheter was inserted into the bladder using sterile technique  A catheter plug was connected  Pt stated that he was in ER last night and started keflex 500mg three times a day for 7 days  Assessment    1   Urinary retention (147 20) (R33 9)    Signatures   Electronically signed by : Shila Dwyer, ; Jul 18 2017  2:44PM EST                       (Co-author)    Electronically signed by : Johnathan North MD; Jul 19 2017  7:55AM EST

## 2018-01-12 NOTE — MISCELLANEOUS
Assessment    1  Type 2 diabetes mellitus (250 00) (E11 9)   2  Chronic kidney disease, stage 3 (585 3) (N18 3)   3  Hypercholesterolemia (272 0) (E78 0)   4  Neuropathy in diabetes (250 60,357 2) (E11 40)   5  Closed nondisplaced fracture of first cervical vertebra with routine healing, unspecified   fracture morphology, subsequent encounter (V54 17) (S12 001D)    Plan  Chronic kidney disease, stage 3, Hypercholesterolemia, Neuropathy in diabetes, Type 2  diabetes mellitus    · (1) CBC/PLT/DIFF; Status:Active; Requested KG:21LUP8928;    Perform:[facility]; JSA:73XZW2752;KAMUVEM; For:Chronic kidney disease, stage 3, Hypercholesterolemia, Neuropathy in diabetes, Type 2 diabetes mellitus; Ordered By:Powers, Gwenetta Brittle;   · (1) COMPREHENSIVE METABOLIC PANEL; Status:Active; Requested HVV:54PPH5451;    Perform:[facility]; EHE:51CSR9671;OFXDHMN; For:Chronic kidney disease, stage 3, Hypercholesterolemia, Neuropathy in diabetes, Type 2 diabetes mellitus; Ordered By:Powers, Gwenetta Brittle;   · (1) HEMOGLOBIN A1C; Status:Active; Requested YMO:12BME6812;    Perform:[facility]; [id number];VINLIGM; For:Chronic kidney disease, stage 3, Hypercholesterolemia, Neuropathy in diabetes, Type 2 diabetes mellitus; Ordered By:Powers, Gwenetta Brittle;   · (1) LIPID PANEL, FASTING; Status:Active; Requested [id number];    Perform:[facility]; TMF:70EHX7371;HNDUIRI; For:Chronic kidney disease, stage 3, Hypercholesterolemia, Neuropathy in diabetes, Type 2 diabetes mellitus; Ordered By:Powers, Gwenetta Brittle;  Type 2 diabetes mellitus    · NovoLIN 70/30 (70-30) 100 UNIT/ML Subcutaneous Suspension; 7 U TWICE A  DAY   Rx By: Joshua Modi; Dispense: 90 Days ; #:6 X 10 ML Vial; Refill: 1; For: Type 2 diabetes mellitus; MARCELLE = N; Record    Discussion/Summary  Discussion Summary:   The patient seems to be doing reasonably well all things considered  He is tolerating his cervical collar   His blood pressure is stable off lisinopril  His diabetes has been stable  He will continue his current medications for now  He has neurosurgical follow-up arranged  He will return here for further internal medicine follow-up in approximately 3 months  Chief Complaint  Chief Complaint Free Text Note Form: d/c 3/2/2016, [facility], s/p fall, cervical fracture  lisinopril stopped  does not have UI  fell twice, fx cervical spine, 2/17, taking Oxycodone 5 mg 2 tablets three times per day      History of Present Illness  TCM Communication [facility]: The patient is being contacted for 3/4/2016  He was hospitalized [facility]  The date of admission: 2/17/2016, date of discharge: 3/2/2016  Diagnosis: fall, cervical fx  He was discharged to home  Medications reviewed and updated today  He scheduled a follow up appointment  Communication performed and completed by Angela Faith   HPI: The patient returned to the office after recent hospitalization at [facility]  The patient suffered 2 falls  Unfortunately, he fractured C1 when he felt for the second time  He was evaluated by neurosurgery and the decision to treat him conservatively with a cervical collar was made  He had been on lisinopril for hypertension but this was stopped  I presume this was on the basis of orthostatic hypotension  Patient has been feeling reasonably well since his discharge  He does have some discomfort from his cervical collar but he is managing this pretty well  He has had no new weakness or numbness of his limbs  He does have chronic issues related to diabetic neuropathy  He has had no chest pain, shortness of breath, palpitations, or dizziness  He has had no further fall  Review of Systems  Complete-Male:   Constitutional: as noted in HPI  Eyes: No complaints of eye pain, no red eyes, no discharge from eyes, no itchy eyes  ENT: no complaints of earache, no hearing loss, no nosebleeds, no nasal discharge, no sore throat, no hoarseness     Cardiovascular: as noted in HPI  Respiratory: No complaints of shortness of breath, no wheezing, no cough, no SOB on exertion, no orthopnea or PND  Gastrointestinal: No complaints of abdominal pain, no constipation, no nausea or vomiting, no diarrhea or bloody stools  Genitourinary: No complaints of dysuria, no incontinence, no hesitancy, no nocturia, no genital lesion, no testicular pain  Musculoskeletal: as noted in HPI  Neurological: as noted in HPI  Psychiatric: Is not suicidal, no sleep disturbances, no anxiety or depression, no change in personality, no emotional problems  Endocrine: no muscle weakness  Hematologic/Lymphatic: no tendency for easy bruising  Active Problems    1  Acute pharyngitis (462) (J02 9)   2  Benign localized prostatic hyperplasia with lower urinary tract symptoms (LUTS)   (600 21,599 69) (N40 1)   3  Bronchitis (490) (J40)   4  Chronic kidney disease, stage 3 (585 3) (N18 3)   5  Colon cancer screening (V76 51) (Z12 11)   6  Decubitus ulcer of sacral region, stage 2 (707 03,707 22) (L89 152)   7  Dysphagia, oropharyngeal phase (787 22) (R13 12)   8  Esophageal reflux (530 81) (K21 9)   9  Fecal incontinence (787 60) (R15 9)   10  Foot ulcer, unspecified laterality, with unspecified severity (707 15) (L97 509)   11  Hepatitis, B Virus (070 30)   12  Hepatitis, C Virus (070 70)   13  Hypercholesterolemia (272 0) (E78 0)   14  Insomnia (780 52) (G47 00)   15  Lumbago (724 2) (M54 5)   16  Need for pneumococcal vaccination (V03 82) (Z23)   17  Need for prophylactic vaccination and inoculation against influenza (V04 81) (Z23)   18  Neuropathy in diabetes (250 60,357 2) (E11 40)   19  Orthostatic hypotension (458 0) (I95 1)   20  Pain, joint, shoulder (719 41) (M25 519)   21  Sacroiliitis (720 2) (M46 1)   22  Screening for genitourinary condition (V81 6) (Z13 89)   23  Screening for neurological condition (V80 09) (Z13 89)   24  Spinal stenosis (724 00) (M48 00)   25   Type 2 diabetes mellitus (250 00) (E11 9)   26  Urinary retention (788 20) (R33 9)    Past Medical History    1  History of Currently Wearing Eyeglasses   2  Former smoker (V15 82) (M18 335)   3  History of Fracture Of The Clavicle (810 00)   4  Need for prophylactic vaccination and inoculation against influenza (V04 81) (Z23)    Surgical History    1  History of Hemorrhoidectomy   2  History of Knee Surgery   3  History of Lumbar Vertebral Fusion   4  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    Family History    1  Family history of Dementia    2  Family history of Hypertension (V17 49)    3  Family history of Cancer   4  Family history of Diabetes Mellitus (V18 0)   5  Family history of Family Health Status Of Father -    10  Family history of Maternal Grandfather Is    7  Family history of Maternal Grandmother Is    8  Family history of Paternal Grandmother Is   Family History Reviewed: The family history was reviewed and updated today  Social History    · Denied: History of Alcohol Use (History)   · Denied: History of Drug Use   · Marital History - Single   · Occupation:   · Denied: History of Smoking Cigarettes  Social History Reviewed: The social history was reviewed and is unchanged  Current Meds   1  Accu-Chek Saida SmartView w/Device Kit; TEST BLOOD SUGAR TWICE A DAY; Therapy: 75MCO8990 to (Last Rx:2014)  Requested for: 98Wys9903 Ordered   2  Accu-Chek Softclix Lancets Miscellaneous; test twice daily (lancets for accucheck saida); Therapy: 97LDC2528 to (Last Rx:2014)  Requested for: 71Exa0641 Ordered   3  Azithromycin 250 MG Oral Tablet; TAKE 2 TABLETS ON DAY 1 THEN TAKE 1 TABLET A   DAY FOR 4 DAYS; Therapy: 66XDA3632 to (Last Rx:2016)  Requested for: 35BNC7951 Ordered   4  BD Safety-Jn Insulin Syringe 29G X 1/2" 1 ML Miscellaneous; Inject insulin BID; Therapy: 09QDS7509 to (Evaluate:2014)  Requested for: 2014;  Last   Rx:2014 Ordered   5  Blood Glucose Test In Vitro Strip; TEST TWICE DAILY; Therapy: 99OCO3331 to (Evaluate:56Kcu9470)  Requested for: 01XTC1173; Last   Rx:09Nov2015 Ordered   6  Gabapentin 300 MG Oral Capsule; 1 pill hs;   Therapy: 42SGA5670 to (Last Rx:99Jqz4359)  Requested for: 93JGQ9271 Ordered   7  Gabapentin 600 MG Oral Tablet; TAKE 1 TABLET TWICE DAILY  AND TAKE 2 TABLETS AT   BEDTIME; Therapy: 32IFN3258 to (Evaluate:08Jun2016)  Requested for: 03GLB8690; Last   Rx:06Cex1366 Ordered   8  Lisinopril 10 MG Oral Tablet; Take 1 tablet daily; Therapy: 64BUD8584 to (Evaluate:73Kwx0978)  Requested for: 29KXP1131; Last   Rx:12Nov2015 Ordered   9  Lyrica 75 MG Oral Capsule; TAKE 1 CAPSULE TWICE DAILY; Therapy: (Recorded:28Oct2015) to Recorded   10  Magnesium 400 MG Oral Capsule; take 1 capsule daily; Therapy: (Recorded:70Gjh9303) to Recorded   11  Methocarbamol 750 MG Oral Tablet; TAKE 1 TABLET Every 6 hours PRN  Requested for:    85DBJ4935; Last Rx:11Dec2015 Ordered   12  MiraLax Oral Powder; Therapy: (Recorded:07Mar2016) to Recorded   13  Morphine Sulfate ER 15 MG Oral Tablet Extended Release; 1 TAB EVERY 12 HRS; Therapy: 74YFC9191 to (Last Rx:43Nbx1966) Ordered   14  Mupirocin 2 % External Ointment; APPLY SPARINGLY TO AFFECTED AREA(S) TWICE    DAILY; Therapy: 40DRZ8670 to (Last Rx:28Oct2015)  Requested for: 28Oct2015 Ordered   15  NovoLIN 70/30 (70-30) 100 UNIT/ML Subcutaneous Suspension; 12 U am & 8 U pm;    Therapy: 53DSC7520 to (Evaluate:28Nov2015)  Requested for: 75BMH9656; Last    Rx:01Jun2015 Ordered   16  OxyCODONE HCl - 5 MG Oral Tablet; TAKE 2  TABLETS THREE TIMES A DAY AS    NEEDED FOR PAIN;    Therapy: 07Apr2012 to  Requested for: 35EZT5367 Recorded   17  Simvastatin 40 MG Oral Tablet; Take 1 tablet daily; Therapy: 56MKN1945 to (Leitha Sites)  Requested for: 80GXP8907; Last    Rx:97Jnd9236 Ordered   18  TraZODone HCl - 50 MG Oral Tablet; Take 1 tablet by mouth at bedtime;     Therapy: 09MMV3650 to (Evaluate:20Mar2016)  Requested for: 62Ron2008; Last    Rx:22Ztm3529 Ordered    Allergies    1  Temazepam CAPS    Vitals  Signs [Data Includes: Current Encounter]   Recorded: 01KMN4291 01:51PM   Temperature: 98 4 F  Heart Rate: 72  Respiration: 15  Systolic: 920  Diastolic: 60  Height: 6 ft 1 in  Weight: 176 lb   BMI Calculated: 23 22  BSA Calculated: 2 03    Physical Exam    Constitutional   General appearance: No acute distress, well appearing and well nourished  A hard cervical collar is noted  Pulmonary   Respiratory effort: No increased work of breathing or signs of respiratory distress  Auscultation of lungs: Clear to auscultation, equal breath sounds bilaterally, no wheezes, no rales, no rhonci  Cardiovascular   Palpation of heart: Normal PMI, no thrills  Auscultation of heart: Normal rate and rhythm, normal S1 and S2, without murmurs  Examination of extremities for edema and/or varicosities: Normal     Abdomen   Abdomen: Abnormal   A suprapubic catheter is noted  Liver and spleen: No hepatomegaly or splenomegaly  Musculoskeletal   Gait and station: Normal     Inspection/palpation of joints, bones, and muscles: Normal     Psychiatric   Orientation to person, place and time: Normal     Mood and affect: Normal          Health Management  Type 2 diabetes mellitus   *VB-Foot Exam; every 1 year; Last 47OLD5833; Next Due: 20Nov2016; Active    Future Appointments    Date/Time Provider Specialty Site   06/13/2016 01:45 PM ARACELI Serna   Internal Medicine [facility]     Signatures   Electronically signed by : Josiephine Lundborg, M D ; Mar  8 2016  1:17PM EST                       (Author)

## 2018-01-12 NOTE — MISCELLANEOUS
Message   Date: 28 Feb 2017 4:00 PM EST, Recorded By: Kim Rock For: Polo Brooks   Caller: Ingrid Johnson, Self   Phone: [phone] (Home)   Reason: Medical Complaint   c/o crusty rash on eye brow and eye lids, using erythromycin ointment prescribed in December & no relief  try HCT 1% cream on the area twice a day  instructed not to put the cream in his eyes  wcb if problem does not resolve         Active Problems    1  Benign localized prostatic hyperplasia with lower urinary tract symptoms (LUTS)   (600 21,599 69) (N40 1)   2  Blepharitis (373 00) (H01 009)   3  Chronic kidney disease, stage 3 (585 3) (N18 3)   4  Closed nondisplaced fracture of first cervical vertebra with routine healing, unspecified   fracture morphology, subsequent encounter (V54 17) (S12 001D)   5  Colon cancer screening (V76 51) (Z12 11)   6  Decubitus ulcer of sacral region, stage 2 (707 03,707 22) (L89 152)   7  Dysphagia, oropharyngeal phase (787 22) (R13 12)   8  Esophageal reflux (530 81) (K21 9)   9  Fecal incontinence (787 60) (R15 9)   10  Foot ulcer, unspecified laterality, with unspecified severity (707 15) (L97 509)   11  Hepatitis, B Virus (070 30)   12  Hepatitis, C Virus (070 70)   13  Hypercholesterolemia (272 0) (E78 00)   14  Insomnia (780 52) (G47 00)   15  Lumbago (724 2) (M54 5)   16  Need for pneumococcal vaccination (V03 82) (Z23)   17  Need for prophylactic vaccination and inoculation against influenza (V04 81) (Z23)   18  Neuropathy in diabetes (250 60,357 2) (E11 40)   19  Orthostatic hypotension (458 0) (I95 1)   20  Pain, joint, shoulder (719 41) (M25 519)   21  Sacroiliitis (720 2) (M46 1)   22  Screening for genitourinary condition (V81 6) (Z13 89)   23  Screening for neurological condition (V80 09) (Z13 89)   24  Spinal stenosis (724 00) (M48 00)   25  Type 2 diabetes mellitus (250 00) (E11 9)   26  Urinary retention (788 20) (R33 9)    Current Meds   1   Accu-Chek Softclix Lancets Miscellaneous; test twice daily (lancets for accucheck saida); Therapy: 90TAT0889 to (Last Rx:04Sep2014)  Requested for: 38Fdt5802 Ordered   2  BD Insulin Syringe Ultrafine 30G X 1/2" 1 ML Miscellaneous; INJECT INSULIN TWICE A   DAY; Therapy: 69Pqd5426 to (Evaluate:22Jan2017)  Requested for: 47Vyx5885; Last   Rx:87Lkj8129 Ordered   3  Blood Glucose Test In Vitro Strip; TEST TWICE DAILY; Therapy: 29BJX9375 to (Evaluate:66Hbe8161)  Requested for: 98Iqg5824; Last   Rx:19Aoo5299 Ordered   4  DULoxetine HCl - 30 MG Oral Capsule Delayed Release Particles; take 1 capsule daily; Therapy: 86DTH9462 to (Evaluate:11Apr2017)  Requested for: 34GOC9503; Last   Rx:11Jan2017 Ordered   5  Erythromycin 5 MG/GM Ophthalmic Ointment; APPLY A SMALL AMOUNT OF OINTMENT   ONCE DAILY TO AFFECTED EYE(S); Therapy: 36Hsl6565 to (Evaluate:10Feb2017)  Requested for: 71Lhk1755; Last   Rx:12Dec2016 Ordered   6  Gabapentin 600 MG Oral Tablet; TAKE 1 TABLET TWICE DAILY AND TAKE 3 TABLETS   AT BEDTIME; Therapy: 34UIL2646 to (467 20 767)  Requested for: 04UDJ5145; Last   Rx:05Jan2017 Ordered   7  Methocarbamol 750 MG Oral Tablet; TAKE 1 TABLET Every 6 hours PRN  Requested for:   09MYL1819; Last Rx:84Nlk8117 Ordered   8  MiraLax Oral Powder; Therapy: (Recorded:07Mar2016) to Recorded   9  Morphine Sulfate ER 15 MG Oral Tablet Extended Release; 1 TAB EVERY 12 HRS; Therapy: 91QLC6890 to (Last Rx:10Feb2015) Ordered   10  Mupirocin 2 % External Ointment; APPLY SPARINGLY TO AFFECTED AREA(S) TWICE    DAILY; Therapy: 07KCF9196 to (Last Rx:28Oct2015)  Requested for: 28Oct2015 Ordered   11  NovoLIN 70/30 (70-30) 100 UNIT/ML Subcutaneous Suspension; 15 U in the morning    and 10 U in the afternoon; Therapy: 01JKY8850 to (Evaluate:15Jan2017)  Requested for: 95Vha7332; Last    Rx:83Qye8178 Ordered   12   OxyCODONE HCl - 5 MG Oral Tablet; TAKE 2  TABLETS THREE TIMES A DAY AS    NEEDED FOR PAIN;    Therapy: 07Apr2012 to  Requested for: 74NWY6431 Recorded   13  Simvastatin 40 MG Oral Tablet; Take 1 tablet daily; Therapy: 84BPO2753 to (Enrico Trammell)  Requested for: 82YKY9509; Last    Rx:77Oaj4886 Ordered   14  TraZODone HCl - 50 MG Oral Tablet; Take 1 tablet by mouth at bedtime; Therapy: 32MBH2505 to (Evaluate:08Apr2017)  Requested for: 65APS2466; Last    Rx:67Tng1241 Ordered    Allergies    1   Temazepam CAPS    Plan  Blepharitis    · Hydrocortisone 1 % External Cream; APPLY SPARINGLY TO eye lid & eye brow  area TWICE DAILY    Signatures   Electronically signed by : ARACELI Maldonado ; Mar  3 2017  4:30PM EST                       (Author)

## 2018-01-13 VITALS
BODY MASS INDEX: 25.88 KG/M2 | HEIGHT: 73 IN | TEMPERATURE: 97.7 F | RESPIRATION RATE: 15 BRPM | SYSTOLIC BLOOD PRESSURE: 190 MMHG | HEART RATE: 76 BPM | DIASTOLIC BLOOD PRESSURE: 88 MMHG | WEIGHT: 195.25 LBS

## 2018-01-13 NOTE — PROGRESS NOTES
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)   6  Chronic kidney disease, stage 3 (585 3) (N18 3)   7  Closed nondisplaced fracture of first cervical vertebra with routine healing, unspecified   fracture morphology, subsequent encounter (V54 17) (S12 001D)   8  Colon cancer screening (V76 51) (Z12 11)   9  Dizziness (780 4) (R42)   10  Dysphagia, oropharyngeal phase (787 22) (R13 12)   11  Esophageal reflux (530 81) (K21 9)   12  Fecal incontinence (787 60) (R15 9)   13  Hepatitis, B Virus (070 30)   14  Hepatitis, C Virus (070 70)   15  Hypercholesterolemia (272 0) (E78 00)   16  Insomnia (780 52) (G47 00)   17  Lumbago (724 2) (M54 5)   18  Need for prophylactic vaccination and inoculation against influenza (V04 81) (Z23)   19  Neuropathy in diabetes (250 60,357 2) (E11 40)   20  Orthostatic hypotension (458 0) (I95 1)   21  Pain, joint, shoulder (719 41) (M25 519)   22  Screening for genitourinary condition (V81 6) (Z13 89)   23  Screening for neurological condition (V80 09) (Z13 89)   24  Spinal stenosis (724 00) (M48 00)   25  Type 2 diabetes mellitus (250 00) (E11 9)   26  Urinary retention (788 20) (R33 9)    Current Meds   1  Accu-Chek Softclix Lancets Miscellaneous; test twice daily (lancets for accucheck saida); Therapy: 31PTZ5880 to (Last Rx:58Hbe4790)  Requested for: 28Zyx0273 Ordered   2  Baclofen 10 MG Oral Tablet; TAKE 1 TABLET 3 TIMES DAILY AS NEEDED FOR MUSCLE   SPASM; Therapy: 30KYY7077 to (Evaluate:81Ohs6811)  Requested for: 18VXP6280; Last   Rx:31Dfo9171 Ordered   3  BD Insulin Syringe Ultrafine 30G X 1/2" 1 ML Miscellaneous; INJECT INSULIN TWICE A   DAY;    Therapy: 21Xcf0187 to (Evaluate:17Jun2018)  Requested for: 05LHI3815; Last   Rx:38Glr7997 Ordered   4  Blood Glucose Test In Vitro Strip; TEST TWICE DAILY; Therapy: 20GQQ6247 to (Evaluate:29Byl3747)  Requested for: 58Izl1706; Last   Rx:63Elh2954 Ordered   5  DULoxetine HCl - 30 MG Oral Capsule Delayed Release Particles; take 1 capsule daily; Therapy: 47Sjp3424 to (Evaluate:28Apr2018)  Requested for: 97YCY8718; Last   Rx:40Tat6081 Ordered   6  Gabapentin 600 MG Oral Tablet; TAKE 1 TABLET TWICE DAILY AND TAKE 3 TABLETS   AT BEDTIME; Therapy: 24WTT5273 to (Evaluate:21Jan2018)  Requested for: 23Oct2017; Last   FY:54DQY5337 Ordered   7  MiraLax Oral Powder; Therapy: (Recorded:07Mar2016) to Recorded   8  Morphine Sulfate ER 15 MG Oral Tablet Extended Release; 1 TAB EVERY 12 HRS; Therapy: 74CGH6847 to (Last Rx:50Wtt1014) Ordered   9  NovoLIN 70/30 (70-30) 100 UNIT/ML Subcutaneous Suspension; 15 U in the morning   and 10 U in the afternoon; Therapy: 51UUM3983 to (Tanner Resendez)  Requested for: 80RAF0309; Last   Rx:10Oct2017 Ordered   10  OxyCODONE HCl - 5 MG Oral Tablet; TAKE 2  TABLETS THREE TIMES A DAY AS    NEEDED FOR PAIN;    Therapy: 07Apr2012 to  Requested for: 48EGB6144 Recorded   11  Simvastatin 40 MG Oral Tablet; Take 1 tablet daily; Therapy: 73XHY1880 to (Sarah Bonny)  Requested for: 65QEN8312; Last    Rx:82Aye1913 Ordered   12  TraZODone HCl - 50 MG Oral Tablet; Take 1 tablet by mouth at bedtime; Therapy: 64QFB5444 to (Evaluate:26Pdc3597)  Requested for: 63RPG1311; Last    Rx:12Oct2017 Ordered    Allergies    1  Temazepam CAPS    Vitals  Signs    Systolic: 694  Diastolic: 80  Height: 6 ft 1 in  Weight: 194 lb   BMI Calculated: 25 6  BSA Calculated: 2 12    Procedure    Procedure: Suprapubic Tube Change   The patient's indwelling SP tube was carefully removed  The area was clear with no debris around the stoma  A 18 French Gomez Catheter was inserted into the bladder using sterile technique  The patient was taught routine catheter care  A catheter plug was connected  Patient bring his own supplies  Plan  Urinary retention    · Ciprofloxacin HCl - 250 MG Oral Tablet   For: Urinary retention; Dose of 1 TABLET; Oral; MARCELLE = N; Administered by: Zack Machado: 11/10/2017 12:00:00 AM; Last Updated By: Zack Machado; 11/10/2017 1:39:40 PM    5 week nurse visit SP tube change  Future Appointments    Date/Time Provider Specialty Site   12/15/2017 01:00 PM Nurse, [facility] [address]   02/01/2018 01:15 PM ARACELI Lange   Internal Medicine [facility]     Signatures   Electronically signed by : Dontrell Madrigal, ; Nov 10 2017  1:32PM EST                       (Co-author)    Electronically signed by : Tram Faria MD; Dec  6 2017  4:30PM EST                       (Author)

## 2018-01-13 NOTE — PROGRESS NOTES
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)   4  Chronic kidney disease, stage 3 (585 3) (N18 3)   5  Closed nondisplaced fracture of first cervical vertebra with routine healing, unspecified   fracture morphology, subsequent encounter (V54 17) (S12 001D)   6  Colon cancer screening (V76 51) (Z12 11)   7  Dizziness (780 4) (R42)   8  Dysphagia, oropharyngeal phase (787 22) (R13 12)   9  Esophageal reflux (530 81) (K21 9)   10  Fecal incontinence (787 60) (R15 9)   11  Hepatitis, B Virus (070 30)   12  Hepatitis, C Virus (070 70)   13  Hypercholesterolemia (272 0) (E78 00)   14  Insomnia (780 52) (G47 00)   15  Laceration of left eyebrow, initial encounter (873 42) (S01 112A)   16  Lumbago (724 2) (M54 5)   17  Neuropathy in diabetes (250 60,357 2) (E11 40)   18  Orthostatic hypotension (458 0) (I95 1)   19  Pain, joint, shoulder (719 41) (M25 519)   20  Preoperative clearance (V72 84) (Z01 818)   21  Screening for genitourinary condition (V81 6) (Z13 89)   22  Screening for neurological condition (V80 09) (Z13 89)   23  Spinal stenosis (724 00) (M48 00)   24  Type 2 diabetes mellitus (250 00) (E11 9)   25  Urinary retention (788 20) (R33 9)    Current Meds   1  Accu-Chek Softclix Lancets Miscellaneous; test twice daily (lancets for accucheck saida); Therapy: 28DPO2025 to (Last Rx:88Pil1935)  Requested for: 85Jyy5524 Ordered   2  Baclofen 10 MG Oral Tablet; TAKE 1 TABLET 3 TIMES DAILY AS NEEDED FOR MUSCLE   SPASM; Therapy: 60NTF5728 to (Evaluate:31Oct2017)  Requested for: 65Vfs1256; Last   Rx:11Meh9519 Ordered   3  BD Insulin Syringe Ultrafine 30G X 1/2" 1 ML Miscellaneous; INJECT INSULIN TWICE A   DAY; Therapy: 27Apr2016 to (Evaluate:22Jan2017)  Requested for: 28Apr2016; Last   Rx:27Apr2016 Ordered   4   Blood Glucose Test In Vitro Strip; TEST TWICE DAILY; Therapy: 80FUN9110 to (Evaluate:19Aug2017)  Requested for: 65Sxp6989; Last   Rx:15Nph6997 Ordered   5  DULoxetine HCl - 30 MG Oral Capsule Delayed Release Particles; take 1 capsule daily; Therapy: 71ZAA8997 to (Evaluate:01Nov2017)  Requested for: 20FIQ3334; Last   AH:91RQR4605 Ordered   6  Erythromycin 5 MG/GM Ophthalmic Ointment; APPLY A SMALL AMOUNT OF OINTMENT   ONCE DAILY TO AFFECTED EYE(S); Therapy: 83Wqj3418 to (Evaluate:28May2017)  Requested for: 49HPX5872; Last   Rx:28Apr2017 Ordered   7  Gabapentin 600 MG Oral Tablet; TAKE 1 TABLET TWICE DAILY AND TAKE 3 TABLETS   AT BEDTIME; Therapy: 82JUX5653 to (Evaluate:19Oct2017)  Requested for: 79Egf6128; Last   Rx:07Zxg7223 Ordered   8  Hydrocortisone 1 % External Cream; APPLY SPARINGLY TO eye lid & eye brow area   TWICE DAILY; Therapy: 37QOA8083 to (Last Rx:28Feb2017)  Requested for: 22HBB7408 Ordered   9  MiraLax Oral Powder; Therapy: (Recorded:07Mar2016) to Recorded   10  Morphine Sulfate ER 15 MG Oral Tablet Extended Release; 1 TAB EVERY 12 HRS; Therapy: 44DBU6322 to (Last Rx:47Vgr0202) Ordered   11  Mupirocin 2 % External Ointment; APPLY SPARINGLY TO AFFECTED AREA(S) TWICE    DAILY; Therapy: 35LMM6421 to (Last Rx:28Oct2015)  Requested for: 28Oct2015 Ordered   12  NovoLIN 70/30 (70-30) 100 UNIT/ML Subcutaneous Suspension; 15 U in the morning    and 10 U in the afternoon; Therapy: 66LLD6561 to (Evaluate:15Jan2017)  Requested for: 47Wxp6513; Last    Rx:70Wgf6277 Ordered   13  OxyCODONE HCl - 5 MG Oral Tablet; TAKE 2  TABLETS THREE TIMES A DAY AS    NEEDED FOR PAIN;    Therapy: 07Apr2012 to  Requested for: 97IOR3789 Recorded   14  Simvastatin 40 MG Oral Tablet; Take 1 tablet daily; Therapy: 84JZJ9028 to (Carol Ann Miller)  Requested for: 80CEQ0277; Last    Rx:96Ktg5484 Ordered   15  TraZODone HCl - 50 MG Oral Tablet; Take 1 tablet by mouth at bedtime;     Therapy: 25GOZ9574 to (Evaluate:10Oct2017)  Requested for: 39Pzo8630; Last    Rx:53Phz4582 Ordered    Allergies    1  Temazepam CAPS    Procedure    Procedure: Suprapubic Tube Change   PT  PRESENTS FOR A ROUTINE SP TUBE CHANGE, WHICH WENT WELL  THERE WAS NO REDNESS OR DRAINAGE IN OR AROUND MEATUS SITE  PT  DENIES ANY PAIN OR FEVER , A 18FR  CATHETER WAS PLACED USING STERILE TECHNIQUE, URINE WAS CLEAR IN BAG        Future Appointments    Date/Time Provider Specialty Site   [facility]2017 [facility]   Electronically signed by : Enrique García, ; Sep  8 2017 12:28PM EST                       (Author)    Electronically signed by : Edvin King MD; Sep  8 2017  4:21PM EST                       (Author)

## 2018-01-14 VITALS
WEIGHT: 194 LBS | HEIGHT: 73 IN | SYSTOLIC BLOOD PRESSURE: 148 MMHG | DIASTOLIC BLOOD PRESSURE: 80 MMHG | BODY MASS INDEX: 25.71 KG/M2

## 2018-01-14 VITALS
SYSTOLIC BLOOD PRESSURE: 98 MMHG | HEART RATE: 87 BPM | DIASTOLIC BLOOD PRESSURE: 68 MMHG | BODY MASS INDEX: 25.58 KG/M2 | WEIGHT: 193 LBS | HEIGHT: 73 IN | TEMPERATURE: 97.4 F | RESPIRATION RATE: 16 BRPM

## 2018-01-14 VITALS
WEIGHT: 191 LBS | BODY MASS INDEX: 25.31 KG/M2 | HEIGHT: 73 IN | DIASTOLIC BLOOD PRESSURE: 80 MMHG | RESPIRATION RATE: 15 BRPM | SYSTOLIC BLOOD PRESSURE: 140 MMHG | TEMPERATURE: 98 F | HEART RATE: 80 BPM

## 2018-01-15 NOTE — MISCELLANEOUS
Message   Recorded as Task   Date: 07/18/2017 08:21 AM, Created By: Devin Galicia   Task Name: Call Back   Assigned To: Johnson CHAO,TEAM   Regarding Patient: Monique Lorenzo, Status: In Progress   Comment:    Serene Hernandez - 18 Jul 2017 8:21 AM     TASK CREATED  Caller: Self; Other; [phone] (Home)  PATIENT CALLED SAYING HE WAS IN THE [facility] AND HE HAS A CATH  PLEASE CALL HIM   Christie Mathis - 18 Jul 2017 8:50 AM     TASK EDITED  Will need to obtain EPIC records prior to calling  DelChristie leavitt - 18 Jul 2017 8:50 AM     TASK IN PROGRESS   DelChristie - 18 Jul 2017 10:36 AM     TASK EDITED  Needs SP tube change per ER notes  appt today, [place]  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)   4  Chronic kidney disease, stage 3 (585 3) (N18 3)   5  Closed nondisplaced fracture of first cervical vertebra with routine healing, unspecified   fracture morphology, subsequent encounter (V54 17) (S12 001D)   6  Colon cancer screening (V76 51) (Z12 11)   7  Dysphagia, oropharyngeal phase (787 22) (R13 12)   8  Esophageal reflux (530 81) (K21 9)   9  Fecal incontinence (787 60) (R15 9)   10  Hepatitis, B Virus (070 30)   11  Hepatitis, C Virus (070 70)   12  Hypercholesterolemia (272 0) (E78 00)   13  Insomnia (780 52) (G47 00)   14  Lumbago (724 2) (M54 5)   15  Neuropathy in diabetes (250 60,357 2) (E11 40)   16  Orthostatic hypotension (458 0) (I95 1)   17  Pain, joint, shoulder (719 41) (M25 519)   18  Preoperative clearance (V72 84) (Z01 818)   19  Screening for genitourinary condition (V81 6) (Z13 89)   20  Screening for neurological condition (V80 09) (Z13 89)   21  Spinal stenosis (724 00) (M48 00)   22  Type 2 diabetes mellitus (250 00) (E11 9)   23  Urinary retention (788 20) (R33 9)    Current Meds   1   Accu-Chek Softclix Lancets Miscellaneous; test twice daily (lancets for accucheck saida); Therapy: 80VFK3229 to (Last Rx:24Efb7844)  Requested for: 18Hoe0871 Ordered   2  Baclofen 10 MG Oral Tablet; TAKE 1 TABLET 3 TIMES DAILY AS NEEDED FOR MUSCLE   SPASM; Therapy: 59OEI0039 to (Evaluate:45Sbq1941)  Requested for: 21Jun2017; Last   Rx:52Sbz4727 Ordered   3  BD Insulin Syringe Ultrafine 30G X 1/2" 1 ML Miscellaneous; INJECT INSULIN TWICE A   DAY; Therapy: 27Asa4114 to (Evaluate:22Jan2017)  Requested for: 28Apr2016; Last   Rx:63Iib4403 Ordered   4  Blood Glucose Test In Vitro Strip; TEST TWICE DAILY; Therapy: 11ODN4509 to (Evaluate:19Aug2017)  Requested for: 33Ntg1082; Last   Rx:12Dec2016 Ordered   5  DULoxetine HCl - 30 MG Oral Capsule Delayed Release Particles; take 1 capsule daily; Therapy: 27QCS8631 to (Evaluate:01Nov2017)  Requested for: 39PKP2199; Last   SR:69QBM6076 Ordered   6  Erythromycin 5 MG/GM Ophthalmic Ointment; APPLY A SMALL AMOUNT OF OINTMENT   ONCE DAILY TO AFFECTED EYE(S); Therapy: 35Urf5094 to (Evaluate:28May2017)  Requested for: 40VLP8497; Last   Rx:28Apr2017 Ordered   7  Gabapentin 600 MG Oral Tablet; TAKE 1 TABLET TWICE DAILY AND TAKE 3 TABLETS   AT BEDTIME; Therapy: 38JRV4223 to (467 20 767)  Requested for: 07UGP7388; Last   Rx:05Jan2017 Ordered   8  Hydrocortisone 1 % External Cream; APPLY SPARINGLY TO eye lid & eye brow area   TWICE DAILY; Therapy: 98SFG8952 to (Last Rx:77Thh0715)  Requested for: 50XIK2588 Ordered   9  MiraLax Oral Powder (Polyethylene Glycol 3350); Therapy: (Recorded:07Mar2016) to Recorded   10  Morphine Sulfate ER 15 MG Oral Tablet Extended Release; 1 TAB EVERY 12 HRS; Therapy: 19XKT2387 to (Last Rx:85Kpx3585) Ordered   11  Mupirocin 2 % External Ointment; APPLY SPARINGLY TO AFFECTED AREA(S) TWICE    DAILY; Therapy: 35FXD4476 to (Last Rx:28Oct2015)  Requested for: 28Oct2015 Ordered   12   NovoLIN 70/30 (70-30) 100 UNIT/ML Subcutaneous Suspension; 15 U in the morning    and 10 U in the afternoon; Therapy: 99LNF1832 to (Evaluate:15Jan2017)  Requested for: 61Qzi5994; Last    Rx:07Itl8877 Ordered   13  OxyCODONE HCl - 5 MG Oral Tablet; TAKE 2  TABLETS THREE TIMES A DAY AS    NEEDED FOR PAIN;    Therapy: 07Apr2012 to  Requested for: 38IVM0260 Recorded   14  Simvastatin 40 MG Oral Tablet; Take 1 tablet daily; Therapy: 04NGA2307 to (Vandana Locke)  Requested for: 62TWM8351; Last    Rx:85Vzm4942 Ordered   15  TraZODone HCl - 50 MG Oral Tablet; Take 1 tablet by mouth at bedtime; Therapy: 20NAB9249 to (Evaluate:10Oct2017)  Requested for: 71Wqq9374; Last    Rx:13Apr2017 Ordered    Allergies    1   Temazepam CAPS    Signatures   Electronically signed by : Jarred Crowell RN; Jul 18 2017 10:36AM EST                       (Author)

## 2018-01-15 NOTE — MISCELLANEOUS
Message   Recorded as Task   Date: 10/09/2017 12:57 PM, Created By: Jumaan Mcfarland   Task Name: Call Back   Assigned To: Johnson CHAO,TEAM   Regarding Patient: Dee Bermudez, Status: Active   Comment:    Jumana Mcfarland - 09 Oct 2017 12:57 PM     TASK CREATED  Caller: Self; [phone] (Home)  Pt was in [place] [address] 10/4 for infection and needs to make fup appointment  [address] [place] [address] Oct 2017 2:03 PM     TASK EDITED  APPT 10/11 WITH DR Leslie Spencer  Active Problems    1  Acute lower urinary tract infection (599 0) (N39 0)   2  Benign localized prostatic hyperplasia with lower urinary tract symptoms (LUTS)   (600 21,599 69) (N40 1)   3  Blepharitis (373 00) (H01 009)   4  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)   5  Chronic kidney disease, stage 3 (585 3) (N18 3)   6  Closed nondisplaced fracture of first cervical vertebra with routine healing, unspecified   fracture morphology, subsequent encounter (V54 17) (S12 001D)   7  Colon cancer screening (V76 51) (Z12 11)   8  Dizziness (780 4) (R42)   9  Dysphagia, oropharyngeal phase (787 22) (R13 12)   10  Esophageal reflux (530 81) (K21 9)   11  Fecal incontinence (787 60) (R15 9)   12  Hepatitis, B Virus (070 30)   13  Hepatitis, C Virus (070 70)   14  Hypercholesterolemia (272 0) (E78 00)   15  Insomnia (780 52) (G47 00)   16  Laceration of left eyebrow, initial encounter (873 42) (S01 112A)   17  Lumbago (724 2) (M54 5)   18  Neuropathy in diabetes (250 60,357 2) (E11 40)   19  Orthostatic hypotension (458 0) (I95 1)   20  Pain, joint, shoulder (719 41) (M25 519)   21  Preoperative clearance (V72 84) (Z01 818)   22  Screening for genitourinary condition (V81 6) (Z13 89)   23  Screening for neurological condition (V80 09) (Z13 89)   24  Spinal stenosis (724 00) (M48 00)   25  Type 2 diabetes mellitus (250 00) (E11 9)   26  Urinary retention (788 20) (R33 9)    Current Meds   1   Accu-Chek Softclix Lancets Miscellaneous; test twice daily (lancets for accucheck saida); Therapy: 94PZV0144 to (Last Rx:15Stz0601)  Requested for: 22Bjd8816 Ordered   2  Baclofen 10 MG Oral Tablet; TAKE 1 TABLET 3 TIMES DAILY AS NEEDED FOR MUSCLE   SPASM; Therapy: 14KPF3606 to (Evaluate:06Jnk1770)  Requested for: 73AYW5617; Last   Rx:14Tlb4435 Ordered   3  BD Insulin Syringe Ultrafine 30G X 1/2" 1 ML Miscellaneous; INJECT INSULIN TWICE A   DAY; Therapy: 94Hut3744 to (Evaluate:2018)  Requested for: 42GVA4318; Last   Rx:49Tmv8856 Ordered   4  Blood Glucose Test In Vitro Strip; TEST TWICE DAILY; Therapy: 94DHG3996 to (Evaluate:30Zzf0746)  Requested for: 41Yif2578; Last   Rx:13Zdh8631 Ordered   5  Cephalexin 500 MG Oral Capsule; TAKE 1 CAPSULE 3 times daily; Therapy: 93XRC6013 to (Evaluate:46Irl0355)  Requested for: 00NCY8854; Last   Rx:35Zzw2487 Ordered   6  DULoxetine HCl - 30 MG Oral Capsule Delayed Release Particles; take 1 capsule daily; Therapy: 77GYG7481 to (Evaluate:2017)  Requested for: 91RMR8333; Last   W82EXP9816 Ordered   7  Erythromycin 5 MG/GM Ophthalmic Ointment; APPLY A SMALL AMOUNT OF OINTMENT   ONCE DAILY TO AFFECTED EYE(S); Therapy: 89Gjo3758 to (Evaluate:92Mkx8452)  Requested for: 07EFW8370; Last   Rx:2017 Ordered   8  Gabapentin 600 MG Oral Tablet; TAKE 1 TABLET TWICE DAILY AND TAKE 3 TABLETS   AT BEDTIME; Therapy: 04RUF9694 to (Evaluate:2017)  Requested for: 35Xtu4896; Last   Rx:76Avd1486 Ordered   9  Hydrocortisone 1 % External Cream; APPLY SPARINGLY TO eye lid & eye brow area   TWICE DAILY; Therapy: 90QWE3974 to (Last Rx:41Smy8333)  Requested for: 77PAZ3769 Ordered   10  MiraLax Oral Powder (Polyethylene Glycol 3350); Therapy: (Recorded:2016) to Recorded   11  Morphine Sulfate ER 15 MG Oral Tablet Extended Release; 1 TAB EVERY 12 HRS; Therapy: 93MNC5537 to (Last Rx:81Kqf6525) Ordered   12   Mupirocin 2 % External Ointment; APPLY SPARINGLY TO AFFECTED AREA(S) TWICE DAILY; Therapy: 39WFM2693 to (Last Rx:28Oct2015)  Requested for: 28Oct2015 Ordered   13  NovoLIN 70/30 (70-30) 100 UNIT/ML Subcutaneous Suspension; 15 U in the morning    and 10 U in the afternoon; Therapy: 43MQJ3021 to (Evaluate:15Jan2017)  Requested for: 94Lpm3142; Last    Rx:55Fyb1560 Ordered   14  OxyCODONE HCl - 5 MG Oral Tablet; TAKE 2  TABLETS THREE TIMES A DAY AS    NEEDED FOR PAIN;    Therapy: 07Apr2012 to  Requested for: 65WSJ9752 Recorded   15  Simvastatin 40 MG Oral Tablet; Take 1 tablet daily; Therapy: 59MPP7450 to (Dondra Click)  Requested for: 20AXB3870; Last    Rx:25Inv2504 Ordered   16  TraZODone HCl - 50 MG Oral Tablet; Take 1 tablet by mouth at bedtime; Therapy: 01LDA0084 to (Evaluate:10Oct2017)  Requested for: 48Hgv1675; Last    Rx:96Vns0854 Ordered    Allergies    1   Temazepam CAPS    Signatures   Electronically signed by : Royer Ruano RN; Oct  9 2017  2:03PM EST                       (Author)

## 2018-01-16 NOTE — MISCELLANEOUS
Message     Date: 05 Jan 2017 3:27 PM EST, Recorded By: Asa Sic For: Polo Brooks   Caller: Kevin Acevedo   Phone: [phone] (Home)   Reason: Medical Complaint   requesting increase in Gabapentin, I called LISANDRA Mccall to increase by 300 mg at bedtime  will take Gabapentin 600 mg 1 tablet morning, 1 tablet in the afternoon and 3 tablets at night, new Rx sent to pharmacy        Active Problems    1  Benign localized prostatic hyperplasia with lower urinary tract symptoms (LUTS)   (600 21,599 69) (N40 1)   2  Blepharitis (373 00) (H01 009)   3  Chronic kidney disease, stage 3 (585 3) (N18 3)   4  Closed nondisplaced fracture of first cervical vertebra with routine healing, unspecified   fracture morphology, subsequent encounter (V54 17) (S12 001D)   5  Colon cancer screening (V76 51) (Z12 11)   6  Decubitus ulcer of sacral region, stage 2 (707 03,707 22) (L89 152)   7  Dysphagia, oropharyngeal phase (787 22) (R13 12)   8  Esophageal reflux (530 81) (K21 9)   9  Fecal incontinence (787 60) (R15 9)   10  Foot ulcer, unspecified laterality, with unspecified severity (707 15) (L97 509)   11  Hepatitis, B Virus (070 30)   12  Hepatitis, C Virus (070 70)   13  Hypercholesterolemia (272 0) (E78 00)   14  Insomnia (780 52) (G47 00)   15  Lumbago (724 2) (M54 5)   16  Need for pneumococcal vaccination (V03 82) (Z23)   17  Need for prophylactic vaccination and inoculation against influenza (V04 81) (Z23)   18  Neuropathy in diabetes (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,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) (E11 40)   19  Orthostatic hypotension (458 0) (I95 1)   20  Pain, joint, shoulder (719 41) (M25 519)   21  Sacroiliitis (720 2) (M46 1)   22  Screening for genitourinary condition (V81 6) (Z13 89)   23  Screening for neurological condition (V80 09) (Z13 89)   24  Spinal stenosis (724 00) (M48 00)   25  Type 2 diabetes mellitus (250 00) (E11 9)   26  Urinary retention (788 20) (R33 9)    Current Meds   1   Accu-Chek Softclix Lancets Miscellaneous; test twice daily (lancets for accucheck saida); Therapy: 54UWK8977 to (Last Rx:40Hov5529)  Requested for: 84Fsx1060 Ordered   2  BD Insulin Syringe Ultrafine 30G X 1/2" 1 ML Miscellaneous; INJECT INSULIN TWICE A   DAY; Therapy: 08Xdd5242 to (Evaluate:22Jan2017)  Requested for: 08Ozi5499; Last   Rx:19Yvk6888 Ordered   3  Blood Glucose Test In Vitro Strip; TEST TWICE DAILY; Therapy: 10FLS2327 to (Evaluate:27Rda2437)  Requested for: 98Ewx3349; Last   Rx:58Gxf6433 Ordered   4  DULoxetine HCl - 30 MG Oral Capsule Delayed Release Particles; take 1 capsule daily; Therapy: 91Tao1152 to (Evaluate:18May2017)  Requested for: 22MBF6352; Last   Rx:55Vgg9140 Ordered   5  Erythromycin 5 MG/GM Ophthalmic Ointment; APPLY A SMALL AMOUNT OF OINTMENT   ONCE DAILY TO AFFECTED EYE(S); Therapy: 01Klj1282 to (Evaluate:89Pde9533)  Requested for: 91Ngk7128; Last   Rx:51Yrd2012 Ordered   6  Gabapentin 300 MG Oral Capsule; 1 pill hs;   Therapy: 45CAO2454 to (Last Rx:56Bsy8759)  Requested for: 62BGQ6103 Ordered   7  Gabapentin 600 MG Oral Tablet; TAKE 1 TABLET TWICE DAILY AND TAKE 2 TABLETS   AT BEDTIME; Therapy: 05AJR9393 to (Evaluate:24May2017)  Requested for: 27SKJ3290; Last   Rx:92Mlh9447 Ordered   8  Methocarbamol 750 MG Oral Tablet; TAKE 1 TABLET Every 6 hours PRN  Requested for:   08TFM8886; Last Rx:50Xku0321 Ordered   9  MiraLax Oral Powder; Therapy: (Recorded:07Mar2016) to Recorded   10  Morphine Sulfate ER 15 MG Oral Tablet Extended Release; 1 TAB EVERY 12 HRS; Therapy: 42JGR5888 to (Last Rx:01Lsk5843) Ordered   11  Mupirocin 2 % External Ointment; APPLY SPARINGLY TO AFFECTED AREA(S) TWICE    DAILY; Therapy: 59YUG6933 to (Last Rx:28Oct2015)  Requested for: 28Oct2015 Ordered   12  NovoLIN 70/30 (70-30) 100 UNIT/ML Subcutaneous Suspension; 15 U in the morning    and 10 U in the afternoon; Therapy: 59CVV1105 to (Evaluate:86Wow1162)  Requested for: 64Phg4477; Last    Rx:03Bnw9594 Ordered   13   OxyCODONE HCl - 5 MG Oral Tablet; TAKE 2  TABLETS THREE TIMES A DAY AS    NEEDED FOR PAIN;    Therapy: 07Apr2012 to  Requested for: 08XVH3522 Recorded   14  Simvastatin 40 MG Oral Tablet; Take 1 tablet daily; Therapy: 67CBK2640 to ([facility])  Requested for: 24SGV6852; Last    Rx:95Flm2495 Ordered   15  TraZODone HCl - 50 MG Oral Tablet; Take 1 tablet by mouth at bedtime; Therapy: 03FGN1810 to (Evaluate:08Apr2017)  Requested for: 53NHC7265; Last    Rx:72Nar5373 Ordered    Allergies    1   Temazepam CAPS    Plan  Neuropathy in diabetes    · Gabapentin 300 MG Oral Capsule   · Gabapentin 600 MG Oral Tablet; TAKE 1 TABLET TWICE DAILY AND TAKE 3  TABLETS AT BEDTIME    Signatures   Electronically signed by : ARACELI Posey ; Jan 12 2017  1:26PM EST                       (Author)

## 2018-01-17 NOTE — MISCELLANEOUS
Message   Date: 13 Jan 2016 3:01 PM EST, Recorded By: Jessica Sorensen For: CalebAbigail   Caller: Bernice Patel, Self   Phone: [phone] (Mobile Phone)   Reason: Medical Complaint   Patient called stating that he has had Nasal Congestion and Cough with green phlegm for approximately 7 days  He denies fever, body ache and sore throat at this time  He has tried OTC meds from [facility], with no improvement  He is requesting an antibiotic  As per Dr Eugene Brown, we can submit an order for a Z-Pack to [facility]   Luz Dee was instructed to call back if his symptoms are not improved  Active Problems    1  Acute pharyngitis (462) (J02 9)   2  Benign localized prostatic hyperplasia with lower urinary tract symptoms (LUTS)   (600 21,599 69) (N40 1)   3  Bronchitis (490) (J40)   4  Chronic kidney disease, stage 3 (585 3) (N18 3)   5  Colon cancer screening (V76 51) (Z12 11)   6  Decubitus ulcer of sacral region, stage 2 (707 03,707 22) (L89 152)   7  Dysphagia, oropharyngeal phase (787 22) (R13 12)   8  Esophageal reflux (530 81) (K21 9)   9  Fecal incontinence (787 60) (R15 9)   10  Foot ulcer, unspecified laterality, with unspecified severity (707 15) (L97 509)   11  Hepatitis, B Virus (070 30)   12  Hepatitis, C Virus (070 70)   13  Hypercholesterolemia (272 0) (E78 0)   14  Insomnia (780 52) (G47 00)   15  Lumbago (724 2) (M54 5)   16  Need for pneumococcal vaccination (V03 82) (Z23)   17  Need for prophylactic vaccination and inoculation against influenza (V04 81) (Z23)   18  Neuropathy in diabetes (250 60,357 2) (E11 40)   19  Orthostatic hypotension (458 0) (I95 1)   20  Pain, joint, shoulder (719 41) (M25 519)   21  Sacroiliitis (720 2) (M46 1)   22  Screening for genitourinary condition (V81 6) (Z13 89)   23  Screening for neurological condition (V80 09) (Z13 89)   24  Spinal stenosis (724 00) (M48 00)   25  Type 2 diabetes mellitus (250 00) (E11 9)   26   Urinary retention (788 20) (R33 9)    Current Meds   1  Accu-Chek Saida SmartView w/Device Kit; TEST BLOOD SUGAR TWICE A DAY; Therapy: 03SBL0107 to (Last Rx:07Qpg4954)  Requested for: 96Vfg2716 Ordered   2  Accu-Chek Softclix Lancets Miscellaneous; test twice daily (lancets for accucheck saida); Therapy: 02FNT0181 to (Last Rx:04Sep2014)  Requested for: 11Exj5139 Ordered   3  BD Safety-Jn Insulin Syringe 29G X 1/2" 1 ML Miscellaneous; Inject insulin BID; Therapy: 71YSE8307 to (Evaluate:12Oct2014)  Requested for: 07Nlu0376; Last   Rx:54Zvd0090 Ordered   4  Blood Glucose Test In Vitro Strip; TEST TWICE DAILY; Therapy: 91EIZ9436 to (Evaluate:94Atg3514)  Requested for: 64QMQ0799; Last   Rx:09Nov2015 Ordered   5  Gabapentin 300 MG Oral Capsule; 1 pill hs;   Therapy: 35OXZ3368 to (Last Rx:53Tnq5281)  Requested for: 23YGZ3384 Ordered   6  Gabapentin 600 MG Oral Tablet; TAKE 1 TABLET TWICE DAILY  AND TAKE 2 TABLETS   AT BEDTIME; Therapy: 82WXM8479 to (Evaluate:08Jun2016)  Requested for: 51XIS4821; Last   Rx:92Sec0628 Ordered   7  Lisinopril 10 MG Oral Tablet; Take 1 tablet daily; Therapy: 23RPE5638 to (Evaluate:00Yws4430)  Requested for: 94KMH2896; Last   Rx:12Nov2015 Ordered   8  Lyrica 75 MG Oral Capsule; TAKE 1 CAPSULE TWICE DAILY; Therapy: (Clois Bronosn) to Recorded   9  Magnesium 400 MG Oral Capsule; take 1 capsule daily; Therapy: (Recorded:00Any5293) to Recorded   10  Methocarbamol 750 MG Oral Tablet; TAKE 1 TABLET Every 6 hours PRN  Requested for:    96NPS5654; Last Rx:30Bsn6096 Ordered   11  Morphine Sulfate ER 15 MG Oral Tablet Extended Release; 1 TAB EVERY 12 HRS; Therapy: 26NMM1253 to (Last Rx:21Ues6354) Ordered   12  Mupirocin 2 % External Ointment; APPLY SPARINGLY TO AFFECTED AREA(S) TWICE    DAILY; Therapy: 82COO1981 to (Last Rx:28Oct2015)  Requested for: 28Oct2015 Ordered   13   NovoLIN 70/30 (70-30) 100 UNIT/ML Subcutaneous Suspension; 12 U am & 8 U pm;    Therapy: 35KTJ1358 to (Evaluate:28Nov2015) Requested for: 01Jun2015; Last    Rx:01Jun2015 Ordered   14  OxyCODONE HCl - 5 MG Oral Tablet; TAKE 1 THREE TIMES A DAY AS NEEDED FOR    PAIN;    Therapy: 53Heu6854 to (Last Rx:80Jqn7936)  Requested for: 23ZDH4467 Ordered   15  Simvastatin 40 MG Oral Tablet; Take 1 tablet daily; Therapy: 17UJQ1842 to (Maryjo Jalloh)  Requested for: 47BXT1193; Last    Rx:18Evs9459 Ordered   16  TraZODone HCl - 50 MG Oral Tablet; Take 1 tablet by mouth at bedtime; Therapy: 37VOD6546 to (Evaluate:20Mar2016)  Requested for: 92Gce8960; Last    Rx:23Yxx2337 Ordered    Allergies    1   Temazepam CAPS    Plan  Acute pharyngitis    · Azithromycin 250 MG Oral Tablet; TAKE 2 TABLETS ON DAY 1 THEN TAKE 1  TABLET A DAY FOR 4 DAYS    Signatures   Electronically signed by : Nina Bain OM; Jan 13 2016  3:12PM EST                       (Author)

## 2018-01-22 VITALS
TEMPERATURE: 98.5 F | DIASTOLIC BLOOD PRESSURE: 80 MMHG | HEART RATE: 96 BPM | SYSTOLIC BLOOD PRESSURE: 152 MMHG | BODY MASS INDEX: 25.71 KG/M2 | WEIGHT: 194 LBS | RESPIRATION RATE: 15 BRPM | HEIGHT: 73 IN

## 2018-01-23 VITALS
DIASTOLIC BLOOD PRESSURE: 62 MMHG | SYSTOLIC BLOOD PRESSURE: 104 MMHG | BODY MASS INDEX: 25.84 KG/M2 | HEIGHT: 73 IN | WEIGHT: 195 LBS

## 2018-01-23 NOTE — PROGRESS NOTES
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)   6  Chronic kidney disease, stage 3 (585 3) (N18 3)   7  Closed nondisplaced fracture of first cervical vertebra with routine healing, unspecified   fracture morphology, subsequent encounter (V54 17) (S12 001D)   8  Colon cancer screening (V76 51) (Z12 11)   9  Dizziness (780 4) (R42)   10  Dysphagia, oropharyngeal phase (787 22) (R13 12)   11  Esophageal reflux (530 81) (K21 9)   12  Fecal incontinence (787 60) (R15 9)   13  Hepatitis, B Virus (070 30)   14  Hepatitis, C Virus (070 70)   15  Hypercholesterolemia (272 0) (E78 00)   16  Insomnia (780 52) (G47 00)   17  Lumbago (724 2) (M54 5)   18  Need for prophylactic vaccination and inoculation against influenza (V04 81) (Z23)   19  Neuropathy in diabetes (250 60,357 2) (E11 40)   20  Orthostatic hypotension (458 0) (I95 1)   21  Pain, joint, shoulder (719 41) (M25 519)   22  Screening for genitourinary condition (V81 6) (Z13 89)   23  Screening for neurological condition (V80 09) (Z13 89)   24  Spinal stenosis (724 00) (M48 00)   25  Type 2 diabetes mellitus (250 00) (E11 9)   26  Urinary retention (788 20) (R33 9)    Current Meds   1  Accu-Chek Softclix Lancets Miscellaneous; test twice daily (lancets for accucheck saida); Therapy: 57QBT7220 to (Last Rx:10Zam3902)  Requested for: 04Cuf4705 Ordered   2   Baclofen 10 MG Oral Tablet; TAKE 1 TABLET 3 TIMES DAILY AS NEEDED FOR MUSCLE SPASM; Therapy: 42VDV0315 to (Evaluate:26Mar2018)  Requested for: 09Uzb3351; Last   Rx:18Gza4141; Status: ACTIVE - Retrospective Authorization Ordered   3  BD Insulin Syringe Ultrafine 30G X 1/2" 1 ML Miscellaneous; INJECT INSULIN TWICE A   DAY; Therapy: 27Apr2016 to (Evaluate:17Jun2018)  Requested for: 11VTT3214; Last   Rx:90Xaf0582 Ordered   4  Blood Glucose Test In Vitro Strip; TEST TWICE DAILY; Therapy: 60OHQ4213 to (Evaluate:48Njq6071)  Requested for: 60Dah1032; Last   Rx:41Hbk7099 Ordered   5  DULoxetine HCl - 30 MG Oral Capsule Delayed Release Particles; take 1 capsule daily; Therapy: 80Wkb4721 to (Evaluate:28Apr2018)  Requested for: 12XQI2505; Last   Rx:96Kjx4322 Ordered   6  Gabapentin 600 MG Oral Tablet; TAKE 1 TABLET TWICE DAILY AND TAKE 3 TABLETS   AT BEDTIME; Therapy: 99NLX1551 to (Evaluate:21Jan2018)  Requested for: 23Oct2017; Last   EC:41ZOG5918 Ordered   7  MiraLax Oral Powder; Therapy: (Recorded:07Mar2016) to Recorded   8  Morphine Sulfate ER 15 MG Oral Tablet Extended Release; 1 TAB EVERY 12 HRS; Therapy: 02MRS8159 to (Last Rx:32Btw6791) Ordered   9  NovoLIN 70/30 (70-30) 100 UNIT/ML Subcutaneous Suspension; 15 U in the morning   and 10 U in the afternoon; Therapy: 98WRI4427 to (Veronica Blood)  Requested for: 77IZG5115; Last   Rx:79Nsm4921 Ordered   10  OxyCODONE HCl - 5 MG Oral Tablet; TAKE 2  TABLETS THREE TIMES A DAY AS    NEEDED FOR PAIN;    Therapy: 07Apr2012 to  Requested for: 36KGG2239 Recorded   11  Simvastatin 40 MG Oral Tablet; Take 1 tablet daily; Therapy: 74JSX8848 to (Jenny Law)  Requested for: 94MRD3819; Last    Rx:52Eqb0697 Ordered   12  TraZODone HCl - 50 MG Oral Tablet; Take 1 tablet by mouth at bedtime; Therapy: 50UJL2967 to (Evaluate:51Nhc7599)  Requested for: 49BFS5612; Last    Rx:12Oct2017 Ordered    Allergies    1   Temazepam CAPS    Vitals  Signs    Systolic: 735  Diastolic: 62  Height: 6 ft 1 in  Weight: 195 lb   BMI Calculated: 25 73  BSA Calculated: 2 13    Procedure    Procedure: Suprapubic Tube Change     Procedure: Bladder Catheterization  Bladder catheterization was performed for urine retention  The procedure's risks, benefits and alternatives were discussed with the patient  An indwelling catheter was inserted and 18fr  under strict aseptic technique  The patient tolerated the procedure well  There were no complications  Follow-up in the office in 4 week(s)  Assessment    1  Urinary retention (308 20) (R33 9)    Pt to return in 4-6 weeks  Future Appointments    Date/Time Provider Specialty Site   02/01/2018 01:30 PM Nurse, Sulaiman Perry [address] [place] [address]   02/01/2018 01:15 PM ARACELI Triplett   Internal Medicine [facility]     Signatures   Electronically signed by : Zabrina Barrera, ; Dec 28 2017 11:37AM EST                       (Author)    Electronically signed by : Dana Cox MD; Dec 28 2017  1:04PM EST                       (Author)

## 2018-01-31 RX ORDER — DOCUSATE SODIUM 100 MG/1
100 CAPSULE, LIQUID FILLED ORAL
COMMUNITY
Start: 2016-03-02 | End: 2018-03-22 | Stop reason: ALTCHOICE

## 2018-01-31 RX ORDER — GABAPENTIN 600 MG/1
TABLET ORAL
COMMUNITY
Start: 2011-02-26 | End: 2018-04-18 | Stop reason: SDUPTHER

## 2018-01-31 RX ORDER — AMLODIPINE BESYLATE 5 MG/1
5 TABLET ORAL
COMMUNITY
Start: 2017-08-22 | End: 2018-03-22 | Stop reason: ALTCHOICE

## 2018-01-31 RX ORDER — SIMVASTATIN 40 MG
1 TABLET ORAL DAILY
COMMUNITY
Start: 2014-09-22 | End: 2019-01-01

## 2018-02-01 ENCOUNTER — TELEPHONE (OUTPATIENT)
Dept: UROLOGY | Facility: AMBULATORY SURGERY CENTER | Age: 68
End: 2018-02-01

## 2018-02-12 LAB
LEFT EYE DIABETIC RETINOPATHY: NORMAL
RIGHT EYE DIABETIC RETINOPATHY: NORMAL
SEVERITY (EYE EXAM): NORMAL

## 2018-02-14 DIAGNOSIS — Z79.4 TYPE 2 DIABETES MELLITUS WITH DIABETIC POLYNEUROPATHY, WITH LONG-TERM CURRENT USE OF INSULIN (HCC): Primary | ICD-10-CM

## 2018-02-14 DIAGNOSIS — Z79.4 TYPE 2 DIABETES MELLITUS WITH DIABETIC POLYNEUROPATHY, WITH LONG-TERM CURRENT USE OF INSULIN (HCC): ICD-10-CM

## 2018-02-14 DIAGNOSIS — E11.42 TYPE 2 DIABETES MELLITUS WITH DIABETIC POLYNEUROPATHY, WITH LONG-TERM CURRENT USE OF INSULIN (HCC): ICD-10-CM

## 2018-02-14 DIAGNOSIS — E11.42 TYPE 2 DIABETES MELLITUS WITH DIABETIC POLYNEUROPATHY, WITH LONG-TERM CURRENT USE OF INSULIN (HCC): Primary | ICD-10-CM

## 2018-02-14 RX ORDER — BLOOD SUGAR DIAGNOSTIC
STRIP MISCELLANEOUS
Qty: 100 EACH | Refills: 4 | Status: SHIPPED | OUTPATIENT
Start: 2018-02-14 | End: 2018-02-14 | Stop reason: SDUPTHER

## 2018-02-19 ENCOUNTER — TELEPHONE (OUTPATIENT)
Dept: UROLOGY | Facility: AMBULATORY SURGERY CENTER | Age: 68
End: 2018-02-19

## 2018-02-19 NOTE — TELEPHONE ENCOUNTER
Patient called in asking to speak with a nurse in regards to his tube can he receive a call back please and thank you

## 2018-02-22 ENCOUNTER — TELEPHONE (OUTPATIENT)
Dept: UROLOGY | Facility: AMBULATORY SURGERY CENTER | Age: 68
End: 2018-02-22

## 2018-02-22 NOTE — TELEPHONE ENCOUNTER
Patient called in asking to speak with a nurse in regards to his nurse visit today he will like to r/s it for another day can he receive a call back please and thank you

## 2018-03-01 ENCOUNTER — CLINICAL SUPPORT (OUTPATIENT)
Dept: UROLOGY | Facility: MEDICAL CENTER | Age: 68
End: 2018-03-01
Payer: MEDICARE

## 2018-03-01 VITALS
HEIGHT: 73 IN | WEIGHT: 190 LBS | SYSTOLIC BLOOD PRESSURE: 138 MMHG | DIASTOLIC BLOOD PRESSURE: 82 MMHG | BODY MASS INDEX: 25.18 KG/M2

## 2018-03-01 DIAGNOSIS — R33.9 RETENTION, URINE: Primary | ICD-10-CM

## 2018-03-01 DIAGNOSIS — N39.0 URINARY TRACT INFECTION ASSOCIATED WITH CYSTOSTOMY CATHETER, SUBSEQUENT ENCOUNTER: ICD-10-CM

## 2018-03-01 DIAGNOSIS — T83.510D URINARY TRACT INFECTION ASSOCIATED WITH CYSTOSTOMY CATHETER, SUBSEQUENT ENCOUNTER: ICD-10-CM

## 2018-03-01 PROCEDURE — 99211 OFF/OP EST MAY X REQ PHY/QHP: CPT

## 2018-03-01 PROCEDURE — 51705 CHANGE OF BLADDER TUBE: CPT

## 2018-03-01 RX ORDER — BACLOFEN 10 MG/1
10 TABLET ORAL 3 TIMES DAILY
COMMUNITY
End: 2018-03-06 | Stop reason: SDUPTHER

## 2018-03-01 RX ORDER — NITROFURANTOIN MACROCRYSTALS 100 MG/1
100 CAPSULE ORAL 3 TIMES DAILY
Qty: 60 CAPSULE | Refills: 1 | Status: SHIPPED | OUTPATIENT
Start: 2018-03-01 | End: 2018-03-22 | Stop reason: ALTCHOICE

## 2018-03-01 RX ORDER — OXYCODONE HYDROCHLORIDE 5 MG/1
10 TABLET ORAL 3 TIMES DAILY
COMMUNITY
Start: 2016-03-02 | End: 2018-03-22 | Stop reason: ALTCHOICE

## 2018-03-01 RX ORDER — METHOCARBAMOL 750 MG/1
750 TABLET, FILM COATED ORAL EVERY 6 HOURS
COMMUNITY
Start: 2016-03-02 | End: 2018-03-01

## 2018-03-01 RX ORDER — CLONAZEPAM 0.5 MG/1
0.5 TABLET ORAL DAILY
COMMUNITY
Start: 2016-03-02 | End: 2018-03-01

## 2018-03-01 RX ORDER — NITROFURANTOIN MACROCRYSTALS 100 MG/1
100 CAPSULE ORAL 4 TIMES DAILY
COMMUNITY
End: 2018-03-01 | Stop reason: SDUPTHER

## 2018-03-01 NOTE — PROGRESS NOTES
Cystostomy tube change     Date/Time 3/1/2018 1:22 PM     Performed by  Elsie Maza by Yoselyn Gregory       Consent: Verbal consent obtained  Consent given by: patient  Patient understanding: patient states understanding of the procedure being performed  Patient consent: the patient's understanding of the procedure matches consent given  Patient identity confirmed: verbally with patient      Preparation: Patient was prepped and draped in the usual sterile fashion  Site preparation: Chlorhexidine    Patient tolerance: Patient tolerated the procedure well with no immediate complications      Comments: The patient's indwelling SP tube was carefully removed  A 18 Vietnamese Gomez catheter was inserted into the bladder using sterile technique  Balloon was inflated with 5cc of sterile water  The patient was taught routine catheter care  A catheter plug was connected  Site was unremarkable  Cipro 250mg #1 was given to pt ndc: 8112-5361-39 ex 03/2020

## 2018-03-06 DIAGNOSIS — M48.00 SPINAL STENOSIS, UNSPECIFIED SPINAL REGION: Primary | ICD-10-CM

## 2018-03-06 RX ORDER — BACLOFEN 10 MG/1
10 TABLET ORAL 3 TIMES DAILY
Qty: 90 TABLET | Refills: 2 | Status: SHIPPED | OUTPATIENT
Start: 2018-03-06 | End: 2018-03-22 | Stop reason: SDUPTHER

## 2018-03-22 ENCOUNTER — OFFICE VISIT (OUTPATIENT)
Dept: INTERNAL MEDICINE CLINIC | Facility: CLINIC | Age: 68
End: 2018-03-22
Payer: MEDICARE

## 2018-03-22 VITALS
TEMPERATURE: 99.1 F | SYSTOLIC BLOOD PRESSURE: 140 MMHG | BODY MASS INDEX: 25.55 KG/M2 | HEIGHT: 73 IN | RESPIRATION RATE: 16 BRPM | DIASTOLIC BLOOD PRESSURE: 66 MMHG | WEIGHT: 192.8 LBS | HEART RATE: 88 BPM

## 2018-03-22 DIAGNOSIS — M48.00 SPINAL STENOSIS, UNSPECIFIED SPINAL REGION: ICD-10-CM

## 2018-03-22 DIAGNOSIS — F51.01 PRIMARY INSOMNIA: ICD-10-CM

## 2018-03-22 DIAGNOSIS — I10 ESSENTIAL HYPERTENSION: ICD-10-CM

## 2018-03-22 DIAGNOSIS — G60.0 CHARCOT-MARIE-TOOTH DISEASE: ICD-10-CM

## 2018-03-22 DIAGNOSIS — E78.00 HYPERCHOLESTEROLEMIA: ICD-10-CM

## 2018-03-22 DIAGNOSIS — E11.42 DIABETIC POLYNEUROPATHY ASSOCIATED WITH TYPE 2 DIABETES MELLITUS (HCC): ICD-10-CM

## 2018-03-22 DIAGNOSIS — Z93.59 SUPRAPUBIC CATHETER (HCC): ICD-10-CM

## 2018-03-22 DIAGNOSIS — R29.6 FREQUENT FALLS: ICD-10-CM

## 2018-03-22 DIAGNOSIS — E11.69 TYPE 2 DIABETES MELLITUS WITH HYPERLIPIDEMIA (HCC): Primary | ICD-10-CM

## 2018-03-22 DIAGNOSIS — I95.1 ORTHOSTATIC HYPOTENSION: ICD-10-CM

## 2018-03-22 DIAGNOSIS — E78.5 TYPE 2 DIABETES MELLITUS WITH HYPERLIPIDEMIA (HCC): Primary | ICD-10-CM

## 2018-03-22 DIAGNOSIS — N18.30 CHRONIC KIDNEY DISEASE, STAGE 3 (HCC): ICD-10-CM

## 2018-03-22 PROBLEM — N17.9 AKI (ACUTE KIDNEY INJURY) (HCC): Status: RESOLVED | Noted: 2017-10-01 | Resolved: 2018-03-22

## 2018-03-22 PROBLEM — N39.0 UTI (URINARY TRACT INFECTION): Status: RESOLVED | Noted: 2017-10-01 | Resolved: 2018-03-22

## 2018-03-22 PROCEDURE — 99214 OFFICE O/P EST MOD 30 MIN: CPT | Performed by: INTERNAL MEDICINE

## 2018-03-22 RX ORDER — BACLOFEN 10 MG/1
10 TABLET ORAL 3 TIMES DAILY
Qty: 270 TABLET | Refills: 1 | Status: SHIPPED | OUTPATIENT
Start: 2018-03-22 | End: 2018-01-01 | Stop reason: SDUPTHER

## 2018-03-22 RX ORDER — DULOXETIN HYDROCHLORIDE 60 MG/1
60 CAPSULE, DELAYED RELEASE ORAL DAILY
Qty: 90 CAPSULE | Refills: 0 | Status: SHIPPED | OUTPATIENT
Start: 2018-03-22 | End: 2018-05-02 | Stop reason: SDUPTHER

## 2018-03-22 NOTE — PROGRESS NOTES
[facility]'s Internal Medicine [place]      NAME: Felix Snyder  AGE: 75 y o  SEX: male  : 1950   MRN: [id number]    DATE: 3/22/2018  TIME: 4:56 PM    Assessment and Plan   1  Type 2 diabetes mellitus with hyperlipidemia (HCC)  The patient's sugars have been elevated  He raised his insulin to 20 units twice a day  He is overdue for hemoglobin A1c   - CBC  - Comprehensive metabolic panel  - HEMOGLOBIN A1C W/ EAG ESTIMATION    2  Essential hypertension    Well controlled off of medication    3  Diabetic polyneuropathy associated with type 2 diabetes mellitus ([address] )   the patient is having quite a bit of discomfort related to neuropathy  He is on gabapentin and a substantial dose  He has been on Lyrica which was not very helpful  His Cymbalta will be increased  - DULoxetine (CYMBALTA) 60 mg delayed release capsule; Take 1 capsule (60 mg total) by mouth daily  Dispense: 90 capsule; Refill: 0    4  Orthostatic hypotension    Not clinically evident at present  5  Charcot-Bre-Tooth disease    Stable    6  Chronic kidney disease, stage 3   to be recheck shortly  7  Suprapubic catheter ([address] )    8  Frequent falls    This is multifactorial   Much of it I believe has to do with the patient's neuropathy  There is no evidence of orthostatic hypotension at the moment although this has been a problem in the past   The patient also has had issues with spinal stenosis and had had previous spine surgery  9  Hypercholesterolemia    On simvastatin  - Lipid panel    10  Primary insomnia   Not well controlled at the moment  The patient would like to increase trazodone   but I am reluctant to do this because of the possibility of worsening orthostasis  11  Spinal stenosis, unspecified spinal region   the patient had an MRI of his neck not long ago  This showed significant foraminal stenosis  Showed evidence of previous surgery  The severe spinal stenosis was not evident   - baclofen 10 mg tablet;  Take 1 tablet (10 mg total) by mouth 3 (three) times a day  Dispense: 270 tablet; Refill: 1     the patient is having quite a bit and neck pain  The he has had several falls  He had been on narcotics for pain related to arthritic problems plus neuropathy  He will contact Dr Lois Martin for follow-up to discuss this issue with him  He also might benefit from an epidural steroid injection in his neck  He will update his lab work  He will continue his other medications as is for the moment  Return to office in: Three months    Chief Complaint     Interval follow-up  History of Present Illness       The patient returns to the office for re-evaluation of type 2 diabetes complicated by neuropathy, hypertension, orthostatic hypotension, and chronic kidney disease stage 3  Since his last visit he has been having a lot of pain in his neck and shoulders  He has been having a lot of pain in his legs which she attributes to neuropathy  His gait is unsteady  He is using a walker  Despite this, he has fallen several times  He had been on MS Contin and oxycodone for his neck and back pain but stopped this after seeing a television show that dwelt on the potential complications of this therapy  He has not seen Dr Lois Martin of pain management for about 9 months  His glucose has been running high and he has increased his insulin somewhat  He has had no hypoglycemia  He has no chest pain or shortness of breath  The following portions of the patient's history were reviewed and updated as appropriate: allergies, current medications, past family history, past medical history, past social history, past surgical history and problem list     Review of Systems   Review of Systems   Constitutional: Negative  HENT: Negative for congestion, ear pain, postnasal drip, rhinorrhea, sore throat and trouble swallowing  Eyes: Negative for pain, discharge, redness and visual disturbance     Respiratory: Negative for cough, shortness of breath and wheezing  Cardiovascular: Negative  Gastrointestinal: Negative  Endocrine: Negative  Genitourinary: Negative for difficulty urinating, dysuria, frequency, hematuria and urgency  Musculoskeletal: Positive for back pain, gait problem and neck pain  Negative for arthralgias, joint swelling and myalgias  Skin: Negative for rash  Neurological: Positive for light-headedness  Negative for dizziness, speech difficulty, weakness, numbness and headaches  Hematological: Negative  Psychiatric/Behavioral: Positive for sleep disturbance  Negative for confusion, decreased concentration and dysphoric mood  The patient is not nervous/anxious  Active Problem List     Patient Active Problem List   Diagnosis    Suprapubic catheter (HCC)    Weakness    Charcot-Bre-Tooth disease    Frequent falls    Type 2 diabetes mellitus with hyperlipidemia ([facility] )    Essential hypertension    BPH (benign prostatic hyperplasia)    Chronic kidney disease, stage 3    Hypercholesterolemia    Insomnia    Neuropathy in diabetes ([place] [address] )    Orthostatic hypotension    Spinal stenosis    DDD (degenerative disc disease), lumbosacral    Depression    Esophageal reflux    Geo fracture, closed, initial encounter ([place] [address] )    Hepatitis B    Hepatitis C virus infection without hepatic coma       Objective   /66 (BP Location: Left arm, Patient Position: Sitting, Cuff Size: Standard)   Pulse 88   Temp 99 1 °F (37 3 °C) (Tympanic)   Resp 16   Ht 6' 1" (1 854 m)   Wt 87 5 kg (192 lb 12 8 oz)   BMI 25 44 kg/m²     Physical Exam   Constitutional: He is oriented to person, place, and time  He appears well-developed and well-nourished  No distress  HENT:   Head: Normocephalic and atraumatic  Neck: Neck supple  No JVD present  No tracheal deviation present  No thyromegaly present  Cardiovascular: Normal rate, regular rhythm, normal heart sounds and intact distal pulses    Exam reveals no gallop and no friction rub  No murmur heard  Pulmonary/Chest: Effort normal and breath sounds normal  He has no wheezes  He has no rales  He exhibits no tenderness  Abdominal: Soft  Bowel sounds are normal  He exhibits no distension and no mass  There is no tenderness  There is no rebound  Musculoskeletal: Normal range of motion  He exhibits no edema or tenderness  Lymphadenopathy:     He has no cervical adenopathy  Neurological: He is alert and oriented to person, place, and time  Skin: Skin is warm and dry  Psychiatric: He has a normal mood and affect   His behavior is normal  Judgment and thought content normal        Pertinent Laboratory/Diagnostic Studies:  No recent lab work        Current Medications     Current Outpatient Prescriptions:     baclofen 10 mg tablet, Take 1 tablet (10 mg total) by mouth 3 (three) times a day, Disp: 270 tablet, Rfl: 1    gabapentin (NEURONTIN) 600 MG tablet, Take by mouth, Disp: , Rfl:     glucose blood (ACCU-CHEK SMARTVIEW) test strip, 1 each by Other route 2 (two) times a day Test, Disp: 100 each, Rfl: 1    insulin NPH-insulin regular (NOVOLIN 70/30) 100 units/mL subcutaneous injection, Inject 20 Units under the skin 2 (two) times a day , Disp: , Rfl:     Polyethylene Glycol 3350 (MIRALAX PO), Take by mouth, Disp: , Rfl:     traZODone (DESYREL) 50 mg tablet, Take by mouth, Disp: , Rfl:     DULoxetine (CYMBALTA) 60 mg delayed release capsule, Take 1 capsule (60 mg total) by mouth daily, Disp: 90 capsule, Rfl: 0    simvastatin (ZOCOR) 40 mg tablet, Take 1 tablet by mouth daily, Disp: , Rfl:       Cherelle Peoples MD

## 2018-03-29 ENCOUNTER — CLINICAL SUPPORT (OUTPATIENT)
Dept: UROLOGY | Facility: MEDICAL CENTER | Age: 68
End: 2018-03-29
Payer: MEDICARE

## 2018-03-29 VITALS
WEIGHT: 192 LBS | DIASTOLIC BLOOD PRESSURE: 78 MMHG | SYSTOLIC BLOOD PRESSURE: 141 MMHG | BODY MASS INDEX: 25.45 KG/M2 | HEIGHT: 73 IN

## 2018-03-29 DIAGNOSIS — R33.9 URINARY RETENTION: Primary | ICD-10-CM

## 2018-03-29 PROCEDURE — 51705 CHANGE OF BLADDER TUBE: CPT

## 2018-03-29 NOTE — PROGRESS NOTES
Pt  PRESENTS FOR A ROUTINE SP TUBE CHANGE, WHICH WENT WELL  THERE WAS NO REDNESS IN OR AROUND THE MEATUS SITE  A 18FR  WAS PLACED CAREFULLY INTO THE BLADDER USING STERILE TECHNIQUE  CIPRO 500MG WAS GIVEN TO Pt   PRIOR TO SP TUBE CHANGE    LOT# [id number]   EXP: 09/2019   NDC# 10937-261-61

## 2018-04-05 DIAGNOSIS — G47.00 INSOMNIA: Primary | ICD-10-CM

## 2018-04-05 RX ORDER — TRAZODONE HYDROCHLORIDE 100 MG/1
100 TABLET ORAL
Qty: 30 TABLET | Refills: 1 | Status: SHIPPED | OUTPATIENT
Start: 2018-04-05 | End: 2018-04-19 | Stop reason: SDUPTHER

## 2018-04-18 DIAGNOSIS — G62.9 NEUROPATHY: Primary | ICD-10-CM

## 2018-04-18 RX ORDER — GABAPENTIN 600 MG/1
TABLET ORAL
Qty: 450 TABLET | Refills: 0 | Status: SHIPPED | OUTPATIENT
Start: 2018-04-18 | End: 2018-01-01 | Stop reason: SDUPTHER

## 2018-04-19 DIAGNOSIS — G47.00 INSOMNIA, UNSPECIFIED TYPE: ICD-10-CM

## 2018-04-19 RX ORDER — TRAZODONE HYDROCHLORIDE 100 MG/1
100 TABLET ORAL
Qty: 90 TABLET | Refills: 1 | Status: SHIPPED | OUTPATIENT
Start: 2018-04-19 | End: 2018-01-01 | Stop reason: SDUPTHER

## 2018-05-02 DIAGNOSIS — E11.42 DIABETIC POLYNEUROPATHY ASSOCIATED WITH TYPE 2 DIABETES MELLITUS (HCC): ICD-10-CM

## 2018-05-02 RX ORDER — DULOXETIN HYDROCHLORIDE 60 MG/1
60 CAPSULE, DELAYED RELEASE ORAL DAILY
Qty: 90 CAPSULE | Refills: 1 | Status: SHIPPED | OUTPATIENT
Start: 2018-05-02 | End: 2018-05-29 | Stop reason: SDUPTHER

## 2018-05-03 ENCOUNTER — CLINICAL SUPPORT (OUTPATIENT)
Dept: UROLOGY | Facility: MEDICAL CENTER | Age: 68
End: 2018-05-03
Payer: MEDICARE

## 2018-05-03 VITALS
BODY MASS INDEX: 26.88 KG/M2 | DIASTOLIC BLOOD PRESSURE: 84 MMHG | WEIGHT: 192 LBS | SYSTOLIC BLOOD PRESSURE: 126 MMHG | HEIGHT: 71 IN

## 2018-05-03 DIAGNOSIS — R33.8 ACUTE URINARY RETENTION: Primary | ICD-10-CM

## 2018-05-03 PROCEDURE — 51705 CHANGE OF BLADDER TUBE: CPT | Performed by: UROLOGY

## 2018-05-03 NOTE — PROGRESS NOTES
Procedures    Patient presents for a routine S/P tube change  He was placed in the supine position and his indwelling catheter was removed gently and without complications  The stoma was cleaned with Hibaclens of minimal debris  An 25 Korean Gomez catheter was inserted into the bladder using sterile technique and the balloon was filled with 5ccs of sterile water  A catheter plug was applied  The patient was given 1 Cipro 500mg following his procedure  He has no questions or concerns and is pleased with his care  Cipro 500mg  LOT: [id number]  EXP: December 2019  [address]: 50102-951-22    Patient supplies his own catheter and plug

## 2018-05-24 DIAGNOSIS — Z79.4 TYPE 2 DIABETES MELLITUS WITH DIABETIC POLYNEUROPATHY, WITH LONG-TERM CURRENT USE OF INSULIN (HCC): ICD-10-CM

## 2018-05-24 DIAGNOSIS — E11.42 TYPE 2 DIABETES MELLITUS WITH DIABETIC POLYNEUROPATHY, WITH LONG-TERM CURRENT USE OF INSULIN (HCC): ICD-10-CM

## 2018-05-29 DIAGNOSIS — E11.42 DIABETIC POLYNEUROPATHY ASSOCIATED WITH TYPE 2 DIABETES MELLITUS (HCC): ICD-10-CM

## 2018-05-30 RX ORDER — DULOXETIN HYDROCHLORIDE 60 MG/1
60 CAPSULE, DELAYED RELEASE ORAL DAILY
Qty: 14 CAPSULE | Refills: 0 | Status: SHIPPED | OUTPATIENT
Start: 2018-05-30 | End: 2019-01-01 | Stop reason: SDUPTHER

## 2018-06-05 DIAGNOSIS — E11.8 TYPE 2 DIABETES MELLITUS WITH COMPLICATION, WITH LONG-TERM CURRENT USE OF INSULIN (HCC): Primary | ICD-10-CM

## 2018-06-05 DIAGNOSIS — Z79.4 TYPE 2 DIABETES MELLITUS WITH COMPLICATION, WITH LONG-TERM CURRENT USE OF INSULIN (HCC): Primary | ICD-10-CM

## 2018-06-11 ENCOUNTER — CLINICAL SUPPORT (OUTPATIENT)
Dept: UROLOGY | Facility: MEDICAL CENTER | Age: 68
End: 2018-06-11
Payer: MEDICARE

## 2018-06-11 VITALS
DIASTOLIC BLOOD PRESSURE: 64 MMHG | WEIGHT: 192 LBS | BODY MASS INDEX: 26.88 KG/M2 | SYSTOLIC BLOOD PRESSURE: 120 MMHG | HEIGHT: 71 IN

## 2018-06-11 DIAGNOSIS — R33.9 URINARY RETENTION: Primary | ICD-10-CM

## 2018-06-11 PROCEDURE — 99211 OFF/OP EST MAY X REQ PHY/QHP: CPT

## 2018-06-11 RX ORDER — MORPHINE SULFATE 15 MG/1
15 TABLET ORAL EVERY 4 HOURS PRN
COMMUNITY
End: 2018-06-21 | Stop reason: ALTCHOICE

## 2018-06-11 NOTE — PROGRESS NOTES
I have reviewed the notes, assessments, and/or procedures performed by the medical assistant, I concur with her/his documentation of Cathy Kulkarni

## 2018-06-11 NOTE — PROGRESS NOTES
Procedures  Patient returns for routine S/P tube change  Patient prepped and  18fr  catheter inserted using sterile technique  Urine return clear  Patient tolerated procedure well  Meatus/Stoma site clean and free of debris, no irritation or redness noted  Patient denies fevers or urinary symptoms  Patient encouraged to maintain adequate fluid intake  Patient started Morphine oral, but unsure of the amount  He thinks it's 15 mg  He will double check when he gets home  Patient given Cipro 250 mg prior to S/P tube change    [address] [id number]   082a  Exp    1/2021  Patient brings his own supplies  Patient to return in 5 weeks for S/P tube change

## 2018-06-18 ENCOUNTER — TELEPHONE (OUTPATIENT)
Dept: UROLOGY | Facility: MEDICAL CENTER | Age: 68
End: 2018-06-18

## 2018-06-18 ENCOUNTER — CLINICAL SUPPORT (OUTPATIENT)
Dept: UROLOGY | Facility: MEDICAL CENTER | Age: 68
End: 2018-06-18
Payer: MEDICARE

## 2018-06-18 VITALS
HEIGHT: 71 IN | SYSTOLIC BLOOD PRESSURE: 120 MMHG | DIASTOLIC BLOOD PRESSURE: 70 MMHG | BODY MASS INDEX: 26.88 KG/M2 | WEIGHT: 192 LBS

## 2018-06-18 DIAGNOSIS — R33.9 RETENTION, URINE: Primary | ICD-10-CM

## 2018-06-18 PROCEDURE — 51705 CHANGE OF BLADDER TUBE: CPT | Performed by: UROLOGY

## 2018-06-18 NOTE — PROGRESS NOTES
Cystostomy tube change     Date/Time 6/18/2018 1:12 PM     Performed by  Ben Cai by Naren Torres       Consent: Verbal consent obtained  Consent given by: patient  Patient identity confirmed: verbally with patient         Patient returns for routine Gomez change  Patient prepped and 18 fr  catheter inserted using sterile technique  Urine return clear  Patient tolerated procedure well  Meatus/Stoma site clean and free of debris, no irritation or redness noted  Patient denies fevers or urinary symptoms  Patient encouraged to maintain adequate fluid intake  Patient said he felt the balloon was not as full and started to pee through his penis  Patient requested that his catheter to be changed  Patient brings his own supplies  Cipro 250 mg was given to patient   Irlanda Jung 47  [id number] LOT [id number] EXP 1/21

## 2018-06-21 ENCOUNTER — OFFICE VISIT (OUTPATIENT)
Dept: INTERNAL MEDICINE CLINIC | Facility: CLINIC | Age: 68
End: 2018-06-21
Payer: MEDICARE

## 2018-06-21 VITALS
TEMPERATURE: 97.5 F | DIASTOLIC BLOOD PRESSURE: 60 MMHG | SYSTOLIC BLOOD PRESSURE: 100 MMHG | HEART RATE: 92 BPM | WEIGHT: 185 LBS | HEIGHT: 71 IN | RESPIRATION RATE: 15 BRPM | BODY MASS INDEX: 25.9 KG/M2

## 2018-06-21 DIAGNOSIS — N18.30 CHRONIC KIDNEY DISEASE, STAGE 3 (HCC): ICD-10-CM

## 2018-06-21 DIAGNOSIS — Z93.59 SUPRAPUBIC CATHETER (HCC): ICD-10-CM

## 2018-06-21 DIAGNOSIS — I10 ESSENTIAL HYPERTENSION: ICD-10-CM

## 2018-06-21 DIAGNOSIS — B19.10 HEPATITIS B INFECTION WITHOUT DELTA AGENT WITHOUT HEPATIC COMA, UNSPECIFIED CHRONICITY: ICD-10-CM

## 2018-06-21 DIAGNOSIS — E78.5 TYPE 2 DIABETES MELLITUS WITH HYPERLIPIDEMIA (HCC): Primary | ICD-10-CM

## 2018-06-21 DIAGNOSIS — B19.20 HEPATITIS C VIRUS INFECTION WITHOUT HEPATIC COMA, UNSPECIFIED CHRONICITY: ICD-10-CM

## 2018-06-21 DIAGNOSIS — E11.69 TYPE 2 DIABETES MELLITUS WITH HYPERLIPIDEMIA (HCC): Primary | ICD-10-CM

## 2018-06-21 DIAGNOSIS — G60.0 CHARCOT-MARIE-TOOTH DISEASE: ICD-10-CM

## 2018-06-21 PROCEDURE — 99214 OFFICE O/P EST MOD 30 MIN: CPT | Performed by: INTERNAL MEDICINE

## 2018-06-21 RX ORDER — OXYCODONE HYDROCHLORIDE 10 MG/1
TABLET ORAL
Refills: 0 | Status: ON HOLD | COMMUNITY
Start: 2018-05-09 | End: 2018-01-01

## 2018-06-21 RX ORDER — BACITRACIN 500 [USP'U]/G
OINTMENT OPHTHALMIC
Refills: 3 | COMMUNITY
Start: 2018-05-18 | End: 2018-01-01

## 2018-06-21 RX ORDER — MORPHINE SULFATE 15 MG/1
TABLET, FILM COATED, EXTENDED RELEASE ORAL
Refills: 0 | Status: ON HOLD | COMMUNITY
Start: 2018-05-10 | End: 2018-01-01

## 2018-06-21 NOTE — PROGRESS NOTES
[facility] [place]      NAME: Flaco Coelho  AGE: 70 y o  SEX: male  : 1950   MRN: [id number]    DATE: 2018  TIME: 4:12 PM    Assessment and Plan   1  Type 2 diabetes mellitus with hyperlipidemia ([facility] [address] )  The patient is overdue for hemoglobin A1c  He will continue his current therapy for now  - CBC  - Comprehensive metabolic panel  - Hemoglobin A1C  - Lipid panel  - Microalbumin, Random Urine (W/Creatinine)    2  Essential hypertension  Well controlled  3  Charcot-Bre-Tooth disease  Slowly worsening  The patient is using a walker  4  Hepatitis C virus infection without hepatic coma, unspecified chronicity  The patient tested positive for hepatitis C in   Clinically, he has remained well  Considering the advanced his in therapy that have occurred recently, this issue will be reopened  - Hepatitis C Qualitative by PCR    5  Hepatitis B infection without delta agent without hepatic coma, unspecified chronicity  The patient was positive for hepatitis B also in   Repeat serology is will be obtained  - Hepatitis B core antibody, total  - Hepatitis B surface antibody  - Hepatitis B surface antigen    6  Chronic kidney disease, stage 3  The patient's kidney function will be recheck in the near future  7  Suprapubic catheter [facility])  Seeing Urology for periodic catheter change  Overall, the patient is doing well  His pain has improved markedly after his neck injection  I asked him to expedite laboratory re-evaluation  He will continue his current medications for now  Return to office in: Three months  Chief Complaint     Chief Complaint   Patient presents with    Follow-up     3 months       History of Present Illness     The patient returned to the office for re-evaluation of hypertension, type 2 diabetes, hyperlipidemia, Charcot-Bre-Tooth disease, and chronic kidney disease stage 3    Since his last visit he had an injection in his neck   [place]  He is found this very helpful and is having much less pain  His walking is better  He feels stronger  He does need a walker because of Charcot-Bre-Tooth disease  He has had no chest pain, shortness of breath, palpitations, or dizziness  He has had no hypoglycemia  He is tolerating his medications well  The following portions of the patient's history were reviewed and updated as appropriate: allergies, current medications, past family history, past medical history, past social history, past surgical history and problem list     Review of Systems   Review of Systems   Constitutional: Negative  HENT: Negative for congestion, ear pain, postnasal drip, rhinorrhea, sore throat and trouble swallowing  Eyes: Negative for pain, discharge, redness and visual disturbance  Respiratory: Negative for cough, shortness of breath and wheezing  Cardiovascular: Negative  Gastrointestinal: Negative  Endocrine: Negative  Genitourinary: Negative for difficulty urinating, dysuria, frequency, hematuria and urgency  Musculoskeletal: Positive for neck pain  Negative for arthralgias, gait problem, joint swelling and myalgias  Skin: Negative for rash  Neurological: Positive for numbness  Negative for dizziness, speech difficulty, weakness, light-headedness and headaches  Hematological: Negative  Psychiatric/Behavioral: Negative for confusion, decreased concentration, dysphoric mood and sleep disturbance  The patient is not nervous/anxious          Active Problem List     Patient Active Problem List   Diagnosis    Suprapubic catheter ([facility] )    Weakness    Charcot-Bre-Tooth disease    Frequent falls    Type [facility] mellitus with [facility]lipidemia ([facility] )    Essential hypertension    BPH (benign prostatic hyperplasia)    Chronic kidney disease, stage 3    Hypercholesterolemia    Insomnia    Neuropathy in diabetes ([facility] )    Orthostatic hypotension    Spinal stenosis    DDD (degenerative disc disease), lumbosacral    Depression    Esophageal reflux    Geo fracture, closed, initial encounter ([facility] [address] )    Hepatitis B    Hepatitis C virus infection without hepatic coma       Objective   /60 (BP Location: Left arm, Patient Position: Sitting, Cuff Size: Standard)   Pulse 92   Temp 97 5 °F (36 4 °C) (Tympanic)   Resp 15   Ht 5' 11" (1 803 m)   Wt 83 9 kg (185 lb)   BMI 25 80 kg/m²     Physical Exam   Constitutional: He is oriented to person, place, and time  He appears well-developed and well-nourished  No distress  HENT:   Head: Normocephalic and atraumatic  Neck: Neck supple  No JVD present  No tracheal deviation present  No thyromegaly present  Cardiovascular: Normal rate, regular rhythm, normal heart sounds and intact distal pulses  Exam reveals no gallop and no friction rub  No murmur heard  Pulmonary/Chest: Effort normal and breath sounds normal  He has no wheezes  He has no rales  He exhibits no tenderness  Abdominal: Soft  Bowel sounds are normal  He exhibits no distension and no mass  There is no tenderness  There is no rebound  Musculoskeletal: Normal range of motion  He exhibits no edema or tenderness  Lymphadenopathy:     He has no cervical adenopathy  Neurological: He is alert and oriented to person, place, and time  Skin: Skin is warm and dry  Psychiatric: He has a normal mood and affect   His behavior is normal  Judgment and thought content normal        Pertinent Laboratory/Diagnostic Studies:  No recent labs        Current Medications     Current Outpatient Prescriptions:     baclofen 10 mg tablet, Take 1 tablet (10 mg total) by mouth 3 (three) times a day, Disp: 270 tablet, Rfl: 1    DULoxetine (CYMBALTA) 60 mg delayed release capsule, Take 1 capsule (60 mg total) by mouth daily, Disp: 14 capsule, Rfl: 0    gabapentin (NEURONTIN) 600 MG tablet, 1 tablet twice a day & 3 tablets at bedtime, Disp: 450 tablet, Rfl: 0    glucose blood (ACCU-CHEK SMARTVIEW) test strip, 1 each by Other route 2 (two) times a day, Disp: 100 each, Rfl: 1    insulin NPH-insulin regular (NOVOLIN 70/30) 100 units/mL subcutaneous injection, Inject 20 Units under the skin 2 (two) times a day, Disp: 20 mL, Rfl: 4    morphine (MS CONTIN) 15 mg 12 hr tablet, TAKE 1 TABLET(S) EVERY 12 HOURS, Disp: , Rfl: 0    oxyCODONE (ROXICODONE) 10 MG TABS, 1 TABLET(S) 3 TIMES A DAY (AS NEEDED), Disp: , Rfl: 0    Polyethylene Glycol 3350 (MIRALAX PO), Take by mouth, Disp: , Rfl:     traZODone (DESYREL) 100 mg tablet, Take 1 tablet (100 mg total) by mouth daily at bedtime, Disp: 90 tablet, Rfl: 1    bacitracin ophthalmic ointment, APPLY TO BOTH EYES TWICE DAILY, Disp: , Rfl: 3    simvastatin (ZOCOR) 40 mg tablet, Take 1 tablet by mouth daily, Disp: , Rfl:       Radha Miller MD

## 2018-07-26 NOTE — PROGRESS NOTES
Patient presents for his routine S/P tube change  He was placed in the supine position and his indwelling catheter was removed gently and without complications  The stoma was cleaned of traces of mucus using Hibaclens solution and an 18 [place] Gomez catheter was inserted into the bladder using sterile technique  The balloon was filled with 5ccs of sterile water and a Gomez plug was attached  Patient has no questions or concerns at this time and is pleased with his care  He is to return in 5 weeks for his next change

## 2018-08-23 NOTE — PROGRESS NOTES
Patient presents for his routine S/P tube change  He was placed in the supine position and his indwelling catheter was removed gently and without complication  The stoma was clan and dry, but wiped down with Hibaclens solution to be sure the site was clean  An 25 Citizen of [place] Gomez was inserted into the bladder gently and without complications  Urine return was clear and yellow  The catheter was inflated with 5CCs of sterile water and was attached to a catheter plug  He has no questions or concerns to be addressed at this time  He is to return in 5 weeks for his next tube change  *Patient brings his own supplies

## 2018-08-28 NOTE — DISCHARGE INSTRUCTIONS
Laceration   WHAT YOU NEED TO KNOW:   A laceration is an injury to the skin and the soft tissue underneath it  Lacerations happen when you are cut or hit by something  They can happen anywhere on the body  DISCHARGE INSTRUCTIONS:   Return to the emergency department if:   · You have heavy bleeding or bleeding that does not stop after 10 minutes of holding firm, direct pressure over the wound  · Your wound opens up  Contact your healthcare provider if:   · You have a fever or chills  · Your laceration is red, warm, or swollen  · You have red streaks on your skin coming from your wound  · You have white or yellow drainage from the wound that smells bad  · You have pain that gets worse, even after treatment  · You have questions or concerns about your condition or care  Medicines:   · Prescription pain medicine  may be given  Ask how to take this medicine safely  · Antibiotics  help treat or prevent a bacterial infection  · Take your medicine as directed  Contact your healthcare provider if you think your medicine is not helping or if you have side effects  Tell him or her if you are allergic to any medicine  Keep a list of the medicines, vitamins, and herbs you take  Include the amounts, and when and why you take them  Bring the list or the pill bottles to follow-up visits  Carry your medicine list with you in case of an emergency  Care for your wound as directed:   · Do not get your wound wet  until your healthcare provider says it is okay  Do not soak your wound in water  Do not go swimming until your healthcare provider says it is okay  Carefully wash the wound with soap and water  Gently pat the area dry or allow it to air dry  · Change your bandages  when they get wet, dirty, or after washing  Apply new, clean bandages as directed  Do not apply elastic bandages or tape too tight  Do not put powders or lotions over your incision  · Apply antibiotic ointment as directed  Your healthcare provider may give you antibiotic ointment to put over your wound if you have stitches  If you have strips of tape over your incision, let them dry up and fall off on their own  If they do not fall off within 14 days, gently remove them  If you have glue over your wound, do not remove or pick at it  If your glue comes off, do not replace it with glue that you have at home  · Check your wound every day for signs of infection such as swelling, redness, or pus  Self-care:   · Apply ice  on your wound for 15 to 20 minutes every hour or as directed  Use an ice pack, or put crushed ice in a plastic bag  Cover it with a towel  Ice helps prevent tissue damage and decreases swelling and pain  · Use a splint as directed  A splint will decrease movement and stress on your wound  It may help it heal faster  A splint may be used for lacerations over joints or areas of your body that bend  Ask your healthcare provider how to apply and remove a splint  · Decrease scarring of your wound  by applying ointments as directed  Do not apply ointments until your healthcare provider says it is okay  You may need to wait until your wound is healed  Ask which ointment to buy and how often to use it  After your wound is healed, use sunscreen over the area when you are out in the sun  You should do this for at least 6 months to 1 year after your injury  Follow up with your healthcare provider as directed: You may need to follow up in 24 to 48 hours to have your wound checked for infection  You will need to return in 3 to 14 days if you have stitches or staples so they can be removed  Care for your wound as directed to prevent infection and help it heal  Write down your questions so you remember to ask them during your visits  © 2017 [employer] Information is for End User's use only and may not be sold, redistributed or otherwise used for commercial purposes   All illustrations and images included in [facility] are the copyrighted property of A D A M [employer] Inc  or Mirza Jarquin  The above information is an  only  It is not intended as medical advice for individual conditions or treatments  Talk to your doctor, nurse or pharmacist before following any medical regimen to see if it is safe and effective for you

## 2018-08-28 NOTE — ED PROVIDER NOTES
History  Chief Complaint   Patient presents with    Extremity Laceration     right lower leg laceration on unknown object approx 1 week ago, states he thought it was healing but "it opened up again" in the shower this morning  71 y/o M with past medical history of diabetes, hypertension, diabetic neuropathy, bladder dysfunction, who presents to emergency department for right lower leg laceration that occurred approximately 1 week ago  Patient states that it was healing, but open again this morning in the shower  He also states that he has woken up numerous times at night with blood on his bed sheets from the open wound  Currently rates the pain as 8/10, that is localized to the wound  Denies abnormal discharge  Denies redness or warmth  Denies fevers, chills, chest pain, shortness of breath, vomiting, diarrhea, abdominal pain  Denies leg swelling  Tetanus is up-to-date  He has been cleaning the wound with soap and water and applying bacitracin daily  History provided by:  Patient   used: No        Prior to Admission Medications   Prescriptions Last Dose Informant Patient Reported? Taking?    DULoxetine (CYMBALTA) 60 mg delayed release capsule   No Yes   Sig: Take 1 capsule (60 mg total) by mouth daily   Polyethylene Glycol 3350 (MIRALAX PO)   Yes Yes   Sig: Take by mouth   bacitracin ophthalmic ointment   Yes Yes   Sig: APPLY TO BOTH EYES TWICE DAILY   baclofen 10 mg tablet   No Yes   Sig: Take 1 tablet (10 mg total) by mouth 3 (three) times a day   gabapentin (NEURONTIN) 600 MG tablet   No Yes   Si tablet twice a day & 3 tablets at bedtime   glucose blood (ACCU-CHEK SMARTVIEW) test strip   No Yes   Si each by Other route 2 (two) times a day   insulin NPH-insulin regular (NOVOLIN 70/30) 100 units/mL subcutaneous injection   No Yes   Sig: Inject 20 Units under the skin 2 (two) times a day   morphine (MS CONTIN) 15 mg 12 hr tablet   Yes Yes   Sig: TAKE 1 TABLET(S) EVERY 12 HOURS   oxyCODONE (ROXICODONE) 10 MG TABS   Yes Yes   Si TABLET(S) 3 TIMES A DAY (AS NEEDED)   simvastatin (ZOCOR) 40 mg tablet   Yes Yes   Sig: Take 1 tablet by mouth daily   traZODone (DESYREL) 100 mg tablet   No Yes   Sig: Take 1 tablet (100 mg total) by mouth daily at bedtime      Facility-Administered Medications: None       Past Medical History:   Diagnosis Date    BPH with obstruction/lower urinary tract symptoms     Bundle branch block, right     Diabetes mellitus ([address] )     Fracture of clavicle     Hypertension     Nephropathy     Neuropathy     charcot- Bre tooth    Spinal stenosis     Ureteral calculus     Urinary retention        Past Surgical History:   Procedure Laterality Date    CYSTOSCOPY      CYSTOSCOPY W/ URETERAL STENT PLACEMENT Right     CYSTOSCOPY W/ URETERAL STENT REMOVAL Right     CYSTOSCOPY W/ URETEROSCOPY W/ LITHOTRIPSY Right     FOOT SURGERY Right     Amputation of toe, Onset - 10/1/12    HEMORROIDECTOMY      KNEE SURGERY Left     PCL and MCL - meniscus, Onset -     LUMBAR FUSION      Transverse lumbar interbody fusion L2-L3 and METRx Lt hemilaminectomy and ema foraminotomy-decompressive at L4-L5 Dr Nuno Michelle and Dr Mireya Hernandez; Onset - 12          Family History   Problem Relation Age of Onset    Dementia Mother     Hypertension Father     Pneumonia Maternal Grandfather     Cancer Family     Diabetes Family      I have reviewed and agree with the history as documented  Social History   Substance Use Topics    Smoking status: Current Some Day Smoker     Types: Cigars    Smokeless tobacco: Never Used      Comment: Never a smoker per [employer]     Alcohol use No        Review of Systems   Constitutional: Negative for chills and fever  HENT: Negative for congestion, ear pain, postnasal drip, rhinorrhea, sinus pain, sinus pressure, sore throat and trouble swallowing      Respiratory: Negative for cough, chest tightness, shortness of breath and wheezing  Cardiovascular: Negative for chest pain, palpitations and leg swelling  Gastrointestinal: Negative for abdominal pain, anal bleeding, constipation, diarrhea, nausea and vomiting  Genitourinary: Negative for dysuria, flank pain, frequency, hematuria and urgency  Musculoskeletal: Negative for arthralgias, back pain, gait problem, joint swelling, myalgias, neck pain and neck stiffness  Skin: Positive for wound  Negative for color change, pallor and rash  Neurological: Negative for dizziness, syncope, weakness, light-headedness, numbness and headaches  Physical Exam  Physical Exam   Constitutional: He is oriented to person, place, and time  He appears well-developed and well-nourished  No distress  HENT:   Head: Normocephalic and atraumatic  Eyes: Conjunctivae and EOM are normal  Pupils are equal, round, and reactive to light  Neck: Normal range of motion  Neck supple  Cardiovascular: Normal rate, regular rhythm and intact distal pulses  Pulmonary/Chest: Effort normal  No respiratory distress  Abdominal: Soft  Musculoskeletal: Normal range of motion  He exhibits no edema or tenderness  No calf tenderness  Neurological: He is alert and oriented to person, place, and time  Skin: Skin is warm and dry  Capillary refill takes less than 2 seconds  He is not diaphoretic  This is a 4 cm linear wound to the right anterior shin, with granulated tissue  No surrounding erythema  No abnormal discharge or bleeding  Psychiatric: He has a normal mood and affect  His behavior is normal    Nursing note and vitals reviewed        Vital Signs  ED Triage Vitals [08/28/18 1302]   Temperature Pulse Respirations Blood Pressure SpO2   98 1 °F (36 7 °C) 85 18 (!) 81/48 93 %      Temp Source Heart Rate Source Patient Position - Orthostatic VS BP Location FiO2 (%)   Temporal Monitor Sitting Right arm --      Pain Score       8           Vitals:    08/28/18 1302 08/28/18 1317   BP: (!) 81/48 140/67   Pulse: 85 77   Patient Position - Orthostatic VS: Sitting Sitting       Visual Acuity      ED Medications  Medications - No data to display    Diagnostic Studies  Results Reviewed     None                 No orders to display              Procedures  Lac Repair  Date/Time: 8/28/2018 1:53 PM  Performed by: Cristina Preciado  Authorized by: Tony KIMBALL   Consent: Verbal consent obtained  Risks and benefits: risks, benefits and alternatives were discussed  Consent given by: patient  Body area: lower extremity  Laceration length: 4 cm  Foreign bodies: no foreign bodies  Tendon involvement: none  Nerve involvement: none      Procedure Details:  Preparation: Patient was prepped and draped in the usual sterile fashion  Irrigation solution: saline  Irrigation method: jet lavage  Amount of cleaning: standard  Debridement: none  Degree of undermining: none  Skin closure: Steri-Strips  Dressing: 4x4 sterile gauze and gauze roll  Patient tolerance: Patient tolerated the procedure well with no immediate complications             Phone Contacts  ED Phone Contact    ED Course                               MDM  Number of Diagnoses or Management Options  Laceration of right lower extremity, initial encounter:   Diagnosis management comments: Discussed with patient that we are unable to close the wound with sutures as there is granulated tissue and wound appears to be healing already  Steri-Strips have been applied over top  Patient has been recommended to follow up with wound Care Clinic due to diabetic neuropathy  No need for antibiotics at this time as wound does not appear to be cellulitic and there is no abnormal purulent discharge  Tetanus is up-to-date  Discharge home with wound care and primary care follow-up      CritCare Time    Disposition  Final diagnoses:   Laceration of right lower extremity, initial encounter     Time reflects when diagnosis was documented in both MDM as applicable and the Disposition within this note     Time User Action Codes Description Comment    8/28/2018  1:44 PM Leidy Washburn [[id number]] Laceration of right lower extremity, initial encounter       ED Disposition     ED Disposition Condition Comment    Discharge  Mary Wayne discharge to home/self care  Condition at discharge: Good        Follow-up Information     Follow up With Specialties Details Why Contact Info Additional Information    Francisco In 3 days For wound re-check [address], [address] [place] [address] [place]  , Yas Blancas MD Internal Medicine In 1 week As needed [address] [place] [phone]             Patient's Medications   Discharge Prescriptions    No medications on file     No discharge procedures on file      ED Provider  Electronically Signed by           Vandana Kramer PA-C  08/28/18 0326

## 2018-10-05 NOTE — PROGRESS NOTES
I have reviewed the notes, assessments, and/or procedures performed , I concur with her/his documentation of Martha Underwood

## 2018-10-05 NOTE — PROGRESS NOTES
Cystostomy tube change     Date/Time 10/5/2018 3:17 PM     Performed by  Ivelisse Arce by Laura Solorzano       Consent: Verbal consent obtained  Consent given by: patient  Patient identity confirmed: verbally with patient      Preparation: Patient was prepped and draped in the usual sterile fashion  Site preparation: Betadine   Procedure Details   Procedure Notes: Patient presents for his routine S/P tube change  He was placed in the supine position, the stoma had a small amount of mucus around the tube  Area was cleaned with peroxide and his indwelling catheter was removed gently and without complication  The stoma was prepped with Betadine and a 25 [place] Gomez was inserted into the bladder gently using sterile technique  Urine return was clear and yellow  The catheter was inflated with 5CCs of sterile water and was attached to a catheter plug  He has no questions or concerns to be addressed at this time  He is to return in 5 weeks for his next tube change  Pt brings his own supplies    Patient tolerance: Patient tolerated the procedure well with no immediate complications

## 2018-10-31 NOTE — PROGRESS NOTES
Assessment and Plan:    Problem List Items Addressed This Visit     None        Health Maintenance Due   Topic Date Due    Diabetic Foot Exam  11/21/2017    HEMOGLOBIN A1C  02/07/2018    DM Eye Exam  03/30/2018    INFLUENZA VACCINE  07/01/2018    URINE MICROALBUMIN  08/07/2018         HPI:  Maynor August is a 70 y o  male here for his Subsequent Wellness Visit      Patient Active Problem List   Diagnosis    Suprapubic catheter ([facility] )    Weakness    Charcot-Bre-Tooth disease    Frequent falls    Type 2 diabetes mellitus with hyperlipidemia ([facility] )    Essential hypertension    BPH (benign prostatic hyperplasia)    Chronic [facility] disease, stage 3 ([facility] )    Hypercholesterolemia    Insomnia    Neuropathy in diabetes ([facility] )    Orthostatic hypotension    Spinal stenosis    DDD (degenerative disc disease), lumbosacral    Depression    Esophageal reflux    Geo fracture, closed, initial encounter ([place] [address] )    Hepatitis B    Hepatitis C virus infection without hepatic coma     Past Medical History:   Diagnosis Date    BPH with obstruction/lower urinary tract symptoms     Bundle branch block, right     Charcot-Bre-Tooth disease     Chronic cystitis     Diabetes mellitus ([facility] )     Fracture of clavicle     Hypertension     Nephropathy     Neuropathy     charcot- Bre tooth    Spinal stenosis     Ureteral calculus     Urinary retention     UTI (urinary tract infection)      Past Surgical History:   Procedure Laterality Date    CYSTOSCOPY  2014, 2015, 2016, 2017    CYSTOSCOPY W/ RETROGRADES Bilateral     CYSTOSCOPY W/ URETERAL STENT PLACEMENT Right 12/30/2015    CYSTOSCOPY W/ URETERAL STENT REMOVAL Right 2016    CYSTOSCOPY W/ URETEROSCOPY W/ LITHOTRIPSY Right 12/30/2015    FOOT SURGERY Right     Amputation of toe, Onset - 10/1/12    HEMORROIDECTOMY      KNEE SURGERY Left     PCL and MCL - meniscus, Onset - 1981    LUMBAR FUSION      Transverse lumbar interbody fusion L2-L3 and METRx Lt hemilaminectomy and ema foraminotomy-decompressive at L4-L5 Dr Laverne Orr and Dr Sukh Tee; Onset - 5/13/13       TRANSURETHRAL RESECTION OF PROSTATE  01/2014     Family History   Problem Relation Age of Onset    Dementia Mother     Hypertension Father     Pneumonia Maternal Grandfather     Cancer Family     Diabetes Family      History   Smoking Status    Former Smoker    Years: 4 00    Types: Cigars    Quit date: 1995   Smokeless Tobacco    Never Used     History   Alcohol Use No      History   Drug Use No       Current Outpatient Prescriptions   Medication Sig Dispense Refill    bacitracin ophthalmic ointment APPLY TO BOTH EYES TWICE DAILY  3    baclofen 10 mg tablet Take 1 tablet (10 mg total) by mouth 3 (three) times a day 270 tablet 1    DULoxetine (CYMBALTA) 60 mg delayed release capsule Take 1 capsule (60 mg total) by mouth daily 14 capsule 0    gabapentin (NEURONTIN) 600 MG tablet 1 TABLET TWICE A DAY & 3 TABLETS AT BEDTIME 450 tablet 0    glucose blood (ACCU-CHEK SMARTVIEW) test strip 1 each by Other route 2 (two) times a day 100 each 1    insulin NPH-insulin regular (NOVOLIN 70/30) 100 units/mL subcutaneous injection Inject 20 Units under the skin 2 (two) times a day 20 mL 4    morphine (MS CONTIN) 15 mg 12 hr tablet TAKE 1 TABLET(S) EVERY 12 HOURS  0    oxyCODONE (ROXICODONE) 10 MG TABS 1 TABLET(S) 3 TIMES A DAY (AS NEEDED)  0    Polyethylene Glycol 3350 (MIRALAX PO) Take by mouth      simvastatin (ZOCOR) 40 mg tablet Take 1 tablet by mouth daily      traZODone (DESYREL) 100 mg tablet TAKE 1 TABLET (100 MG TOTAL) BY MOUTH DAILY AT BEDTIME 90 tablet 1     No current facility-administered medications for this visit        No Known Allergies  Immunization History   Administered Date(s) Administered    DT (pediatric) 10/19/2017    Influenza 11/05/2014, 12/11/2015, 12/12/2016, 10/19/2017    Influenza Quadrivalent Preservative Free 3 years and older IM 11/07/2014, 10/04/2017    Influenza Quadrivalent, 6-35 Months IM 12/11/2015    Influenza Split High Dose Preservative Free IM 12/12/2016, 10/19/2017    Influenza TIV (IM) 09/27/2012, 11/20/2013    Pneumococcal Conjugate 13-Valent 08/04/2016, 08/22/2017    Pneumococcal Polysaccharide PPV23 08/18/2014    Td (adult), adsorbed 10/19/2017    Tdap 12/03/2008, 12/16/2014       Patient Care Team:  Blanca Stevenson MD as PCP - General  MD Blanca Ansari MD Lillis Sears, MD Ann Dopp, MD    Medicare Screening Tests and Risk Assessments:  Devon Mortimer is here for his Subsequent Wellness visit  Health Risk Assessment:  Patient rates overall health as very good  Patient feels that their physical health rating is Slightly worse  Eyesight was rated as Same  Hearing was rated as Same  Patient feels that their emotional and mental health rating is Much better  Pain experienced by patient in the last 7 days has been A lot  Patient's pain rating has been 10/10  Patient states that he has experienced no weight loss or gain in last 6 months  Emotional/Mental Health:  Patient has been feeling nervous/anxious  PHQ-9 Depression Screening:    Frequency of the following problems over the past two weeks:      1  Little interest or pleasure in doing things: 0 - not at all      2  Feeling down, depressed, or hopeless: 0 - not at all  PHQ-2 Score: 0          Broken Bones/Falls: Fall Risk Assessment:    In the past year, patient has experienced: History of falling in past year     Number of falls: 1          Bladder/Bowel:  Patient has not leaked urine accidently in the last six months  Patient reports no loss of bowel control  Immunizations:  Patient has had a flu vaccination within the last year  Patient has received a pneumonia shot  Patient has not received a shingles shot  Home Safety:  Patient does not have trouble with stairs inside or outside of their home     Patient currently reports that there are no safety hazards present in home, working smoke alarms, working carbon monoxide detectors  Preventative Screenings:   no prostate cancer screen performed, colon cancer screen completed, cholesterol screen completed, glaucoma eye exam completed,     Nutrition:  Current diet: Diabetic with servings of the following:    Medications:  Patient is not currently taking any over-the-counter supplements  Patient is able to manage medications  Lifestyle Choices:  Patient reports current tobacco use  Patient reports no alcohol use  Patient drives a vehicle  Patient wears seat belt  Activities of Daily Living:  Can get out of bed by his or her self, able to dress self, able to make own meals, able to do own shopping, able to bathe self, can do own laundry/housekeeping, can manage own money, pay bills and track expenses    Previous Hospitalizations:  No hospitalization or ED visit in past 12 months        Advanced Directives:  Patient has decided on a power of   Patient has spoken to designated power of   Patient has not completed advanced directive  Preventative Screening/Counseling:      Cardiovascular:      General: Screening Current          Diabetes:      General: Screening Current          Colorectal Cancer:      General: Screening Current          Prostate Cancer:      General: Risks and Benefits Discussed          Osteoporosis:      General: Screening Not Indicated          AAA:      General: Screening Not Indicated          Glaucoma:      General: Screening Current          HIV:      General: Screening Not Indicated          Hepatitis C:      General: Risks and Benefits Discussed        Advanced Directives:   Patient has no living will for healthcare, has durable POA for healthcare, patient does not have an advanced directive  Information on ACP and/or AD provided  End of life assessment reviewed with patient       Immunizations:      Influenza: Influenza UTD This Year      Pneumococcal: Lifetime Vaccine Completed      Shingrix: Risks & Benefits Discussed      TD: Td Vaccine UTD        Referrals: Additional Comments: The patient is doing generally well  Overall, his lifestyle is currently healthy  His exercise is limited by his clinical status  He recently moved into a new apartment which I believe will be much safer for him  He is living with his brother gym  Currently, he has no evidence of significant psychiatric or cognitive problems  He is up-to-date with his vaccines with the exception of herpes zoster  We discussed this  He is up-to-date with his health screening measures  I discussed advance care planning with the patient  He does not have a Living Will  He would anticipate his brother Peterson Amaya acting as his spokesperson  Alternately, his sister nyla bernal would serve this role  I suggested to him that he formalize these arrangements with the appropriate paperwork

## 2018-11-01 NOTE — PROGRESS NOTES
[facility] [place]      NAME: Sangita Mcgovern  AGE: 70 y o  SEX: male  : 1950   MRN: [id number]    DATE: 2018  TIME: 5:36 PM    Assessment and Plan   1  Need for influenza vaccination  - influenza vaccine, [id number], high-dose, PF 0 5 mL, for patients 65 yr+ (FLUZONE HIGH-DOSE)    2  Type 2 diabetes mellitus with hyperlipidemia (HCC)  Well controlled  3  Diabetic polyneuropathy associated with type 2 diabetes mellitus ([address] )  Continue with appropriate diabetic management  He is on gabapentin  4  Essential hypertension  Well controlled  5  Charcot-Bre-Tooth disease  This has resulted in significant gait instability  The patient is using a walker  6  Chronic kidney disease, stage 3 (HCC)  Stable    Overall, the patient is doing pretty well  He will continue his current medications  I asked him to get his lab work done in the near future if possible  Hepatitis studies were previously ordered and will be done with his next lab work  Return to office in:   3 months    Chief Complaint     Chief Complaint   Patient presents with    Medicare Wellness Visit    Follow-up     3 months       History of Present Illness     The patient returned to the office for re-evaluation of hypertension, type 2 diabetes complicated by peripheral neuropathy, Charcot-Bre-Tooth disease, and chronic kidney disease stage 3  Since his last visit he has been feeling pretty well  He has had no chest pain, shortness of breath, palpitations, or dizziness  He recently moved to a new apartment which she believes is going to be much more suitable for him  He is living with his brother gym  He is having no problems with his medications  He has had no symptoms of hypoglycemia or hyperglycemia      The following portions of the patient's history were reviewed and updated as appropriate: allergies, current medications, past family history, past medical history, past social history, past surgical history and problem list     Review of Systems   Review of Systems   Constitutional: Negative  HENT: Negative for congestion, ear pain, postnasal drip, rhinorrhea, sore throat and trouble swallowing  Eyes: Negative for pain, discharge, redness and visual disturbance  Respiratory: Negative for cough, shortness of breath and wheezing  Cardiovascular: Negative  Gastrointestinal: Negative  Endocrine: Negative  Genitourinary: Negative for difficulty urinating, dysuria, frequency, hematuria and urgency  Musculoskeletal: Positive for gait problem  Negative for arthralgias, joint swelling and myalgias  Skin: Negative for rash  Neurological: Negative for dizziness, speech difficulty, weakness, light-headedness, numbness and headaches  Hematological: Negative  Psychiatric/Behavioral: Negative for confusion, decreased concentration, dysphoric mood and sleep disturbance  The patient is not nervous/anxious  Active Problem List     Patient Active Problem List   Diagnosis    Suprapubic catheter (HCC)    Weakness    Charcot-Bre-Tooth disease    Frequent falls    Type 2 diabetes mellitus with hyperlipidemia ([facility] )    Essential hypertension    BPH (benign prostatic hyperplasia)    Chronic kidney disease, stage 3 ([facility] )    Hypercholesterolemia    Insomnia    Neuropathy in diabetes ([facility] )    Orthostatic hypotension    Spinal stenosis    DDD (degenerative disc disease), lumbosacral    Depression    Esophageal reflux    Geo fracture, closed, initial encounter ([place] [address] )    Hepatitis B    Hepatitis C virus infection without hepatic coma       Objective   /82 (BP Location: Left arm, Patient Position: Sitting, Cuff Size: Standard)   Pulse 80   Temp 97 5 °F (36 4 °C) (Tympanic)   Resp 15   Ht 6' 1" (1 854 m)   Wt 80 3 kg (177 lb)   BMI 23 35 kg/m²     Physical Exam   Constitutional: He is oriented to person, place, and time  He appears well-developed and well-nourished   No distress  HENT:   Head: Normocephalic and atraumatic  Neck: Neck supple  No JVD present  No tracheal deviation present  No thyromegaly present  Cardiovascular: Normal rate, regular rhythm, normal heart sounds and intact distal pulses  Exam reveals no gallop and no friction rub  No murmur heard  Pulmonary/Chest: Effort normal and breath sounds normal  He has no wheezes  He has no rales  He exhibits no tenderness  Abdominal: Soft  Bowel sounds are normal  He exhibits no distension and no mass  There is no tenderness  There is no rebound  Musculoskeletal: Normal range of motion  He exhibits no edema or tenderness  Lymphadenopathy:     He has no cervical adenopathy  Neurological: He is alert and oriented to person, place, and time  Skin: Skin is warm and dry  Psychiatric: He has a normal mood and affect  His behavior is normal  Judgment and thought content normal        Pertinent Laboratory/Diagnostic Studies:  No visits with results within 3 Month(s) from this visit     Latest known visit with results is:   Admission on 09/30/2017, Discharged on 10/04/2017   Component Date Value Ref Range Status    Color, UA 09/30/2017 Yellow   Final    Clarity, UA 09/30/2017 Clear   Final    pH, UA 09/30/2017 6 5  4 5 - 8 0 Final    Leukocytes, UA 09/30/2017 Small* Negative Final    Nitrite, UA 09/30/2017 Negative  Negative Final    Protein, UA 09/30/2017 100 (2+)* Negative mg/dl Final    Glucose, UA 09/30/2017 500 (1/2%)* Negative mg/dl Final    Ketones, UA 09/30/2017 Negative  Negative mg/dl Final    Urobilinogen, UA 09/30/2017 0 2  0 2, 1 0 E U /dl E U /dl Final    Bilirubin, UA 09/30/2017 Negative  Negative Final    Blood, UA 09/30/2017 Trace* Negative Final    Specific Gravity, UA 09/30/2017 1 015  1 003 - 1 030 Final    RBC, UA 09/30/2017 1-2* None Seen, 0-5 /hpf Final    WBC, UA 09/30/2017 20-30* None Seen, 0-5, 5-55, 5-65 /hpf Final    Epithelial Cells 09/30/2017 Occasional  None Seen, Occasional /hpf Final    Bacteria, UA 09/30/2017 Occasional  None Seen, Occasional /hpf Final    OTHER OBSERVATIONS 09/30/2017 Yeast Cells Present   Final    Urine Culture 09/30/2017 >100,000 cfu/ml Candida species NOT albicans*  Final    Urine Culture 09/30/2017 50,000-59,000 cfu/ml Stenotrophomonas maltophilia*  Final    WBC 09/30/2017 7 60  4 31 - 10 16 Thousand/uL Final    RBC 09/30/2017 3 96  3 88 - 5 62 Million/uL Final    Hemoglobin 09/30/2017 12 3  12 0 - 17 0 g/dL Final    Hematocrit 09/30/2017 37 3  36 5 - 49 3 % Final    MCV 09/30/2017 94  82 - 98 fL Final    MCH 09/30/2017 31 1  26 8 - 34 3 pg Final    MCHC 09/30/2017 33 0  31 4 - 37 4 g/dL Final    RDW 09/30/2017 13 9  11 6 - 15 1 % Final    MPV 09/30/2017 10 9  8 9 - 12 7 fL Final    Platelets 18/15/3256 225  149 - 390 Thousands/uL Final    nRBC 09/30/2017 0  /100 WBCs Final    Neutrophils Relative 09/30/2017 62  43 - 75 % Final    Lymphocytes Relative 09/30/2017 27  14 - 44 % Final    Monocytes Relative 09/30/2017 7  4 - 12 % Final    Eosinophils Relative 09/30/2017 4  0 - 6 % Final    Basophils Relative 09/30/2017 0  0 - 1 % Final    Neutrophils Absolute 09/30/2017 4 69  1 85 - 7 62 Thousands/µL Final    Lymphocytes Absolute 09/30/2017 2 04  0 60 - 4 47 Thousands/µL Final    Monocytes Absolute 09/30/2017 0 55  0 17 - 1 22 Thousand/µL Final    Eosinophils Absolute 09/30/2017 0 30  0 00 - 0 61 Thousand/µL Final    Basophils Absolute 09/30/2017 0 02  0 00 - 0 10 Thousands/µL Final    Sodium 09/30/2017 137  136 - 145 mmol/L Final    Potassium 09/30/2017 4 6  3 5 - 5 3 mmol/L Final    Chloride 09/30/2017 100  100 - 108 mmol/L Final    CO2 09/30/2017 32  21 - 32 mmol/L Final    ANION GAP 09/30/2017 5  4 - 13 mmol/L Final    BUN 09/30/2017 26* 5 - 25 mg/dL Final    Creatinine 09/30/2017 1 71* 0 60 - 1 30 mg/dL Final    Glucose 09/30/2017 303* 65 - 140 mg/dL Final    Calcium 09/30/2017 9 4  8 3 - 10 1 mg/dL Final    eGFR 09/30/2017 40  ml/min/1 73sq m Final    Blood Culture 09/30/2017 No Growth After 5 Days  Final    Blood Culture 09/30/2017 No Growth After 5 Days     Final    LACTIC ACID 09/30/2017 1 2  0 5 - 2 0 mmol/L Final    POC Glucose 09/30/2017 192* 65 - 140 mg/dl Final    POC Glucose 10/01/2017 162* 65 - 140 mg/dl Final    WBC 10/01/2017 7 11  4 31 - 10 16 Thousand/uL Final    RBC 10/01/2017 3 77* 3 88 - 5 62 Million/uL Final    Hemoglobin 10/01/2017 11 6* 12 0 - 17 0 g/dL Final    Hematocrit 10/01/2017 35 4* 36 5 - 49 3 % Final    MCV 10/01/2017 94  82 - 98 fL Final    MCH 10/01/2017 30 8  26 8 - 34 3 pg Final    MCHC 10/01/2017 32 8  31 4 - 37 4 g/dL Final    RDW 10/01/2017 13 8  11 6 - 15 1 % Final    Platelets 50/24/7148 189  149 - 390 Thousands/uL Final    MPV 10/01/2017 10 7  8 9 - 12 7 fL Final    Sodium 10/01/2017 140  136 - 145 mmol/L Final    Potassium 10/01/2017 3 9  3 5 - 5 3 mmol/L Final    Chloride 10/01/2017 103  100 - 108 mmol/L Final    CO2 10/01/2017 30  21 - 32 mmol/L Final    ANION GAP 10/01/2017 7  4 - 13 mmol/L Final    BUN 10/01/2017 22  5 - 25 mg/dL Final    Creatinine 10/01/2017 1 52* 0 60 - 1 30 mg/dL Final    Glucose 10/01/2017 145* 65 - 140 mg/dL Final    Calcium 10/01/2017 9 1  8 3 - 10 1 mg/dL Final    eGFR 10/01/2017 46  ml/min/1 73sq m Final    POC Glucose 10/01/2017 142* 65 - 140 mg/dl Final    POC Glucose 10/01/2017 192* 65 - 140 mg/dl Final    POC Glucose 10/01/2017 183* 65 - 140 mg/dl Final    POC Glucose 10/01/2017 152* 65 - 140 mg/dl Final    WBC 10/02/2017 7 03  4 31 - 10 16 Thousand/uL Final    RBC 10/02/2017 3 80* 3 88 - 5 62 Million/uL Final    Hemoglobin 10/02/2017 11 8* 12 0 - 17 0 g/dL Final    Hematocrit 10/02/2017 35 8* 36 5 - 49 3 % Final    MCV 10/02/2017 94  82 - 98 fL Final    MCH 10/02/2017 31 1  26 8 - 34 3 pg Final    MCHC 10/02/2017 33 0  31 4 - 37 4 g/dL Final    RDW 10/02/2017 13 9  11 6 - 15 1 % Final    Platelets 14/11/9726 203 149 - 390 Thousands/uL Final    MPV 10/02/2017 10 6  8 9 - 12 7 fL Final    Sodium 10/02/2017 140  136 - 145 mmol/L Final    Potassium 10/02/2017 4 0  3 5 - 5 3 mmol/L Final    Chloride 10/02/2017 103  100 - 108 mmol/L Final    CO2 10/02/2017 32  21 - 32 mmol/L Final    ANION GAP 10/02/2017 5  4 - 13 mmol/L Final    BUN 10/02/2017 22  5 - 25 mg/dL Final    Creatinine 10/02/2017 1 36* 0 60 - 1 30 mg/dL Final    Glucose 10/02/2017 143* 65 - 140 mg/dL Final    Calcium 10/02/2017 9 2  8 3 - 10 1 mg/dL Final    eGFR 10/02/2017 53  ml/min/1 73sq m Final    POC Glucose 10/02/2017 159* 65 - 140 mg/dl Final    POC Glucose 10/02/2017 237* 65 - 140 mg/dl Final    POC Glucose 10/02/2017 130  65 - 140 mg/dl Final    POC Glucose 10/03/2017 116  65 - 140 mg/dl Final    POC Glucose 10/03/2017 205* 65 - 140 mg/dl Final    POC Glucose 10/03/2017 175* 65 - 140 mg/dl Final    POC Glucose 10/03/2017 98  65 - 140 mg/dl Final    POC Glucose 10/04/2017 115  65 - 140 mg/dl Final    POC Glucose 10/04/2017 220* 65 - 140 mg/dl Final    POC Glucose 10/04/2017 181* 65 - 140 mg/dl Final           Current Medications     Current Outpatient Prescriptions:     bacitracin ophthalmic ointment, APPLY TO BOTH EYES TWICE DAILY, Disp: , Rfl: 3    baclofen 10 mg tablet, Take 1 tablet (10 mg total) by mouth 3 (three) times a day, Disp: 270 tablet, Rfl: 1    DULoxetine (CYMBALTA) 60 mg delayed release capsule, Take 1 capsule (60 mg total) by mouth daily, Disp: 14 capsule, Rfl: 0    gabapentin (NEURONTIN) 600 MG tablet, 1 TABLET TWICE A DAY & 3 TABLETS AT BEDTIME, Disp: 450 tablet, Rfl: 0    glucose blood (ACCU-CHEK SMARTVIEW) test strip, 1 each by Other route 2 (two) times a day, Disp: 100 each, Rfl: 1    insulin NPH-insulin regular (NOVOLIN 70/30) 100 units/mL subcutaneous injection, Inject 20 Units under the skin 2 (two) times a day, Disp: 20 mL, Rfl: 4    morphine (MS CONTIN) 15 mg 12 hr tablet, TAKE 1 TABLET(S) EVERY 12 HOURS, Disp: , Rfl: 0    oxyCODONE (ROXICODONE) 10 MG TABS, 1 TABLET(S) 3 TIMES A DAY (AS NEEDED), Disp: , Rfl: 0    Polyethylene Glycol 3350 (MIRALAX PO), Take by mouth, Disp: , Rfl:     simvastatin (ZOCOR) 40 mg tablet, Take 1 tablet by mouth daily, Disp: , Rfl:     traZODone (DESYREL) 100 mg tablet, TAKE 1 TABLET (100 MG TOTAL) BY MOUTH DAILY AT BEDTIME, Disp: 90 tablet, Rfl: 1      Josiephine Lundborg, MD

## 2018-11-10 PROBLEM — K56.41 FECAL IMPACTION OF COLON (HCC): Status: ACTIVE | Noted: 2018-01-01

## 2018-11-10 PROBLEM — R53.81 PHYSICAL DEBILITY: Status: ACTIVE | Noted: 2018-01-01

## 2018-11-10 PROBLEM — G89.29 CHRONIC PAIN: Status: ACTIVE | Noted: 2018-01-01
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
## 2018-11-10 NOTE — ASSESSMENT & PLAN NOTE
In the setting of Charcot-Bre-Tooth; on MS Contin and oxycodone    If renal dysfunction worsens will need to decrease or discontinue morphine product

## 2018-11-10 NOTE — ASSESSMENT & PLAN NOTE
Lab Results   Component Value Date    HGBA1C 6 9 (H) 04/10/2017     Continue 70/30 20 units b i d  and add sliding scale

## 2018-11-10 NOTE — ASSESSMENT & PLAN NOTE
Charcot-Bre-Tooth disease; ambulates with rolling walker at baseline  Now more debilitated due to kidney injury and UTI    PT/OT to evaluate for discharge recommendations

## 2018-11-10 NOTE — ASSESSMENT & PLAN NOTE
Physical debility with underlying Charcot-Bre-Tooth disease  At baseline ambulates with rolling walker  Worsening debility secondary to acute kidney injury and UTI    PT/OT to evaluate

## 2018-11-10 NOTE — ASSESSMENT & PLAN NOTE
Given acute kidney injury, decrease gabapentin from home dose to 300 mg t i d  until renal dysfunction returns to baseline

## 2018-11-10 NOTE — ASSESSMENT & PLAN NOTE
Acute kidney injury on CKD3 likely secondary to dehydration and urinary tract infection  Does not appear to have SPT dysfunction  Start IV fluids and treat underlying UTI  Recheck labs in a m

## 2018-11-10 NOTE — ED PROVIDER NOTES
History  Chief Complaint   Patient presents with    Fall     Pt brought in via EMS after EMS that brother found that pt had fallen x 2 today, pt reports to have been dizzy and fell  pt is very poor historian and is having a hard time answering questions  also c/o feeling like his suprapubic catheter is infected, states that he had "the shakes" yesterday  collared by EMS, contusion to left forehead  per EMS pt on oxy and morphine at home for back pain   Dizziness     69-year-old male presenting after recurrent falls at home and some confusion  Patient is a very poor historian  He lives at home with his brother  Patient apparently had a fall last night as well as this morning  He cannot tell me the cause of these falls, how he landed or any details  Patient is inconsistent with his questioning about where he is having pain  He reports some abdominal pain and hip/leg pain  Patient has an abrasion to his forehead, no blood thinners  Has chronic back pain and apparently takes chronic narcotics  Sister reports that since he was not able to sleep last night, he also took a sleeping pill this morning  Patient denies taking any more of his medications than he is prescribed  Denies alcohol use  Sister agrees that the patient is more confused than his baseline and reports that this has happened in the past when he has had urine infections  Patient has a suprapubic Gomez catheter and he feels the urine is infected, but cannot tell me why he feels this way  No known fevers, CP, SOB, neck pain, cough, nausea, vomiting, changes in stool  Reports tetanus is UTD    A/P:  69-year-old male with at least 2 falls- CTH to rule out intracranial bleed, CT C-spine to rule out fracture, CT A/P to assess for hydro/intra-abdominal pathology, x-rays of areas of pain to assess for osseous abnormality    Will get cardiac workup, CBC to rule out anemia, BMP to assess renal function/electrolytes, lipase to rule pancreatitis, UA to assess for urine infection, admit            Prior to Admission Medications   Prescriptions Last Dose Informant Patient Reported? Taking?    DULoxetine (CYMBALTA) 60 mg delayed release capsule   No Yes   Sig: Take 1 capsule (60 mg total) by mouth daily   Polyethylene Glycol 3350 (MIRALAX PO)   Yes Yes   Sig: Take by mouth   bacitracin ophthalmic ointment   Yes Yes   Sig: APPLY TO BOTH EYES TWICE DAILY   baclofen 10 mg tablet   No Yes   Sig: Take 1 tablet (10 mg total) by mouth 3 (three) times a day   gabapentin (NEURONTIN) 600 MG tablet   No Yes   Si TABLET TWICE A DAY & 3 TABLETS AT BEDTIME   glucose blood (ACCU-CHEK SMARTVIEW) test strip   No Yes   Si each by Other route 2 (two) times a day   insulin NPH-insulin regular (NOVOLIN 70/30) 100 units/mL subcutaneous injection   No Yes   Sig: Inject 20 Units under the skin 2 (two) times a day   morphine (MS CONTIN) 15 mg 12 hr tablet   Yes Yes   Sig: TAKE 1 TABLET(S) EVERY 12 HOURS   oxyCODONE (ROXICODONE) 10 MG TABS   Yes Yes   Si TABLET(S) 3 TIMES A DAY (AS NEEDED)   simvastatin (ZOCOR) 40 mg tablet   Yes Yes   Sig: Take 1 tablet by mouth daily   traZODone (DESYREL) 100 mg tablet   No Yes   Sig: TAKE 1 TABLET (100 MG TOTAL) BY MOUTH DAILY AT BEDTIME      Facility-Administered Medications: None       Past Medical History:   Diagnosis Date    BPH with obstruction/lower urinary tract symptoms     Bundle branch block, right     Charcot-Bre-Tooth disease     Chronic cystitis     Diabetes mellitus ([address] )     Fracture of clavicle     Hypertension     Nephropathy     Neuropathy     charcot- Bre tooth    Spinal stenosis     Ureteral calculus     Urinary retention        Past Surgical History:   Procedure Laterality Date    CYSTOSCOPY  , , , 2017    CYSTOSCOPY W/ RETROGRADES Bilateral     CYSTOSCOPY W/ URETERAL STENT PLACEMENT Right 2015    CYSTOSCOPY W/ URETERAL STENT REMOVAL Right 2016    CYSTOSCOPY W/ URETEROSCOPY W/ LITHOTRIPSY Right 12/30/2015    FOOT SURGERY Right     Amputation of toe, Onset - 10/1/12    HEMORROIDECTOMY      KNEE SURGERY Left     PCL and MCL - meniscus, Onset - 1981    LUMBAR FUSION      Transverse lumbar interbody fusion L2-L3 and METRx Lt hemilaminectomy and ema foraminotomy-decompressive at L4-L5 Dr Joe Vila and Dr Merline Severance; Onset - 5/13/13       TRANSURETHRAL RESECTION OF PROSTATE  01/2014       Family History   Problem Relation Age of Onset    Dementia Mother     Hypertension Father     Pneumonia Maternal Grandfather     Cancer Family     Diabetes Family      I have reviewed and agree with the history as documented  Social History   Substance Use Topics    Smoking status: Light Tobacco Smoker     Years: 4 00     Types: Cigars     Last attempt to quit: 1995    Smokeless tobacco: Never Used      Comment: occasional cigar    Alcohol use No        Review of Systems   Constitutional: Negative for chills, fever and unexpected weight change  HENT: Negative for ear pain, rhinorrhea and sore throat  Respiratory: Negative for cough and shortness of breath  Cardiovascular: Negative for chest pain and leg swelling  Gastrointestinal: Negative for abdominal pain, constipation, diarrhea, nausea and vomiting  Genitourinary: Negative for dysuria, frequency, hematuria and urgency  Musculoskeletal: Positive for arthralgias and back pain  Negative for myalgias and neck pain  Skin: Negative for color change and rash  Allergic/Immunologic: Negative for immunocompromised state  Neurological: Negative for dizziness, weakness, light-headedness, numbness and headaches  Hematological: Does not bruise/bleed easily  Psychiatric/Behavioral: Positive for confusion  Negative for agitation  All other systems reviewed and are negative  Physical Exam  Physical Exam   Constitutional: He appears well-developed and well-nourished  HENT:   Head: Normocephalic and atraumatic     Nose: Nose normal    Mouth/Throat: Oropharynx is clear and moist    Abrasion to L forehead  No scalp ttp   Eyes: Conjunctivae and EOM are normal    Neck: Normal range of motion  Neck supple  No midline C/T/L spine ttp, no stepoffs/deformity   Cardiovascular: Normal rate, regular rhythm, normal heart sounds and intact distal pulses  Pulmonary/Chest: Effort normal and breath sounds normal  No stridor  He has no rales  He exhibits no tenderness  Abdominal: Soft  He exhibits no distension  There is no tenderness  Suprapubic hernandez in place  Back: no tenderness or skin changes to sacrum/back   Musculoskeletal: He exhibits no edema or deformity  Neurological: He is alert  He exhibits normal muscle tone  Coordination normal    Forgetful with questioning and repetitive  No focal neuro deficits  Strength 5/5 throughout   Skin: Skin is warm and dry  Psychiatric: He has a normal mood and affect  His behavior is normal    Nursing note and vitals reviewed        Vital Signs  ED Triage Vitals [11/10/18 1328]   Temperature Pulse Respirations Blood Pressure SpO2   (!) 97 4 °F (36 3 °C) 78 18 (!) 213/96 97 %      Temp Source Heart Rate Source Patient Position - Orthostatic VS BP Location FiO2 (%)   Oral Monitor Lying Right arm --      Pain Score       9           Vitals:    11/10/18 1707 11/10/18 1730 11/10/18 1815 11/10/18 1838   BP: (!) 197/114 (!) 163/132 (!) 173/99 (!) 162/104   Pulse: 85   100   Patient Position - Orthostatic VS: Lying Lying Sitting Lying       Visual Acuity      ED Medications  Medications   baclofen tablet 10 mg (not administered)   DULoxetine (CYMBALTA) delayed release capsule 60 mg (not administered)   gabapentin (NEURONTIN) capsule 300 mg (not administered)   insulin aspart protamine-insulin aspart (NovoLOG 70/30) 100 units/mL subcutaneous injection 20 Units (not administered)   morphine (MS CONTIN) ER tablet 15 mg (not administered)   oxyCODONE (ROXICODONE) immediate release tablet 10 mg (10 mg Oral Given 11/10/18 1844)   pravastatin (PRAVACHOL) tablet 40 mg (40 mg Oral Given 11/10/18 1844)   traZODone (DESYREL) tablet 100 mg (not administered)   sodium chloride 0 9 % infusion (75 mL/hr Intravenous New Bag 11/10/18 1841)   insulin lispro (HumaLOG) 100 units/mL subcutaneous injection 1-6 Units (not administered)   insulin lispro (HumaLOG) 100 units/mL subcutaneous injection 1-5 Units (not administered)   heparin (porcine) subcutaneous injection 5,000 Units (not administered)   acetaminophen (TYLENOL) tablet 650 mg (not administered)   HYDROmorphone (DILAUDID) injection 0 5 mg (not administered)   ondansetron (ZOFRAN) injection 4 mg (4 mg Intravenous Given 11/10/18 1909)   amLODIPine (NORVASC) tablet 5 mg (5 mg Oral Given 11/10/18 1844)   ceFAZolin (ANCEF) IVPB (premix) 1,000 mg (1,000 mg Intravenous New Bag 11/10/18 1844)   senna-docusate sodium (SENOKOT S) 8 6-50 mg per tablet 1 tablet (1 tablet Oral Given 11/10/18 1844)   lactulose 20 g/30 mL oral solution 20 g (not administered)   hydrALAZINE (APRESOLINE) injection 15 mg (15 mg Intravenous Given 11/10/18 1738)       Diagnostic Studies  Results Reviewed     Procedure Component Value Units Date/Time    Urine Microscopic [[id number]]  (Abnormal) Collected:  11/10/18 1738    Lab Status:  Final result Specimen:  Urine from Urine, Suprapubic catheter Updated:  11/10/18 1818     RBC, UA 4-10 (A) /hpf      WBC, UA Innumerable (A) /hpf      Epithelial Cells Occasional /hpf      Bacteria, UA Innumerable (A) /hpf     Urine culture [[id number]] Collected:  11/10/18 1738    Lab Status:   In process Specimen:  Urine from Urine, Suprapubic catheter Updated:  11/10/18 1817    UA w Reflex to Microscopic w Reflex to Culture [[id number]]  (Abnormal) Collected:  11/10/18 1738    Lab Status:  Final result Specimen:  Urine from Urine, Suprapubic catheter Updated:  11/10/18 1804     Color, UA Yellow     Clarity, UA Slightly Cloudy     Specific Gravity, UA 1 025     pH, UA 6 0     Leukocytes, UA Moderate (A)     Nitrite, UA Negative     Protein,  (2+) (A) mg/dl      Glucose, UA Negative mg/dl      Ketones, UA Negative mg/dl      Urobilinogen, UA 0 2 E U /dl      Bilirubin, UA Negative     Blood, UA Moderate (A)    Fingerstick Glucose (POCT) [[id number]]  (Abnormal) Collected:  11/10/18 1801    Lab Status:  Final result Updated:  11/10/18 1803     POC Glucose 147 (H) mg/dl     Ammonia [[id number]]  (Normal) Collected:  11/10/18 1628    Lab Status:  Final result Specimen:  Blood from Arm, Left Updated:  11/10/18 1718     Ammonia 16 umol/L     Lipase [[id number]]  (Normal) Collected:  11/10/18 1348    Lab Status:  Final result Specimen:  Blood from Arm, Right Updated:  11/10/18 1541     Lipase 178 u/L     Protime-INR [[id number]]  (Normal) Collected:  11/10/18 1510    Lab Status:  Final result Specimen:  Blood from Arm, Right Updated:  11/10/18 1529     Protime 13 8 seconds      INR 1 05    APTT [[id number]]  (Normal) Collected:  11/10/18 1510    Lab Status:  Final result Specimen:  Blood from Arm, Right Updated:  11/10/18 1529     PTT 34 seconds     Troponin I [[id number]]  (Normal) Collected:  11/10/18 1348    Lab Status:  Final result Specimen:  Blood from Arm, Right Updated:  11/10/18 1418     Troponin I <0 02 ng/mL     Comprehensive metabolic panel [[id number]]  (Abnormal) Collected:  11/10/18 1348    Lab Status:  Final result Specimen:  Blood from Arm, Right Updated:  11/10/18 1417     Sodium 139 mmol/L      Potassium 4 0 mmol/L      Chloride 102 mmol/L      CO2 29 mmol/L      ANION GAP 8 mmol/L      BUN 40 (H) mg/dL      Creatinine 2 32 (H) mg/dL      Glucose 145 (H) mg/dL      Calcium 9 5 mg/dL      AST 23 U/L      ALT 19 U/L      Alkaline Phosphatase 100 U/L      Total Protein 8 1 g/dL      Albumin 3 5 g/dL      Total Bilirubin 0 44 mg/dL      eGFR 28 ml/min/1 73sq m     Narrative:         National Kidney Disease Education Program recommendations are as follows:  GFR calculation is accurate only with a steady state creatinine  Chronic Kidney disease less than 60 ml/min/1 73 sq  meters  Kidney failure less than 15 ml/min/1 73 sq  meters  CBC and differential [[id number]]  (Abnormal) Collected:  11/10/18 1348    Lab Status:  Final result Specimen:  Blood from Arm, Right Updated:  11/10/18 1357     WBC 12 40 (H) Thousand/uL      RBC 3 81 (L) Million/uL      Hemoglobin 11 5 (L) g/dL      Hematocrit 36 2 (L) %      MCV 95 fL      MCH 30 2 pg      MCHC 31 8 g/dL      RDW 14 4 %      MPV 9 9 fL      Platelets 729 Thousands/uL      nRBC 0 /100 WBCs      Neutrophils Relative 77 (H) %      Immat GRANS % 1 %      Lymphocytes Relative 12 (L) %      Monocytes Relative 7 %      Eosinophils Relative 3 %      Basophils Relative 0 %      Neutrophils Absolute 9 69 (H) Thousands/µL      Immature Grans Absolute 0 07 Thousand/uL      Lymphocytes Absolute 1 47 Thousands/µL      Monocytes Absolute 0 82 Thousand/µL      Eosinophils Absolute 0 32 Thousand/µL      Basophils Absolute 0 03 Thousands/µL                  CT abdomen pelvis wo contrast   ED Interpretation by Alejandrina Sandoval DO (11/10 1605)   CT ABDOMEN AND PELVIS WITHOUT IV CONTRAST       INDICATION:   abd pain  Patient fell        COMPARISON:  None        TECHNIQUE:  CT examination of the abdomen and pelvis was performed without intravenous contrast   Axial, sagittal, and coronal 2D reformatted images were created from the source data and submitted for interpretation         Radiation dose length product (DLP) for this visit:  445 mGy-cm   This examination, like all CT scans performed in the [facility], was performed utilizing techniques to minimize radiation dose exposure, including the use of iterative    reconstruction and automated exposure control         Enteric contrast was not administered    The absence of intravenous and gastrointestinal contrast significantly limits evaluation of the abdominal and pelvic viscera         FINDINGS:       ABDOMEN     LOWER CHEST:  Mild subsegmental atelectasis in the base of the right lower lobe        LIVER/BILIARY TREE:  Unremarkable        GALLBLADDER:  Several small calcified gallstones layering dependently        SPLEEN:  Unremarkable        PANCREAS:  Unremarkable        ADRENAL GLANDS:  Unremarkable        KIDNEYS/URETERS:  No hydronephrosis or calculus  Mild to moderate left ureterectasis to the level of the iliac vessels, chronicity and etiology not known        STOMACH AND BOWEL:  Stomach and small intestine unremarkable  Large amount of feces throughout the entire colon, including within a distended rectum, consistent with chronic constipation  Colon otherwise unremarkable        APPENDIX:  No findings to suggest appendicitis        ABDOMINOPELVIC CAVITY: No lymphadenopathy or mass  No ascites  No extraluminal gas        VESSELS:  Unremarkable for patient's age  No aortic aneurysm        PELVIS       REPRODUCTIVE ORGANS:  There appears to have been a prostatectomy        URINARY BLADDER:  Suprapubic catheter in place        ABDOMINAL WALL/INGUINAL REGIONS:  Unremarkable        OSSEOUS STRUCTURES:  Prior fusions at L2-L3 and L4-L5  Hardware intact  No evidence of recent fracture or destructive lesion        IMPRESSION:       1  Cholelithiasis  2   Mild left ureterectasis of unknown etiology, without hydronephrosis  3   Large amount of feces throughout distended colon, consistent with constipation  4   No evidence of acute abnormality in the abdomen or pelvis  Final Result by Abby Deras MD (01/95 4283)      1  Cholelithiasis  2   Mild left ureterectasis of unknown etiology, without hydronephrosis  3   Large amount of feces throughout distended colon, consistent with constipation  4   No evidence of acute abnormality in the abdomen or pelvis                 Workstation performed: [id number]         CT spine cervical without contrast   ED Interpretation by Suzan Olguin DO (11/10 1549)   CT CERVICAL SPINE - WITHOUT CONTRAST       INDICATION:   fall        COMPARISON:  None        TECHNIQUE:  CT examination of the cervical spine was performed without intravenous contrast   Contiguous axial images were obtained  Sagittal and coronal reconstructions were performed          Radiation dose length product (DLP) for this visit:  595 mGy-cm   This examination, like all CT scans performed in the [facility], was performed utilizing techniques to minimize radiation dose exposure, including the use of iterative    reconstruction and automated exposure control          IMAGE QUALITY:  Diagnostic        FINDINGS:       ALIGNMENT:  Extensive fusion from C3 through C6  Straightening of the cervical spine resulting from the fusion  Alignment otherwise unremarkable        VERTEBRAE:  Small nondisplaced cleft in the right side of the posterior ring of C1  This appears to have been present on an MRI of the cervical spine from October 2, 2017 (best appreciated on series 6, image 9)  This is either a congenital fusion    anomaly or, more likely, an old unhealed posterior ring fracture  In any event, there is no acute C1 fracture        No other evidence of fracture  No bone destruction or osteoblastic lesion        DISC SPACES:  Extensive cervical fusion as described above  Advanced degenerative disc disease at C3-C4 and C7-T1         ATLANTOAXIAL ARTICULATION: Bilateral lateral displacement of the C1 articular masses, consistent with the sequela of a Geo fracture  Odontoid process intact  Anterior atlantodental interval is normal   Calcification of the transverse atlantal    ligament        FACET JOINTS:  Advanced multilevel bilateral degenerative facet arthropathy         FORAMINA:   Foraminal stenosis at multiple levels          SPINAL CANAL:  Mild to moderate spinal stenosis at C3-C4, unchanged since the earlier MRI    Spinal canal otherwise normal in caliber      PREVERTEBRAL AND PARASPINAL SOFT TISSUES:  Normal        OTHER SOFT TISSUES OF THE NECK: Normal        INCLUDED SKULL BASE: Normal        IMAGED PORTIONS OF THE BRAIN: Normal        THORACIC INLET:  Normal  Lung apices clear            IMPRESSION:       1  Nondisplaced disruption of the right posterior ring of C1, probably an old ununited fracture, which appears to have been present on an MRI of the cervical spine from 10/2/2017  2   No evidence of acute fracture or traumatic malalignment  3   Widespread degenerative disease and prior fusions from C3 through C6  Final Result by Juan M Hood MD (11/10 1547)      1  Nondisplaced disruption of the right posterior ring of C1, probably an old ununited fracture, which appears to have been present on an MRI of the cervical spine from 10/2/2017  2   No evidence of acute fracture or traumatic malalignment  3   Widespread degenerative disease and prior fusions from C3 through C6  Workstation performed: [id number]         CT head without contrast   ED Interpretation by Zane Jung DO (11/10 7672)   CT BRAIN - WITHOUT CONTRAST       INDICATION:   AMS/falls        COMPARISON:  CT from October 1, 2017        TECHNIQUE:  CT examination of the brain was performed  In addition to axial images, coronal 2D reformatted images were created and submitted for interpretation          Radiation dose length product (DLP) for this visit:  987 mGy-cm   This examination, like all CT scans performed in the [facility], was performed utilizing techniques to minimize radiation dose exposure, including the use of iterative    reconstruction and automated exposure control          IMAGE QUALITY:  Diagnostic        FINDINGS:       PARENCHYMA:  No intracranial mass, mass effect or midline shift  No CT signs of acute infarction  No acute parenchymal hemorrhage        VENTRICLES AND EXTRA-AXIAL SPACES:  Normal for the patient's age    No extra-axial blood        VISUALIZED ORBITS AND PARANASAL SINUSES:  Mucosal thickening in both maxillary sinuses  Other paranasal sinuses clear  Orbits unremarkable        CALVARIUM AND EXTRACRANIAL SOFT TISSUES:  Left frontal scalp swelling  Skull intact        IMPRESSION:       No acute intracranial abnormality           Final Result by Linda France MD (11/10 9538)      No acute intracranial abnormality  Workstation performed: [id number]         X-ray chest 2 views    (Results Pending)   XR hips bilateral 2 vw w pelvis if performed    (Results Pending)   XR knee 4+ vw right injury    (Results Pending)   XR ankle 3+ views LEFT    (Results Pending)   XR knee 4+ vw left injury    (Results Pending)              Procedures  ECG 12 Lead Documentation  Date/Time: 11/10/2018 3:46 PM  Performed by: Mayuri Linder  Authorized by: Raisa CHAVEZ     Indications / Diagnosis:  Falls  ECG reviewed by me, the ED Provider: yes    Patient location:  ED  Previous ECG:     Previous ECG:  Compared to current    Comparison ECG info:  9/30/17  Rate:     ECG rate:  73  Rhythm:     Rhythm: sinus rhythm    Ectopy:     Ectopy: PAC    QRS:     QRS axis:  Normal  Conduction:     Conduction: normal    ST segments:     ST segments:  Normal           Phone Contacts  ED Phone Contact    ED Course  ED Course as of Nov 10 1911   Sat Nov 10, 2018   1444 WBC: (!) 12 40   1444 Baseline per previous Hemoglobin: (!) 11 5   1444 1 3 1 year ago Creatinine: (!) 2 32   1444 Blood Pressure: (!) 213/96                               MDM  Number of Diagnoses or Management Options  CYDNEY (acute kidney injury) ([facility] [address] ):   Confusion:   Recurrent falls:   Diagnosis management comments: 71 yo M with recurrent falls and confusion, found to have CYDNEY   Likely urine infection, but urine not collected yet- SLIM will f/u on this and pt admitted for further workup/management       Amount and/or Complexity of Data Reviewed  Clinical lab tests: ordered and reviewed  Tests in the radiology section of CPT®: ordered and reviewed  Tests in the medicine section of CPT®: ordered and reviewed      CritCare Time    Disposition  Final diagnoses:   CYDNEY (acute kidney injury) ([facility] [address] )   Recurrent falls   Confusion     Time reflects when diagnosis was documented in both MDM as applicable and the Disposition within this note     Time User Action Codes Description Comment    11/10/2018  4:59 PM Rennis Angst A Add [N17 9] CYDNEY (acute kidney injury) ([facility] [address] )     11/10/2018  4:59 PM Rennis Angst A Add [R29 6] Recurrent falls     11/10/2018  4:59 PM Josselin Hamlin Add [R41 0] Confusion     11/10/2018  7:09 PM Annalee Fluke Add [Z93 59] Suprapubic catheter [facility])       ED Disposition     ED Disposition Condition Comment    Admit  Case was discussed with BRENDA and the patient's admission status was agreed to be Admission Status: inpatient status to the service of Dr Vandana Medel   Follow-up Information    None         Current Discharge Medication List      CONTINUE these medications which have NOT CHANGED    Details   bacitracin ophthalmic ointment APPLY TO BOTH EYES TWICE DAILY  Refills: 3      baclofen 10 mg tablet Take 1 tablet (10 mg total) by mouth 3 (three) times a day  Qty: 270 tablet, Refills: 1    Associated Diagnoses: Spinal stenosis, unspecified spinal region      DULoxetine (CYMBALTA) 60 mg delayed release capsule Take 1 capsule (60 mg total) by mouth daily  Qty: 14 capsule, Refills: 0    Comments:  Will need 14 days supply, Patient had been taking 2 tablets daily, he states he did not realize he should have only taken 1 tablet daily  Associated Diagnoses: Diabetic polyneuropathy associated with type 2 diabetes mellitus (HCC)      gabapentin (NEURONTIN) 600 MG tablet 1 TABLET TWICE A DAY & 3 TABLETS AT BEDTIME  Qty: 450 tablet, Refills: 0    Associated Diagnoses: Neuropathy      glucose blood (ACCU-CHEK SMARTVIEW) test strip 1 each by Other route 2 (two) times a day  Qty: 100 each, Refills: 1    Associated Diagnoses: Type 2 diabetes mellitus with diabetic polyneuropathy, with long-term current use of insulin (HCC)      insulin NPH-insulin regular (NOVOLIN 70/30) 100 units/mL subcutaneous injection Inject 20 Units under the skin 2 (two) times a day  Qty: 20 mL, Refills: 4    Associated Diagnoses: Type 2 diabetes mellitus with complication, with long-term current use of insulin (HCC)      morphine (MS CONTIN) 15 mg 12 hr tablet TAKE 1 TABLET(S) EVERY 12 HOURS  Refills: 0      oxyCODONE (ROXICODONE) 10 MG TABS 1 TABLET(S) 3 TIMES A DAY (AS NEEDED)  Refills: 0      Polyethylene Glycol 3350 (MIRALAX PO) Take by mouth      simvastatin (ZOCOR) 40 mg tablet Take 1 tablet by mouth daily      traZODone (DESYREL) 100 mg tablet TAKE 1 TABLET (100 MG TOTAL) BY MOUTH DAILY AT BEDTIME  Qty: 90 tablet, Refills: 1    Associated Diagnoses: Insomnia, unspecified type           No discharge procedures on file      ED Provider  Electronically Signed by           Suzan Olguin DO  11/10/18 9514

## 2018-11-10 NOTE — PLAN OF CARE
DISCHARGE PLANNING     Discharge to home or other facility with appropriate resources Progressing        GENITOURINARY - ADULT     Maintains or returns to baseline urinary function Progressing     Absence of urinary retention Progressing     Urinary catheter remains patent Progressing        METABOLIC, FLUID AND ELECTROLYTES - ADULT     Electrolytes maintained within normal limits Progressing     Glucose maintained within target range Progressing        NEUROSENSORY - ADULT     Achieves stable or improved neurological status Progressing     Achieves maximal functionality and self care Progressing        PAIN - ADULT     Verbalizes/displays adequate comfort level or baseline comfort level Progressing        Potential for Falls     Patient will remain free of falls Progressing        Prexisting or High Potential for Compromised Skin Integrity     Skin integrity is maintained or improved Progressing        SAFETY ADULT     Maintain or return to baseline ADL function Progressing     Maintain or return mobility status to optimal level Progressing

## 2018-11-10 NOTE — ED NOTES
Spoke with charge RN on S2 and will page SLIM to evaluate BP prior to patient  going up to floor       Lavena Rubinstein, RN  11/10/18 4064

## 2018-11-10 NOTE — ASSESSMENT & PLAN NOTE
Not on maintenance medications for hypertension  Will give hydralazine IV x1 and order p r n     Will start low-dose amlodipine and reassess blood pressures

## 2018-11-11 NOTE — CONSULTS
Reason for Consult / SP tube    This is a 50-year-old- male with a prostatic obstruction and concurrent diabetic neurogenic bladder dysfunction  He has been in urinary retention with an indwelling suprapubic catheter for over 4 years  On multiple attempts for a voiding trial he has failed  He was due for to have an office suprapubic tube changed a few days ago, but he experienced a fall and symptoms of urinary tract infection which brought him to the emergency room and then admitted to the hospital     Chronic urinary colonization, not certain if has truly symptomatic UTI at this time  Culture - GNR, and yeast       Pt says he used to take cipro as needed at home, will discourage this to prevent reisistant organisms    Plan: Will order a 25 French Gomez catheter to the bedside for subsequent suprapubic tube change likely tomorrow  Check urine culture, and continue empiric IV antibiotics  Meds/Allergies       acetaminophen    baclofen    oxyCODONE  Prior to Admission Medications   Prescriptions Last Dose Informant Patient Reported? Taking?    DULoxetine (CYMBALTA) 30 mg delayed release capsule     Yes Yes   Sig: Take by mouth   ERYTHROMYCIN OP   Self Yes Yes   Sig: Apply 5 mg to eye Medrol Dose Pack scheduling ONLY   OXYCODONE HCL PO     Yes Yes   Sig: Take by mouth   Polyethylene Glycol 3350 (MIRALAX PO)     Yes No   Sig: Take by mouth   baclofen 10 mg tablet     Yes Yes   Sig: Take by mouth   gabapentin (NEURONTIN) 600 MG tablet     Yes Yes   Sig: Take by mouth   insulin NPH-insulin regular (NOVOLIN 70/30) 100 units/mL subcutaneous injection     Yes Yes   Sig: Inject under the skin   morphine (MS CONTIN) 15 mg 12 hr tablet     Yes Yes   Sig: Take by mouth   polyethylene glycol-propylene glycol (SYSTANE ULTRA) 0 4-0 3 %   Self Yes Yes   Sig: Apply to eye 3 (three) times a day   traZODone (DESYREL) 50 mg tablet     Yes Yes   Sig: Take by mouth      Facility-Administered Medications: None       Current Facility-Administered Medications:     acetaminophen (TYLENOL) tablet 650 mg, 650 mg    baclofen tablet 10 mg, 10 mg, Oral, TID PRN, Maxi Ordoñez DO, 10 mg      cefTRIAXone (ROCEPHIN) 1,000 mg in dextrose 5 % 50 mL IVPB, 1,000 mg, Intravenous, Q24H, Kary Soria MD, Stopped at     DULoxetine (CYMBALTA) delayed release capsule 30 mg, 30 mg, Oral, Daily, Kary Reed MD, 30 mg     enoxaparin (LOVENOX) subcutaneous injection 40 mg, 40 mg, Subcutaneous, Daily, Kary Soria MD, 40 mg     gabapentin (NEURONTIN) capsule 1,800 mg, 1,800 mg, Oral, HS, Maxi Ordoñez DO, 1,800 mg     gabapentin (NEURONTIN) capsule 600 mg, 600 mg, Oral, BID (AM & Afternoon), Maxi Ordoñez DO, 600 mg at     insulin aspart protamine-insulin aspart (NovoLOG 70/30) 100 units/mL subcutaneous injection 15 Units, 15 Units, Subcutaneous, BID AC, Kary Soria MD, 15 Units     insulin lispro (HumaLOG) 100 units/mL subcutaneous injection 1-6 Units, 1-6 Units, Subcutaneous, HS, Kary Soria MD, 1 Units at 10/01/17 2111    insulin lispro (HumaLOG) 100 units/mL subcutaneous injection 2-12 Units, 2-12 Units, Subcutaneous, TID AC, 4 Units at 10/03/17 1303 **AND** Fingerstick Glucose (POCT), , , TID AC, Kary Soria MD    morphine (MS CONTIN) ER tablet 15 mg, 15 mg, Oral, Q12H KIKA, Kary Soria MD, 15 mg at     nicotine (NICODERM CQ) 14 mg/24hr TD 24 hr patch 1 patch, 1 patch, Transdermal, Daily, Kary Soria MD    oxyCODONE (ROXICODONE) immediate release tablet 10 mg, 10 mg, Oral, Q8H PRN, Kary Soria MD, 10 mg at     polyvinyl alcohol (LIQUIFILM TEARS) 1 4 % ophthalmic solution 1 drop, 1 drop, Both Eyes, 4x Daily, Maxi Ordoñez DO, 1 drop at   Syl[facility] Julius  traZODone (DESYREL) tablet 50 mg, 50 mg, Oral, HS, Kary Reed MD, 50 mg at     Review of Systems  10 point review of systems negative except as noted in HPI    Allergies  No Known Allergies    PMH  Medical History Past Medical History:   Diagnosis Date    Diabetes mellitus      Neuropathy       charcot- Bre tooth         Past surgical history  Surgical History   History reviewed  No pertinent surgical history  Social history        Social History   Substance Use Topics    Smoking status: Current Some Day Smoker       Types: Cigars    Smokeless tobacco: Never Used    Alcohol use No      ?  Physical Exam  General appearance: alert, appears stated age and cooperative  Head: Normocephalic, without obvious abnormality, atraumatic  Neck: no adenopathy, no carotid bruit, no JVD, supple, symmetrical, trachea midline and thyroid not enlarged, symmetric, no tenderness/mass/nodules  Lungs: clear to auscultation bilaterally  Heart: regular rate and rhythm, S1, S2 normal, no murmur, click, rub or gallop  Abdomen: soft, non-tender; bowel sounds normal; no masses,  no organomegaly and SP in place, site  clean  Male genitalia: normal     Consults     Review of Systems        Historical Information     No Known Allergies  Medical History        Past Medical History:   Diagnosis Date    Diabetes mellitus      Neuropathy       charcot- Bre tooth         Surgical History   History reviewed   No pertinent surgical history         Social History         History   Alcohol Use No          History   Drug Use No      History   Smoking Status    Current Some Day Smoker    Types: Cigars   Smokeless Tobacco    Never Used      Family History: non-contributory           Lab Results:   CBC (With Platelets)   Order: [id number]   Status:  Final result   Visible to patient:  No (Not Released) Next appt:  01/31/2019 at 01:15 PM in Internal Medicine Amy Kevin MD)     Ref Range & Units 11/11/18 0504 11/10/18 1348    WBC 4 31 - 10 16 Thousand/uL 10 80   12 40      RBC 3 88 - 5 62 Million/uL 3 48   3 81      Hemoglobin 12 0 - 17 0 g/dL 10 6   11 5      Hematocrit 36 5 - 49 3 % 33 0   36 2      MCV 82 - 98 fL 95  95     MCH 26 8 - 34 3 pg 30 5  30 2     MCHC 31 4 - 37 4 g/dL 32 1  31 8     RDW 11 6 - 15 1 % 14 5  14 4     Platelets 330 - 571 Thousands/uL 234  269     MPV 8 9 - 12 7 fL 9 8  9 9       Specimen Collected: 11/11/18 05:04 Last Resulted: 11/11/18 05:17              Comprehensive metabolic panel   Order: [id number]   Status:  Final result   Visible to patient:  No (Not Released) Next appt:  01/31/2019 at 01:15 PM in Internal Medicine Tee Eden MD)     Ref Range & Units 11/11/18 0504 11/10/18 1348 10/2/17 0454 10/1/17 0512    Sodium 136 - 145 mmol/L 141  139  140  140     Potassium 3 5 - 5 3 mmol/L 4 0  4 0  4 0  3 9     Chloride 100 - 108 mmol/L 106  102  103  103     CO2 21 - 32 mmol/L 26  29  32  30     ANION GAP 4 - 13 mmol/L 9  8  5  7     BUN 5 - 25 mg/dL 40   40   22  22     Creatinine 0 60 - 1 30 mg/dL 2 35   2  32CM   1 36CM   1 52CM     Comment: Standardized to IDMS reference method    Glucose 65 - 140 mg/dL 158   145CM   143CM   145CM     Comment:             CT ABDOMEN AND PELVIS WITHOUT IV CONTRAST from 11/10/2018   INDICATION:   abd pain  Patient fell  COMPARISON:  None  TECHNIQUE:  CT examination of the abdomen and pelvis was performed without intravenous contrast   Axial, sagittal, and coronal 2D reformatted images were created from the source data and submitted for interpretation       Radiation dose length product (DLP) for this visit:  445 mGy-cm   This examination, like all CT scans performed in the [facility], was performed utilizing techniques to minimize radiation dose exposure, including the use of iterative   reconstruction and automated exposure control       Enteric contrast was not administered    The absence of intravenous and gastrointestinal contrast significantly limits evaluation of the abdominal and pelvic viscera       FINDINGS:     ABDOMEN     LOWER CHEST:  Mild subsegmental atelectasis in the base of the right lower lobe      LIVER/BILIARY TREE:  Unremarkable      GALLBLADDER: Several small calcified gallstones layering dependently      SPLEEN:  Unremarkable      PANCREAS:  Unremarkable      ADRENAL GLANDS:  Unremarkable      KIDNEYS/URETERS:  No hydronephrosis or calculus  Mild to moderate left ureterectasis to the level of the iliac vessels, chronicity and etiology not known      STOMACH AND BOWEL:  Stomach and small intestine unremarkable  Large amount of feces throughout the entire colon, including within a distended rectum, consistent with chronic constipation  Colon otherwise unremarkable      APPENDIX:  No findings to suggest appendicitis      ABDOMINOPELVIC CAVITY: No lymphadenopathy or mass  No ascites  No extraluminal gas      VESSELS:  Unremarkable for patient's age  No aortic aneurysm      PELVIS     REPRODUCTIVE ORGANS:  There appears to have been a prostatectomy      URINARY BLADDER:  Suprapubic catheter in place      ABDOMINAL WALL/INGUINAL REGIONS:  Unremarkable      OSSEOUS STRUCTURES:  Prior fusions at L2-L3 and L4-L5  Hardware intact  No evidence of recent fracture or destructive lesion      IMPRESSION:     1  Cholelithiasis  2   Mild left ureterectasis of unknown etiology, without hydronephrosis  3   Large amount of feces throughout distended colon, consistent with constipation    4   No evidence of acute abnormality in the abdomen or pelvis      Imaging Studies: I have personally reviewed pertinent reports

## 2018-11-11 NOTE — PROGRESS NOTES
Progress Note - [place] Garcia 70 y o  male MRN: [id number]    [facility] [address]02 Encounter: [id number]      Subjective: The patient is somewhat more alert today  He says he does not feel very well  He feels generally weak  He denies pain  He denies nausea or vomiting  He has no shortness of breath  Physical Exam:   Temp:  [97 2 °F (36 2 °C)-98 6 °F (37 °C)] 97 4 °F (36 3 °C)  HR:  [] 76  Resp:  [18-20] 20  BP: ()/() 163/77    Gen:  Well-developed, well-nourished, in no distress  Neck:  Supple  No lymphadenopathy, goiter, or bruit  Heart:  Regular rhythm  No murmur, gallop, or rub  Lungs:  Clear to auscultation and percussion  No wheezing, rales, or rhonchi    Abd:  Soft with active bowel sounds  No mass, tenderness, or organomegaly  Suprapubic tube is noted  Extremities:  No clubbing, cyanosis, or edema  Muscular atrophy related to Charcot-Bre-Tooth disease is noted  Neuro:  Mildly lethargic    No focal sign  Skin:  Warm and dry      LABS:   CBC:   Lab Results   Component Value Date    WBC 10 80 (H) 11/11/2018    HGB 10 6 (L) 11/11/2018    HCT 33 0 (L) 11/11/2018    MCV 95 11/11/2018     11/11/2018    MCH 30 5 11/11/2018    MCHC 32 1 11/11/2018    RDW 14 5 11/11/2018    MPV 9 8 11/11/2018   , CMP:   Lab Results   Component Value Date    SODIUM 141 11/11/2018    K 4 0 11/11/2018     11/11/2018    CO2 26 11/11/2018    BUN 40 (H) 11/11/2018    CREATININE 2 35 (H) 11/11/2018    CALCIUM 9 2 11/11/2018    AST 17 11/11/2018    ALT 16 11/11/2018    ALKPHOS 83 11/11/2018    EGFR 27 11/11/2018           Patient Active Problem List   Diagnosis    [facility] [facility]    [facility]rapub[facility] catheter [facility] [facility]    UTI (urinary tract infection)    Weakness    Charcot-Bre-Tooth disease    Frequent falls    Type 2 diabetes mellitus with hyperlipidemia ([facility] [address] [facility]    Essential hypertension    BPH (benign prostatic hyperplasia)    Chronic kidney disease, stage 3 ([facility] [address] )  Hypercholesterolemia    Insomnia    Neuropathy in diabetes (HCC)    Orthostatic hypotension    Spinal stenosis    DDD (degenerative disc disease), lumbosacral    Depression    Esophageal reflux    Geo fracture, closed, initial encounter [facility] [address] [facility]    Hepatitis B    Hepatitis C virus infection without hepatic coma    Physical debility    Chronic pain    Fecal impaction of colon (HCC)       Assessment/Plan:  1  Urinary tract infection with sepsis  2  Neurogenic bladder, status post suprapubic tube  3  Acute renal failure  4  Type 2 diabetes with peripheral neuropathy  5  Hypertension  6  History of orthostatic hypotension  7  Charcot-Bre-Tooth disease  8  Lumbar disc disease, status post surgery   9  Hypercholesterolemia  10  History of hepatitis-B and C    The patient is doing somewhat better today  We will continue intravenous hydration and intravenous antibiotics  Cultures are currently pending  We will carefully monitor the patient's creatinine  The patient had been asked to get a hepatitis profile as an outpatient but this has not yet been done  I have therefore reordered this to clarify this issue      VTE Pharmacologic Prophylaxis: Heparin  VTE Mechanical Prophylaxis: sequential compression device

## 2018-11-11 NOTE — PROGRESS NOTES
When nurse hold up bag to SPT purulent material was released in addition to urine  Will have urology evaluate in addition to IV antibiotics  Blood cultures were ordered prior to starting antibiotics on floor

## 2018-11-12 NOTE — DISCHARGE INSTRUCTIONS
PER UROLOGY------------Cath Care instructions---Maintain  catheter to straight drainage  May use leg bag and shower  May flush daily prn using Clarisse syringe and 120 ml NSS  May use more saline ad thelma to prevent/treat cath obstruction  Call for Urologist follow-up  Information above  SPT can remain in place for up to 4 weeks at a time  Then exchange using 18FR    SPT exchanged/ placed last at [facility] on

## 2018-11-12 NOTE — PLAN OF CARE
Problem: PHYSICAL THERAPY ADULT  Goal: Performs mobility at highest level of function for planned discharge setting  See evaluation for individualized goals  Treatment/Interventions: Functional transfer training, LE strengthening/ROM, Therapeutic exercise, Endurance training, Patient/family training, Equipment eval/education, Bed mobility, Gait training, Compensatory technique education, Continued evaluation, Spoke to nursing, OT          See flowsheet documentation for full assessment, interventions and recommendations  Prognosis: Fair  Problem List: Decreased strength, Decreased range of motion, Decreased endurance, Impaired balance, Decreased mobility, Decreased safety awareness, Decreased skin integrity, Pain  Assessment: Pt is 71 y/o male admitted with physical debility with hx of underlying Charcot Bre-tooth disease, UTI, CYDNEY, fecal impaction, HTN  PT consulted, Up with assist   Reports independent with use of RW in apt in which he resides with brother  Reports hx of 2 recent falls leading to admission  At present exam is limited 2* participation  Declines mobilty assessment or ambulation at this time  Presents with decreased general strength  Chronic L shoulder ROM limitations and impaired strength  Impaired RLE sensation and decreased accuracy of LLE  Given hx of fall and need for RW for ambulation with previously mentioned impairments, will benefit from ongoing skilled PT in order to assure return to baseline level of function for safe d/c to home  Pt indicates desire to return home and not rhb, therefore must achieve independence  PT to see next session for further mobilty assessment and to provide appropriate d/c recommendations  Barriers to Discharge: Decreased caregiver support     Recommendation:  (TBD following mobiltiy assessment)     PT - OK to Discharge: No    See flowsheet documentation for full assessment

## 2018-11-12 NOTE — PLAN OF CARE
Problem: OCCUPATIONAL THERAPY ADULT  Goal: Performs self-care activities at highest level of function for planned discharge setting  See evaluation for individualized goals  Treatment Interventions: ADL retraining, Functional transfer training, UE strengthening/ROM, Endurance training, Compensatory technique education, Activityengagement          See flowsheet documentation for full assessment, interventions and recommendations  Limitation: Decreased ADL status, Decreased UE ROM, Decreased UE strength, Decreased Safe judgement during ADL, Decreased endurance, Decreased self-care trans, Decreased high-level ADLs  Prognosis: Good  Assessment: Pt is a 70 y o  male admitted to the hospital s/p 2 falls in one day  Pt reports diziness  Pt noted with physical debility  Pt has PMH of Charcot-Bre-Tooth disease and orthostatic hypotension  CT of the head and c-spine - for acute changes  XR of knee, ankle, and hip also - for acute changes  Pt noted with L foot ulcer, podiatry not indicating weight bearing restriction  PTA pt states independent with ADLs and functional mobility--uses RW for ambulation  Needs assistance from brother with iADLs  +drives, +falls (=2), + home alone  During initial eval pt demonstrated deficits in transfer saftey, functional mobility, ADL status, functional balance, activity tolerance (fair=15-20), and b/l UE strength  Pt would benefit from continued OT tx for above deficits  Pt would like to go home, but agrees to STR or home therapy services  Recommended d/c STR  See end of note for goals        OT Discharge Recommendation: Short Term Rehab (pt perfers home with therapy services)  OT - OK to Discharge: Yes (When medically stable )

## 2018-11-12 NOTE — PLAN OF CARE

## 2018-11-12 NOTE — PROCEDURES
BEDSIDE PROCEDURE      Suprapubic Catheter Exchange PROCEDURE NOTE      Pre-operative Diagnosis: Neurogenic Bladder          Post-operative Diagnosis: same      INDICATION   71 y/o male with NGB & long term SPT admitted with change in mental status  Consultation requested to perform SPT exchange  Last previous Catheter exchange date is unknown  TIME OUT: Correct patient identity  PROCEDURE   Consent: Patient was agreeable  Formal Informed consent however, not applicable  Under sterile conditions the patient was positioned  Betadine solution and sterile drapes were utilized  Non- Petroleum based gel was used to lubricate Cystotomy insertion site  Utilized 18FR Catheter  Urine was obtained without any difficulties and Without evidence of hematuria or trauma  A Drain sponge was applied to the insertion site and wound care instructions were provided  Findings  50 ml of clear yellow urine was obtained  Note: Patient's bladder was essentially empty at time of catheter insertion      Complications:  None; patient tolerated the procedure well  Condition: stable      Plan  Maintain SPT to straight drainage  Flush daily using Clarisse syringe and 60 ml NSS  Next exchange in 4 weeks  No further  intervention required                BLAINE Espinosa              Attending Attestation: Not Applicable                User Provider Type Action

## 2018-11-12 NOTE — SOCIAL WORK
CM met with pt at bedside to discuss discharge needs  Pt lives in a 3rd floor apartment with elevator access  Pt lives with his brother  ADL's are completed independently  Pt ambulates with a RW; wheels on front and back  PCP identified as Dr Claire Blake  Pharmacy identified as [facility]   Pt reports hx with [facility]; Hx of STR at [facility]  Pt does drive but reports that he will not be able to for a little bit  His brother is able to assist with transportation  Pt reports that he will call his sister for a ride home at D/C  Pt denies POA; CM provided pt with information  No needs expressed or identified at this time  CM following

## 2018-11-12 NOTE — PROGRESS NOTES
Progress Note - Senia Marie 70 y o  male MRN: [id number]    Unit/Bed#: [facility] [address] 212-02 Encounter: [id number]      Subjective: The patient feels and looks considerably better today  His lethargy has resolved  He feels generally weak  He is not having much pain  He is able to eat  He slept pretty well  He has no chest pain, shortness of breath, nausea, or vomiting  Physical Exam:   Temp:  [97 6 °F (36 4 °C)-98 3 °F (36 8 °C)] 98 3 °F (36 8 °C)  HR:  [71-85] 85  Resp:  [16-18] 17  BP: (160-198)/(78-97) 190/78    Gen:  Well-developed, well-nourished, in no distress  Neck:  Supple  No lymphadenopathy, goiter, or bruit  Heart:  Regular rhythm  No murmur, gallop, or rub  Lungs:  Clear to auscultation and percussion  No wheezing, rales, or rhonchi    Abd:  Soft with active bowel sounds  No mass, tenderness, or organomegaly  Suprapubic catheter is in place  Extremities:  No clubbing, cyanosis, or edema  There is a small crack in the patient's skin under the right 5th toe  Muscle atrophy related to the patient's Charcot-Bre-Tooth disease is noted  Neuro:  Alert and oriented  No focal sign  Skin:  Warm and dry      LABS:   No labs today        Patient Active Problem List   Diagnosis    [facility]) [facility] [address] [facility]    Suprapubic catheter ([facility] [address] [facility]    UTI (urinary tract infection)    Weakness    Charcot-Bre-Tooth disease    Frequent falls    Type 2 diabetes mellitus with hyperlipidemia ([address]    Essential hypertension    BPH (benign prostatic hyperplasia)    Chronic kidney disease, stage 3 (HCC)    Hypercholesterolemia    Insomnia    Neuropathy in diabetes ([address]    Orthostatic hypotension    Spinal stenosis    DDD (degenerative disc disease), lumbosacral    Depression    Esophageal reflux    Geo fracture, closed, initial encounter ([facility] [address] )    Hepatitis B    Hepatitis C virus infection without hepatic coma    Physical debility    Chronic pain    Fecal impaction of colon ([address] ) Assessment/Plan:  1  Urinary tract infection with sepsis, related to the patient's suprapubic catheter  2  Neurogenic bladder, status post suprapubic tube  3  Acute renal failure  4  Type 2 diabetes with peripheral neuropathy  5  Hypertension  6  History of orthostatic hypotension  7  Charcot-Bre-Tooth disease  8  Lumbar disc disease, status post surgery  9  Hypercholesterolemia  10  History of hepatitis-B and C, serology pending  11  Chronic opioid use related to 8  The patient has improved significantly  His current antibiotic therapy will continue until his culture results are available  We will continue to monitor his creatinine  Hepatitis profile is pending  The patient's hemoglobin A1c was 6 7  His insulin will be reduced somewhat        VTE Pharmacologic Prophylaxis: Heparin  VTE Mechanical Prophylaxis: sequential compression device
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
## 2018-11-12 NOTE — CONSULTS
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 - stable    Plan:  - wound evaluated at bedside, no acute signs of infection; no surgical intervention necessary, render local wound care with dermagran and dry sterile dressing to be changed every 2-3 days  - no weight-bearing restrictions  - patient is stable from Podiatry standpoint, patient follow up with Dr Radha Centeno at her office upon discharge for outpatient management    Thank you for this consultation  Discussed plan with attending in agreement  History of Present Illness   Physician Requesting Consult: Mita Moon MD  Reason for Consult / Principal Problem: right foot wound  Hx and PE limited by:   HPI: Radha Prakash is a 70y o  year old male who presents with right 5th toe ulceration with pain  He states it started about a couple days ago  He is worried for infection  He sees a podiatrist Dr Radha Centeno, however he does not recall his last appointment  He reports a history of previous infection with need for amputation to right foot about 3-4 years ago  He has difficulty ambulating and needs use of walker  He is a diabetic male with blood sugar management with insulin  Denies any further complications  Inpatient consult to Podiatry     Performed by  Moris Winkler by Angela Ann       Consent: Verbal consent obtained  Review of Systems   Musculoskeletal: Positive for gait problem  Skin: Positive for wound  Negative for color change  Neurological: Positive for numbness     All other systems reviewed and are negative  Historical Information   Past Medical History:   Diagnosis Date    BPH with obstruction/lower urinary tract symptoms     Bundle branch block, right     Charcot-Bre-Tooth disease     Chronic cystitis     Diabetes mellitus ([address] )     Fracture of clavicle     Hypertension     Nephropathy     Neuropathy     charcot- Bre tooth    Spinal stenosis     Ureteral calculus     Urinary retention      Past Surgical History:   Procedure Laterality Date    CYSTOSCOPY  2014, 2015, 2016, 2017    CYSTOSCOPY W/ RETROGRADES Bilateral     CYSTOSCOPY W/ URETERAL STENT PLACEMENT Right 12/30/2015    CYSTOSCOPY W/ URETERAL STENT REMOVAL Right 2016    CYSTOSCOPY W/ URETEROSCOPY W/ LITHOTRIPSY Right 12/30/2015    FOOT SURGERY Right     Amputation of toe, Onset - 10/1/12    HEMORROIDECTOMY      KNEE SURGERY Left     PCL and MCL - meniscus, Onset - 1981    LUMBAR FUSION      Transverse lumbar interbody fusion L2-L3 and METRx Lt hemilaminectomy and ema foraminotomy-decompressive at L4-L5 Dr Hernan Chris and Dr Manuel Graves;  Onset - 5/13/13       TRANSURETHRAL RESECTION OF PROSTATE  01/2014     Social History   History   Alcohol Use No     History   Drug Use No     History   Smoking Status    Light Tobacco Smoker    Years: 4 00    Types: Cigars    Last attempt to quit: 1995   Smokeless Tobacco    Never Used     Comment: occasional cigar     Family History:   Family History   Problem Relation Age of Onset    Dementia Mother     Hypertension Father     Pneumonia Maternal Grandfather     Cancer Family     Diabetes Family        Meds/Allergies   all current active meds have been reviewed    No Known Allergies    Objective       Intake/Output Summary (Last 24 hours) at 11/12/18 1032  Last data filed at 11/12/18 0546   Gross per 24 hour   Intake              980 ml   Output             1750 ml   Net             -770 ml           Physical Exam   Constitutional: He is oriented to person, place, and time  No distress  HENT:   Head: Normocephalic  Lesion noted on left forehead secondary to recent fall   Eyes: Pupils are equal, round, and reactive to light  Cardiovascular: Normal rate, regular rhythm and normal heart sounds  DP PT pulses positive 2/4 bilaterally; cap refill within normal limits; no pitting edema, skin temp warm to warm from toes to tibial tuberosity, no pedal hair is noted   Pulmonary/Chest: He is in respiratory distress  Abdominal: There is no tenderness  Musculoskeletal: He exhibits edema and deformity (Bilateral lower extremity; history of right 4th ray amputation)  He exhibits no tenderness  Ambulatory with use of walker however limited   Neurological: He is alert and oriented to person, place, and time  Gross sensation intact, epicritic sensation diminished bilaterally   Skin: No erythema  Fissure noted to plantar aspect of 5th digit - granular wound base with sanguinous drainage noted, no deep probe, no malodor, no cellulitis, and no edema, no erythema, no acute signs of infection noted    No ID macerations    Superficial lesion noted to right anterior leg   Psychiatric: He has a normal mood and affect  His behavior is normal  Judgment and thought content normal            Lab Results: I have personally reviewed pertinent labs  CBC: No results found for: WBC, RBC  CMP: No results found for: SODIUM, CL, CO2, ANIONGAP, BUN, CREATININE, GLUCOSE, CALCIUM, AST, ALT, ALKPHOS, PROT, BILITOT, EGFR      Results from last 7 days  Lab Units 11/10/18  1901 11/10/18  1900   BLOOD CULTURE  No Growth at 24 hrs  No Growth at 24 hrs  Imaging Studies: I have personally reviewed pertinent reports  EKG, Pathology, and Other Studies: I have personally reviewed pertinent reports

## 2018-11-12 NOTE — OCCUPATIONAL THERAPY NOTE
OccupationalTherapy Evaluation([id number])     Patient Name: Sangita Mcgovern  NFJLF'L Date: 11/12/2018  Problem List  Patient Active Problem List   Diagnosis    [facility]    Suprapubic catheter ([facility] )    UTI (urinary tract infection)    Weakness    Charcot-Bre-Tooth disease    Frequent falls    Type 2 diabetes mellitus with hyperlipidemia ([facility] )    Essential hypertension    BPH (benign prostatic hyperplasia)    Chronic kidney disease, stage 3 ([facility] )    Hypercholesterolemia    Insomnia    Neuropathy in diabetes ([facility] )    Orthostatic hypotension    Spinal stenosis    DDD (degenerative disc disease), lumbosacral    Depression    Esophageal reflux    Geo fracture, closed, initial encounter ([place] [address] )    Hepatitis B    Hepatitis C virus infection without hepatic coma    Physical debility    Chronic pain    Fecal impaction of colon ([place] [address] )     Past Medical History  Past Medical History:   Diagnosis Date    BPH with obstruction/lower urinary tract symptoms     Bundle branch block, right     Charcot-Bre-Tooth disease     Chronic cystitis     Diabetes mellitus ([facility] )     Fracture of clavicle     Hypertension     Nephropathy     Neuropathy     charcot- Bre tooth    Spinal stenosis     Ureteral calculus     Urinary retention      Past Surgical History  Past Surgical History:   Procedure Laterality Date    CYSTOSCOPY  2014, 2015, 2016, 2017    CYSTOSCOPY W/ RETROGRADES Bilateral     CYSTOSCOPY W/ URETERAL STENT PLACEMENT Right 12/30/2015    CYSTOSCOPY W/ URETERAL STENT REMOVAL Right 2016    CYSTOSCOPY W/ URETEROSCOPY W/ LITHOTRIPSY Right 12/30/2015    FOOT SURGERY Right     Amputation of toe, Onset - 10/1/12    HEMORROIDECTOMY      KNEE SURGERY Left     PCL and MCL - meniscus, Onset - 1981    LUMBAR FUSION      Transverse lumbar interbody fusion L2-L3 and METRx Lt hemilaminectomy and ema foraminotomy-decompressive at L4-L5 Dr Edi Hsu and Dr Frances Shaikh;  Onset - 5/13/13       TRANSURETHRAL RESECTION OF PROSTATE  01/2014 11/12/18 1200   Note Type   Note type Eval only   Restrictions/Precautions   Weight Bearing Precautions Per Order No   Other Precautions Fall Risk;Pain   Pain Assessment   Pain Assessment 0-10   Pain Score 8   Pain Type Chronic pain   Pain Location Neck;Back   Hospital Pain Intervention(s) Repositioned; Ambulation/increased activity   Response to Interventions Tolerated   Home Living   Type of [address] One level;Elevator;Ramped entrance   [address] chair   [address] [facility]   Prior Function   Level of [address] with ADLs and functional mobility   Lives With Family  (Brother)   Receives Help From Family   ADL Assistance Independent   IADLs Needs assistance   Falls in the last 6 months 1 to 4  (2)   Vocational Retired   Lifestyle   Autonomy PTA pt states independent with ADLs and functional mobility--uses RW for ambulation  Needs assistance from brother with iADLs  neg drives, +falls (=2), + home alone   Reciprocal Relationships Supportive brother and sister   Service to Others Retired from working at [employer] [employer] ([address]) [employer] "Walking feels different"   ADL   Where Assessed Edge of bed   Eating Assistance 5  Supervision/Setup   Grooming Assistance 5  [facility] 4  Minimal Assistance   Bed Mobility   Rolling R 6  Modified independent   Additional items Bedrails;HOB elevated; Increased time required   Supine to Sit 4  Minimal assistance   Additional items Assist x 1;Verbal cues; Increased time required; Bedrails;HOB elevated  (Scooting to EOB)   Sit to Supine 4  Minimal assistance   Additional items Assist x 1;Bedrails; Increased time required;Verbal cues;LE management   Transfers   Sit to Stand 4  Minimal assistance   Additional items Assist x 1;Bedrails;Verbal cues; Increased time required   Stand to Sit 4  Minimal assistance   Additional items Assist x 1;Bedrails; Increased time required;Verbal cues   Functional Mobility   Functional Mobility 4  Minimal assistance  (x1)   Additional Comments BP EOB= 179/97, nsg notified   Additional items Rolling walker   Balance   Static Sitting Fair +   Dynamic Sitting Fair   Static Standing Fair   Dynamic Standing Fair -   Activity Tolerance   Activity Tolerance Patient tolerated treatment well;Patient limited by fatigue   Medical Staff Made Aware nsg   RUE Assessment   RUE Assessment WFL  (b/l muscle wasting noted)   RUE Strength   RUE Overall Strength Within Functional Limits - able to perform ADL tasks with strength  (3+/5 throughout )   LUE Assessment   LUE Assessment X  (limited shoulder ROM =about 80 degrees, end range =about 95)   LUE Strength   LUE Overall Strength Deficits  (shoulder= 3/5 , elbow-distal=3+/5 )   Hand Function   Gross Motor Coordination Functional   Fine Motor Coordination Functional   Sensation   Light Touch No apparent deficits   Proprioception   Proprioception No apparent deficits   Vision-Basic Assessment   Current Vision Other (Comment)  (reports he should wear glasses, unable to see remote)   Visual History Cataracts; Corrective eye surgery  (surgery for cataracts)   Vision - Complex Assessment   Acuity Able to read clock/calendar on wall without difficulty   Perception   Inattention/Neglect Appears intact   Cognition   Overall Cognitive Status [facility]   Arousal/Participation Alert; Responsive; Cooperative   Attention Within functional limits   Orientation Level Oriented to person;Oriented to place;Oriented to situation   Memory Within functional limits  (Slowed recall )   Following Commands Follows one step commands without difficulty   Assessment   Limitation Decreased ADL status; Decreased UE ROM;Decreased UE strength;Decreased Safe judgement during ADL;Decreased endurance;Decreased self-care trans;Decreased high-level ADLs   Prognosis Good   Assessment Pt is a 70 y o  male admitted to the hospital s/p 2 falls in one day  Pt reports diziness  Pt noted with physical debility  Pt has PMH of Charcot-Bre-Tooth disease and orthostatic hypotension  CT of the head and c-spine - for acute changes  XR of knee, ankle, and hip also - for acute changes  Pt noted with L foot ulcer, podiatry not indicating weight bearing restriction  PTA pt states independent with ADLs and functional mobility--uses RW for ambulation  Needs assistance from brother with iADLs  +drives, +falls (=2), + home alone  During initial eval pt demonstrated deficits in transfer saftey, functional mobility, ADL status, functional balance, activity tolerance (fair=15-20), and b/l UE strength  Pt would benefit from continued OT tx for above deficits  Pt would like to go home, but agrees to STR or home therapy services  Recommended d/c STR  See end of note for goals  Goals   Patient Goals To go home   Plan   Treatment Interventions ADL retraining;Functional transfer training;UE strengthening/ROM; Endurance training; Compensatory technique education; Activityengagement   Goal Expiration Date 11/23/18   Treatment Day 0   OT Frequency 3-5x/wk   Recommendation   OT Discharge Recommendation Short Term Rehab  (pt perfers home with therapy services)   OT - OK to Discharge Yes  (When medically stable )   Barthel Index   Feeding 10   Bathing 0   Grooming Score 5   Dressing Score 5   Bladder Score 0   Bowels Score 10   Toilet Use Score 5   Transfers (Bed/Chair) Score 10   Mobility (Level Surface) Score 0   Stairs Score 0   Barthel Index Score 45   Modified [place] Scale   Modified [place] Scale 4     Occupational Therapy Goals (7-10 days):     1   Pt will independently verbalize 2-3 potential fall risks/transfer safety hazards and their appropriate compensation techniques       2  Pt will demonstrate proper transfer safety 100% of the time       3  Pt will demonstrate mod I with their sit-stand transfers to assist with completion of their LE ADLs     4  Pt will demonstrate improved activity tolerance to good (20-30 mins) and standing tolerance to 5-7 mins to assist with ADL tasks       5  Pt will demonstrate an increase in b/i UE strength by 1/2 MM score for participation in ADL completion       6  Pt will demonstrate g balance 100% of the time       7  Pt will demonstrate mod I with their UE and LE bathing/dressing       8  Pt will demonstrate mod I with bed mobility to facilitate EOB ADLs     9   Pt will demonstrate g carryover with pt education and training 100% of the time       ALEX Leon

## 2018-11-12 NOTE — PHYSICAL THERAPY NOTE
PT EVALUATION    70 y o     [id number]    Dizziness [R42]  Confusion [R41 0]  Multiple injuries [T07  XXXA]  [facility]) ([facility] ) [N17 9]  Recurrent falls [R29 6]    Past Medical History:   Diagnosis Date    BPH with obstruction/lower urinary tract symptoms     Bundle branch block, right     Charcot-Bre-Tooth disease     Chronic cystitis     [facility]     Fracture of clavicle     Hypertension     Nephropathy     Neuropathy     charcot- Bre tooth    Spinal stenosis     Ureteral calculus     Urinary retention          Past Surgical History:   Procedure Laterality Date    CYSTOSCOPY  2014, 2015, 2016, 2017    CYSTOSCOPY W/ RETROGRADES Bilateral     CYSTOSCOPY W/ URETERAL STENT PLACEMENT Right 12/30/2015    CYSTOSCOPY W/ URETERAL STENT REMOVAL Right 2016    CYSTOSCOPY W/ URETEROSCOPY W/ LITHOTRIPSY Right 12/30/2015    FOOT SURGERY Right     Amputation of toe, Onset - 10/1/12    HEMORROIDECTOMY      KNEE SURGERY Left     PCL and MCL - meniscus, Onset - 1981    LUMBAR FUSION      Transverse lumbar interbody fusion L2-L3 and METRx Lt hemilaminectomy and ema foraminotomy-decompressive at L4-L5 Dr Prabhakar Perry and Dr Jose Nichole; Onset - 5/13/13       TRANSURETHRAL RESECTION OF PROSTATE  01/2014 11/12/18 1340   Note Type   Note type Eval only   Pain Assessment   Pain Score 7   Pain Type Acute pain;Chronic pain   Pain Location Back;Neck;Leg   Hospital Pain Intervention(s) Emotional support   Home Living   Type of Home Apartment   Home Layout One level;Elevator   Bathroom Shower/Tub Walk-in shower   Bathroom Toilet Standard   Bathroom Equipment Grab bars in shower; Shower chair   Bathroom Accessibility Accessible   Home Equipment Walker   Additional Comments resides with brother in apt  Reports brother works  Alone from approximately 10-1     Prior Function   Level of [place] Independent with ADLs and functional mobility   [facility] From [facility] ADL Assistance Independent   IADLs Needs assistance   Falls in the last 6 months 1 to 4  (2)   Vocational Retired   Comments I PTA with use of RW  Restrictions/Precautions   Weight Bearing Precautions Per Order No   Other Precautions Fall Risk;Pain   General   Additional Pertinent History Pt is 69 y/o male admitted with falls x 2, weakness and debility  UTI, CYDNEY, fecal impaction  PT consulted  Up with assist    Cognition   Overall Cognitive Status WFL   RUE Assessment   RUE Assessment WFL  (groslsy 4-/5)   LUE Assessment   LUE Assessment X  (prximal limitations noted grossly 3-/5, otherwise 4-/5)   RLE Assessment   RLE Assessment WFL  (grossly 4-/5)   LLE Assessment   LLE Assessment WFL  (groslsy 4-/5)   Sharp/Dull   RLE Sharp/Dull Impaired   LLE Sharp/Dull Grossly intact   Bed Mobility   Additional Comments refused mobilty at this time  Endurance Deficit   Endurance Deficit Yes   Endurance Deficit Description fatigue  Activity Tolerance   Activity Tolerance Patient tolerated treatment well;Patient limited by fatigue   Medical Staff Made Aware nursing  Nurse [address] [place] [address] for PT to see per nalini   Assessment   Prognosis Fair   Problem List Decreased strength;Decreased range of motion;Decreased endurance; Impaired balance;Decreased mobility; Decreased safety awareness;Decreased skin integrity;Pain   Assessment Pt is 69 y/o male admitted with physical debility with hx of underlying Charcot Bre-tooth disease, UTI, CYDNEY, fecal impaction, HTN  PT consulted, Up with assist   Reports independent with use of RW in apt in which he resides with brother  Reports hx of 2 recent falls leading to admission  At present exam is limited 2* participation  Declines mobilty assessment or ambulation at this time  Presents with decreased general strength  Chronic L shoulder ROM limitations and impaired strength  Impaired RLE sensation and decreased accuracy of LLE    Given hx of fall and need for RW for ambulation with previously mentioned impairments, will benefit from ongoing skilled PT in order to assure return to baseline level of function for safe d/c to home  Pt indicates desire to return home and not rhb, therefore must achieve independence  PT to see next session for further mobilty assessment and to provide appropriate d/c recommendations  Barriers to Discharge Decreased caregiver support   Goals   Patient Goals go home   STG Expiration Date 11/22/18   Short Term Goal #1 10 days: 1)  Improve overall strength and balance 1/2 grade in order to optimize ability to perform functional tasks and reduce fall risk  2) Increase activity tolerance to 45 minutes in order to improve endurance to functional tasks 3) PT for ongoing patient and family/caregiver education, DME needs and d/c planning in order to promote highest level of function in least restrictive environment  4) Further assess functional mobiltiy and revise goals  Treatment Day 0   Plan   Treatment/Interventions Functional transfer training;LE strengthening/ROM; Therapeutic exercise; Endurance training;Patient/family training;Equipment eval/education; Bed mobility;Gait training; Compensatory technique education;Continued evaluation;Spoke to nursing;OT   PT Frequency Other (Comment)  (4-6x/wk)   Recommendation   Recommendation (TBD following mobiltiy assessment)   PT - OK to Discharge No   Modified Barling Scale   Modified Barling Scale 4   Barthel Index   Feeding 10   Bathing 0   Grooming Score 5   Dressing Score 5   Bladder Score 0   Bowels Score 10   Toilet Use Score 5   Transfers (Bed/Chair) Score 10   Mobility (Level Surface) Score 0   Stairs Score 0   Barthel Index Score 45     History: co - morbidities, fall risk, use of assistive device, assist for adl's, multiple lines  Exam: impairments in locomotion, musculoskeletal, balance,posture, joint integrity, skin integrity, cardiac, barthel 45  Clinical: unstable/unpredictable  Complexity:high    Marilee Palmer, PT

## 2018-11-13 NOTE — PLAN OF CARE
Problem: OCCUPATIONAL THERAPY ADULT  Goal: Performs self-care activities at highest level of function for planned discharge setting  See evaluation for individualized goals  Treatment Interventions: ADL retraining, Functional transfer training, Endurance training, Activityengagement          See flowsheet documentation for full assessment, interventions and recommendations  Limitation: Decreased ADL status, Decreased UE ROM, Decreased UE strength, Decreased Safe judgement during ADL, Decreased endurance, Decreased self-care trans, Decreased high-level ADLs  Prognosis: Good  Assessment: Pt was seen for a 38 min skilled OT tx with a focus on LE dressing, functional balance, functional mobility, transfer safety, and activity tolerance (curently = fair 15-20 mins)  Noted deficits with memory and judgement/safety  Pt was able to complete LE dressing with min A for donning L shoe  Pt was able to don/doff socks with supervision  Pt was able to tolerate EOB sitting for 3-5 mins with f/f+ balance  Standing balance of f-/p+  Pt demonstrated impaired safety judgement during functional transfers  Verbal cueing required for hand placement and RW techniques  Pt had surgical shoe for R foot per podiatry note  Pt was sitting up in the chair at the end of session with chair alarm on and all needs in reach, nsg notified  Pt continues to demonstrate appropriateness for STR although patient perfers to go directly home  Pt was cooperative and motivated        OT Discharge Recommendation: Short Term Rehab (pt perfers to return home )  OT - OK to Discharge: Yes (When medically stable )

## 2018-11-13 NOTE — PLAN OF CARE
Problem: PHYSICAL THERAPY ADULT  Goal: Performs mobility at highest level of function for planned discharge setting  See evaluation for individualized goals  Treatment/Interventions: Functional transfer training, LE strengthening/ROM, Therapeutic exercise, Endurance training, Patient/family training, Equipment eval/education, Bed mobility, Gait training, Compensatory technique education, Continued evaluation, Spoke to nursing, OT          See flowsheet documentation for full assessment, interventions and recommendations  Outcome: Progressing  Prognosis: Fair  Problem List: Decreased strength, Decreased endurance, Impaired balance, Decreased mobility, Decreased coordination, Decreased safety awareness, Decreased skin integrity, Pain, Decreased range of motion  Assessment: Seen for progression of PT and mobility training  Cristhian needed for transfers with increased time and cues  Bed height elevated for sit to stand transfer and requires increased assist to stabilize RW upon transition  Gait with Cristhian  Cues for UE WB, upright posture, to remain inside DOUG of RW  Impaired weight shift with poor transition of weight onto RLE with assist to weight shift needed  Poor LLE foot clearance in step  Gait very slowed and unsteady  Encouragement needed to remain OOB in chair  Alarm in place  Tolerated just few LE there ex following ambulation  Discussed PT rec for STR, however pt prefers to go directly home  Given impairments and reports is functioning "50% worse" than his baseline, rehab is recommended  Barriers to Discharge: Decreased caregiver support     Recommendation: Short-term skilled PT     PT - OK to Discharge: Yes (to rhb NO to home )    See flowsheet documentation for full assessment

## 2018-11-13 NOTE — PROGRESS NOTES
Progress Note - Nikolas Healy 70 y o  male MRN: [id number]    Unit/Bed#: [address] 212[address]02 Encounter: [id number]      Subjective: The patient feels pretty well today  He does have some soreness in his neck and back  He slept pretty well  He is eating well  He denies chest pain or shortness of breath  He is having no abdominal pain, nausea, or vomiting  Physical Exam:   Temp:  [98 1 °F (36 7 °C)-98 3 °F (36 8 °C)] 98 1 °F (36 7 °C)  HR:  [76-93] 76  Resp:  [17-19] 19  BP: (165-198)/(78-92) 169/88    Gen:  Well-developed, well-nourished, in no distress  Neck:  Supple  No lymphadenopathy, goiter, or bruit  Heart:  Regular rhythm  No murmur, gallop, or rub  Lungs: Auscultation and percussion  No wheezing, rales, or rhonchi    Abd:  Soft with active bowel sounds  No mass, tenderness, or organomegaly  Suprapubic catheter is in place  Extremities:  No clubbing, cyanosis, or edema  No calf tenderness  Neuro:  Alert and oriented  No focal sign  Skin:  Warm and dry        LABS:   CBC:   Lab Results   Component Value Date    WBC 10 17 (H) 11/13/2018    HGB 9 6 (L) 11/13/2018    HCT 29 8 (L) 11/13/2018    MCV 93 11/13/2018     11/13/2018    MCH 30 1 11/13/2018    MCHC 32 2 11/13/2018    RDW 13 7 11/13/2018    MPV 9 8 11/13/2018    NRBC 0 11/13/2018   , CMP:   Lab Results   Component Value Date    SODIUM 135 (L) 11/13/2018    K 3 8 11/13/2018    CL 99 (L) 11/13/2018    CO2 28 11/13/2018    BUN 29 (H) 11/13/2018    CREATININE 1 86 (H) 11/13/2018    CALCIUM 8 7 11/13/2018    EGFR 36 11/13/2018           Patient Active Problem List   Diagnosis    [facility]    Suprapubic catheter ([facility]    UTI (urinary tract infection)    Weakness    Charcot-Bre-Tooth disease    Frequent falls    [facility]    Essential hypertension    BPH (benign prostatic hyperplasia)    [facility] disease, stage 3 [facility]    Hypercholesterolemia    Insomnia    [facility]    Orthostatic hypotension    Spinal stenosis    DDD (degenerative disc disease), lumbosacral    Depression    Esophageal reflux    [facility] fracture[facility] closed[facility] initial encounter ([facility] )    Hepatitis B    Hepatitis C virus infection without hepatic coma    Physical debility    Chronic pain    Fecal impaction of colon (HCC)       Assessment/Plan:  1  Urinary tract infection with sepsis, secondary to suprapubic catheter  2  Neurogenic bladder, with chronic suprapubic tube  3  Acute renal failure, improving  4  Type 2 diabetes with peripheral neuropathy  5  Hypertension  6  History of orthostatic hypotension  7  Charcot-Bre-Tooth disease  8  Lumbar disc disease, status post surgery  9  Hypercholesterolemia  10  Hepatitis-B and C  11  Chronic opioid use related to 8  12  Anemia    The patient is improving steadily  His urine culture grew enterobacter and group B strep  He will be transitioned to Levaquin  We will continue to monitor his hemoglobin and creatinine  Physical therapy is in progress        VTE Pharmacologic Prophylaxis: Heparin  VTE Mechanical Prophylaxis: sequential compression device

## 2018-11-13 NOTE — OCCUPATIONAL THERAPY NOTE
OccupationalTherapy Progress Note (lcmt=[id number])   Patient Name: Radha SCOTTTE'O Date: 11/13/2018  Problem List  Patient Active Problem List   Diagnosis    [facility]DNEY [facility]) ([facility] )    Suprapubic catheter ([place] [address] )    UTI (urinary tract infection)    Weakness    Charcot-Bre-Tooth disease    Frequent falls    Type 2 diabetes mellitus with hyperlipidemia ([place] [address] )    Essential hypertension    BPH (benign prostatic hyperplasia)    Chronic kidney disease, stage 3 ([place] [address] )    Hypercholesterolemia    Insomnia    Neuropathy in diabetes ([place] [address] )    Orthostatic hypotension    Spinal stenosis    DDD (degenerative disc disease), lumbosacral    Depression    Esophageal reflux    Geo fracture, closed, initial encounter ([place] [address] )    Hepatitis B    Hepatitis C virus infection without hepatic coma    Physical debility    Chronic pain    Fecal impaction of colon ([place] [address] )           11/13/18 1510   Restrictions/Precautions   Weight Bearing Precautions Per Order No   Other Precautions Fall Risk;Pain; Chair Alarm; Bed Alarm   Pain Assessment   Pain Assessment FLACC   Pain Rating: FLACC (Rest) - Face 0   Pain Rating: FLACC (Rest) - Legs 0   Pain Rating: FLACC (Rest) - Activity 0   Pain Rating: FLACC (Rest) - Cry 0   Pain Rating: FLACC (Rest) - Consolability 0   Score: FLACC (Rest) 0   ADL   Where Assessed Edge of bed   LB Dressing Assistance 4  Minimal Assistance   LB Dressing Deficit Verbal cueing; Don/doff L shoe   Functional Standing Tolerance   Time 1-3 mins   Activity Functional transfers   Comments RW   Bed Mobility   Rolling R 5  Supervision   Additional items Bedrails; Impulsive   Supine to Sit 5  Supervision   Additional items Increased time required;Verbal cues   Transfers   Sit to Stand 4  Minimal assistance   Additional items Increased time required;Verbal cues; Assist x 1   Stand to Sit 5  Supervision   Additional items Increased time required;Verbal cues   Functional Mobility   Functional Mobility 4  Minimal assistance  (x1)   Additional items Rolling walker   Cognition   Overall Cognitive Status Impaired   Arousal/Participation Alert; Responsive; Cooperative   Attention Within functional limits   Orientation Level Oriented X4   Following Commands Follows one step commands without difficulty   Activity Tolerance   Activity Tolerance Patient limited by fatigue   Medical Staff Made Aware nsg   Assessment   Assessment Pt was seen for a 38 min skilled OT tx with a focus on LE dressing, functional balance, functional mobility, transfer safety, and activity tolerance (curently = fair 15-20 mins)  Noted deficits with memory and judgement/safety  Pt was able to complete LE dressing with min A for donning L shoe  Pt was able to don/doff socks with supervision  Pt was able to tolerate EOB sitting for 3-5 mins with f/f+ balance  Standing balance of f-/p+  Pt demonstrated impaired safety judgement during functional transfers  Verbal cueing required for hand placement and RW techniques  Pt had surgical shoe for R foot per podiatry note  Pt was sitting up in the chair at the end of session with chair alarm on and all needs in reach, nsg notified  Pt continues to demonstrate appropriateness for STR although patient perfers to go directly home  Pt was cooperative and motivated  Plan   Treatment Interventions ADL retraining;Functional transfer training; Endurance training; Activityengagement   Goal Expiration Date 11/23/18   Treatment Day 1   OT Frequency 3-5x/wk   Recommendation   OT Discharge Recommendation Short Term Rehab  (pt perfers to return home )   Barthel Index   Feeding 10   Bathing 0   Grooming Score 5   Dressing Score 5   Bladder Score 10   Bowels Score 5   Toilet Use Score 5   Transfers (Bed/Chair) Score 10   Mobility (Level Surface) Score 0   Stairs Score 0   Barthel Index Score 50     Anne Sandoval, OTS

## 2018-11-13 NOTE — PHYSICAL THERAPY NOTE
PHYSICAL THERAPY NOTE          Patient Name: Zamzam Mcdonald  TMQIE'S Date: 11/13/2018 11/13/18 9614   Pain Assessment   Hospital Pain Intervention(s) Repositioned; Ambulation/increased activity   Pain Rating: FLACC (Rest) - Face 0   Pain Rating: FLACC (Rest) - Legs 0   Pain Rating: FLACC (Rest) - Activity 0   Pain Rating: FLACC (Rest) - Cry 0   Pain Rating: FLACC (Rest) - Consolability 0   Score: FLACC (Rest) 0   Pain Rating: FLACC (Activity) - Face 1   Pain Rating: FLACC (Activity) - Legs 0   Pain Rating: FLACC (Activity) - Activity 0   Pain Rating: FLACC (Activity) - Cry 1   Pain Rating: FLACC (Activity) - Consolability 1   Score: FLACC (Activity) 3   Restrictions/Precautions   Weight Bearing Precautions Per Order No   Other Precautions Fall Risk;Pain;Cognitive; Chair Alarm; Bed Alarm  ([facility]   )   General   Chart Reviewed Yes   Additional Pertinent History review of Dr Ness Krueger attestation to podiatry consult  Surgical shoe to R foot noted  Response to Previous Treatment Patient with no complaints from previous session  Family/Caregiver Present No   Cognition   Arousal/Participation Alert; Cooperative   Attention Attends with cues to redirect   Orientation Level Oriented X4   Following Commands Follows one step commands with increased time or repetition   Subjective   Subjective Agreeable to therapy  Surgical shoe feels wierd   Bed Mobility   Additional Comments received sitting at EOB upon arrival   Transfers   Sit to Stand 4  Minimal assistance   Additional items Assist x 1; Increased time required;Verbal cues  (to stabilize RW  Bed height elevated )   Stand to Sit 4  Minimal assistance   Additional items Increased time required;Armrests; Verbal cues  (cues for hand placement needed  Increased time )   Additional Comments cues and A for RW management upon approach to chair      Ambulation/Elevation   Gait pattern Decreased foot clearance; Improper Weight shift;L Foot drag; Forward Flexion; Inconsistent princess; Short stride; Excessively slow  (A to weight shift to L)   Gait Assistance 4  Minimal assist   Additional items Assist x 1;Verbal cues; Tactile cues   Assistive Device Rolling walker   Distance Amb with RW 40'x2  Increased time and cues to remain inside DOUG of RW  Balance   Static Sitting Fair +   Dynamic Sitting Fair   Static Standing Fair   Dynamic Standing Fair -   Ambulatory Poor +   Endurance Deficit   Endurance Deficit Yes   Endurance Deficit Description fatigue, weakness   Activity Tolerance   Activity Tolerance Patient tolerated treatment well;Patient limited by fatigue   Medical Staff Made Aware Nurse, Juanita Felix   Nurse Made Aware yes   Exercises   Hip Flexion Sitting;10 reps;AROM; Bilateral   Knee AROM Long Arc [employer]; Bilateral  (x 12 reps)   Assessment   Prognosis Fair   Problem List Decreased strength;Decreased endurance; Impaired balance;Decreased mobility; Decreased coordination;Decreased safety awareness;Decreased skin integrity;Pain;Decreased range of motion   Assessment Seen for progression of PT and mobility training  Cristhian needed for transfers with increased time and cues  Bed height elevated for sit to stand transfer and requires increased assist to stabilize RW upon transition  Gait with Cristhian  Cues for UE WB, upright posture, to remain inside DOUG of RW  Impaired weight shift with poor transition of weight onto RLE with assist to weight shift needed  Poor LLE foot clearance in step  Gait very slowed and unsteady  Encouragement needed to remain OOB in chair  Alarm in place  Tolerated just few LE there ex following ambulation  Discussed PT rec for STR, however pt prefers to go directly home  Given impairments and reports is functioning "50% worse" than his baseline, rehab is recommended     Barriers to Discharge Decreased caregiver support   Goals   Patient Goals go home   STG Expiration Date 11/22/18   Short Term Goal #1 1)  Pt will perform bed mobility with Fermin demonstrating appropriate technique 100% of the time in order to improve function  2)  Perform all transfers with Fermin demonstrating safe and appropriate technique 100% of the time in order to improve ability to negotiate safely in home environment  3) Amb with least restrictive AD > 150'x1 with mod I in order to demonstrate ability to negotiate in home environment  4)  Improve overall strength and balance 1/2 grade in order to optimize ability to perform functional tasks and reduce fall risk  5) Increase activity tolerance to 45 minutes in order to improve endurance to functional tasks  6) PT for ongoing patient and family/caregiver education, DME needs and d/c planning in order to promote highest level of function in least restrictive environment  Treatment Day 1   Plan   Treatment/Interventions Functional transfer training;LE strengthening/ROM; Elevations; Therapeutic exercise; Endurance training;Patient/family training;Equipment eval/education; Bed mobility;Gait training; Compensatory technique education;Continued evaluation;Spoke to nursing;OT   Progress Slow progress, decreased activity tolerance   PT Frequency Other (Comment)  (4-6x/wk)   Recommendation   Recommendation Short-term skilled PT   PT - OK to Discharge Yes  (to rhb NO to home )   Additional Comments To acheive mobility goals for safe d/c to home     Audrey Marina, PT

## 2018-11-13 NOTE — PLAN OF CARE
DISCHARGE PLANNING     Discharge to home or other facility with appropriate resources Progressing        DISCHARGE PLANNING - CARE MANAGEMENT     Discharge to post-acute care or home with appropriate resources Progressing        GENITOURINARY - ADULT     Maintains or returns to baseline urinary function Progressing     Absence of urinary retention Progressing     Urinary catheter remains patent Progressing        METABOLIC, FLUID AND ELECTROLYTES - ADULT     Electrolytes maintained within normal limits Progressing     Glucose maintained within target range Progressing        NEUROSENSORY - ADULT     Achieves stable or improved neurological status Progressing     Achieves maximal functionality and self care Progressing        PAIN - ADULT     Verbalizes/displays adequate comfort level or baseline comfort level Progressing        Potential for Falls     Patient will remain free of falls Progressing        Prexisting or High Potential for Compromised Skin Integrity     Skin integrity is maintained or improved Progressing        SAFETY ADULT     Maintain or return to baseline ADL function Progressing     Maintain or return mobility status to optimal level Progressing

## 2018-11-14 NOTE — SOCIAL WORK
CM provided pt with STR list  Pt is agreeable to rehab  He would like to review the list with his brother and sister and reports that they should be in tomorrow  CM will touch base with physician for when they think pt will be ready for d/c

## 2018-11-14 NOTE — TELEPHONE ENCOUNTER
Talked to pt  He said he is going to rehab to get stronger  He wanted to hold off scheduling the appt  He will call us when he feels he is strong enough

## 2018-11-14 NOTE — UTILIZATION REVIEW
Continued Stay Review    Date: 11/14/208    Vital Signs: 97 7 - 80 - 12   153/83   RA    Medications:   Scheduled Meds:   Current Facility-Administered Medications:  acetaminophen 650 mg Oral Q6H PRN Charito Moses, DO   amLODIPine 5 mg Oral Daily Maxi Ordoñez,    dextran 70-hypromellose 1 drop Both Eyes Q3H PRN Yaima Salas MD   DULoxetine 60 mg Oral Daily Charito Moses, DO   gabapentin 300 mg Oral TID Charito Moses, DO   heparin (porcine) 5,000 Units Subcutaneous Q8H [address] Maxi Ordoñez,    insulin aspart protamine-insulin aspart 18 Units Subcutaneous BID AC Yaima Salas MD   levofloxacin 500 mg Oral Q24H Yaima Salas MD   metoprolol succinate 25 mg Oral Daily Yaima Salas MD   morphine 15 mg Oral Q12H [address] Maxi Ordoñez,    ondansetron 4 mg Intravenous Q4H PRN Charito Moses, DO   oxyCODONE 10 mg Oral Q6H PRN Charito Moses, DO   pravastatin 40 mg Oral Daily [facility] [employer], DO   senna-docusate sodium 1 tablet Oral BID Charito Moses, DO   traZODone 100 mg Oral HS Maxi Ordoñez DO     Continuous Infusions:    PRN Meds:   acetaminophen    dextran 70-hypromellose    ondansetron    oxyCODONE x 2    Abnormal Labs/Diagnostic Results:   BS's 102, 157, 105    Age/Sex: 70 y o  male     Assessment/Plan:  11/14 Note pending:  Note from 11/13/:  1  Urinary tract infection with sepsis, secondary to suprapubic catheter  2  Neurogenic bladder, with chronic suprapubic tube  3  Acute renal failure, improving  4  Type 2 diabetes with peripheral neuropathy  5  Hypertension  6  History of orthostatic hypotension  7  Charcot-Bre-Tooth disease  8  Lumbar disc disease, status post surgery  9  Hypercholesterolemia  10  Hepatitis-B and C  11  Chronic opioid use related to 8  12  Anemia     The patient is improving steadily  His urine culture grew enterobacter and group B strep  He will be transitioned to Levaquin  We will continue to monitor his hemoglobin and creatinine  Physical therapy is in progress  Discharge Plan: Rehab - TBD

## 2018-11-14 NOTE — PROGRESS NOTES
Progress Note - Radha Prakash 70 y o  male MRN: [id number]    Unit/Bed#: [address] 212-02 Encounter: [id number]      Subjective: The patient has some neck and back pain but it is slowly improving  He denies chest pain, shortness of breath, abdominal pain, nausea, or vomiting  Physical Exam:   Temp:  [97 7 °F (36 5 °C)-99 °F (37 2 °C)] 99 °F (37 2 °C)  HR:  [72-80] 75  Resp:  [12-18] 16  BP: (143-153)/(81-84) 143/84    Gen:  Well-developed, in no distress  Neck:  Supple  No lymphadenopathy, goiter, or bruit  Heart:  Regular rhythm  No murmur, gallop, or rub  Lungs:  Clear to auscultation and percussion  No wheezing, rales, or rhonchi    Abd:  Soft with active bowel sounds  No mass, tenderness, or organomegaly  Extremities:  No clubbing, cyanosis, or edema  Muscle atrophy of the calves is noted  Neuro:  Alert and oriented  No focal sign  Skin:  Warm and dry      LABS:  No new labs        Patient Active Problem List   Diagnosis    CYDNEY (acute kidney injury) ([address] )    Suprapubic catheter ([address] )    UTI (urinary tract infection)    Weakness    Charcot-Bre-Tooth disease    Frequent falls    Type 2 diabetes mellitus with hyperlipidemia ([address] )    Essential hypertension    BPH (benign prostatic hyperplasia)    Chronic kidney disease, stage 3 (HCC)    Hypercholesterolemia    Insomnia    Neuropathy in diabetes ([address] )    Orthostatic hypotension    Spinal stenosis    DDD (degenerative disc disease), lumbosacral    Depression    Esophageal reflux    [facility] closed[facility]    Hepatitis B    Hepatitis C virus infection without hepatic coma    Physical debility    Chronic pain    Fecal impaction of colon (HCC)       Assessment/Plan:  1  Urinary tract infection with sepsis, secondary to suprapubic catheter  2  Neurogenic bladder with chronic suprapubic tube  3  Acute renal failure, improving  4  Type 2 diabetes with neuropathy  5  Hypertension  6   History of orthostatic hypotension  7  Charcot-Bre-Tooth disease  8  Lumbar disc disease, status post surgery  9  Hypercholesterolemia  10  Hepatitis-B and C  11  Chronic opioid use related to 8  12  Anemia  13  Toxic encephalopathy secondary to 1 , resolved    The patient is infection is resolving  His kidney function is improving  His diabetes, blood pressure, and lipids are satisfactorily controlled  He will continue on Levaquin  Physical and occupational therapy will be continued  He has decided to pursue short-term rehab which I think is appropriate  I will ask GI to evaluate the patient regarding his hepatitis and possible future treatment      VTE Pharmacologic Prophylaxis: Heparin  VTE Mechanical Prophylaxis: sequential compression device

## 2018-11-15 NOTE — PHYSICAL THERAPY NOTE
Physical Therapy Treatment Note     11/15/18 1215   Pain Assessment   Pain Assessment 0-10   Pain Score 8   Pain Type Chronic pain   Pain Location Back;Neck   Pain Orientation Bilateral   Hospital Pain Intervention(s) Repositioned; Ambulation/increased activity; Emotional support; Rest   Response to Interventions TOLERATED   Restrictions/Precautions   Braces or Orthoses (SURGAICAL SHOE r LE WBAT)   Other Precautions Pain; Fall Risk;Bed Alarm; Chair Alarm;Cognitive   General   Chart Reviewed Yes   Response to Previous Treatment Patient with no complaints from previous session  Family/Caregiver Present No   Cognition   Overall Cognitive Status Impaired   Arousal/Participation Alert; Responsive; Cooperative   Attention Attends with cues to redirect   Orientation Level Oriented X4   Memory Decreased recall of precautions;Decreased recall of recent events;Decreased short term memory   Following Commands Follows multistep commands with increased time or repetition   Subjective   Subjective PT IN BED AGREEABLE TO PT      Bed Mobility   Supine to Sit 4  Minimal assistance   Additional items Assist x 1; Increased time required;Verbal cues;LE management   Transfers   Sit to Stand 3  Moderate assistance   Additional items Assist x 2   Stand to Sit 4  Minimal assistance   Additional items Assist x 1; Armrests; Increased time required;Verbal cues   Additional Comments PT PERFORMED EOB X 15 MINUTES STATIC AND SYNAMIC SITTING BALANCE STABLE WITHOUT SUPPORT   pT PEFORMED STATIC STANDING WITH MIN ASSIST X1 WITH SUPPORT OF rW 5-7 MINUTES  MARCHING IN PLACE X 10 REPS  [address]  Ambulation/Elevation   Gait pattern Decreased foot clearance;Narrow DOUG; Forward Flexion; Excessively slow; Steppage   Gait Assistance 4  Minimal assist   Additional items Verbal cues; Assist x 2  (FOR SAFETY)   Assistive Device Rolling walker   Distance 75'X2   Balance   Static Sitting Fair +   Dynamic Sitting Fair +   Static Standing Fair   Dynamic Standing Fair -   Ambulatory Poor +   Endurance Deficit   Endurance Deficit Yes   Endurance Deficit Description FATIGUE, WEAKNESS   Activity Tolerance   Activity Tolerance Patient limited by fatigue   Medical Staff Made Aware Jenny Wiggins   Nurse Made Aware YES   Exercises   Hip Flexion Sitting;Standing;10 reps;AROM; Bilateral   Hip Abduction Sitting;10 reps;AROM; Bilateral   Hip Adduction Sitting;10 reps;AROM; Bilateral  (ISOMETRIC HIP ADD   )   Knee AROM Long Arc Quad Sitting;10 reps;AROM; Bilateral   Ankle Pumps Sitting;10 reps;AROM; Bilateral   Assessment   Prognosis Fair   Problem List Decreased strength;Decreased endurance; Impaired balance;Decreased mobility; Decreased safety awareness;Decreased skin integrity;Pain;Decreased range of motion   Assessment PT DEMONSTRATES STABLE STATIC/ DYNAMIC SITTING BALANCE AT EDGE OF BED  Pt PERFORMS BED MOBILITY WITH MIN ASSIST X1, TRANSFERS WITH MOD ASSIST X2 AND VERBAL CUES FOR SAFE TRANSFER TECHNIQUES AND CUES FOR HANDPLACEMENT AND EASE OF DESCENT  Pt  PROGRESSED WITH AMBULATION DISTANCES TO 76' X2 WITH USE OF r AND MIN ASSIST X2 FOR SAFETY  NOTED IMPROVED AMBULATORY BALANCE THIS SESSION  PT CONTINUE TO DEMONSTRATE GAIT DEVIATIONS AS NOTED WITH DECREASED DF R FOOT  PT  PERFORMED B/L LE SEATED AROM X 10 REPS AND STANDING HIP FLEXION WITH SUPPORT OF RW AND MIN ASSIST FOR STEADYING ASSIST AND SAFETY  PT   REMAINED SEATED OUT OF BED IN CHAIR WITH CHAIR ALARM ACTIVATED AT CONCLUSION OF pt SESSION   rECOMMEND REHAB AT D/C  [facility] TO MAXIMIZE FUNCTIONAL OUTCOMES AND INDEPENDENCE  Goals   Patient Goals TO GO HOME    STG Expiration Date 11/22/18   Treatment Day 2   Plan   Treatment/Interventions Functional transfer training;LE strengthening/ROM; Therapeutic exercise; Endurance training;Patient/family training;Equipment eval/education; Bed mobility;Gait training;Spoke to nursing;OT   Progress Slow progress, decreased activity tolerance   PT Frequency (4-6X WEEK)   Recommendation Recommendation Short-term skilled PT   PT - OK to Discharge Yes        11/15/18 1215   Pain Assessment   Pain Assessment 0-10   Pain Score 8   Pain Type Chronic pain   Pain Location Back;Neck   Pain Orientation Bilateral   Hospital Pain Intervention(s) Repositioned; Ambulation/increased activity; Emotional support; Rest   Response to Interventions TOLERATED   Restrictions/Precautions   Braces or Orthoses (SURGAICAL SHOE r LE WBAT)   Other Precautions Pain; Fall Risk;Bed Alarm; Chair Alarm;Cognitive   General   Chart Reviewed Yes   Response to Previous Treatment Patient with no complaints from previous session  Family/Caregiver Present No   Cognition   Overall Cognitive Status Impaired   Arousal/Participation Alert; Responsive; Cooperative   Attention Attends with cues to redirect   Orientation Level Oriented X4   Memory Decreased recall of precautions;Decreased recall of recent events;Decreased short term memory   Following Commands Follows multistep commands with increased time or repetition   Subjective   Subjective PT IN BED AGREEABLE TO PT      Bed Mobility   Supine to Sit 4  Minimal assistance   Additional items Assist x 1; Increased time required;Verbal cues;LE management   Transfers   Sit to Stand 3  Moderate assistance   Additional items Assist x 2   Stand to Sit 4  Minimal assistance   Additional items Assist x 1; Armrests; Increased time required;Verbal cues   Additional Comments PT PERFORMED EOB X 15 MINUTES STATIC AND SYNAMIC SITTING BALANCE STABLE WITHOUT SUPPORT   pT PEFORMED STATIC STANDING WITH MIN ASSIST X1 WITH SUPPORT OF rW 5-7 MINUTES  MARCHING IN PLACE X 10 REPS  [address]  Ambulation/Elevation   Gait pattern Decreased foot clearance;Narrow DOUG; Forward Flexion; Excessively slow; Steppage   Gait Assistance 4  Minimal assist   Additional items Verbal cues; Assist x 2  (FOR SAFETY)   Assistive Device Rolling walker   Distance 25'L6   Balance   Static Sitting Fair +   Dynamic Sitting Fair + Static Standing Fair   Dynamic Standing Fair -   Ambulatory Poor +   Endurance Deficit   Endurance Deficit Yes   Endurance Deficit Description FATIGUE, WEAKNESS   Activity Tolerance   Activity Tolerance Patient limited by fatigue   Medical Staff Made Aware Casper Johnson   Nurse Made Aware YES   Exercises   Hip Flexion Sitting;Standing;10 reps;AROM; Bilateral   Hip Abduction Sitting;10 reps;AROM; Bilateral   Hip Adduction Sitting;10 reps;AROM; Bilateral  (ISOMETRIC HIP ADD   )   Knee AROM Long Arc Quad Sitting;10 reps;AROM; Bilateral   Ankle Pumps Sitting;10 reps;AROM; Bilateral   Assessment   Prognosis Fair   Problem List Decreased strength;Decreased endurance; Impaired balance;Decreased mobility; Decreased safety awareness;Decreased skin integrity;Pain;Decreased range of motion   Assessment PT DEMONSTRATES STABLE STATIC/ DYNAMIC SITTING BALANCE AT EDGE OF BED  Pt PERFORMS BED MOBILITY WITH MIN ASSIST X1, TRANSFERS WITH MOD ASSIST X2 AND VERBAL CUES FOR SAFE TRANSFER TECHNIQUES AND CUES FOR HANDPLACEMENT AND EASE OF DESCENT  Pt  PROGRESSED WITH AMBULATION DISTANCES TO 76' X2 WITH USE OF r AND MIN ASSIST X2 FOR SAFETY  NOTED IMPROVED AMBULATORY BALANCE THIS SESSION  PT CONTINUE TO DEMONSTRATE GAIT DEVIATIONS AS NOTED WITH DECREASED DF R FOOT  PT  PERFORMED B/L LE SEATED AROM X 10 REPS AND STANDING HIP FLEXION WITH SUPPORT OF RW AND MIN ASSIST FOR STEADYING ASSIST AND SAFETY  PT   REMAINED SEATED OUT OF BED IN CHAIR WITH CHAIR ALARM ACTIVATED AT CONCLUSION OF pt SESSION   rECOMMEND REHAB AT D/C  [facility] TO MAXIMIZE FUNCTIONAL OUTCOMES AND INDEPENDENCE  Goals   Patient Goals TO GO HOME    STG Expiration Date 11/22/18   Treatment Day 2   Plan   Treatment/Interventions Functional transfer training;LE strengthening/ROM; Therapeutic exercise; Endurance training;Patient/family training;Equipment eval/education; Bed mobility;Gait training;Spoke to nursing;OT   Progress Slow progress, decreased activity tolerance   PT Frequency (4-6X WEEK)   Recommendation   Recommendation Short-term skilled PT   PT - OK to Discharge Yes       Esperanza Toth, JORDIN

## 2018-11-15 NOTE — PLAN OF CARE
Problem: PHYSICAL THERAPY ADULT  Goal: Performs mobility at highest level of function for planned discharge setting  See evaluation for individualized goals  Treatment/Interventions: Functional transfer training, LE strengthening/ROM, Therapeutic exercise, Endurance training, Patient/family training, Equipment eval/education, Bed mobility, Gait training, Compensatory technique education, Continued evaluation, Spoke to nursing, OT          See flowsheet documentation for full assessment, interventions and recommendations  Outcome: Progressing  Prognosis: Fair  Problem List: Decreased strength, Decreased endurance, Impaired balance, Decreased mobility, Decreased safety awareness, Decreased skin integrity, Pain, Decreased range of motion  Assessment: PT DEMONSTRATES STABLE STATIC/ DYNAMIC SITTING BALANCE AT EDGE OF BED  Pt PERFORMS BED MOBILITY WITH MIN ASSIST X1, TRANSFERS WITH MOD ASSIST X2 AND VERBAL CUES FOR SAFE TRANSFER TECHNIQUES AND CUES FOR HANDPLACEMENT AND EASE OF DESCENT  Pt  PROGRESSED WITH AMBULATION DISTANCES TO 76' X2 WITH USE OF r AND MIN ASSIST X2 FOR SAFETY  NOTED IMPROVED AMBULATORY BALANCE THIS SESSION  PT CONTINUE TO DEMONSTRATE GAIT DEVIATIONS AS NOTED WITH DECREASED DF R FOOT  PT  PERFORMED B/L LE SEATED AROM X 10 REPS AND STANDING HIP FLEXION WITH SUPPORT OF RW AND MIN ASSIST FOR STEADYING ASSIST AND SAFETY  PT   REMAINED SEATED OUT OF BED IN CHAIR WITH CHAIR ALARM ACTIVATED AT CONCLUSION OF pt SESSION   rECOMMEND REHAB AT D/C  [facility] TO MAXIMIZE FUNCTIONAL OUTCOMES AND INDEPENDENCE  Barriers to Discharge: Decreased caregiver support     Recommendation: Short-term skilled PT     PT - OK to Discharge: Yes    See flowsheet documentation for full assessment

## 2018-11-15 NOTE — PROGRESS NOTES
Progress Note - [facility] 70 y o  male MRN: [id number]    Unit/Bed#: [address] 212-02 Encounter: [id number]      Subjective: The patient feels reasonably well  He has no chest pain, shortness of breath, nausea, or vomiting  His neck and back have improved somewhat  Physical Exam:   Temp:  [97 2 °F (36 2 °C)-98 9 °F (37 2 °C)] 98 3 °F (36 8 °C)  HR:  [70-80] 70  Resp:  [17-19] 19  BP: (139-191)/(72-92) 139/72    Gen:  Well-developed, well-nourished, in no distress  Neck:  Supple  No lymphadenopathy, goiter, or bruit  Heart:  Regular rhythm  No murmur, gallop, or rub  Lungs:  Clear to auscultation and percussion  No wheezing, rales, or rhonchi   Abd:  Soft with active bowel sounds  No mass, tenderness, or organomegaly  Extremities:  No clubbing, cyanosis, or edema  No calf tenderness  Neuro:  Alert and oriented  No focal sign  Skin:  Warm and dry      LABS:    CMP:   Lab Results   Component Value Date    SODIUM 134 (L) 11/15/2018    K 3 9 11/15/2018    CL 99 (L) 11/15/2018    CO2 26 11/15/2018    BUN 37 (H) 11/15/2018    CREATININE 1 89 (H) 11/15/2018    CALCIUM 9 4 11/15/2018    EGFR 35 11/15/2018           Patient Active Problem List   Diagnosis    [facility]acute kidney [facility]) [facility]    Suprapubic catheter ([facility]    UTI (urinary tract infection)    Weakness    Charcot-Bre-Tooth disease    Frequent falls    Type 2 diabetes mellitus with hyperlipidemia ([facility]    Essential hypertension    BPH (benign prostatic hyperplasia)    Chronic kidney disease, stage 3 ([facility]    Hypercholesterolemia    Insomnia    Neuropathy in diabetes ([facility]    Orthostatic hypotension    Spinal stenosis    DDD (degenerative disc disease), lumbosacral    Depression    Esophageal reflux    [facility]    Hepatitis B    Hepatitis C virus infection without hepatic coma    Physical debility    Chronic pain    Fecal impaction of colon (HCC)       Assessment/Plan:  1   Urinary tract infection with sepsis, secondary to suprapubic catheter  2  Neurogenic bladder with chronic suprapubic tube  3  Acute renal failure, improved  4  Type 2 diabetes with neuropathy  5  Hypertension  6  History of orthostatic hypotension  7  Charcot-Bre-Tooth disease  8  Lumbar disc disease, status post surgery  9  Hypercholesterolemia  10  Hepatitis-B and C  11  Chronic opioid use related to 8  12  Anemia   13  Henery Jurist Toxic encephalopathy secondary to 1, improved     The patient continues to improve  He is tolerating oral Levaquin  If he remains stable over night, he will likely be transferred to rehab tomorrow  GI evaluation was much appreciated  The patient's hepatitis will need to be followed up when he recovers from his acute illness      VTE Pharmacologic Prophylaxis: Heparin  VTE Mechanical Prophylaxis: sequential compression device

## 2018-11-15 NOTE — PLAN OF CARE
Problem: OCCUPATIONAL THERAPY ADULT  Goal: Performs self-care activities at highest level of function for planned discharge setting  See evaluation for individualized goals  Occupational Therapy  Outcome: Progressing  Limitation: Decreased ADL status, Decreased UE ROM, Decreased UE strength, Decreased Safe judgement during ADL, Decreased endurance, Decreased self-care trans, Decreased high-level ADLs  Prognosis: Good  Assessment: Pt was seen for skilled OT with focus on completion of self care tasks, functional mobility, review of fall prevention and review of current plan of care  Pt with need for increased A with EOB positioning  Pt with increased fatigue this tx session  Mod A x2 for initial sit to stand at [facility]  See above levels of A required for all functional tasks  Pt with need for increased A for clothing management instance  No LOB noted with activity  Pt may benefit from further rehab with focus on achieving optimal performance levels with all functional tasks   Continue to recommend Inpatient rehab     OT Discharge Recommendation: Short Term Rehab  OT - OK to Discharge: Yes (When medically stable )      Comments: Mariola Clinton, [address]

## 2018-11-15 NOTE — CONSULTS
Consultation - [facility]  Sangita Mcgovern 70 y o  male MRN: [id number]  [facility] [address] 212-02 Encounter: [id number]        Inpatient consult to gastroenterology  Consult performed by: Susan Golden ordered by: Lev Denson          Reason for Consult / Principal Problem: chronic hepatitis C     HPI: 57-year-old male with history of diabetes mellitus, neuropathy, Charcot-Bre-Tooth disease, and BPH with suprapubic catheter admitted with worsening physical debility and falls  He was found to have UTI with CYDNEY  He is currently being treated with antibiotics  Along the way, he was tested for chronic viral hepatitis  Hepatitis C ab and Hepatitis B core total ab was positive  Hepatitis B core IgM was negative  Hepatitis B surface ag was negative  Liver enzymes are normal  No signs of cirrhosis on labs or imaging  He has never been treated for hepatitis C in the past  He is a light tobacco smoker but denies alcohol and recreational drug use  He denies history of IV drug use  He does have tattoos  He denies history of blood transfusion  He denies nausea, vomiting, abdominal pain, diarrhea  He has chronic constipation  CT abdomen pelvis on admission showed large stool burden  He required an aggressive bowel regimen  He denies recent abnormal weight loss  He had a colonoscopy several years ago  REVIEW OF SYSTEMS:    CONSTITUTIONAL: Denies any fever, chills  + Fair appetite  No abnormal weight loss  HEENT: No earache or tinnitus  Denies hearing loss or visual disturbances  CARDIOVASCULAR: No chest pain or palpitations  RESPIRATORY: Denies any cough, hemoptysis, shortness of breath or dyspnea on exertion  GASTROINTESTINAL: As noted in the History of Present Illness  GENITOURINARY: No problems with urination  Denies any hematuria or dysuria  NEUROLOGIC:  + Headache  +Tremor  MUSCULOSKELETAL: + Generalized weakness  SKIN: Denies skin rashes or itching     ENDOCRINE: Denies excessive thirst  Denies intolerance to heat or cold  PSYCHOSOCIAL: Denies depression or anxiety  Denies any recent memory loss  Historical Information   Past Medical History:   Diagnosis Date    BPH with obstruction/lower urinary tract symptoms     Bundle branch block, right     Charcot-Bre-Tooth disease     Chronic cystitis     Diabetes mellitus ([address] )     Fracture of clavicle     Hypertension     Nephropathy     Neuropathy     charcot- Bre tooth    Spinal stenosis     Ureteral calculus     Urinary retention      Past Surgical History:   Procedure Laterality Date    CYSTOSCOPY  2014, 2015, 2016, 2017    CYSTOSCOPY W/ RETROGRADES Bilateral     CYSTOSCOPY W/ URETERAL STENT PLACEMENT Right 12/30/2015    CYSTOSCOPY W/ URETERAL STENT REMOVAL Right 2016    CYSTOSCOPY W/ URETEROSCOPY W/ LITHOTRIPSY Right 12/30/2015    FOOT SURGERY Right     Amputation of toe, Onset - 10/1/12    HEMORROIDECTOMY      KNEE SURGERY Left     PCL and MCL - meniscus, Onset - 1981    LUMBAR FUSION      Transverse lumbar interbody fusion L2-L3 and METRx Lt hemilaminectomy and ema foraminotomy-decompressive at L4-L5 Dr Jackson Pepe and Dr Kam Kincaid;  Onset - 5/13/13       TRANSURETHRAL RESECTION OF PROSTATE  01/2014     Social History   History   Alcohol Use No     History   Drug Use No     History   Smoking Status    Light Tobacco Smoker    Years: 4 00    Types: Cigars    Last attempt to quit: 70 [address] Never Used     Comment: occasional cigar     Family History   Problem Relation Age of Onset   [facility] Mother     Hypertension Father     Pneumonia Maternal Grandfather     Cancer Family     Diabetes Family        Meds/Allergies     Prescriptions Prior to Admission   Medication    bacitracin ophthalmic ointment    baclofen 10 mg tablet    DULoxetine (CYMBALTA) 60 mg delayed release capsule    gabapentin (NEURONTIN) 600 MG tablet    glucose blood (ACCU-CHEK SMARTVIEW) test strip    insulin NPH-insulin regular (NOVOLIN 70/30) 100 units/mL subcutaneous injection    morphine (MS CONTIN) 15 mg 12 hr tablet    oxyCODONE (ROXICODONE) 10 MG TABS    Polyethylene Glycol 3350 (MIRALAX PO)    simvastatin (ZOCOR) 40 mg tablet    traZODone (DESYREL) 100 mg tablet     Current Facility-Administered Medications   Medication Dose Route Frequency    acetaminophen (TYLENOL) tablet 650 mg  650 mg Oral Q6H PRN    amLODIPine (NORVASC) tablet 5 mg  5 mg Oral Daily    dextran 70-hypromellose (GENTEAL TEARS) 0 1-0 3 % ophthalmic solution 1 drop  1 drop Both Eyes Q3H PRN    DULoxetine (CYMBALTA) delayed release capsule 60 mg  60 mg Oral Daily    gabapentin (NEURONTIN) capsule 300 mg  300 mg Oral TID    heparin (porcine) subcutaneous injection 5,000 Units  5,000 Units Subcutaneous Q8H [address]    insulin aspart protamine-insulin aspart (NovoLOG 70/30) 100 units/mL subcutaneous injection 18 Units  18 Units Subcutaneous BID AC    levofloxacin (LEVAQUIN) tablet 500 mg  500 mg Oral Q24H    metoprolol succinate (TOPROL-XL) 24 hr tablet 25 mg  25 mg Oral Daily    morphine (MS CONTIN) ER tablet 15 mg  15 mg Oral Q12H [address]    ondansetron (ZOFRAN) injection 4 mg  4 mg Intravenous Q4H PRN    oxyCODONE (ROXICODONE) immediate release tablet 10 mg  10 mg Oral Q6H PRN    pravastatin (PRAVACHOL) tablet 40 mg  40 mg Oral Daily With Dinner    senna-docusate sodium (SENOKOT S) 8 6-50 mg per tablet 1 tablet  1 tablet Oral BID    traZODone (DESYREL) tablet 100 mg  100 mg Oral HS       No Known Allergies        Objective     Blood pressure 154/82, pulse 76, temperature (!) 97 2 °F (36 2 °C), temperature source Temporal, resp  rate 18, weight 79 8 kg (176 lb), SpO2 95 %        Intake/Output Summary (Last 24 hours) at 11/15/18 1034  Last data filed at 11/15/18 0601   Gross per 24 hour   Intake              240 ml   Output             2000 ml   Net            -1760 ml         PHYSICAL EXAM:      General Appearance:   Alert, cooperative, no distress, appears stated age    HEENT:   Normocephalic, atraumatic, anicteric      Neck:  Supple, symmetrical, trachea midline, no adenopathy    Lungs:   Clear to auscultation bilaterally   Heart[de-identified]   S1 and S2 normal; regular rate and rhythm; no murmur, rub, or gallop  Abdomen:   Soft, non-tender, non-distended; normal bowel sounds; no masses, no organomegaly    Genitalia:   Deferred    Rectal:   Deferred    Extremities:  No cyanosis, clubbing or edema    Pulses:  2+ and symmetric all extremities    Skin:  Skin color, texture, turgor normal, no rashes or lesions    Lymph nodes:  No palpable cervical lymphadenopathy        Lab Results:     Results from last 7 days  Lab Units 11/13/18  0536   WBC Thousand/uL 10 17*   HEMOGLOBIN g/dL 9 6*   HEMATOCRIT % 29 8*   PLATELETS Thousands/uL 231   NEUTROS PCT % 65   LYMPHS PCT % 21   MONOS PCT % 11   EOS PCT % 3       Results from last 7 days  Lab Units 11/15/18  0425  11/11/18  0504   POTASSIUM mmol/L 3 9  < > 4 0   CHLORIDE mmol/L 99*  < > 106   CO2 mmol/L 26  < > 26   BUN mg/dL 37*  < > 40*   CREATININE mg/dL 1 89*  < > 2 35*   CALCIUM mg/dL 9 4  < > 9 2   ALK PHOS U/L  --   --  83   ALT U/L  --   --  16   AST U/L  --   --  17   < > = values in this interval not displayed  Results from last 7 days  Lab Units 11/10/18  1510   INR  1 05       Results from last 7 days  Lab Units 11/10/18  1348   LIPASE u/L 178       Imaging Studies: I have personally reviewed pertinent imaging studies  Ct Abdomen Pelvis Wo Contrast    Result Date: 11/10/2018  Impression: 1  Cholelithiasis  2   Mild left ureterectasis of unknown etiology, without hydronephrosis  3   Large amount of feces throughout distended colon, consistent with constipation  4   No evidence of acute abnormality in the abdomen or pelvis  Workstation performed: [id number]     X-ray Chest 2 Views    Result Date: 11/11/2018  Impression: No acute cardiopulmonary disease   Workstation performed: [id number] Xr Hips Bilateral 2 Vw W Pelvis If Performed    Result Date: 11/11/2018  Impression: No acute osseous abnormality  Workstation performed: [id number]     Xr Knee 4+ Vw Left Injury    Result Date: 11/11/2018  Impression: No fracture  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, mild elsewhere  Workstation performed: [id number]     Xr Knee 4+ Vw Right Injury    Result Date: 11/11/2018  Impression: No acute osseous abnormality  Workstation performed: [id number]     Xr Ankle 3+ Views Left    Result Date: 11/11/2018  Impression: No acute osseous abnormality  Workstation performed: [id number]     Ct Head Without Contrast    Result Date: 11/10/2018  Impression: No acute intracranial abnormality  Workstation performed: [id number]     Ct Spine Cervical Without Contrast    Result Date: 11/10/2018  Impression: 1  Nondisplaced disruption of the right posterior ring of C1, probably an old ununited fracture, which appears to have been present on an MRI of the cervical spine from 10/2/2017  2   No evidence of acute fracture or traumatic malalignment  3   Widespread degenerative disease and prior fusions from C3 through C6  Workstation performed: [id number]       ASSESSMENT and PLAN:      65-year-old male with history of diabetes mellitus, neuropathy, Charcot-Bre-Tooth disease, and BPH with suprapubic catheter presenting with worsening physical debility diagnosed with UTI and CYDNEY found to have reactive viral hepatitis serologies  1) Hepatitis C ab positive: This appears to be a new finding   He has never been treated for hepatitis C in the past      -Order hepatitis C viral load, if positive he will require outpatient treatment for chronic hepatitis C infection  -He should be a good candidate for hepatitis C treatment as he denies alcohol and recreational drug use  -Follow-up in the outpatient GI office    2) Hepatitis B core ab positive: Hepatitis B core IgM and surface ag negative, so this likely represents past infection    -Check hepatitis B surface ab for immunity    Patient was seen and examined by Dr Thalia Payne  All key medical decisions were made by Dr Thalia Payne  Thank you for allowing us to participate in the care of this present patient  We will follow-up with you closely

## 2018-11-15 NOTE — OCCUPATIONAL THERAPY NOTE
Occupational Therapy Treatment Note:       11/15/18 1211   Restrictions/Precautions   Weight Bearing Precautions Per Order No   Other Precautions Pain; Fall Risk; Chair Alarm; Bed Alarm;Cognitive   Pain Assessment   Pain Assessment 0-10   Pain Score 8   Pain Type Chronic pain   Pain Location Back;Neck   Pain Orientation Bilateral   ADL   Where Assessed Edge of bed   Grooming Assistance 4  Minimal Assistance   Grooming Deficit Setup   UB Bathing Assistance 4  Minimal Assistance   UB Bathing Deficit Setup   LB Bathing Assistance 3  Moderate Assistance   LB Bathing Deficit Setup;Steadying;Verbal cueing;Supervision/safety; Increased time to complete; Buttocks; Perineal area;Right lower leg including foot; Left lower leg including foot   LB Bathing Comments Pt incontinent of bowel light tx session warranting need for increased A     UB Dressing Assistance 4  Minimal Assistance   UB Dressing Deficit Setup   LB Dressing Assistance 3  Moderate Assistance   LB Dressing Deficit Setup;Steadying; Requires assistive device for steadying;Verbal cueing;Supervision/safety; Increased time to complete; Don/doff R sock; Don/doff L sock; Tie shoes; Thread RLE into underwear; Thread LLE into underwear;Pull up over hips;Don/doff R shoe;Don/doff L shoe   LB Dressing Comments Increased A required for clothing management instance  Functional Standing Tolerance   Time 3 mins  Activity functional mobility  Comments Pt with increased fatigue with activity  Bed Mobility   Supine to Sit 4  Minimal assistance   Additional items Assist x 1   Additional Comments cues for safety with activity required  Transfers   Sit to Stand 3  Moderate assistance   Additional items Assist x 2   Stand to Sit 4  Minimal assistance   Additional items Assist x 1   Stand pivot 4  Minimal assistance   Additional items Assist x 2   Additional Comments cues for safety and appropriate techs required      Functional Mobility   Functional Mobility 4  Minimal assistance Additional Comments x2   Additional items Rolling walker   Cognition   Overall Cognitive Status Impaired   Arousal/Participation Alert; Responsive; Cooperative   Attention Attends with cues to redirect   Orientation Level Oriented X4   Memory Decreased short term memory;Decreased recall of recent events;Decreased recall of precautions   Following Commands Follows multistep commands with increased time or repetition   Comments cues for safety to carry over reviewed techs    Additional Activities   Additional Activities Other (Comment)  (Reviewed fall prevention checklist with Pt  )   Additional Activities Comments Pt reports having F understanding  Activity Tolerance   Activity Tolerance Patient limited by fatigue;Patient limited by pain   Medical Staff Made Aware Reported all findings to nursing staff  Assessment   Assessment Pt was seen for skilled OT with focus on completion of self care tasks, functional mobility, review of fall prevention and review of current plan of care  Pt with need for increased A with EOB positioning  Pt with increased fatigue this tx session  Mod A x2 for initial sit to stand at [facility]  See above levels of A required for all functional tasks  Pt with need for increased A for clothing management instance  No LOB noted with activity  Pt may benefit from further rehab with focus on achieving optimal performance levels with all functional tasks  Continue to recommend Inpatient rehab   Plan   Treatment Interventions ADL retraining;Functional transfer training; Endurance training;Cognitive reorientation   Goal Expiration Date 11/23/18   Treatment Day 2   OT Frequency 3-5x/wk   Recommendation   OT Discharge Recommendation Short Term Rehab   Barthel Index   Feeding 10   Bathing 0   Grooming Score 5   Dressing Score 5   Bladder Score 10   Bowels Score 5   Toilet Use Score 5   Transfers (Bed/Chair) Score 10   Mobility (Level Surface) Score 0   Stairs Score 0   Barthel Index Score 50   Modified Wend[place] Scale   Modified [place] Scale [address] [facility], [address]

## 2018-11-16 NOTE — DISCHARGE SUMMARY
Discharge Summary - Dmitriy Swartz [birth year], [id number]        Admission Date: 11/10/2018  Discharge Date: 11/16/2018    Admitting Diagnosis: Dizziness [R42]  Confusion [R41 0]  Multiple injuries [T07  XXXA]  CYDNEY (acute kidney injury) ([facility] [address] ) [N17 9]  Recurrent falls [R29 6]    Discharge Diagnosis:   1  Urinary tract infection with sepsis, secondary to suprapubic catheter  2  Neurogenic bladder requiring chronic suprapubic catheterization  3  Acute renal failure, improved  4  Type 2 diabetes with neuropathy  5  Hypertension  6  History of orthostatic hypotension  7  Charcot-Bre-Tooth disease  8  Lumbar disc disease, status post surgery  9  Hypercholesterolemia  10  Hepatitis-B and C  11  Chronic opioid use related to 8  12  Anemia  13  Toxic encephalopathy secondary to 1  Consulting Physicians:  1  Dr Armand Schultz, podiatry  2  Dr Nolberto Eid, urology   3  Dr Cassandra Baldwin, gastroenterology     Procedures Performed:   Suprapubic catheter change    HPI:  The patient is a 48-year-old man who has multiple medical problems but was in his usual state of health until the day of admission  He seemed generally weaker  He had 2 falls  He became somnolent  The family thought the symptoms were reminiscent of previous urinary tract infections  He was brought to the emergency room  His urine was observed to be purulent  He was admitted for further care  Hospital Course: The patient was admitted to the hospital and hydrated with intravenous fluid  Appropriate cultures were obtained  The patient had fever, tachycardia, and leukocytosis indicative of sepsis  He was started on antibiotics directed toward urinary jaylan  The patient was somnolent and confused at the time of admission consistent with toxic encephalopathy  Fortunately, with vigorous hydration and antibiotic therapy the patient is temperature normalized  His heart rate normalized    His mental status returned to normal   His urine culture grew Enterobacter and strep   When this information became available, he was converted to oral Levaquin  He continued to do well  He was seen by Urology and his suprapubic tube was changed  The patient had acute renal failure at the time of admission with a creatinine of 2 3  This was related to infection and hypokalemia  As his infection came under control and with IV rehydration, his creatinine fell to 1 8 which is close to his usual baseline    The patient has Charcot-Bre-Tooth disease and diabetic neuropathy  He has had difficulty with foot ulcers  He had a small open area under his right 5th toe  He was seen by Podiatry and local care was recommended  He is followed by Dr Mo Brown as an outpatient  The patient has a history of hepatitis-B and C  When I last saw him in the office, I requested repeat serology  These had not yet been done and so they were required during his hospitalization  The patient was found to have hepatitis-C and at least exposure to hepatitis-B  He was seen by GI  Some additional studies were ordered and this will be further evaluated as an outpatient  The patient's other medical issues were generally stable throughout his hospitalization  On the day of discharge she was feeling well  Vital signs were stable  Lungs were clear  Cardiac exam was normal   The abdomen was soft and nontender  Suprapubic tube was noted  There was no edema  Muscular atrophy related to Charcot-Bre-Tooth disease was noted  The patient's mental status had returned to normal       Disposition:  The patient was transferred to [facility] on November 16th  He will continue with diabetic diet  His activity will be as tolerated  He will be under the care of the physicians at [facility] during his stay there  When he returns home, he will resume his primary care through our office      Discharge instructions/Information to patient and family:   See after visit summary for information provided to patient and family  Provisions for Follow-Up Care:  See after visit summary for information related to follow-up care and any pertinent home health orders  Planned Readmission: No    Discharge Statement   I spent 40 minutes discharging the patient  This time was spent on the day of discharge  I had direct contact with the patient on the day of discharge  Discharge Medications:  See after visit summary for reconciled discharge medications provided to patient and family

## 2018-11-21 NOTE — TELEPHONE ENCOUNTER
Pt visited  ER 11/18 for infected subrapubic, at nursing home and pt states "they put in the wrong catheter," please advise    [phone]

## 2018-11-21 NOTE — TELEPHONE ENCOUNTER
Pt called and wanted a Catheter plug he is in "[facility] [facility]" and they are unable to get one  Catheter plug was put upfront to p/u

## 2018-11-22 PROBLEM — R29.6 FREQUENT FALLS: Status: RESOLVED | Noted: 2017-10-01 | Resolved: 2018-01-01

## 2018-11-22 PROBLEM — J18.9 PNEUMONIA DUE TO INFECTIOUS ORGANISM: Status: ACTIVE | Noted: 2018-01-01

## 2018-11-22 PROBLEM — K56.41 FECAL IMPACTION OF COLON (HCC): Status: RESOLVED | Noted: 2018-01-01 | Resolved: 2018-01-01

## 2018-11-22 PROBLEM — N31.9 NEUROGENIC BLADDER: Status: ACTIVE | Noted: 2018-01-01

## 2018-11-22 PROBLEM — A41.9 SEVERE SEPSIS (HCC): Status: ACTIVE | Noted: 2018-01-01

## 2018-11-22 PROBLEM — R65.20 SEVERE SEPSIS (HCC): Status: ACTIVE | Noted: 2018-01-01

## 2018-11-22 NOTE — SEPSIS NOTE
Sepsis Note   Justin Alberts 70 y o  male MRN: [id number]  Unit/Bed#: ED 16 Encounter: [id number]            Initial Sepsis Screening     Row Name 11/22/18 0753                Is the patient's history suggestive of a new or worsening infection? (!)  Yes (Proceed)  -CS        Suspected source of infection pneumonia  -CS        Are two or more of the following signs & symptoms of infection both present and new to the patient? (!)  Yes (Proceed)  -CS        Indicate SIRS criteria Leukocytosis (WBC > 62213 IJL); Hyperthemia > 38 3C (100 9F)  -CS        If the answer is yes to both questions, suspicion of sepsis is present          If severe sepsis is present AND tissue hypoperfusion perists in the hour after fluid resuscitation or lactate > 4, the patient meets criteria for SEPTIC SHOCK          Are any of the following organ dysfunction criteria present within 6 hours of suspected infection and SIRS criteria that are NOT considered to be chronic conditions? (!)  Yes  -CS        Organ dysfunction Creatinine > 0 5 mg/dl ABOVE BASELINE  -CS        Date of presentation of severe sepsis          Time of presentation of severe sepsis          Tissue hypoperfusion persists in the hour after crystalloid fluid administration, evidenced, by either:          Was hypotension present within one hour of the conclusion of crystalloid fluid administration?  No  -CS        Date of presentation of septic shock          Time of presentation of septic shock            User Key  (r) = Recorded By, (t) = Taken By, (c) = Cosigned By    [address] Name Provider Type    CS Tonia Melendez MD Physician

## 2018-11-22 NOTE — ED NOTES
pts has received a total of 2,220mL of 2,400mL NSS bolus  Pt to complete bolus after vancomycin infusion then start maintnance fluids of 125mL/hr  ICU RN aware of infusions        Vignesh Saldana RN  11/22/18 8557

## 2018-11-22 NOTE — PROGRESS NOTES
Progress Note - ICU Transfer to SD/MS tele/MS   Jacqueline Look 70 y o  male MRN: [id number]  Critical access hospital0 [facility]   Unit/Bed#: ICU 06 Encounter: [id number]    Code Status: Level 1 - Full Code  Referring Physician: Dr Rashid Guillory  Accepting Physician: Dr Sampson Tadeo  _____________________________________________________________________    Reason for ICU/SD admission:  1  Severe sepsis secondary to urinary tract infection  · This patient will be admitted to inpatient status as this patient is believed to require greater than 2 midnight stay  · Patient was recently discharged with a urinary tract infection and now presents with what is believed to be a right-sided pneumonia  · Obtain blood cultures, sputum cultures, urine cultures  · Patient's initial lactate was 1 5 and his lowest blood pressure was 96 systolic lead he has received 38 milliliters/kilogram of the IV fluid be ideal body weight  · Will continue broad-spectrum antibiotics of cefepime and vancomycin  · Moderate blood pressures closely and initiate pressors if blood pressure falls below 90 systolicly  2  Right-sided lower lobe pneumonia likely healthcare acquired  · Obtain blood cultures, sputum cultures, and urine cultures as outlined above  · Continue broad-spectrum antibiotics  · Continue aggressive pulmonary toileting  · Maintain O2 saturations greater than 92% with supplemental oxygen as needed  · Strep pneumo and Legionella ordered   3  Suprapubic catheter with neurogenic bladder  · Consult Urology  · Obtain urine culture  · Continue broad-spectrum antibiotics  4  Acute kidney injury superimposed on chronic kidney disease stage 3  · Baseline creatinine appears to be 1 3-1 7  · Continue IV fluid resuscitation monitor renal indices closely  · Avoid nephrotoxic drugs  5  Mild hypotension in the setting of known essential hypertension  · Hold antihypertensives  · Continue IV fluids as outlined above  6   Type 2 diabetes mellitus with hyperglycemia  · Initiate sliding-scale insulin, mealtime insulin, and Lantus  · Monitor Accu-Cheks closely  7  History hepatitis B and C  · Monitor LFTs  8  Degenerative disc disease, lumbosacral with spinal stenosis and associated chronic pain and chronic opioid use  · Continue long-acting and short-acting opioids as prescribed in Outpatient setting and monitor pain  9  History of Charcot-Bre tooth disease with physical debility  · Consult physical therapy    History of Present Illness:  70 y o  male who presents acute onset of fever, chills, and rigors  Patient was recently discharged from [place] [address]  on 11/16/2018 secondary to urosepsis and was transition to [facility] for physical deconditioning and need for rehab  He reports feeling his normal self until this a m  When he developed fever, chills, and rigors  He was transferred to [address] [place] [address] secondary to these symptoms and found to have hypoxia  On initial emergency department exam his pulse ox was 87% on room air and he was found to have a right-sided infiltrate on chest x-ray  He was referred for admission secondary to mild hypotension and high mild hypoxia with an associated right lower lobe pulmonary infiltrate      Upon record review he was recently admitted 11/10/2018 through 11/16/2018 the [facility] with chief complaints of generalized weakness, somnolence, and falls witnessed by his family  He resides in apartment with his brother  He was brought to the emergency room secondary to the aforementioned symptoms on 11/10/2018 where was noticed that he had purulent urine  He was admitted on 11/10/2018 and was hydrated with IV fluids secondary to fever, tachycardia, and leukocytosis with indications of sepsis  He was treated with broad-spectrum antibiotics and his temperature normalized and had improvement with somnolence and weakness    He was found to have an oral back tear and strep in his urine culture and Urology was consulted  He had a suprapubic catheter changed  He had associated acute renal failure secondary to sepsis with a creatinine of 2 3 which improved to 1 8 at the time of his discharge on 11/16/2018  He was [facility] 11/16/2018 and now returns for the aforementioned symptoms  Summary of clinical course:   Patient was admitted to the ICU in stable condition  He was transferred 6 hours post ER admission to the medical surgical floor for continued treatment  Consultants: Urology  _____________________________________________________________________    Diagnostic Data:  CBC:    Results from last 7 days  Lab Units 11/22/18  1020 11/22/18  0648   WBC Thousand/uL  --  13 28*   HEMOGLOBIN g/dL  --  10 5*   HEMATOCRIT %  --  33 5*   PLATELETS Thousands/uL 249 328      CMP: Lab Results   Component Value Date    K 4 7 11/22/2018     11/22/2018    CO2 27 11/22/2018    BUN 46 (H) 11/22/2018    CREATININE 2 47 (H) 11/22/2018    CALCIUM 8 9 11/22/2018    AST 25 11/22/2018    ALT 20 11/22/2018    ALKPHOS 78 11/22/2018    EGFR 26 11/22/2018   ,   PT/INR:   Lab Results   Component Value Date    INR 1 09 11/22/2018         Microbiology:  BCx2  Sputum Cx  Urine Cx  Legionalla antigen  Step Pneumo antigen    Imaging: I have personally reviewed the pertinent imaging studies on the PACS system  XR chest 2 views   Final Result      New right lung infiltrate consistent with pneumonia              Workstation performed: [id number]               Cardiac/EKG/telemetry/Echo:     Results from last 7 days  Lab Units 11/22/18  0649   TROPONIN I ng/mL 0 02   NT-PRO BNP pg/mL 1,922*     EKG NSR  _____________________________________________________________________    Recent or scheduled procedures: None    Outstanding/pending diagnostics: Culture data     Mobilization Plan: PT/OT ordered    Home medications that are not reordered and reason why: Antihypertensives on hold secondary to normotension  Spoke with Dr Bruno Solitario regarding transfer  Please call [phone] with any questions or concerns  Portions of the record may have been created with voice recognition software  Occasional wrong word or "sound a like" substitutions may have occurred due to the inherent limitations of voice recognition software  Read the chart carefully and recognize, using context, where substitutions have occurred      Deep Newberry

## 2018-11-22 NOTE — PLAN OF CARE
CARDIOVASCULAR - ADULT     Maintains optimal cardiac output and hemodynamic stability Progressing     Absence of cardiac dysrhythmias or at baseline rhythm Progressing        HEMATOLOGIC - ADULT     Maintains hematologic stability Progressing        METABOLIC, FLUID AND ELECTROLYTES - ADULT     Electrolytes maintained within normal limits Progressing     Fluid balance maintained Progressing     Glucose maintained within target range Progressing        MUSCULOSKELETAL - ADULT     Maintain or return mobility to safest level of function Progressing     Maintain proper alignment of affected body part Progressing        Potential for Falls     Patient will remain free of falls Progressing        Prexisting or High Potential for Compromised Skin Integrity     Skin integrity is maintained or improved Progressing        RESPIRATORY - ADULT     Achieves optimal ventilation and oxygenation Progressing        SKIN/TISSUE INTEGRITY - ADULT     Skin integrity remains intact Progressing     Incision(s), wounds(s) or drain site(s) healing without S/S of infection Progressing     Oral mucous membranes remain intact Progressing

## 2018-11-22 NOTE — H&P
History & Physical Exam - Critical Care   Nelsy Mccray 70 y o  male MRN: [id number]  Unit/Bed#: ED 16 Encounter: [id number]      Assessment/Plan:  1  Severe sepsis secondary to urinary tract infection  · This patient will be admitted to inpatient status as this patient is believed to require greater than 2 midnight stay  · Patient was recently discharged with a urinary tract infection and now presents with what is believed to be a right-sided pneumonia  · Obtain blood cultures, sputum cultures, urine cultures  · Patient's initial lactate was 1 5 and his lowest blood pressure was 96 systolic lead he has received 38 milliliters/kilogram of the IV fluid be ideal body weight  · Will continue broad-spectrum antibiotics of cefepime and vancomycin  · Moderate blood pressures closely and initiate pressors if blood pressure falls below 90 systolicly  2  Right-sided lower lobe pneumonia likely healthcare acquired  · Obtain blood cultures, sputum cultures, and urine cultures as outlined above  · Continue broad-spectrum antibiotics  · Continue aggressive pulmonary toileting  · Maintain O2 saturations greater than 92% with supplemental oxygen as needed  · Strep pneumo and Legionella ordered   3  Suprapubic catheter with neurogenic bladder  · Consult Urology  · Obtain urine culture  · Continue broad-spectrum antibiotics  4  Acute kidney injury superimposed on chronic kidney disease stage 3  · Baseline creatinine appears to be 1 3-1 7  · Continue IV fluid resuscitation monitor renal indices closely  · Avoid nephrotoxic drugs  5  Mild hypotension in the setting of known essential hypertension  · Hold antihypertensives  · Continue IV fluids as outlined above  6  Type 2 diabetes mellitus with hyperglycemia  · Initiate sliding-scale insulin, mealtime insulin, and Lantus  · Monitor Accu-Cheks closely  7  History hepatitis B and C  · Monitor LFTs  8   Degenerative disc disease, lumbosacral with spinal stenosis and associated chronic pain and chronic opioid use  · Continue long-acting and short-acting opioids as prescribed in Outpatient setting and monitor pain  9  History of Charcot-Bre tooth disease with physical debility  · Consult physical therapy    _____________________________________________________________________      HPI:    Zamzam Mcdonald is a 70 y o  male who presents acute onset of fever, chills, and rigors  Patient was recently discharged from [address] [place] [address] on 11/16/2018 secondary to urosepsis and was transition to [address] for physical deconditioning and need for rehab  He reports feeling his normal self until this a m  When he developed fever, chills, and rigors  He was transferred to [address] [place] [address] secondary to these symptoms and found to have hypoxia  On initial emergency department exam his pulse ox was 87% on room air and he was found to have a right-sided infiltrate on chest x-ray  He was referred for admission secondary to mild hypotension and high mild hypoxia with an associated right lower lobe pulmonary infiltrate  Upon record review he was recently admitted 11/10/2018 through 11/16/2018 the [facility] with chief complaints of generalized weakness, somnolence, and falls witnessed by his family  He resides in apartment with his brother  He was brought to the emergency room secondary to the aforementioned symptoms on 11/10/2018 where was noticed that he had purulent urine  He was admitted on 11/10/2018 and was hydrated with IV fluids secondary to fever, tachycardia, and leukocytosis with indications of sepsis  He was treated with broad-spectrum antibiotics and his temperature normalized and had improvement with somnolence and weakness  He was found to have an oral back tear and strep in his urine culture and Urology was consulted  He had a suprapubic catheter changed    He had associated acute renal failure secondary to sepsis with a creatinine of 2 3 which improved to 1 8 at the time of his discharge on 11/16/2018  He was discharged to [place] [facility] on 11/16/2018 and now returns for the aforementioned symptoms  Review of Systems:  CONSTITUTIONAL:  Positive fever, positive Chills, positive fatigue, positive general weakness  HEENT:  No noted trauma  No loss of consciousness  No lightheadedness  No diplopia or blurred vision  No earache, sore throat or runny nose  CARDIOVASCULAR:  No pressure, squeezing, strangling, tightness, heaviness or aching about the chest, neck, axilla or epigastrium  No palpitations  No dyspnea on exertion  RESPIRATORY:  No cough, no hemoptysis, no wheezing, no change in sputum production, positive shortness of breath, no PND or orthopnea  GASTROINTESTINAL:  No abdominal pain No nausea, vomiting or diarrhea  No complaints of dysphagia  No complaints of oral or rectal bleeding  GENITOURINARY:  No dysuria, frequency or urgency  No polyuria, nocturia, or hematuria  Suprapubic catheter intact with purulent drainage from orifice  MUSCULOSKELETAL:  No complaints of joint pain, joint instability, joint swelling, or arthralgias  No complaints of lower extremity edema  SKIN:  No change in skin, hair or nails  No complaints of itching, rashes, lumps, or sores  NEUROLOGIC:  No paresthesias, fasciculations, seizures or weakness  No complaints of syncope, slurring of speech, focal weakness, neck stiffness, or numbness/tingling  PSYCHIATRIC:  No disorder of thought or mood  No history of anxiety or depression  No complaints of memory deficits  ENDOCRINE:  No heat or cold intolerance, polyuria or polydipsia  HEMATOLOGICAL:  No easy bruising or bleeding   No petechiae, purpura, or anemia  ALLERGY/IMMUNE:  No history of immunosuppression  [facility]       Historical Information   Past Medical History:   Diagnosis Date    BPH with obstruction/lower urinary tract symptoms     Bundle branch block, right     Charcot-Bre-Tooth disease  Diabetes mellitus ([facility] [address] )     Fracture of clavicle     GERD (gastroesophageal reflux disease)     Hypertension     Neuropathy     charcot- Bre tooth    Renal disorder     Severe sepsis ([facility] [address] ) 11/22/2018    Spinal stenosis     Ureteral calculus     Urinary retention      Past Surgical History:   Procedure Laterality Date    CYSTOSCOPY  2014, 2015, 2016, 2017    CYSTOSCOPY W/ RETROGRADES Bilateral     CYSTOSCOPY W/ URETERAL STENT PLACEMENT Right 12/30/2015    CYSTOSCOPY W/ URETERAL STENT REMOVAL Right 2016    CYSTOSCOPY W/ URETEROSCOPY W/ LITHOTRIPSY Right 12/30/2015    FOOT SURGERY Right     Amputation of toe, Onset - 10/1/12    HEMORROIDECTOMY      KNEE SURGERY Left     PCL and MCL - meniscus, Onset - 1981    LUMBAR FUSION      Transverse lumbar interbody fusion L2-L3 and METRx Lt hemilaminectomy and ema foraminotomy-decompressive at L4-L5 Dr Gloria Mcgill and Dr Laila Nunez;  Onset - 5/13/13       TRANSURETHRAL RESECTION OF PROSTATE  01/2014     Social History   History   Alcohol Use No     History   Drug Use No     History   Smoking Status    Light Tobacco Smoker    Years: 4 00    Types: Cigars    Last attempt to quit: 1995   Smokeless Tobacco    Never Used     Comment: occasional cigar       Family History:   Family History   Problem Relation Age of Onset    Dementia Mother     Hypertension Father     Charcot-Bre-Tooth disease Father     Diabetes Father     Pneumonia Maternal Grandfather     Cancer Family     Diabetes Family        Medications:  Pertinent medications were reviewed    Current Facility-Administered Medications:  sodium chloride 1,400 mL Intravenous Once Chai Dixon MD Last Rate: 1,400 mL (11/22/18 3890)   sodium chloride 125 mL/hr Intravenous Continuous Chai Dixon MD Last Rate: 125 mL/hr (11/22/18 6857)   vancomycin 15 mg/kg Intravenous Once Chai Dixon MD          No Known Allergies      Vitals:   /52 (BP Location: Right arm) Pulse 92   Temp (!) 101 6 °F (38 7 °C) (Oral)   Resp 17   Wt 85 7 kg (188 lb 15 oz)   SpO2 94%   BMI 24 93 kg/m²   Body mass index is 24 93 kg/m²  SpO2: 94 %,   SpO2 Activity: At Rest,   O2 Device: Simple mask      Intake/Output Summary (Last 24 hours) at 11/22/18 0859  Last data filed at 11/22/18 0330   Gross per 24 hour   Intake             1000 ml   Output                0 ml   Net             1000 ml     Invasive Devices     Peripheral Intravenous Line            Peripheral IV 11/22/18 Left Antecubital less than 1 day    Peripheral IV 11/22/18 Right Arm less than 1 day          Drain            Suprapubic Catheter 18 Fr  9 days                Physical Exam:  Gen:  Age appropriate affect and behavior  HEENT:  Normocephalic, atraumatic, pupils are 3 and worse bilaterally, extraocular movements are intact, facial droop, slurred speech  Neck:  No JVD, no adenopathy  Chest:  Decreased breath sounds right with rhonchi and faint wheeze  Left lung sounds are clear  Cor: S1/S2, regular rate and rhythm, no murmurs, no rubs, no gallops, no clicks  Abd:  Obese, soft, nontender, nondistended, positive bowel sounds x4 quadrants  Ext:  Moves all extremities with purpose, palpable pulses x4 extremities, no noted edema  Neuro:  Cranial nerves 2 through trouble grossly intact, GCS is 15, follows all commands  Skin:  Pink/warm/dry, noted breakdown    Diagnostic Data:  Lab: I have personally reviewed pertinent lab results  ,   CBC:    Results from last 7 days  Lab Units 11/22/18  0648   WBC Thousand/uL 13 28*   HEMOGLOBIN g/dL 10 5*   HEMATOCRIT % 33 5*   PLATELETS Thousands/uL 328      CMP: Lab Results   Component Value Date    K 4 7 11/22/2018     11/22/2018    CO2 27 11/22/2018    BUN 46 (H) 11/22/2018    CREATININE 2 47 (H) 11/22/2018    CALCIUM 8 9 11/22/2018    AST 25 11/22/2018    ALT 20 11/22/2018    ALKPHOS 78 11/22/2018    EGFR 26 11/22/2018   ,   PT/INR:   Lab Results   Component Value Date    INR 1 09 11/22/2018       Microbiology:  Blood cultures x2 are pending  Urine culture is pending  Sputum culture is pending    Imaging: I have personally reviewed the pertinent imaging studies on the PACS system  XR chest 2 views   Final Result      New right lung infiltrate consistent with pneumonia  Workstation performed: [id number]               Cardiac/EKG/telemetry/Echo:     Results from last 7 days  Lab Units 11/22/18  0649   TROPONIN I ng/mL 0 02   NT-PRO BNP pg/mL 1,922*     EKG reveals sinus rhythm      VTE Prophylaxis: Heparin    Code Status:  Full code    BLAINE Draper    Portions of the record may have been created with voice recognition software  Occasional wrong word or "sound a like" substitutions may have occurred due to the inherent limitations of voice recognition software  Read the chart carefully and recognize, using context, where substitutions have occurred

## 2018-11-22 NOTE — SEPSIS NOTE
Sepsis Note   Martha Underwood 70 y o  male MRN: [id number]  Unit/Bed#: ICU 06 Encounter: [id number]            Initial Sepsis Screening     Row Name 11/22/18 0753                Is the patient's history suggestive of a new or worsening infection? (!)  Yes (Proceed)  -CS        Suspected source of infection pneumonia  -CS        Are two or more of the following signs & symptoms of infection both present and new to the patient? (!)  Yes (Proceed)  -CS        Indicate SIRS criteria Leukocytosis (WBC > 97564 IJL); Hyperthemia > 38 3C (100 9F)  -CS        If the answer is yes to both questions, suspicion of sepsis is present          If severe sepsis is present AND tissue hypoperfusion perists in the hour after fluid resuscitation or lactate > 4, the patient meets criteria for SEPTIC SHOCK          Are any of the following organ dysfunction criteria present within 6 hours of suspected infection and SIRS criteria that are NOT considered to be chronic conditions? (!)  Yes  -CS        Organ dysfunction Creatinine > 0 5 mg/dl ABOVE BASELINE  -CS        Date of presentation of severe sepsis          Time of presentation of severe sepsis          Tissue hypoperfusion persists in the hour after crystalloid fluid administration, evidenced, by either:          Was hypotension present within one hour of the conclusion of crystalloid fluid administration?  No  -CS        Date of presentation of septic shock          Time of presentation of septic shock            User Key  (r) = Recorded By, (t) = Taken By, (c) = Cosigned By    [address] Name Provider Type    CS Casi Darnell MD Physician               Default Flowsheet Data (last 720 hours)      Sepsis Reassess     Row Name 11/22/18 7723                   Repeat Volume Status and Tissue Perfusion Assessment Performed    Repeat Volume Status and Tissue Perfusion Assessment Performed Yes  -CS           Volume Status and Tissue Perfusion Post Fluid Resuscitation- Must Document 5 of the Following 7:    Vital Signs Reviewed (HR, RR, BP, T) Yes  -CS        Arterial Oxygen Saturation Reviewed (POx, SaO2 or SpO2) Yes (comment %)   94  -CS        Cardio Normal S1/S2  -CS        Pulmonary (!)  Normal effort;Rhonchi  -CS        Capillary Refill Brisk  -CS        Peripheral Pulses Radial  -CS        Peripheral Pulse +2  -CS        Skin Warm  -CS        Urine output assessed Adequate  -CS           *OR*   Intensive Monitoring- Must Document One of the Following Four *:    Vital Signs Reviewed          * Central Venous Pressure (CVP or RAP)          * Central Venous Oxygen (SVO2, ScvO2 or Oxygen saturation via central catheter)          * Bedside Cardiovascular US in IVC diameter and % collapse          * Passive Leg Raise OR Crystalloid Challenge            User Key  (r) = Recorded By, (t) = Taken By, (c) = Cosigned By    Initials Name Provider Type    CS Norma Stone MD Physician

## 2018-11-22 NOTE — PROGRESS NOTES
Vancomycin Assessment    Heriberto Mcneal is a 70 y o  male who is currently receiving vancomycin 1250 mg IV q24h for Sepsis  Relevant clinical data and objective history reviewed:  Creatinine   Date Value Ref Range Status   11/22/2018 2 47 (H) 0 60 - 1 30 mg/dL Final     Comment:     Standardized to IDMS reference method   11/15/2018 1 89 (H) 0 60 - 1 30 mg/dL Final     Comment:     Standardized to IDMS reference method   11/13/2018 1 86 (H) 0 60 - 1 30 mg/dL Final     Comment:     Standardized to IDMS reference method   04/10/2017 1 33 (H) 0 70 - 1 25 mg/dL Final     Comment:     Result Comment: For patients >52years of age, the reference limit  for Creatinine is approximately 13% higher for people  identified as -American      09/18/2014 1 15 0 5 - 1 5 mg/dL Final     Comment:     Standardized to IDMS reference method   09/17/2014 1 53 (H) 0 60 - 1 30 mg/dL Final     Comment:     Standardized to IDMS reference method     BP 96/53 (BP Location: Right arm)   Pulse 82   Temp 97 8 °F (36 6 °C) (Oral)   Resp 16   Wt 85 7 kg (188 lb 15 oz)   SpO2 95%   BMI 24 93 kg/m²   No intake/output data recorded  Lab Results   Component Value Date/Time    BUN 46 (H) 11/22/2018 06:49 AM    BUN 26 (H) 04/10/2017 11:04 AM    WBC 13 28 (H) 11/22/2018 06:48 AM    WBC 8 5 04/10/2017 11:04 AM    HGB 10 5 (L) 11/22/2018 06:48 AM    HGB 12 7 (L) 04/10/2017 11:04 AM    HCT 33 5 (L) 11/22/2018 06:48 AM    HCT 39 3 04/10/2017 11:04 AM    MCV 97 11/22/2018 06:48 AM    MCV 93 4 04/10/2017 11:04 AM     11/22/2018 10:20 AM     04/10/2017 11:04 AM     Temp Readings from Last 3 Encounters:   11/22/18 97 8 °F (36 6 °C) (Oral)   11/16/18 97 7 °F (36 5 °C) (Temporal)   10/31/18 97 5 °F (36 4 °C) (Tympanic)     Vancomycin Days of Therapy: 1    Assessment/Plan  The patient is currently on vancomycin utilizing scheduled dosing based on actual body weight    Baseline risks associated with therapy include: pre-existing renal impairment  The patient is currently receiving 1250 mg IV q24h and pharmacy will re-evaluate patient's kidney function tomorrow to determine whether to modify the maintenance dose or d/c current order and draw random levels (bolus dosing when level falls below target 15-20)  Patient was administered a 25 mg/kg loading dose today (capped at 2000 mg IV) due to severe sepsis on admission  The next maintenance dose is currently scheduled for noon 11/23/18  Pharmacy will also follow closely for s/sx of nephrotoxicity, infusion reactions and appropriateness of therapy  BMP and CBC will be ordered per protocol  Due to infection severity, will target a trough of 15-20  Pharmacy will continue to follow the patients culture results, pertinent labs, and clinical progress daily      Valencia Rivera, Pharmacist

## 2018-11-22 NOTE — PROGRESS NOTES
Called report and spoke with assigned RN approximately at 1430  Pt was transferred to [place] [id number] via wheelchair with all his belongings accompanied by a PCA  Pt's medication given to RN

## 2018-11-23 NOTE — SPEECH THERAPY NOTE
Speech Language/Pathology  Speech/Language Pathology  Assessment    Patient Name: Zamzam Mcdonald  [id number] Date: 11/23/2018     Problem List  Patient Active Problem List   Diagnosis    [facility]    Suprapubic catheter ([facility]    UTI (urinary tract infection)    Charcot-Bre-Tooth disease    Type 2 diabetes mellitus with hyperlipidemia ([facility]    Essential hypertension    BPH (benign prostatic hyperplasia)    Chronic kidney disease, stage 3 ([facility]    Hypercholesterolemia    Neuropathy in diabetes ([facility]    Spinal stenosis    DDD (degenerative disc disease), lumbosacral    Depression    Esophageal reflux    Hepatitis B    Hepatitis C virus infection without hepatic coma    Physical debility    Chronic pain    Severe sepsis (HCC)    Neurogenic bladder    Pneumonia due to infectious organism     Past Medical History  Past Medical History:   Diagnosis Date    BPH with obstruction/lower urinary tract symptoms     Bundle branch block, right     Charcot-Bre-Tooth disease     Diabetes mellitus ([facility]     Fracture of clavicle     GERD (gastroesophageal reflux disease)     Hypertension     Neuropathy     charcot- Bre tooth    Renal disorder     Severe sepsis ([facility] ) 11/22/2018    Spinal stenosis     Ureteral calculus     Urinary retention      Past Surgical History  Past Surgical History:   Procedure Laterality Date    CYSTOSCOPY  2014, 2015, 2016, 2017    CYSTOSCOPY W/ RETROGRADES Bilateral     CYSTOSCOPY W/ URETERAL STENT PLACEMENT Right 12/30/2015    CYSTOSCOPY W/ URETERAL STENT REMOVAL Right 2016    CYSTOSCOPY W/ URETEROSCOPY W/ LITHOTRIPSY Right 12/30/2015    FOOT SURGERY Right     Amputation of toe, Onset - 10/1/12    HEMORROIDECTOMY      KNEE SURGERY Left     PCL and MCL - meniscus, Onset - 1981    LUMBAR FUSION      Transverse lumbar interbody fusion L2-L3 and METRx Lt hemilaminectomy and ema foraminotomy-decompressive at L4-L5 Dr Joe Vila and Dr Merline Severance; Onset - 5/13/13       TRANSURETHRAL RESECTION OF PROSTATE  01/2014     Attestation signed by Shayna Alberts MD at 11/22/2018 11:36 AM   Split/Shared Statement:  I have seen and examined the patient  I have discussed the patient's care with the advanced practitioner  I personally reviewed the laboratory data       Imaging Studies were reviewed personally on the [facility] system:  Chest x-ray shows dense right lower lobe pneumonia     EXAMINATION:  Snoring but arousable  Vital signs are stable  He is requiring 6 L nasal cannula  Blood pressure responded well to fluid resuscitation  Lungs clear anteriorly  Diminished with bronchial breath sounds at the right base  Heart is regular  Abdomen benign  Extremities without edema        I agree with the findings, assessment, and plan as outlined in the history and physical by [facility]  Continue sepsis protocol with treatment for right lower lobe pneumonia and possible urinary tract infection verses chronic colonization        Shayna Alberts MD     HPI:    Jacqueline Camacho is a 70 y o  male who presents acute onset of fever, chills, and rigors  Patient was recently discharged from [address] [place] [address] on 11/16/2018 secondary to urosepsis and was transition to [facility] for physical deconditioning and need for rehab  He reports feeling his normal self until this a m  When he developed fever, chills, and rigors  He was transferred to [address] [place] [address] secondary to these symptoms and found to have hypoxia  On initial emergency department exam his pulse ox was 87% on room air and he was found to have a right-sided infiltrate on chest x-ray  He was referred for admission secondary to mild hypotension and high mild hypoxia with an associated right lower lobe pulmonary infiltrate    Upon record review he was recently admitted 11/10/2018 through 11/16/2018 the [facility] with chief complaints of generalized weakness, somnolence, and falls witnessed by his family  He resides in apartment with his brother  He was brought to the emergency room secondary to the aforementioned symptoms on 11/10/2018 where was noticed that he had purulent urine  He was admitted on 11/10/2018 and was hydrated with IV fluids secondary to fever, tachycardia, and leukocytosis with indications of sepsis  He was treated with broad-spectrum antibiotics and his temperature normalized and had improvement with somnolence and weakness  He was found to have an oral back tear and strep in his urine culture and Urology was consulted  He had a suprapubic catheter changed  He had associated acute renal failure secondary to sepsis with a creatinine of 2 3 which improved to 1 8 at the time of his discharge on 11/16/2018  [facility] and now returns for the aforementioned symptoms  VBS results from 2/26/16 [facility] (see [facility] records for complete details)  Oral Stage: Minimal base of tongue/oral residuals noted with all consistencies  Pt independently double swallowed to clear oral cavity  Adequate AP transfer with solid consistencies  Mild prolonged mastication yet functional  Pt accepted small amounts of solid trials  Pt noted to independently use oral prep set with thin liquids via cup/straw which aided in better bolus control from previous VFSS  Decreased bolus control noted with ambient fluid from mixed trials which spilled towards valleculae prior to swallow  Pharyngeal Stage: Mild delayed pharyngeal swallow with PO spilling valleculae prior to swallow initiation  Mild decreased hyolaryngeal elevation with slightly decreased epiglottic inversion  This resulted in minimal valleculae/pyriform sinus retention which was cleared with double swallows  Epiglottis noted to slowly invert but able to protect patients airway during swallow   Minimal transient penetration observed with thin liquid with and without oral prep set/ cued and not cued via cup/straw  No tracheal aspiration observed on study  VBS results from 2/16/16 [facility] (see [facility] records for complete details)  Diagnosis: Mild Oropharyngeal Dysphagia  Oral Stage: Reduced bolus control with thin liquid and ambient fluid from mixed consistencies  Pt with better bolus control with oral prep set with thin via cup  Functional mastication of soft and dry solids  Pharyngeal Stage: Mild delayed pharyngeal swallow with PO filling valleculae prior to swallow initiation  Decreased hyolaryngeal elevation/anterior movement resulting in decreased epiglottic inversion  Noted cervical osteophyte at the level of the epiglottis- ? Contributing to dysphagia/not allowing for full inversion of epiglottis  Reduced bolus control of ambient fluid from mixed along with delayed swallow resulted in deep penetration prior to and during swallow  This resulted in trace silent aspiration anteriorly into airway without cough response  Deep laryngeal penetration also observed with thin liquids via cup without oral prep set  With oral prep set, penetration was observed to be less  No other episodes of tracheal aspiration observed  signed by Vangie Martinez DO at 11/23/2018 3:08 PM       Split/Shared Statement  I saw/examined the patient  I agree with the Advanced Practitioner's note with the following additions/exceptions:  Patient is still short of breath and hypoxic requiring nasal cannula  No urinary complaints      Assessment and plan  1  Sepsis secondary to pneumonia  Large right-sided pneumonia  Agree with transitioning cefepime to zosyn for anaerobic coverage and have speech pathologist evaluate  Continue vancomycin until MRSA swab returns  2  Acute kidney injury  Secondary to sepsis  Continue lactated Ringer's  3  Type 2 diabetes mellitus with hyperlipidemia  Continue Lantus and sliding scale  4  Essential hypertension    Continue to hold amlodipine and metoprolol due to hypotension from sepsis  5  Charcot-Bre-Tooth disease  With debility  Will need rehab  6  Neurogenic bladder  SPT replaced during last admission     11/10/18 CT  LOWER CHEST:  Mild subsegmental atelectasis in the base of the right lower lobe  IMPRESSION: cxr 11/22/18  New right lung infiltrate consistent with pneumonia  Bedside Swallow Evaluation:    Summary:  Pt presents w/ delayed cough w/ thin liquids  Trialed nectar water x 1  No cough, but he stated he didn't like it  "i'm not going for that speech test, i've had too many tests"  ? Aspiration  Also can not r/o reflux given h/o gerd  Recommendations:  Recommending VBS to determine if pt is in fact aspirating, however he is refusing at this time despite giving him rationale for test and possible ongoing respiratory issues  Pt also has h/o GERD  Can not r/o refluxing  Diet: as tolerated for now  Liquid: recommended at least a trial of nectar but pt refused  Meds: a tolerated  Supervision:intermittent  Positioning:Upright  Strategies: Pt to take PO/Meds only when fully alert and upright  Oral care  Aspiration precautions    Therapy Prognosis: unknown  Prognosis considerations: Depends on pt agreeing to study  Frequency: will f/u as able  ? If he may change his decision    Patient's goal: doesn't want any tests right now  Reason for consult:  R/o aspiration  Determine safest and least restrictive diet  respiratory compromise  current pna  h/o dysphagia   Precautions:  Contact  Current diet:  Regular w/ thin  Premorbid diet[de-identified]  regular w/ thin  Previous VBS:  2 at [facility] noted above  O2 requirement:  5L nc  Voice/Speech:  wnl  Social:  Lives w/ family  Follows commands:  yes                       Cognitive Status:  Alert and oriented  Oral [facility] exam:  Partial dentition, broken teeth  Full symmetry    Items administered:  Puree, nectar thick liquid, thin liquids,  Liquids were taken by straw       Oral stage:  wfl w/ minimal trials thin and nectar liquids  Pharyngeal stage:  ? Mild  Swallow promptness: prompt  Laryngeal rise:fair  Wet voice:no  Throat clear:no  Cough:Delayed cough x 2 (~ 20 seconds) w/ 2 trials thin water  No cough w/ nectar thick water, but he did not like it  Secondary swallows: none  Audible swallows: none    Esophageal stage:  H/o gerd  ? Delayed cough due to delayed response from aspiration vs reflux  Results d/w:  Pt, nursing, Dr Editha Cogan):  Pt will tolerate least restrictive diet w/out s/s aspiration or oral/pharyngeal difficulties

## 2018-11-23 NOTE — PHYSICAL THERAPY NOTE
PT EVALUATION    Pt  Name: Martha Underwood  Pt  Age: 70 y o    MRN: [id number]  LENGTH OF STAY: 1    Patient Active Problem List   Diagnosis    [facility]    [facility]rapubic catheter [facility]    UTI (urinary tract infection)    Charcot-Bre-Tooth disease    [facility]    Essential hypertension    BPH (benign prostatic hyperplasia)    Chronic kidney disease, stage 3 ([facility]    Hypercholesterolemia    Neuropathy in diabetes ([facility]    Spinal stenosis    DDD (degenerative disc disease), lumbosacral    Depression    Esophageal reflux    Hepatitis B    Hepatitis C virus infection without hepatic coma    Physical debility    Chronic pain    Severe sepsis (HCC)    Neurogenic bladder    Pneumonia due to infectious organism       Admitting Diagnoses:   Pneumonia [J18 9]  Renal insufficiency [N28 9]  Hypoxia [R09 02]  Neurogenic bladder [N31 9]  Suprapubic catheter ([facility] ) [Z93 59]  Fever [R50 9]  CYDNEY (acute kidney injury) ([facility] ) [N17 9]  Severe sepsis ([facility] ) [A41 9, R65 20]  Type 2 diabetes mellitus with hyperlipidemia ([facility] ) [E11 69, E78 5]  Urinary tract infection associated with cystostomy catheter, initial encounter ([place] [address] ) [T83 510A, N39 0]    Past Medical History:   Diagnosis Date    BPH with obstruction/lower urinary tract symptoms     Bundle branch block, right     Charcot-Bre-Tooth disease     Diabetes mellitus ([facility]     Fracture of clavicle     GERD (gastroesophageal reflux disease)     Hypertension     Neuropathy     charcot- Bre tooth    Renal disorder     Severe sepsis ([facility] ) 11/22/2018    Spinal stenosis     Ureteral calculus     Urinary retention        Past Surgical History:   Procedure Laterality Date    CYSTOSCOPY  2014, 2015, 2016, 2017    CYSTOSCOPY W/ RETROGRADES Bilateral     CYSTOSCOPY W/ URETERAL STENT PLACEMENT Right 12/30/2015    CYSTOSCOPY W/ URETERAL STENT REMOVAL Right 2016    CYSTOSCOPY W/ URETEROSCOPY W/ LITHOTRIPSY Right 12/30/2015    FOOT SURGERY Right     Amputation of toe, Onset - 10/1/12    HEMORROIDECTOMY      KNEE SURGERY Left     PCL and MCL - meniscus, Onset - 1981    LUMBAR FUSION      Transverse lumbar interbody fusion L2-L3 and METRx Lt hemilaminectomy and ema foraminotomy-decompressive at L4-L5 Dr Rich Coley and Dr Kimberly Lagunas; Onset - 5/13/13       TRANSURETHRAL RESECTION OF PROSTATE  01/2014       Imaging Studies:  XR chest 2 views   Final Result by Gerardo Juarez DO (11/22 8008)      New right lung infiltrate consistent with pneumonia  Workstation performed: [id number]            11/23/18 1126   Note Type   Note type Eval only   Pain Assessment   Pain Score 8   Pain Type Chronic pain   Pain Location Back   Pain Orientation Bilateral;Lower   Hospital Pain Intervention(s) Repositioned; Ambulation/increased activity; Emotional support; Rest   Home Living   Type of [address] One level  (0STE)   [address] [place] [address] chair   Home Equipment Walker   Additional Comments PTA, pt was at [email or web address] for [employer]   Prior Function   Level of [address] with ADLs and functional mobility  (w/ RW; prior to rehab)   Lives With Family  (brother)   Receives Help From   [address] in the last 6 months 1 to 4  (2x)   Comments PTA, pt was at [email or web address] for rehab-> requiring (A) for most functional mobility & ADLs   Restrictions/Precautions   Weight Bearing Precautions Per Order No   Other Precautions Contact/isolation; Chair Alarm; Bed Alarm;Multiple lines;O2;Fall Risk;Pain   General   Additional Pertinent History [facility] admission 11/10-11/16 then D/C to [facility] for rehab   Family/Caregiver Present No   Cognition   Overall Cognitive Status WFL   Arousal/Participation Alert   Attention Within functional limits   Orientation Level Oriented to person;Oriented to place;Oriented to time   Following Commands Follows one step commands without difficulty   RUE Assessment   RUE Assessment WFL   RUE Strength   RUE Overall Strength Within Functional Limits - able to perform ADL tasks with strength   LUE Assessment   LUE Assessment WFL   LUE Strength   LUE Overall Strength Within Functional Limits - able to perform ADL tasks with strength   RLE Assessment   RLE Assessment WFL   Strength RLE   RLE Overall Strength 4/5   LLE Assessment   LLE Assessment WFL   Strength LLE   LLE Overall Strength 4/5   Coordination   Movements are Fluid and Coordinated 1   Sensation X  (neuropathy BLE)   Bed Mobility   Supine to Sit Unable to assess   Additional Comments pt OOB in chair pre & post session   Transfers   Sit to Stand 4  Minimal assistance   Additional items Assist x 1; Armrests; Increased time required;Verbal cues   Stand to Sit 4  Minimal assistance   Additional items Assist x 1; Armrests; Increased time required;Verbal cues   Additional Comments cues for techniques   Ambulation/Elevation   Gait pattern Forward Flexion;Decreased foot clearance;Shuffling; Short stride; Excessively slow  (minimal B/L knee buckling noted)   Gait Assistance 4  Minimal assist   Additional items Assist x 1;Verbal cues; Tactile cues   Assistive Device Rolling walker   Distance 40'x1   Balance   Static Sitting Fair +   Static Standing Fair -   Ambulatory Poor +   Endurance Deficit   Endurance Deficit Yes   Endurance Deficit Description fatigue   Activity Tolerance   Activity Tolerance Patient limited by fatigue   Nurse Made Aware Izzy   Assessment   Prognosis Good   Problem List Decreased strength;Decreased endurance; Impaired balance;Decreased mobility; Impaired sensation;Pain;Obesity; Decreased safety awareness   Assessment Pt  68 y o male transferred from [facility] w/ acute onset of fever, chills, and rigors  Pt admitted for Severe sepsis ([facility] ) 2* to UTI w/ pneumonia & CYDNEY   Of note, pt had [facility] admission on 11/10-11/16 then D/C to [facility] for rehab   Prior to rehab, pt lived w/ his brother in one level apartment; pt was I w/ RW; (+) h/o falls x2; his brother works from 5am to [employer]  Pt referred to PT for mobility assessment & D/C planning w/ orders of activity as tolerated  On eval, pt demonstrate dec mobility, balance, endurance & amb  Pt require minAx1 for most functional mobility + cues for techniques  Gait deviations as above, slow & unsteady w/ dec foot clearance & slight B/L knee buckling but no gross LOB noted  (+) fatigue but relieved w/ rest  No SOB reported, SpO2 92% w/ 4 5L O2  No dizziness reported t/o session  Nsg staff most recent vital signs as follows: /69 (BP Location: Left arm)   Pulse 97   Temp 99 5 °F (37 5 °C) (Temporal)   Resp 18   Ht 6' (1 829 m)   Wt 91 5 kg (201 lb 11 5 oz)   SpO2 93%   BMI 27 36 kg/m²   At end of session, pt remain OOB in chair w/o issues, call bell & phone in reach, chair alarm activated  Fall precautions reinforced w/ good understanding  Pt still functioning below baseline hence will continue skilled PT to improve function & safety  At this time, due to above mentioned deficits, dec caregiver support & high risk for falls, pt will benefit from continued inpt rehab at D/C  However, pt prefers to return home  If home, pt will need to achieve PT goals prior to D/C & will require HHPT & inc family support at D/C  CM to follow  Nsg staff to continue to mobilized pt (OOB in chair for all meals & ambulate in room/unit) as tolerated to prevent further decline in function  Nsg notified      Barriers to Discharge Decreased caregiver support   Barriers to Discharge Comments pt's brother works from 5am to 1pm   Goals   Patient Goals to go home   STG Expiration Date 12/03/18   Short Term Goal #1 Goals to be met in 10 days; pt will be able to: 1) inc strength & balance by 1/2 grade to improve overall functional mobility & dec fall risk; 2) inc bed mobility to I for pt to be able to get in/OOB safely w/ proper techniques 100% of the time, to dec caregiver assistance & safely function at home; 3) inc transfers to modified I for pt to transition safely from one surface to another w/o % of the time, to dec caregiver assistance & safely function at home; 4) inc amb w/ RW approx  >5' w/ modified I for pt to ambulate household distances w/o any % of the time, to dec caregiver assistance & safely function at home; 5) inc barthel score to 75 to decrease overall risk for falls; 6) pt/caregiver ed   Treatment Day 0   Plan   Treatment/Interventions Functional transfer training;LE strengthening/ROM; Therapeutic exercise; Endurance training;Patient/family training;Bed mobility;Gait training;Spoke to nursing   PT Frequency Other (Comment)  (4-5x/wk)   Recommendation   Recommendation Short-term skilled PT  (however pt prefers to return home)   Equipment Recommended Walker  (RW)   PT - OK to Discharge (yes to inpt rehab; no to home)   Additional Comments STR beneficial however pt prefers to return home  If home, pt will need to achieve PT goals prior to D/C & HHPT + inc family support at D/C     Barthel Index   Feeding 10   Bathing 0   Grooming Score 5   Dressing Score 5   Bladder Score 0   Bowels Score 10   Toilet Use Score 5   Transfers (Bed/Chair) Score 10   Mobility (Level Surface) Score 0   Stairs Score 0   Barthel Index Score 45   Hx/personal factors: co-morbidities, dec caregiver support, mutliple lines, use of AD, pain, h/o of falls, fall risk, assist w/ ADL's, obesity and O2  Examination: dec mobility, dec balance, dec endurance, dec amb, moderate fall risk, pain  Clinical: unpredictable (ongoing medical status, abnormal lab values, moderate fall risk and pain mgt)  Complexity: high     Ruby Ortiz, PT

## 2018-11-23 NOTE — PROGRESS NOTES
Progress Note - [facility] 1950, 70 y o  male MRN: [id number]    Unit/Bed#: [address] 68 2 [address] Jost[address] 87 215-01 Encounter: [id number]    Primary Care Provider: Deanne Xiao MD   Date and time admitted to hospital: 11/22/2018  6:16 AM        Pneumonia due to infectious organism   Assessment & Plan    Cannot r/o HCAP given recent hospitalization and ambulatory dysfunction  Flu negative, strep pneumo/legionella neg, mrsa and sputum cx pending  Started on cefepime/vanc  Pt's subjective s/sx are improving but O2 requirements are at 4L NC due to hypoxia in 80s w/therapy and leukocytosis is worsening  procalcitonin is worsening from 2 8 to 5 9will switch to zosyn/vanc, monitor renal status closely  Repeat cxr pa/lateral in am     Neurogenic bladder   Assessment & Plan    S/p SPT w/exchange during hospitalization here at 11/10/18       Physical debility   Assessment & Plan    Pt/ot consult appreciated will need str     Hepatitis C virus infection without hepatic coma   Assessment & Plan    Hx of liver enzymes are unremarkable     Hepatitis B   Assessment & Plan    Hx of  Liver enzymes are unremarkable     Essential hypertension   Assessment & Plan    Held on admission due to hypotension  Restart toprol xl in am     Type 2 diabetes mellitus with hyperlipidemia [facility])   Assessment & Plan    Lab Results   Component Value Date    HGBA1C 6 3 11/22/2018       Recent Labs      11/22/18   1644  11/22/18   2101  11/23/18   0731  11/23/18   1117   POCGLU  145*  134  103  103       Blood Sugar Average: Last 72 hrs:  (P) 131     BS acceptable continue current regimen     Charcot-Bre-Tooth disease   Assessment & Plan    W/ambulatory dysfunction  Pt/ot consulted     Suprapubic catheter [facility])   Assessment & Plan    W/recent suprapubic cath xchange  ua nitrite negative w/leukocytes and bacteria    May be colonization vs persistent infxn, pt's purulent drainage has improved and is otherwise asymptomatic  If repeat ucx concerning for persistent infection may need additional xchange, urology consult pending       CYDNEY (acute kidney injury) [facility])   Assessment & Plan    cydney w/baseline creatinine of 1 4-1 7 most recently has been 1 8-2 0 w/in last month  Was 2 47 on admission today 2 33  Likely 2* sepsis and prerenal loss from hypotension  UA w/0-1 rbcs, unlikely ATN  Monitor on vancomycin, continue LR       * Severe sepsis (HCC)   Assessment & Plan    A/e/b fever, leukocytosis and hypotension POA 2* HCAP, less likely urosepsis although pt was recently admitted for urosepsis 2* enterobacter and strep  procalcitonin and leukocytosis are worsening, although pt afebrile x24H and s/sx are improving  CXR concerning for right sided PNA  abx as noted above       VTE Pharmacologic Prophylaxis:   Pharmacologic: Heparin  Mechanical VTE Prophylaxis in Place: No    Patient Centered Rounds: I have performed bedside rounds with nursing staff today  Discussions with Specialists or Other Care Team Provider:      Education and Discussions with Family / Patient: sister at bedside    Time Spent for Care: 20 minutes  More than 50% of total time spent on counseling and coordination of care as described above  Current Length of Stay: 1 day(s)    Current Patient Status: Inpatient   Certification Statement: The patient will continue to require additional inpatient hospital stay due to hcap    Discharge Plan:     Code Status: Level 1 - Full Code      Subjective:   Pt seen and examined  He reports that his shortness of breath and cough are improving  He reports his cough is nonproductive but loose  He has no chest pain  His appetite is intact is no nausea vomiting  He reports that his prior suprapubic catheter output has appears less purulent since his prior admission  He did become hypoxic per nursing staff in the mid 80s requiring 4 L nasal cannula        Objective:     Vitals:   Temp (24hrs), Av 2 °F (37 3 °C), Min:98 9 °F (37 2 °C), Max:99 5 °F (37 5 °C)    Temp:  [98 9 °F (37 2 °C)-99 5 °F (37 5 °C)] 99 5 °F (37 5 °C)  HR:  [74-97] 97  Resp:  [18-20] 18  BP: ()/(53-74) 136/69  SpO2:  [85 %-95 %] 93 %  Body mass index is 27 36 kg/m²  Input and Output Summary (last 24 hours): Intake/Output Summary (Last 24 hours) at 11/23/18 1258  Last data filed at 11/23/18 0553   Gross per 24 hour   Intake           360 42 ml   Output             1400 ml   Net         -1039 58 ml       Physical Exam:     Physical Exam   Constitutional: He appears well-developed  No distress  HENT:   Head: Normocephalic and atraumatic  Right Ear: External ear normal    Left Ear: External ear normal    Eyes: Conjunctivae are normal  Right eye exhibits no discharge  Left eye exhibits no discharge  No scleral icterus  Neck: Normal range of motion  Cardiovascular: Normal rate, regular rhythm and normal heart sounds  Exam reveals no gallop and no friction rub  No murmur heard  Pulmonary/Chest: Effort normal  No respiratory distress  He has no wheezes  He has rales  He exhibits no tenderness  Patient on 4 L nasal cannula  Patient has no conversational dyspnea  Patient has coarse rales throughout middle lower right lung   Abdominal: Soft  He exhibits no distension  There is no tenderness  There is no guarding  Genitourinary:   Genitourinary Comments: Spt intact   Musculoskeletal: He exhibits no edema  Neurological: He is alert  Skin: Skin is warm and dry  He is not diaphoretic  Psychiatric: He has a normal mood and affect  Vitals reviewed      (  Be Sure to Include Physical Exam: Delete this entire line when you have entered your exam)    Additional Data:     Labs:      Results from last 7 days  Lab Units 11/23/18  0512  11/22/18  0648   WBC Thousand/uL 24 41*  --  13 28*   HEMOGLOBIN g/dL 7 9*  --  10 5*   HEMATOCRIT % 25 1*  --  33 5*   PLATELETS Thousands/uL 232  < > 328   BANDS PCT % 4  --   --    NEUTROS PCT %  --   --  88*   LYMPHS PCT %  --   --  5* LYMPHO PCT % 6*  --   --    MONOS PCT %  --   --  5   MONO PCT % 0*  --   --    EOS PCT % 0  --  1   < > = values in this interval not displayed  Results from last 7 days  Lab Units 11/23/18  0512 11/22/18  0649   SODIUM mmol/L 135* 137   POTASSIUM mmol/L 4 2 4 7   CHLORIDE mmol/L 102 101   CO2 mmol/L 23 27   BUN mg/dL 45* 46*   CREATININE mg/dL 2 33* 2 47*   ANION GAP mmol/L 10 9   CALCIUM mg/dL 8 4 8 9   ALBUMIN g/dL  --  2 9*   TOTAL BILIRUBIN mg/dL  --  0 40   ALK PHOS U/L  --  78   ALT U/L  --  20   AST U/L  --  25   GLUCOSE RANDOM mg/dL 91 127       Results from last 7 days  Lab Units 11/22/18  0649   INR  1 09       Results from last 7 days  Lab Units 11/23/18  1117 11/23/18  0731 11/22/18  2101 11/22/18  1644 11/22/18  1118   POC GLUCOSE mg/dl 103 103 134 145* 170*       Results from last 7 days  Lab Units 11/22/18  1020   HEMOGLOBIN A1C % 6 3       Results from last 7 days  Lab Units 11/23/18  0512 11/22/18  0829 11/22/18  0648   LACTIC ACID mmol/L  --   --  1 5   PROCALCITONIN ng/ml 5 95* 2 80*  --            * I Have Reviewed All Lab Data Listed Above  * Additional Pertinent Lab Tests Reviewed: All Labs Within Last 24 Hours Reviewed    Imaging:    Imaging Reports Reviewed Today Include: cxr  Imaging Personally Reviewed by Myself Includes:      Recent Cultures (last 7 days):       Results from last 7 days  Lab Units 11/22/18  0903 11/22/18  0834 11/22/18  0649 11/22/18  0648 11/22/18  4811   BLOOD CULTURE   --   --  No Growth at 24 hrs   --  No Growth at 24 hrs     URINE CULTURE  No Growth <100 cfu/mL  --   --   --   --    INFLUENZA B PCR   --   --   --  None Detected  --    RSV PCR   --   --   --  None Detected  --    LEGIONELLA URINARY ANTIGEN   --  Negative  --   --   --        Last 24 Hours Medication List:     Current Facility-Administered Medications:  acetaminophen 650 mg Oral Q6H PRN BLAINE Draper    cefepime 1,000 mg Intravenous Q12H BLAINE Rodriguez Last Rate: 1,000 mg (11/23/18 7286)   DULoxetine 60 mg Oral Daily Rey Sonday, CRNP    gabapentin 600 mg Oral BID Guyann Groveland, CRNP    heparin (porcine) 5,000 Units Subcutaneous [facility] [address] Vandana Gregorio Sonday, CRNP    insulin glargine 30 Units Subcutaneous HS Rey Sonday, CRNP    insulin lispro 1-5 Units Subcutaneous HS Charles Sonday, CRNP    insulin lispro 10 Units Subcutaneous TID With Meals Guyann Groveland, CRNP    insulin lispro 2-12 Units Subcutaneous TID [facility] Rey Sonday, CRNP    lactated ringers 125 mL/hr Intravenous Continuous Guyann Groveland, CRNP Last Rate: 125 mL/hr (11/23/18 0756)   levalbuterol 1 25 mg Nebulization Q6H Guyann Groveland, CRNP    morphine 15 mg Oral Q12H Charles Sonday, CRNP    oxyCODONE 10 mg Oral Q6H PRN Guyann Groveland, CRNP    piperacillin-tazobactam 2 25 g Intravenous Q6H Lilian Mcgovern PA-C    polyethylene glycol 17 g Oral Daily PRN Guyann Groveland, CRNP    sodium chloride 3 mL Nebulization Q6H Maxi Ordoñez,     traZODone 100 mg Oral HS Charles Sonday, CRNP    vancomycin 1,500 mg Intravenous Q24H Maxi Ordoñez,  Last Rate: 1,500 mg (11/23/18 1212)        Today, Patient Was Seen By: Lorna Daly PA-C    ** Please Note: Dictation voice to text software may have been used in the creation of this document   **

## 2018-11-23 NOTE — UTILIZATION REVIEW
Initial Clinical Review    Admission: Date/Time/Statement: 11/22/18 @ 0812     Orders Placed This Encounter   Procedures    Inpatient Admission (expected length of stay for this patient is greater than two midnights)     Standing Status:   Standing     Number of Occurrences:   1     Order Specific Question:   Admitting Physician     Answer:   Brennen Johns     Order Specific Question:   Level of Care     Answer:   Critical Care [15]     Order Specific Question:   Estimated length of stay     Answer:   More than 2 Midnights     Order Specific Question:   Certification     Answer:   I certify that inpatient services are medically necessary for this patient for a duration of greater than two midnights  See H&P and MD Progress Notes for additional information about the patient's course of treatment  ED: Date/Time/Mode of Arrival:   ED Arrival Information     Expected Arrival Acuity Means of Arrival Escorted By Service Admission Type    - 11/22/2018 06:16 [facility]    Arrival Complaint    fever          Chief Complaint:   Chief Complaint   Patient presents with    Fever - 9 weeks to 76 years     Pt brought in via EMS from [place] [address], per EMS pt with ongoing fever reports recently diagnosed with UTI and sepsis  Pt reports chronic back pain  pt is alert and oriented x3       History of Illness: Gabo Pompa is a 70 y o  male who presents acute onset of fever, chills, and rigors  Patient was recently discharged from [place] [address]  on 11/16/2018 secondary to urosepsis and was transition to [address] for physical deconditioning and need for rehab  He reports feeling his normal self until this a m  When he developed fever, chills, and rigors  He was transferred to [address] [place] [address] secondary to these symptoms and found to have hypoxia    On initial emergency department exam his pulse ox was 87% on room air and he was found to have a right-sided infiltrate on chest x-ray  He was referred for admission secondary to mild hypotension and high mild hypoxia with an associated right lower lobe pulmonary infiltrate      Upon record review he was recently admitted 11/10/2018 through 11/16/2018 the [facility] with chief complaints of generalized weakness, somnolence, and falls witnessed by his family  He resides in apartment with his brother  He was brought to the emergency room secondary to the aforementioned symptoms on 11/10/2018 where was noticed that he had purulent urine  He was admitted on 11/10/2018 and was hydrated with IV fluids secondary to fever, tachycardia, and leukocytosis with indications of sepsis  He was treated with broad-spectrum antibiotics and his temperature normalized and had improvement with somnolence and weakness  He was found to have an oral back tear and strep in his urine culture and Urology was consulted  He had a suprapubic catheter changed  He had associated acute renal failure secondary to sepsis with a creatinine of 2 3 which improved to 1 8 at the time of his discharge on 11/16/2018    He was [facility]2018 and now returns for the aforementioned symptoms        ED Vital Signs:   ED Triage Vitals [11/22/18 0628]   Temperature Pulse Respirations Blood Pressure SpO2   (!) 101 6 °F (38 7 °C) 100 18 124/60 (!) 87 %      Temp Source Heart Rate Source Patient Position - Orthostatic VS BP Location FiO2 (%)   Oral Monitor Lying Right arm --      Pain Score       9        Wt Readings from Last 1 Encounters:   11/23/18 91 5 kg (201 lb 11 5 oz)       Vital Signs (abnormal):    above    Abnormal Labs/Diagnostic Test Results:   BNP   1,922  BUN/Creat   46/2 47  Albumin   2 9  WBC   13 28  H/H  10 5/33 5  Abs  neutro   11 68  U/A    sm leukocyte      Tr blood     2+ protein  CXR:    C/W  PNA    R  lung    ED Treatment:   Medication Administration from 11/22/2018 0616 to 11/22/2018 0954       Date/Time Order Dose Route Action Action by Comments     11/22/2018 0723 sodium chloride 0 9 % bolus 500 mL 0 mL Intravenous Stopped Caron Watters RN      11/22/2018 0650 sodium chloride 0 9 % bolus 500 mL 500 mL Intravenous New Bag Maral Landeros RN      11/22/2018 8150 sodium chloride 0 9 % infusion 125 mL/hr Intravenous Mynortlenardet 37 Caron Watters RN      11/22/2018 6201 albuterol inhalation solution 5 mg 5 mg Nebulization Given Caron Watters RN      11/22/2018 0618 acetaminophen (TYLENOL) tablet 650 mg 650 mg Oral Given Caron Watters RN      11/22/2018 0902 cefepime (MAXIPIME) 2 g/50 mL dextrose IVPB 0 mg Intravenous Stopped Caron Watters RN      11/22/2018 0806 cefepime (MAXIPIME) 2 g/50 mL dextrose IVPB 2,000 mg Intravenous Mynortlenardet 37 Caron Watters RN      11/22/2018 8260 sodium chloride 0 9 % bolus 500 mL 0 mL Intravenous Stopped Caron Watters RN      11/22/2018 0805 sodium chloride 0 9 % bolus 500 mL 500 mL Intravenous Triciaet 37 Caron Watters RN      11/22/2018 0905 vancomycin (VANCOCIN) 1,250 mg in sodium chloride 0 9 % 250 mL IVPB 1,250 mg Intravenous Triciaet 37 Caron Watters RN      11/22/2018 9688 sodium chloride 0 9 % bolus 1,400 mL 1,400 mL Intravenous New Bag Caron Watters RN           Past Medical/Surgical History:    Active Ambulatory Problems     Diagnosis Date Noted    CYDNEY (acute kidney injury) ([facility] ) 10/01/2017    Suprapubic catheter ([facility] ) 10/01/2017    UTI (urinary tract infection) 10/01/2017    Charcot-Bre-Tooth disease 10/01/2017    Type 2 diabetes mellitus with hyperlipidemia ([facility] ) 10/01/2017    Essential hypertension 10/01/2017    BPH (benign prostatic hyperplasia) 08/09/2015    [facility], [facility] ) 12/11/2015    Hypercholesterolemia 07/06/2012    Neuropathy in diabetes ([facility] ) 06/27/2012    Spinal stenosis 06/27/2013    DDD (degenerative disc disease), lumbosacral 11/05/2014    Depression 02/15/2016    Esophageal reflux 06/19/2012  Hepatitis B 06/27/2012    Hepatitis C virus infection without hepatic coma 06/27/2012    Physical debility 11/10/2018    Chronic pain 11/10/2018     Resolved Ambulatory Problems     Diagnosis Date Noted    Frequent falls 10/01/2017    Insomnia 09/27/2012    Orthostatic hypotension 06/01/2015    Fecal impaction of colon ([facility] ) 11/10/2018     Past Medical History:   Diagnosis Date    BPH with obstruction/lower urinary tract symptoms     Bundle branch block, right     Charcot-Bre-Tooth disease     Diabetes mellitus ([place] [address] )     Fracture of clavicle     GERD (gastroesophageal reflux disease)     Hypertension     Neuropathy     Renal disorder     Severe sepsis ([place] [address] ) 11/22/2018    Spinal stenosis     Ureteral calculus     Urinary retention        Admitting Diagnosis: Pneumonia [J18 9]  Renal insufficiency [N28 9]  Hypoxia [R09 02]  Neurogenic bladder [N31 9]  Suprapubic catheter (HCC) [Z93 59]  Fever [R50 9]  CYDNEY (acute kidney injury) ([place] [address] ) [N17 9]  Severe sepsis ([place] [address] ) [A41 9, R65 20]  Type 2 diabetes mellitus with hyperlipidemia (HCC) [E11 69, E78 5]  Urinary tract infection associated with cystostomy catheter, initial encounter ([place] [address] ) [T83 510A, N39 0]    Age/Sex: 70 y o  male    1  Assessment/Plan: Severe sepsis secondary to urinary tract infection  · This patient will be admitted to inpatient status as this patient is believed to require greater than 2 midnight stay  · Patient was recently discharged with a urinary tract infection and now presents with what is believed to be a right-sided pneumonia  · Obtain blood cultures, sputum cultures, urine cultures  · Patient's initial lactate was 1 5 and his lowest blood pressure was 96 systolic lead he has received 38 milliliters/kilogram of the IV fluid be ideal body weight    · Will continue broad-spectrum antibiotics of cefepime and vancomycin  · Moderate blood pressures closely and initiate pressors if blood pressure falls below 90 systolicly  2  Right-sided lower lobe pneumonia likely healthcare acquired  · Obtain blood cultures, sputum cultures, and urine cultures as outlined above  · Continue broad-spectrum antibiotics  · Continue aggressive pulmonary toileting  · Maintain O2 saturations greater than 92% with supplemental oxygen as needed  · Strep pneumo and Legionella ordered   3  Suprapubic catheter with neurogenic bladder  · Consult Urology  · Obtain urine culture  · Continue broad-spectrum antibiotics  4  Acute kidney injury superimposed on chronic kidney disease stage 3  · Baseline creatinine appears to be 1 3-1 7  · Continue IV fluid resuscitation monitor renal indices closely  · Avoid nephrotoxic drugs  5  Mild hypotension in the setting of known essential hypertension  · Hold antihypertensives  Continue IV fluids as outlined above  6  Type 2 diabetes mellitus with hyperglycemia  · Initiate sliding-scale insulin, mealtime insulin, and Lantus  · Monitor Accu-Cheks closely  7  History hepatitis B and C  · Monitor LFTs  8  Degenerative disc disease, lumbosacral with spinal stenosis and associated chronic pain and chronic opioid use  · Continue long-acting and short-acting opioids as prescribed in Outpatient setting and monitor pain  9   History of Charcot-Bre tooth disease with physical debility  · Consult physical therapy    Admission Orders:   IP  11/22 @   0812  Scheduled Meds:   Current Facility-Administered Medications:  acetaminophen 650 mg Oral Q6H PRN BLAINE Goode    cefepime 1,000 mg Intravenous Q12H BLAINE Goode Last Rate: 1,000 mg (11/23/18 0539)   chlorhexidine 15 mL Swish & Spit Q12H [facility] Rey Ord, [address]    DULoxetine 60 mg Oral Daily Charles Sonday, BLAINE    gabapentin 600 mg Oral BID BLAINE Goode    heparin (porcine) 5,000 Units Subcutaneous [facility] Charles SondayBLAINE    insulin glargine 30 Units Subcutaneous HS Charles SondayBLAINE    insulin lispro 1-5 Units Subcutaneous HS Zamzam Ruiz CRNP    insulin lispro 10 Units Subcutaneous TID With Meals [facility], CRNP    insulin lispro 2-12 Units Subcutaneous TID [facility], CRNP    lactated ringers 125 mL/hr Intravenous Continuous [facility], CRNP Last Rate: 125 mL/hr (11/23/18 0756)   levalbuterol 1 25 mg Nebulization Q6H [facility], CRNP    morphine 15 mg Oral Q12H Rey Aziza, CRNP    oxyCODONE 10 mg Oral Q6H PRN [facility], CRNP    polyethylene glycol 17 g Oral Daily PRN [facility], CRNP    sodium chloride 3 mL Nebulization Q6H Maxi Ordoñez, DO    traZODone 100 mg Oral HS Charles Sonday, CRNP    vancomycin 1,500 mg Intravenous Q24H Dorsie Quiet, DO      Continuous Infusions:   lactated ringers 125 mL/hr Last Rate: 125 mL/hr (11/23/18 0756)     PRN Meds:   acetaminophen    oxyCODONE    polyethylene glycol     O2  4-6  L  Dysphagia  eval  Fall precautions  CCd   Diet  Cons urology  Urine  C/s  Sputum c/s  PT/OT

## 2018-11-23 NOTE — ASSESSMENT & PLAN NOTE
natasha w/baseline creatinine of 1 4-1 7 most recently has been 1 8-2 0 w/in last month  Was 2 47 on admission today 2 33  Likely 2* sepsis and prerenal loss from hypotension    UA w/0-1 rbcs, unlikely ATN  Monitor on vancomycin, continue LR

## 2018-11-23 NOTE — PLAN OF CARE
Problem: OCCUPATIONAL THERAPY ADULT  Goal: Performs self-care activities at highest level of function for planned discharge setting  See evaluation for individualized goals  Treatment Interventions: Functional transfer training, UE strengthening/ROM, Endurance training, Compensatory technique education, Activityengagement, Energy conservation          See flowsheet documentation for full assessment, interventions and recommendations  Limitation: Decreased endurance, Decreased Safe judgement during ADL, Decreased UE strength, Decreased self-care trans, Decreased high-level ADLs  Prognosis: Fair  Assessment: Pt is a 70 y o  male admitted to the hospital 2* fever, chills, and rigors  Pt was found to be hypoxic in ED  Pt noted with severe sepsis 2* UTI  Chest x/ray indicated pneumonia  Pt has recent hospilization in which he was discharged on 11/16 for urospsis  Pt was discharged to [address] [place] [address] for STR  PTA pt was independent with ADLs and functional mobility--uses RW for ambulation  Needs assistance with iADLs from brother  At [address] [facility] pt was walking 100+ ft  +home alone, +falls (=2), -   During initial eval pt demonstrated deficits in b/l UE strength, transfer safety, functional mobility, activity tolerance (currently=fair 10-15 mins), functional endurance, and functional balance  Pt would benefit from continued OT tx due to these deficits  Recommended STR, however pt would like to return home at this time  See goals at end of note        OT Discharge Recommendation: Short Term Rehab (pt perfers home)

## 2018-11-23 NOTE — ASSESSMENT & PLAN NOTE
Lab Results   Component Value Date    HGBA1C 6 3 11/22/2018       Recent Labs      11/22/18   1644  11/22/18   2101  11/23/18   0731  11/23/18   1117   POCGLU  145*  134  103  103       Blood Sugar Average: Last 72 hrs:  (P) 131     BS acceptable continue current regimen

## 2018-11-23 NOTE — PLAN OF CARE
Problem: SLP ADULT - SWALLOWING, IMPAIRED  Goal: Initial SLP swallow eval performed  Outcome: Completed Date Met: 11/23/18

## 2018-11-23 NOTE — PROGRESS NOTES
Vancomycin Assessment    Phuong Olmedo is a 70 y o  male who is currently receiving vancomycin 1250mg q24h for Pneumonia Pneumonia   Relevant clinical data and objective history reviewed:  Creatinine   Date Value Ref Range Status   11/23/2018 2 33 (H) 0 60 - 1 30 mg/dL Final     Comment:     Standardized to IDMS reference method   11/22/2018 2 47 (H) 0 60 - 1 30 mg/dL Final     Comment:     Standardized to IDMS reference method   11/15/2018 1 89 (H) 0 60 - 1 30 mg/dL Final     Comment:     Standardized to IDMS reference method   04/10/2017 1 33 (H) 0 70 - 1 25 mg/dL Final     Comment:     Result Comment: For patients >52years of age, the reference limit  for Creatinine is approximately 13% higher for people  identified as -American      09/18/2014 1 15 0 5 - 1 5 mg/dL Final     Comment:     Standardized to IDMS reference method   09/17/2014 1 53 (H) 0 60 - 1 30 mg/dL Final     Comment:     Standardized to IDMS reference method     /69 (BP Location: Left arm)   Pulse 97   Temp 99 5 °F (37 5 °C) (Temporal)   Resp 18   Ht 6' (1 829 m)   Wt 91 5 kg (201 lb 11 5 oz)   SpO2 93%   BMI 27 36 kg/m²   I/O last 3 completed shifts: In: 3060 4 [I V :540 4;  IV Piggyback:2520]  Out: 1600 [Urine:1600]  Lab Results   Component Value Date/Time    BUN 45 (H) 11/23/2018 05:12 AM    BUN 26 (H) 04/10/2017 11:04 AM    WBC 24 41 (H) 11/23/2018 05:12 AM    WBC 8 5 04/10/2017 11:04 AM    HGB 7 9 (L) 11/23/2018 05:12 AM    HGB 12 7 (L) 04/10/2017 11:04 AM    HCT 25 1 (L) 11/23/2018 05:12 AM    HCT 39 3 04/10/2017 11:04 AM    MCV 95 11/23/2018 05:12 AM    MCV 93 4 04/10/2017 11:04 AM     11/23/2018 05:12 AM     04/10/2017 11:04 AM     Temp Readings from Last 3 Encounters:   11/23/18 99 5 °F (37 5 °C) (Temporal)   11/16/18 97 7 °F (36 5 °C) (Temporal)   10/31/18 97 5 °F (36 4 °C) (Tympanic)     Vancomycin Days of Therapy: 2    Assessment/Plan  The patient is currently on vancomycin utilizing scheduled dosing  Baseline risks associated with therapy include: pre-existing renal impairment and advanced age  The patient is receiving 1250mg q24h with the most recent vancomycin level being not at steady-state and sub-therapeutic based on a goal of 15-20 (appropriate for most indications) ; therefore, after clinical evaluation will be changed to 1500mg q24h   Pharmacy will continue to follow closely for s/sx of nephrotoxicity, infusion reactions and appropriateness of therapy  BMP and CBC will be ordered per protocol  Plan for trough as patient approaches steady state, prior to the 4th  dose at approximately 11/26 1130 Pharmacy will continue to follow the patients culture results and clinical progress daily      Sheri Betancourt, Pharmacist

## 2018-11-23 NOTE — OCCUPATIONAL THERAPY NOTE
OccupationalTherapy Evaluation(time=0920-0948)     Patient Name: Isabel FIGUEROA Date: 11/23/2018  Problem List  Patient Active Problem List   Diagnosis    [facility]    Suprapubic catheter [facility]    UTI (urinary tract infection)    Charcot-Bre-Tooth disease    Type 2 diabetes mellitus with hyperlipidemia ([facility]    Essential hypertension    BPH (benign prostatic hyperplasia)    Chronic kidney disease, stage 3 ([facility]    Hypercholesterolemia    Neuropathy in diabetes ([facility]    Spinal stenosis    DDD (degenerative disc disease), lumbosacral    Depression    Esophageal reflux    Hepatitis B    Hepatitis C virus infection without hepatic coma    Physical debility    Chronic pain    Severe sepsis (HCC)    Neurogenic bladder    Pneumonia due to infectious organism     Past Medical History  Past Medical History:   Diagnosis Date    BPH with obstruction/lower urinary tract symptoms     Bundle branch block, right     Charcot-Bre-Tooth disease     Diabetes mellitus [facility]     Fracture of clavicle     GERD (gastroesophageal reflux disease)     Hypertension     Neuropathy     charcot- Bre tooth    Renal disorder     Severe sepsis ([facility] ) 11/22/2018    Spinal stenosis     Ureteral calculus     Urinary retention      Past Surgical History  Past Surgical History:   Procedure Laterality Date    CYSTOSCOPY  2014, 2015, 2016, 2017    CYSTOSCOPY W/ RETROGRADES Bilateral     CYSTOSCOPY W/ URETERAL STENT PLACEMENT Right 12/30/2015    CYSTOSCOPY W/ URETERAL STENT REMOVAL Right 2016    CYSTOSCOPY W/ URETEROSCOPY W/ LITHOTRIPSY Right 12/30/2015    FOOT SURGERY Right     Amputation of toe, Onset - 10/1/12    HEMORROIDECTOMY      KNEE SURGERY Left     PCL and MCL - meniscus, Onset - 1981    LUMBAR FUSION      Transverse lumbar interbody fusion L2-L3 and METRx Lt hemilaminectomy and ema foraminotomy-decompressive at L4-L5 Dr Shimon Hart and Dr Cleo Lindsay;  Onset - 5/13/13  TRANSURETHRAL RESECTION OF PROSTATE  01/2014 11/23/18 0945   Note Type   Note type Eval only   Restrictions/Precautions   Weight Bearing Precautions Per Order No   Other Precautions Contact/isolation; Chair Alarm; Bed Alarm;O2;Fall Risk;Pain;Multiple lines   Pain Assessment   Pain Assessment 0-10   Pain Score 8   Pain Type Chronic pain   Pain Location Back;Neck   Hospital Pain Intervention(s) Ambulation/increased activity;Repositioned   Response to Interventions Tolerated   Home Living   Type of [address] One level;Elevator   Bathroom Equipment Shower chair   [address]   Prior Function   Level of [place] Independent with ADLs and functional mobility   Lives With Family  (brother)   Receives Help From Family   ADL Assistance Independent   IADLs Needs assistance   Falls in the last 6 months 1 to 4  (2)   Vocational Retired   Lifestyle   Autonomy PTA pt was independent with ADLs and functional mobility--uses RW for ambulation  Needs assistance with iADLs from brother  Pt was recently at [address] [place] [address] for STR and walking 100+ ft  +home alone, +falls (=2), -    Reciprocal Relationships Supportive brother   Service to Others Worked at [employer]   Psychosocial   Psychosocial (WDL) WDL   Subjective   Subjective "I am feeling much better"   ADL   Where Assessed Edge of bed   Eating Assistance 5  Supervision/Setup   Grooming Assistance 5  Supervision/Setup   UB Bathing Assistance 5  Supervision/Setup   LB Bathing Assistance 4  Minimal Assistance   [address] 5  Supervision/Setup   LB Dressing Assistance 4  [address]  4  Minimal Assistance   Bed Mobility   Supine to Sit 6  Modified independent   Additional items Bedrails; Increased time required;HOB elevated   Additional Comments Pt OOB in chair at end of eval with chair alarm on and all needs in reach, nsg notified   Transfers   Sit to Stand 5  Supervision   Additional items Increased time required;Verbal cues   Stand to Sit 5  Supervision   Additional items Increased time required;Verbal cues   Functional Mobility   Functional Mobility 4  Minimal assistance  (x1)   Additional Comments EG=086/52, 94% on O2, 85% on RA, pt put back on O2, nsg notified   Additional items Rolling walker   Balance   Static Sitting Fair   Dynamic Sitting Poor +   Static Standing Fair -   Dynamic Standing Poor +   Activity Tolerance   Activity Tolerance Patient limited by fatigue;Patient tolerated treatment well   Medical Staff Made Aware nsg   RUE Assessment   RUE Assessment WFL  (b/l muscle wasting noted)   RUE Strength   RUE Overall Strength Within Functional Limits - able to perform ADL tasks with strength  (3+/5 throughout)   LUE Assessment   LUE Assessment X  (limited shr ROM=80 degrees, end range about 95 degrees )   LUE Strength   LUE Overall Strength Within Functional Limits - able to perform ADL tasks with strength  (shoulder=3/5, elbow-distal =3+/5)   Hand Function   Gross Motor Coordination Functional   Fine Motor Coordination Functional   Sensation   Light Touch No apparent deficits   Proprioception   Proprioception No apparent deficits   Vision-Basic Assessment   Current Vision Does not wear glasses   Visual History Cataracts; Corrective eye surgery   Vision - Complex Assessment   Acuity Able to read clock/calendar on wall without difficulty   Perception   Inattention/Neglect Appears intact   Cognition   Overall Cognitive Status [facility]   Arousal/Participation Alert; Responsive; Cooperative   Attention Within functional limits   Orientation Level Oriented X4   Memory Within functional limits   Following Commands Follows one step commands without difficulty   Assessment   Limitation Decreased endurance;Decreased Safe judgement during ADL;Decreased UE strength;Decreased self-care trans;Decreased high-level ADLs   Prognosis Fair Assessment Pt is a 70 y o  male admitted to the hospital 2* fever, chills, and rigors  Pt was found to be hypoxic in ED  Pt noted with severe sepsis 2* UTI  Chest x/ray indicated pneumonia  Pt has recent hospilization in which he was discharged on 11/16 for urospsis  Pt was discharged to [facility] for STR  PTA pt was independent with ADLs and functional mobility--uses RW for ambulation  Needs assistance with iADLs from brother  At [facility] pt was walking 100+ ft  +home alone, +falls (=2), -   During initial eval pt demonstrated deficits in b/l UE strength, transfer safety, functional mobility, activity tolerance (currently=fair 10-15 mins), functional endurance, and functional balance  Pt would benefit from continued OT tx due to these deficits  Recommended STR, however pt would like to return home at this time  See goals at end of note  Goals   Patient Goals To go home    Plan   Treatment Interventions Functional transfer training;UE strengthening/ROM; Endurance training; Compensatory technique education; Activityengagement; Energy conservation   Goal Expiration Date 12/03/18   Treatment Day 0   OT Frequency 3-5x/wk   Recommendation   OT Discharge Recommendation Short Term Rehab  (pt perfers home)   Barthel Index   Feeding 10   Bathing 0   Grooming Score 5   Dressing Score 5   Bladder Score 0   Bowels Score 10   Toilet Use Score 5   Transfers (Bed/Chair) Score 10   Mobility (Level Surface) Score 0   Stairs Score 0   Barthel Index Score 45   Modified [place] Scale   Modified [place] Scale 4     Occupational Therapy Goals (7-10 days):     1  Pt will demonstrate mod I with walker mobility for home/community activities 100% of the time       2  Pt will independently verbalize 2-3 potential fall risks/transfer safety hazards and their appropriate compensation techniques       3  Pt will demonstrate proper transfer safety 100% of the time       4   Pt will demonstrate mod I with their sit-stand transfers to assist with completion of their LE ADLs     5  Pt will demonstrate improved activity tolerance to good (20-30 mins) and standing tolerance to 5-7 mins to assist with ADL tasks       6  Pt will demonstrate an increase in b/i UE strength by 1/2 MM score for participation in ADL completion       7  Pt will demonstrate g/g- balance 100% of the time       8  Pt will demonstrate mod I with their UE and LE bathing/dressing       9   Pt will demonstrate g carryover with pt training/education w/ min verbal cueing 100% of the time        Anne Sandoval, OTS

## 2018-11-23 NOTE — ASSESSMENT & PLAN NOTE
Cannot r/o HCAP given recent hospitalization and ambulatory dysfunction  Flu negative, strep pneumo/legionella neg, mrsa and sputum cx pending  Started on cefepime/vanc  Pt's subjective s/sx are improving but O2 requirements are at 4L NC due to hypoxia in 80s w/therapy and leukocytosis is worsening    procalcitonin is worsening from 2 8 to 5 9will switch to zosyn/vanc, monitor renal status closely  Repeat cxr pa/lateral in am

## 2018-11-23 NOTE — ASSESSMENT & PLAN NOTE
A/e/b fever, leukocytosis and hypotension POA 2* HCAP, less likely urosepsis although pt was recently admitted for urosepsis 2* enterobacter and strep  procalcitonin and leukocytosis are worsening, although pt afebrile x24H and s/sx are improving  CXR concerning for right sided PNA  abx as noted above

## 2018-11-23 NOTE — PLAN OF CARE
Problem: PHYSICAL THERAPY ADULT  Goal: Performs mobility at highest level of function for planned discharge setting  See evaluation for individualized goals  Treatment/Interventions: Functional transfer training, LE strengthening/ROM, Therapeutic exercise, Endurance training, Patient/family training, Bed mobility, Gait training, Spoke to nursing  Equipment Recommended: Cassie Modi (RW)       See flowsheet documentation for full assessment, interventions and recommendations  Outcome: Progressing  Prognosis: Good  Problem List: Decreased strength, Decreased endurance, Impaired balance, Decreased mobility, Impaired sensation, Pain, Obesity, Decreased safety awareness  Assessment: Pt  68 y o male transferred from [facility] w/ acute onset of fever, chills, and rigors  Pt admitted for Severe sepsis ([facility] ) 2* to UTI w/ pneumonia & CYDNEY   Of note, pt had [facility] admission on 11/10-11/16 then D/C to [facility] for rehab  Prior to rehab, pt lived w/ his brother in one level apartment; pt was I w/ RW; (+) h/o falls x2; his brother works from 5am to [employer]  Pt referred to PT for mobility assessment & D/C planning w/ orders of activity as tolerated  On eval, pt demonstrate dec mobility, balance, endurance & amb  Pt require minAx1 for most functional mobility + cues for techniques  Gait deviations as above, slow & unsteady w/ dec foot clearance & slight B/L knee buckling but no gross LOB noted  (+) fatigue but relieved w/ rest  No SOB reported, SpO2 92% w/ 4 5L O2  No dizziness reported t/o session  Nsg staff most recent vital signs as follows: /69 (BP Location: Left arm)   Pulse 97   Temp 99 5 °F (37 5 °C) (Temporal)   Resp 18   Ht 6' (1 829 m)   Wt 91 5 kg (201 lb 11 5 oz)   SpO2 93%   BMI 27 36 kg/m²   At end of session, pt remain OOB in chair w/o issues, call bell & phone in reach, chair alarm activated  Fall precautions reinforced w/ good understanding   Pt still functioning below baseline hence will continue skilled PT to improve function & safety  At this time, due to above mentioned deficits, dec caregiver support & high risk for falls, pt will benefit from continued inpt rehab at D/C  However, pt prefers to return home  If home, pt will need to achieve PT goals prior to D/C & will require HHPT & inc family support at D/C  CM to follow  Nsg staff to continue to mobilized pt (OOB in chair for all meals & ambulate in room/unit) as tolerated to prevent further decline in function  Nsg notified  Barriers to Discharge: Decreased caregiver support  Barriers to Discharge Comments: pt's brother works from 5am to 1pm  Recommendation: Short-term skilled PT (however pt prefers to return home)     PT - OK to Discharge:  (yes to inpt rehab; no to home)    See flowsheet documentation for full assessment

## 2018-11-23 NOTE — ASSESSMENT & PLAN NOTE
W/recent suprapubic cath xchange  ua nitrite negative w/leukocytes and bacteria    May be colonization vs persistent infxn, pt's purulent drainage has improved and is otherwise asymptomatic  If repeat ucx concerning for persistent infection may need additional xchange, urology consult pending

## 2018-11-23 NOTE — CONSULTS
UROLOGY CONSULTATION NOTE     Patient Identifiers: Lawyer López (MRN [id number])  Service Requesting Consultation: Dr Bonifacio López  Service Providing Consultation:  Urology, Laura Luz, [address]    Date of Service: 11/23/2018  Inpatient consult to Urology  Consult performed by: Stanislaw Gutierrez ordered by: Alec Monahna          Reason for Consultation:  Neurogenic bladder and chronic suprapubic catheter    ASSESSMENT:     70 y o  old male with  neurogenic bladder and suprapubic catheter exchanged approximately 10 days ago  PLAN:   Continue medical optimization  Await urine culture  However, patient has small chance of UTI  Chest x-ray shows new right lung infiltrate; likely source this admission  Maintain suprapubic catheter to straight drainage  Changed little over week ago  Next exchange due in 3 weeks  Our office will contact patient to schedule appointment  No  intervention indicated  Follow-up with Dr Noemy Uribe as prior  History of Present Illness:     Lawyer López is a 70 y o  old with a history of BPH status post TURP with failure to void thereafter status post suprapubic tube placement; exchanged 11/12; admitted for sepsis criteria  Patient receiving Cefepime and Vanco per Internal Medicine team   Blood and urine culture pending  Patient recently admitted for Enterobacter UTI since treatment      Past Medical, Past Surgical History:     Past Medical History:   Diagnosis Date    BPH with obstruction/lower urinary tract symptoms     Bundle branch block, right     Charcot-Bre-Tooth disease     [facility]     Fracture of clavicle     GERD (gastroesophageal reflux disease)     Hypertension     Neuropathy     charcot- Bre tooth    Renal disorder     Severe sepsis ([facility] ) 11/22/2018    Spinal stenosis     Ureteral calculus     Urinary retention    :    Past Surgical History:   Procedure Laterality Date    CYSTOSCOPY  2014, 2015, 2016, 2017    CYSTOSCOPY W/ RETROGRADES Bilateral     CYSTOSCOPY W/ URETERAL STENT PLACEMENT Right 12/30/2015    CYSTOSCOPY W/ URETERAL STENT REMOVAL Right 2016    CYSTOSCOPY W/ URETEROSCOPY W/ LITHOTRIPSY Right 12/30/2015    FOOT SURGERY Right     Amputation of toe, Onset - 10/1/12    HEMORROIDECTOMY      KNEE SURGERY Left     PCL and MCL - meniscus, Onset - 1981    LUMBAR FUSION      Transverse lumbar interbody fusion L2-L3 and METRx Lt hemilaminectomy and ema foraminotomy-decompressive at L4-L5 Dr Tonya Justin and Dr Radu Hagen;  Onset - 5/13/13       TRANSURETHRAL RESECTION OF PROSTATE  01/2014   :    Medications, Allergies:     Current Facility-Administered Medications   Medication Dose Route Frequency    acetaminophen (TYLENOL) tablet 650 mg  650 mg Oral Q6H PRN    cefepime (MAXIPIME) 1,000 mg in dextrose 5 % 50 mL IVPB  1,000 mg Intravenous Q12H    chlorhexidine (PERIDEX) 0 12 % oral rinse 15 mL  15 mL Swish & Spit Q12H [facility]    DULoxetine (CYMBALTA) delayed release capsule 60 mg  60 mg Oral Daily    gabapentin (NEURONTIN) capsule 600 mg  600 mg Oral BID    heparin (porcine) subcutaneous injection 5,000 Units  5,000 Units Subcutaneous Q8H [facility]    insulin glargine (LANTUS) subcutaneous injection 30 Units 0 3 mL  30 Units Subcutaneous HS    insulin lispro (HumaLOG) 100 units/mL subcutaneous injection 1-5 Units  1-5 Units Subcutaneous HS    insulin lispro (HumaLOG) 100 units/mL subcutaneous injection 10 Units  10 Units Subcutaneous TID With Meals    insulin lispro (HumaLOG) 100 units/mL subcutaneous injection 2-12 Units  2-12 Units Subcutaneous TID AC    lactated ringers infusion  125 mL/hr Intravenous Continuous    levalbuterol (XOPENEX) inhalation solution 1 25 mg  1 25 mg Nebulization Q6H    morphine (MS CONTIN) ER tablet 15 mg  15 mg Oral Q12H    oxyCODONE (ROXICODONE) immediate release tablet 10 mg  10 mg Oral Q6H PRN    polyethylene glycol (MIRALAX) packet 17 g  17 g Oral Daily PRN    sodium chloride 0 9 % inhalation solution 3 mL  3 mL Nebulization Q6H    traZODone (DESYREL) tablet 100 mg  100 mg Oral HS    vancomycin (VANCOCIN) 1,250 mg in sodium chloride 0 9 % 250 mL IVPB  15 mg/kg Intravenous Q24H       Allergies:  No Known Allergies:    Social and Family History:   Social History:   Social History   Substance Use Topics    Smoking status: Light Tobacco Smoker     Years:  00     Types: Cigars     Last attempt to quit:     Smokeless tobacco: Never Used      Comment: occasional cigar    Alcohol use No        History   Smoking Status    Light Tobacco Smoker    Years:  00    Types: Cigars    Last attempt to quit:    Smokeless Tobacco    Never Used     Comment: occasional cigar       Family History:  Family History   Problem Relation Age of Onset    Dementia Mother     Hypertension Father     Charcot-Bre-Tooth disease Father     Diabetes Father     Pneumonia Maternal Grandfather     Cancer Family     Diabetes Family    :     Review of Systems:     Review of Systems - History obtained from chart review and the patient  General ROS: positive for  - chills, fever and malaise  Respiratory ROS: no cough, shortness of breath, or wheezing  Cardiovascular ROS: no chest pain or dyspnea on exertion  Gastrointestinal ROS: no abdominal pain, change in bowel habits, or black or bloody stools  Genito-Urinary ROS: no dysuria, trouble voiding, or hematuria    All other systems queried were negative  Physical Exam:   General: Patient is pleasant and in NAD  Awake and alert  /69 (BP Location: Left arm)   Pulse 97   Temp 99 5 °F (37 5 °C) (Temporal)   Resp 18   Ht 6' (1 829 m)   Wt 91 5 kg (201 lb 11 5 oz)   SpO2 93%   BMI 27 36 kg/m² Temp (24hrs), Av 2 °F (37 3 °C), Min:98 9 °F (37 2 °C), Max:99 5 °F (37 5 °C)  current; Temperature: 99 5 °F (37 5 °C)  I/O last 24 hours: In: 3060 4 [I V :540 4;  IV Piggyback:2520]  Out: 1600 [Urine:1600]  General appearance: alert and oriented, in no acute distress, appears stated age and cooperative  Head: Normocephalic, without obvious abnormality, atraumatic  Neck: no adenopathy, no carotid bruit, no JVD, supple, symmetrical, trachea midline and thyroid not enlarged, symmetric, no tenderness/mass/nodules  Lungs: diminished breath sounds  Heart: regular rate and rhythm, S1, S2 normal, no murmur, click, rub or gallop  Abdomen: soft, non-tender; bowel sounds normal; no masses,  no organomegaly  Extremities: extremities normal, warm and well-perfused; no cyanosis, clubbing, or edema  Pulses: 2+ and symmetric  Neurologic: Grossly normal  SPT-- CDI      Labs:     Lab Results   Component Value Date    HGB 7 9 (L) 11/23/2018    HCT 25 1 (L) 11/23/2018    WBC 24 41 (H) 11/23/2018     11/23/2018   ]    Lab Results   Component Value Date     04/10/2017    K 4 2 11/23/2018     11/23/2018    CO2 23 11/23/2018    BUN 45 (H) 11/23/2018    CREATININE 2 33 (H) 11/23/2018    CALCIUM 8 4 11/23/2018    GLUCOSE 107 09/18/2014   ]    Imaging:   I personally reviewed the images and report of the following studies, and reviewed them with the patient:    XR Chest: see below   XR chest 2 views [[id number]] Collected: 11/22/18 9465   Order Status: Completed Updated: 11/22/18 0827   Narrative:     CHEST     INDICATION:   Fever  COMPARISON:  11/10/2018    EXAM PERFORMED/VIEWS: Karene Dine CHEST AP & LATERAL  Images: 3    FINDINGS:    Cardiomediastinal silhouette appears unremarkable  Diffuse right lung airspace disease compatible with pneumonia   Elevation of the right diaphragm again noted  The left lung is clear  No pneumothorax or pleural effusion  Paravertebral ossifications thoracic spine   Old left clavicle fracture   Elevation of the bilateral humeral heads which may be indicative of chronic rotator cuff tears  Impression:       New right lung infiltrate consistent with pneumonia  Thank you for allowing me to participate in this patients care    Please do not hesitate to call with any additional questions    BLAINE Wallace

## 2018-11-23 NOTE — TELEPHONE ENCOUNTER
Mr Khang Yoder is a 53-year-old male seen in consultation at [facility]  Patient is known to Dr Hattie Bay for neurogenic bladder suprapubic catheter  Please contact patient for neck scheduled suprapubic catheter exchange due approximately 12/12  Thank you

## 2018-11-24 PROBLEM — N39.0 UTI (URINARY TRACT INFECTION): Status: RESOLVED | Noted: 2017-10-01 | Resolved: 2018-01-01

## 2018-11-24 NOTE — ASSESSMENT & PLAN NOTE
natasha w/baseline creatinine of 1 4-1 7 most recently has been 1 8-2 0 w/in last month  Was 2 47 on admission and has been stable at 2 33 despite IVF fluid hydration w/now slightly worsening hyponatremia  Likely 2* sepsis and prerenal loss from hypotension  UA w/0-1 rbcs, unlikely ATN  Continue to monitor given prior use of vanc if worsens may need nephrology consult    Encourage oral intake

## 2018-11-24 NOTE — ASSESSMENT & PLAN NOTE
A/e/b fever, leukocytosis and hypotension POA 2* HCAP, ucx unremarkable despite recent UTI bcx ntd  procalcitonin slightly improved today at 4 3 from 5, leukocytosis stable although pt afebrile x48H and s/sx are improving  Continue zosyn for now, monitor respiratory status closely

## 2018-11-24 NOTE — ASSESSMENT & PLAN NOTE
Lab Results   Component Value Date    HGBA1C 6 3 11/22/2018       Recent Labs      11/23/18   1608  11/23/18   2105  11/24/18   0728  11/24/18   1135   POCGLU  130  112  114  118       Blood Sugar Average: Last 72 hrs:  (P) 361 [id number]     BS acceptable continue current regimen

## 2018-11-24 NOTE — PROGRESS NOTES
Progress Note - Nelys Mccray 1950, 70 y o  male MRN: [id number]    [facility] [address] 215-01 Encounter: [id number]    Primary Care Provider: Josiephine Lundborg, MD   Date and time admitted to hospital: 11/22/2018  6:16 AM        Pneumonia due to infectious organism   Assessment & Plan    Cannot r/o HCAP given recent hospitalization and ambulatory dysfunction  Flu negative, strep pneumo/legionella/mrsa negative  Given hx of aspiration have high suspicion pt may be undergoing active aspiration as leukocytosis remains elevated and worsened at 20 from admission  Started on cefepime/vanc then zosyn/vanc and now on zosyn abx d#3  Pt's subjective s/sx are improving but O2 requirements are at 4L NC due to hypoxia in 80s w/therapy and leukocytosis is worsened to 20  procalcitonin peak at 5 9 now 4 3     cxr pa/lateral appears stable although lung sounds are w/diffuse crackles  If procalcitonin/wbc remains elevated may need to r/o underlying loculated effusion or abscess w/ct scan  Monitor vs, cbc, procalcitonin daily  Continue SLP for aspiration evaluation  If demonstrated aspiration would recommend repeat VBS if pt amenable (has been resistant prior)     Neurogenic bladder   Assessment & Plan    S/p SPT w/exchange during hospitalization here at 11/10/18       Physical debility   Assessment & Plan    Pt/ot consult appreciated will need str     Hepatitis C virus infection without hepatic coma   Assessment & Plan    Recent RNA quant of 7000  No transaminitis needs op f/u with GI for initiation of therapy     Hepatitis B   Assessment & Plan    Hx of    Liver enzymes are unremarkable     Essential hypertension   Assessment & Plan    meds held on admission due to hypotension now improved and hypertensive  Continue toprol restart norvasc today     Type 2 diabetes mellitus with hyperlipidemia [facility])   Assessment & Plan    Lab Results   Component Value Date    HGBA1C 6 3 11/22/2018       Recent Labs      11/23/18   1608 11/23/18   2105  11/24/18   0728  11/24/18   1135   POCGLU  130  112  114  118       Blood Sugar Average: Last 72 hrs:  (P) 849 [id number]     BS acceptable continue current regimen     Charcot-Bre-Tooth disease   Assessment & Plan    W/ambulatory dysfunction  Pt/ot consulted and will likely need to return to therapy upon d/c     Suprapubic catheter [facility])   Assessment & Plan    W/recent suprapubic cath xchange  ucx negative     CYDNEY (acute kidney injury) ([facility] [address] )   Assessment & Plan    cydney w/baseline creatinine of 1 4-1 7 most recently has been 1 8-2 0 w/in last month  Was 2 47 on admission and has been stable at 2 33 despite IVF fluid hydration w/now slightly worsening hyponatremia  Likely 2* sepsis and prerenal loss from hypotension  UA w/0-1 rbcs, unlikely ATN  Continue to monitor given prior use of vanc if worsens may need nephrology consult  Encourage oral intake       * Severe sepsis (HCC)   Assessment & Plan    A/e/b fever, leukocytosis and hypotension POA 2* HCAP, ucx unremarkable despite recent UTI bcx ntd  procalcitonin slightly improved today at 4 3 from 5, leukocytosis stable although pt afebrile x48H and s/sx are improving  Continue zosyn for now, monitor respiratory status closely       UTI (urinary tract infection)resolved as of 11/24/2018   Assessment & Plan    Prior UTI on recent admission now resolved after appropriate abx treatment       VTE Pharmacologic Prophylaxis:   Pharmacologic: Heparin  Mechanical VTE Prophylaxis in Place: No  Will order based on vte screen of 7    Patient Centered Rounds: I have performed bedside rounds with nursing staff today  Discussions with Specialists or Other Care Team Provider: sidney    Education and Discussions with Family / Patient: pt-disposition workup    Time Spent for Care: 20 minutes  More than 50% of total time spent on counseling and coordination of care as described above      Current Length of Stay: 2 day(s)    Current Patient Status: Inpatient   Certification Statement: The patient will continue to require additional inpatient hospital stay due to HCAP    Discharge Plan:     Code Status: Level 1 - Full Code      Subjective:   Pt seen and examined  On 4 5 L per nursing and lungs sound worse  Pt reports his sob continues to improve and his cough is stable, loose but nonproductive  No cp  No n/v/f/c and no diarrhea  Appetite intact  No LE edema  He was initially resistant to repeat testing for dysphagia but given persistent leukocytosis seems more amenable to repeat testing to r/o active aspiration    Objective:     Vitals:   Temp (24hrs), Av 1 °F (37 3 °C), Min:98 7 °F (37 1 °C), Max:99 4 °F (37 4 °C)    Temp:  [98 7 °F (37 1 °C)-99 4 °F (37 4 °C)] 99 4 °F (37 4 °C)  HR:  [94-97] 94  Resp:  [20-22] 22  BP: (125-169)/(57-81) 169/81  SpO2:  [95 %-96 %] 96 %  Body mass index is 27 96 kg/m²  Input and Output Summary (last 24 hours): Intake/Output Summary (Last 24 hours) at 18 1259  Last data filed at 18 1038   Gross per 24 hour   Intake          5637 97 ml   Output             3950 ml   Net          1687 97 ml       Physical Exam:     Physical Exam   Constitutional: He appears well-developed  No distress  HENT:   Head: Normocephalic and atraumatic  Right Ear: External ear normal    Left Ear: External ear normal    Eyes: Conjunctivae are normal    Neck: Normal range of motion  Cardiovascular: Normal rate, regular rhythm and normal heart sounds  Exam reveals no gallop and no friction rub  No murmur heard  Muffled heart sounds but no cinthia m/r/g   Pulmonary/Chest: No respiratory distress  He has rales (coarse wet rales b/l in posterior lung fields diffusely)  Mild conversational dyspnea, pt in semirecumbent position on 4L w/o wob  Good respiratory excursion   Abdominal: Soft  He exhibits no distension  There is no tenderness  There is no rebound  Musculoskeletal: He exhibits no edema     Neurological: He is alert    Skin: Skin is warm  He is not diaphoretic  Psychiatric: He has a normal mood and affect  Vitals reviewed  (  Be Sure to Include Physical Exam: Delete this entire line when you have entered your exam)    Additional Data:     Labs:      Results from last 7 days  Lab Units 11/24/18  0506  11/22/18  0648   WBC Thousand/uL 20 63*  < > 13 28*   HEMOGLOBIN g/dL 7 8*  < > 10 5*   HEMATOCRIT % 24 4*  < > 33 5*   PLATELETS Thousands/uL 234  < > 328   BANDS PCT % 5  < >  --    NEUTROS PCT %  --   --  88*   LYMPHS PCT %  --   --  5*   LYMPHO PCT % 4*  < >  --    MONOS PCT %  --   --  5   MONO PCT % 6  < >  --    EOS PCT % 1  < > 1   < > = values in this interval not displayed  Results from last 7 days  Lab Units 11/24/18  0537  11/22/18  0649   SODIUM mmol/L 133*  < > 137   POTASSIUM mmol/L 4 3  < > 4 7   CHLORIDE mmol/L 101  < > 101   CO2 mmol/L 24  < > 27   BUN mg/dL 42*  < > 46*   CREATININE mg/dL 2 34*  < > 2 47*   ANION GAP mmol/L 8  < > 9   CALCIUM mg/dL 8 8  < > 8 9   ALBUMIN g/dL  --   --  2 9*   TOTAL BILIRUBIN mg/dL  --   --  0 40   ALK PHOS U/L  --   --  78   ALT U/L  --   --  20   AST U/L  --   --  25   GLUCOSE RANDOM mg/dL 111  < > 127   < > = values in this interval not displayed  Results from last 7 days  Lab Units 11/22/18  0649   INR  1 09       Results from last 7 days  Lab Units 11/24/18  1135 11/24/18  0728 11/23/18  2105 11/23/18  1608 11/23/18  1117 11/23/18  0731 11/22/18  2101 11/22/18  1644 11/22/18  1118   POC GLUCOSE mg/dl 118 114 112 130 103 103 134 145* 170*       Results from last 7 days  Lab Units 11/22/18  1020   HEMOGLOBIN A1C % 6 3       Results from last 7 days  Lab Units 11/24/18  0506 11/23/18  0512 11/22/18  0829 11/22/18  0648   LACTIC ACID mmol/L  --   --   --  1 5   PROCALCITONIN ng/ml 4 31* 5 95* 2 80*  --            * I Have Reviewed All Lab Data Listed Above  * Additional Pertinent Lab Tests Reviewed:  All Labs Within Last 24 Hours Reviewed    Imaging:    Imaging Reports Reviewed Today Include:   Imaging Personally Reviewed by Myself Includes:      Recent Cultures (last 7 days):       Results from last 7 days  Lab Units 11/22/18  0903 11/22/18  0834 11/22/18  0649 11/22/18  0648 11/22/18  1687   BLOOD CULTURE   --   --  No Growth at 48 hrs  --  No Growth at 48 hrs  URINE CULTURE  No Growth <100 cfu/mL  --   --   --   --    INFLUENZA B PCR   --   --   --  None Detected  --    RSV PCR   --   --   --  None Detected  --    LEGIONELLA URINARY ANTIGEN   --  Negative  --   --   --        Last 24 Hours Medication List:     Current Facility-Administered Medications:  acetaminophen 650 mg Oral Q6H PRN Delores Foy Sonday, CRNP    amLODIPine 5 mg Oral Daily Lilian Mcgovern PA-C    DULoxetine 60 mg Oral Daily Charles Sonday, CRNP    gabapentin 600 mg Oral BID Charles Sonday, CRNP    heparin (porcine) 5,000 Units Subcutaneous [facility] [address] Charles Sonday, CRNP    insulin glargine 30 Units Subcutaneous HS Charles Sonday, CRNP    insulin lispro 1-5 Units Subcutaneous HS Charles Sonday, CRNP    insulin lispro 10 Units Subcutaneous TID With Meals Celine Means, CRNP    insulin lispro 2-12 Units Subcutaneous TID [facility], CRNP    levalbuterol 1 25 mg Nebulization Q6H Celine Means, CRNP    morphine 15 mg Oral Q12H Charles Sonday, CRNP    oxyCODONE 10 mg Oral Q6H PRN Delores Foy Sonday, CRNP    piperacillin-tazobactam 2 25 g Intravenous Q6H Lilian Mcgovern PA-C Last Rate: 2 25 g (11/24/18 0615)   polyethylene glycol 17 g Oral Daily PRN Barbara Means, CRNP    sodium chloride 3 mL Nebulization Q6H Maxi Ordoñez,     traZODone 100 mg Oral HS Barbara Means, BLAINE         Today, Patient Was Seen By: Michelle Patel PA-C    ** Please Note: Dictation voice to text software may have been used in the creation of this document   **

## 2018-11-24 NOTE — PHYSICIAN ADVISOR
Current patient class: Inpatient  The patient is currently on Hospital Day: 2 at [facility]        The patient was admitted to the hospital  on 11/22/18 at 21  for the following diagnosis:  Pneumonia [J18 9]  Renal insufficiency [N28 9]  Hypoxia [R09 02]  Neurogenic bladder [N31 9]  Suprapubic catheter ([facility] [address] ) [Z93 59]  Fever [R50 9]  CYDNEY (acute kidney injury) ([address] ) [N17 9]  Severe sepsis ([address] ) [A41 9, R65 20]  Type 2 diabetes mellitus with hyperlipidemia ([facility] [address] ) [E11 69, E78 5]  Urinary tract infection associated with cystostomy catheter, initial encounter ([facility] [address] ) [T83 510A, N39 0]       There is documentation in the medical record of an expected length of stay of at least 2 midnights  The patient is therefore expected to satisfy the 2 midnight benchmark and given the 2 midnight presumption is appropriate for INPATIENT ADMISSION  Given this expectation of a satisfying stay, CMS instructs us that the patient is most often appropriate for inpatient admission under part A provided medical necessity is documented in the chart  After review of the relevant documentation, labs, vital signs and test results, the patient is appropriate for INPATIENT ADMISSION  Admission to the hospital as an inpatient is a complex decision making process which requires the practitioner to consider the patients presenting complaint, history and physical examination and all relevant testing  With this in mind, in this case, the patient was deemed appropriate for INPATIENT ADMISSION  After review of the documentation and testing available at the time of the admission I concur with this clinical determination of medical necessity  The patient does have an inpatient admission within the previous 30 days  The patient was admitted on 11/10/18 and discharged on 11/16/18 as an inpatient  The patient therefore required readmission review   In this case the patient should be considered a SEPARATE and UNRELATED INPATIENT ADMISSION  The patient had been discharged in stable condition with a completed care plan  There were no unresolved acute medical issues at the time of discharged which would have reasonably been expected to prompt this readmission  Previous admission was for weakness and UTI while current admission is for PNA  Rationale is as follows: The patient is a 70 yrs   Male who presented to the ED at 11/22/2018  6:16 AM with a chief complaint of Fever - 9 weeks to 74 years (Pt brought in via EMS from [place] [address], per EMS pt with ongoing fever reports recently diagnosed with UTI and sepsis  Pt reports chronic back pain  pt is alert and oriented x3)   The patient is a 69 yo male who presented with fever and was admitted for PNA  CXR shows nor infiltrate  He had fever, leukocytosis on admission  He is on IV abx, IVF and has serial labs  Severity of illness and intensity of service warrant inpatient LOC given expected LOS >2 days        The patients vitals on arrival were ED Triage Vitals [11/22/18 0628]   Temperature Pulse Respirations Blood Pressure SpO2   (!) 101 6 °F (38 7 °C) 100 18 124/60 (!) 87 %      Temp Source Heart Rate Source Patient Position - Orthostatic VS BP Location FiO2 (%)   Oral Monitor Lying Right arm --      Pain Score       9           Past Medical History:   Diagnosis Date    BPH with obstruction/lower urinary tract symptoms     Bundle branch block, right     Charcot-Bre-Tooth disease     Diabetes mellitus ([address] )     Fracture of clavicle     GERD (gastroesophageal reflux disease)     Hypertension     Neuropathy     charcot- Bre tooth    Renal disorder     Severe sepsis ([address] ) 11/22/2018    Spinal stenosis     Ureteral calculus     Urinary retention      Past Surgical History:   Procedure Laterality Date    CYSTOSCOPY  2014, 2015, 2016, 2017    CYSTOSCOPY W/ RETROGRADES Bilateral     CYSTOSCOPY W/ URETERAL STENT PLACEMENT Right 12/30/2015    CYSTOSCOPY W/ URETERAL STENT REMOVAL Right 2016    CYSTOSCOPY W/ URETEROSCOPY W/ LITHOTRIPSY Right 12/30/2015    FOOT SURGERY Right     Amputation of toe, Onset - 10/1/12    HEMORROIDECTOMY      KNEE SURGERY Left     PCL and MCL - meniscus, Onset - 1981    LUMBAR FUSION      Transverse lumbar interbody fusion L2-L3 and METRx Lt hemilaminectomy and ema foraminotomy-decompressive at L4-L5 Dr Cat Leyva and Dr Keaton Vital; Onset - 5/13/13       TRANSURETHRAL RESECTION OF PROSTATE  01/2014           Consults have been placed to:   IP CONSULT TO CASE MANAGEMENT  IP CONSULT TO NUTRITION SERVICES  IP CONSULT TO UROLOGY  IP CONSULT TO PHARMACY    Vitals:    11/23/18 0729 11/23/18 0811 11/23/18 1348 11/23/18 1531   BP: 136/69   125/57   BP Location: Left arm   Left arm   Pulse: 97   97   Resp: 18   20   Temp: 99 5 °F (37 5 °C)   98 7 °F (37 1 °C)   TempSrc: Temporal   Temporal   SpO2: 95% 93% 95% 96%   Weight:       Height:           Most recent labs:    Recent Labs      11/22/18   0649   11/23/18   0512   WBC   --    --   24 41*   HGB   --    --   7 9*   HCT   --    --   25 1*   PLT   --    < >  232   K  4 7   --   4 2   CALCIUM  8 9   --   8 4   BUN  46*   --   45*   CREATININE  2 47*   --   2 33*   INR  1 09   --    --    TROPONINI  0 02   --    --    AST  25   --    --    ALT  20   --    --    ALKPHOS  78   --    --     < > = values in this interval not displayed         Scheduled Meds:  Current Facility-Administered Medications:  acetaminophen 650 mg Oral Q6H PRN BLAINE Landeros    DULoxetine 60 mg Oral Daily BLAINE Rodriguez    gabapentin 600 mg Oral BID BLAINE Landeros    heparin (porcine) 5,000 Units Subcutaneous [facility] [address] BLAINE Rodriguez    insulin glargine 30 Units Subcutaneous HS BLAINE Rodriguez    insulin lispro 1-5 Units Subcutaneous HS BLAINE Rodriguez    insulin lispro 10 Units Subcutaneous TID With Meals BLAINE Landeros    insulin lispro 2-12 Units Subcutaneous TID [facility] [place] BLAINE Ervin    lactated ringers 125 mL/hr Intravenous Continuous Jaeyne Ramal, DAVIANNP Last Rate: 125 mL/hr (11/23/18 0756)   levalbuterol 1 25 mg Nebulization Q6H Francyne Ramal, CRNP    morphine 15 mg Oral Q12H Francyne Ramal, CRNP    oxyCODONE 10 mg Oral Q6H PRN Jaeyne Ramal, CRNP    piperacillin-tazobactam 2 25 g Intravenous Q6H Lilian Mcgovern PA-C Last Rate: Stopped (11/23/18 2030)   polyethylene glycol 17 g Oral Daily PRN Jaeyne Ramal, CRNP    sodium chloride 3 mL Nebulization Q6H Maxi Ordoñez DO    traZODone 100 mg Oral HS BLAINE Rodriguez    vancomycin 1,500 mg Intravenous Q24H Sarkis Barker DO Last Rate: Stopped (11/23/18 1342)     Continuous Infusions:  lactated ringers 125 mL/hr Last Rate: 125 mL/hr (11/23/18 0756)     PRN Meds:   acetaminophen    oxyCODONE    polyethylene glycol    Surgical procedures (if appropriate):

## 2018-11-24 NOTE — ASSESSMENT & PLAN NOTE
Cannot r/o HCAP given recent hospitalization and ambulatory dysfunction  Flu negative, strep pneumo/legionella/mrsa negative  Given hx of aspiration have high suspicion pt may be undergoing active aspiration as leukocytosis remains elevated and worsened at 20 from admission  Started on cefepime/vanc then zosyn/vanc and now on zosyn abx d#3  Pt's subjective s/sx are improving but O2 requirements are at 4L NC due to hypoxia in 80s w/therapy and leukocytosis is worsened to 20  procalcitonin peak at 5 9 now 4 3     cxr pa/lateral appears stable although lung sounds are w/diffuse crackles  If procalcitonin/wbc remains elevated may need to r/o underlying loculated effusion or abscess w/ct scan  Monitor vs, cbc, procalcitonin daily  Continue SLP for aspiration evaluation    If demonstrated aspiration would recommend repeat VBS if pt amenable (has been resistant prior)

## 2018-11-24 NOTE — ASSESSMENT & PLAN NOTE
meds held on admission due to hypotension now improved and hypertensive  Continue toprol restart norvasc today

## 2018-11-25 NOTE — ASSESSMENT & PLAN NOTE
meds held on admission due to hypotension now improved and hypertensive  Restarted on norvasc restart toprol

## 2018-11-25 NOTE — ASSESSMENT & PLAN NOTE
natasha w/baseline creatinine of 1 4-1 7 most recently has been 1 8-2 0 w/in last month  Was 2 47 on admission and now 2 20 Likely 2* sepsis and prerenal loss from hypotension  UA w/0-1 rbcs, unlikely ATN    Can restart gentle ivf hydration at 50cc/hr NS repeat bmp in am

## 2018-11-25 NOTE — ASSESSMENT & PLAN NOTE
Likely aspiration pneumonia although cannot r/o HCAP  -Wbcs improving slowly, procalcitonin today pending but starting to trend down  -Flu negative, strep pneumo/legionella/mrsa negative   -Started on cefepime/vanc then zosyn/vanc and now on zosyn abx d#4    - Given improvement w/initiation of zosyn high suspicion for aspiration however pt is refusing further SLP eval or VBS    In 2016 there was evidence of difficulty w/inversion of his epiglottis and occasional trace silent aspiration at [facility] in the setting of prior C1 fracture  -continue zosyn wean O2 down to 3L NC continue to monitor

## 2018-11-25 NOTE — ASSESSMENT & PLAN NOTE
Lab Results   Component Value Date    HGBA1C 6 3 11/22/2018       Recent Labs      11/24/18   1640  11/24/18   2108  11/25/18   0718  11/25/18   1126   POCGLU  78  102  106  151*       Blood Sugar Average: Last 72 hrs:  (P) 055 [id number]   Well controlled  BS acceptable continue current regimen

## 2018-11-25 NOTE — PROGRESS NOTES
Progress Note - Sangita Mcgovern 1950, 70 y o  male MRN: [id number]    Unit/Bed#: [facility] 68 [address] [id number] Encounter: [id number]    Primary Care Provider: Miles Reyes MD   Date and time admitted to hospital: 11/22/2018  6:16 AM        Pneumonia due to infectious organism   Assessment & Plan    Likely aspiration pneumonia although cannot r/o HCAP  -Wbcs improving slowly, procalcitonin today pending but starting to trend down  -Flu negative, strep pneumo/legionella/mrsa negative   -Started on cefepime/vanc then zosyn/vanc and now on zosyn abx d#4    - Given improvement w/initiation of zosyn high suspicion for aspiration however pt is refusing further SLP eval or VBS  In 2016 there was evidence of difficulty w/inversion of his epiglottis and occasional trace silent aspiration at [facility] in the setting of prior C1 fracture  -continue zosyn wean O2 down to 3L NC continue to monitor       Neurogenic bladder   Assessment & Plan    S/p SPT w/exchange during hospitalization here at 11/10/18       Hepatitis C virus infection without hepatic coma   Assessment & Plan    Recent RNA quant of 7000  No transaminitis needs op f/u with GI for initiation of therapy     Hepatitis B   Assessment & Plan    Hx of    Liver enzymes are unremarkable     Essential hypertension   Assessment & Plan    meds held on admission due to hypotension now improved and hypertensive  Restarted on norvasc restart toprol     Type 2 diabetes mellitus with hyperlipidemia [facility])   Assessment & Plan    Lab Results   Component Value Date    HGBA1C 6 3 11/22/2018       Recent Labs      11/24/18   1640  11/24/18   2108  11/25/18   0718  11/25/18   1126   POCGLU  78  102  106  151*       Blood Sugar Average: Last 72 hrs:  (P) 980 [id number]   Well controlled  BS acceptable continue current regimen     Charcot-Bre-Tooth disease   Assessment & Plan    W/ambulatory dysfunction  Pt/ot consulted and will likely need to return to therapy upon d/c     Suprapubic catheter [facility])   Assessment & Plan    W/recent suprapubic cath xchange  ucx negative     CYDNEY (acute kidney injury) ([facility] [address] )   Assessment & Plan    cydney w/baseline creatinine of 1 4-1 7 most recently has been 1 8-2 0 w/in last month  Was 2 47 on admission and now 2 20 Likely 2* sepsis and prerenal loss from hypotension  UA w/0-1 rbcs, unlikely ATN  Can restart gentle ivf hydration at 50cc/hr NS repeat bmp in am         * Severe sepsis (HCC)   Assessment & Plan    A/e/b fever, leukocytosis and hypotension POA 2* HCAP, ucx unremarkable despite recent UTI bcx neg x72H  Initially admitted to step down transitioned to med/surge  Wbc/procalcitonin are improving slowly, likely 2* aspiration PNA vs HCAP  Ucx negative for UTI no other s/sx,  Continue zosyn D2, abx d 4, monitor respiratory status closely  Would treat for total of 7 days w/zosyn, another 3 days, hopeful transition to cefepime/flagyl in 24H         VTE Pharmacologic Prophylaxis:   Pharmacologic: Heparin  Mechanical VTE Prophylaxis in Place: Yes    Patient Centered Rounds: I have performed bedside rounds with nursing staff today  Discussions with Specialists or Other Care Team Provider:     Education and Discussions with Family / Patient: disposition    Time Spent for Care: 20 minutes  More than 50% of total time spent on counseling and coordination of care as described above  Current Length of Stay: 3 day(s)    Current Patient Status: Inpatient   Certification Statement: The patient will continue to require additional inpatient hospital stay due to pneumonia    Discharge Plan: once weaned off O2    Code Status: Level 1 - Full Code      Subjective:   Pt seen and examined  Pt somewhat somnolent and reports he did not sleep well overnight  Per nursing pt also confused his dry eraser for telephone but otherwise was oriented  He reports his sob is improving some and that his cough is stable and nonproductive  No cp or diarrhea    Discussed need for pt to be out of bed and discussed w/nursing O2 wean from 4 to 3L and wean as possible  D/w pt rationale for vbs/SLP bedside swallow given concern for aspiration but he continues to decline evaluation  Objective:     Vitals:   Temp (24hrs), Av 3 °F (37 4 °C), Min:99 °F (37 2 °C), Max:99 6 °F (37 6 °C)    Temp:  [99 °F (37 2 °C)-99 6 °F (37 6 °C)] 99 2 °F (37 3 °C)  HR:  [67-98] 98  Resp:  [18-19] 18  BP: (154-166)/(81-88) 156/84  SpO2:  [90 %-97 %] 90 %  Body mass index is 27 93 kg/m²  Input and Output Summary (last 24 hours): Intake/Output Summary (Last 24 hours) at 18 1143  Last data filed at 18 0901   Gross per 24 hour   Intake              100 ml   Output             3200 ml   Net            -3100 ml       Physical Exam:     Physical Exam   Constitutional: He appears well-developed  No distress  HENT:   Head: Normocephalic and atraumatic  Right Ear: External ear normal    Left Ear: External ear normal    Eyes: Conjunctivae are normal  Right eye exhibits no discharge  Left eye exhibits no discharge  No scleral icterus  Neck: Normal range of motion  Cardiovascular: Normal rate, regular rhythm and normal heart sounds  Exam reveals no gallop and no friction rub  No murmur heard  Pulmonary/Chest: He has rales (diffuse coarse inspiratory/expiratory rales throughout right lung field)  Abdominal: Soft  He exhibits no distension  There is no tenderness  There is no rebound and no guarding  Musculoskeletal: He exhibits no edema  Neurological: He is alert  Skin: Skin is warm and dry  He is not diaphoretic  Psychiatric: He has a normal mood and affect  Vitals reviewed      (  Be Sure to Include Physical Exam: Delete this entire line when you have entered your exam)    Additional Data:     Labs:      Results from last 7 days  Lab Units 18  0520  18  0648   WBC Thousand/uL 15 86*  < > 13 28*   HEMOGLOBIN g/dL 7 8*  < > 10 5*   HEMATOCRIT % 24 9*  < > 33 5* PLATELETS Thousands/uL 228  < > 328   BANDS PCT % 3  < >  --    NEUTROS PCT %  --   --  88*   LYMPHS PCT %  --   --  5*   LYMPHO PCT % 8*  < >  --    MONOS PCT %  --   --  5   MONO PCT % 3*  < >  --    EOS PCT % 0  < > 1   < > = values in this interval not displayed  Results from last 7 days  Lab Units 11/25/18  0521  11/22/18  0649   SODIUM mmol/L 137  < > 137   POTASSIUM mmol/L 4 0  < > 4 7   CHLORIDE mmol/L 102  < > 101   CO2 mmol/L 25  < > 27   BUN mg/dL 38*  < > 46*   CREATININE mg/dL 2 20*  < > 2 47*   ANION GAP mmol/L 10  < > 9   CALCIUM mg/dL 9 0  < > 8 9   ALBUMIN g/dL  --   --  2 9*   TOTAL BILIRUBIN mg/dL  --   --  0 40   ALK PHOS U/L  --   --  78   ALT U/L  --   --  20   AST U/L  --   --  25   GLUCOSE RANDOM mg/dL 107  < > 127   < > = values in this interval not displayed  Results from last 7 days  Lab Units 11/22/18  0649   INR  1 09       Results from last 7 days  Lab Units 11/25/18  1126 11/25/18  0718 11/24/18  2108 11/24/18  1640 11/24/18  1135 11/24/18  0728 11/23/18  2105 11/23/18  1608 11/23/18  1117 11/23/18  0731 11/22/18  2101 11/22/18  1644   POC GLUCOSE mg/dl 151* 106 102 78 118 114 112 130 103 103 134 145*       Results from last 7 days  Lab Units 11/22/18  1020   HEMOGLOBIN A1C % 6 3       Results from last 7 days  Lab Units 11/25/18  0520 11/24/18  0506 11/23/18  0512 11/22/18  0829 11/22/18  0648   LACTIC ACID mmol/L  --   --   --   --  1 5   PROCALCITONIN ng/ml 2 95* 4 31* 5 95* 2 80*  --            * I Have Reviewed All Lab Data Listed Above  * Additional Pertinent Lab Tests Reviewed: All Labs Within Last 24 Hours Reviewed    Imaging:    Imaging Reports Reviewed Today Include:   Imaging Personally Reviewed by Myself Includes:      Recent Cultures (last 7 days):       Results from last 7 days  Lab Units 11/22/18  0903 11/22/18  0834 11/22/18  0649 11/22/18  0648 11/22/18  2360   BLOOD CULTURE   --   --  No Growth at 72 hrs   --  No Growth at 72 hrs     URINE CULTURE  No Growth <100 cfu/mL  --   --   --   --    INFLUENZA B PCR   --   --   --  None Detected  --    RSV PCR   --   --   --  None Detected  --    LEGIONELLA URINARY ANTIGEN   --  Negative  --   --   --        Last 24 Hours Medication List:     Current Facility-Administered Medications:  acetaminophen 650 mg Oral Q6H PRN Kehinde Gonsales Sonday, CRDIO    amLODIPine 5 mg Oral Daily Lilian Mcgovern PA-C    DULoxetine 60 mg Oral Daily Charles Sonday, BLAINE    gabapentin 600 mg Oral BID Charles Sonday, BLAINE    guaiFENesin 600 mg Oral Q12H [address] Lilian Mcgovern PA-C    heparin (porcine) 5,000 Units Subcutaneous [facility] [address] Charles Sonday, BLAINE    insulin glargine 30 Units Subcutaneous HS Charles Sonday, CRNP    insulin lispro 1-5 Units Subcutaneous HS Charles Sonday, BLAINE    insulin lispro 10 Units Subcutaneous TID With Meals [facility], BLAINE    insulin lispro 2-12 Units Subcutaneous TID [facility], BLAINE    levalbuterol 1 25 mg Nebulization Q6H Charles Sonday, BLAINE    metoprolol succinate 25 mg Oral Daily Lilian Mcgovern PA-C    morphine 15 mg Oral Q12H Lilian Mcgovern PA-C    oxyCODONE 10 mg Oral Q6H PRN Lilian Mcgovern PA-C    piperacillin-tazobactam 2 25 g Intravenous Q6H Lilian Mcgovern PA-C Last Rate: 2 25 g (11/25/18 0644)   polyethylene glycol 17 g Oral Daily [facility]N [facility]    sodium chloride 3 mL Nebulization Q6H Maxi Ordoñez DO    traZODone 100 mg Oral HS [facility], BLAINE         Today, Patient Was Seen By: Ayah Beebe PA-C    ** Please Note: Dictation voice to text software may have been used in the creation of this document   **

## 2018-11-25 NOTE — ASSESSMENT & PLAN NOTE
A/e/b fever, leukocytosis and hypotension POA 2* HCAP, ucx unremarkable despite recent UTI bcx neg x72H  Initially admitted to step down transitioned to med/surge  Wbc/procalcitonin are improving slowly, likely 2* aspiration PNA vs HCAP  Ucx negative for UTI no other s/sx,  Continue zosyn D2, abx d 4, monitor respiratory status closely    Would treat for total of 7 days w/zosyn, another 3 days, hopeful transition to cefepime/flagyl in 24H

## 2018-11-26 NOTE — OCCUPATIONAL THERAPY NOTE
OccupationalTherapy Progress Note(time=1620-1700)     Patient Name: Zamzam Mcdonald  CLXLB'H Date: 11/26/2018  Problem List  Patient Active Problem List   Diagnosis    [facility]    [facility]rapub[facility] catheter ([facility] )    Charcot-Bre-Tooth disease    Type 2 diabetes mellitus with hyperlipidemia ([facility] )    Essential hypertension    BPH (benign prostatic hyperplasia)    Chronic kidney disease, stage 3 ([facility] )    Hypercholesterolemia    Neuropathy in diabetes ([facility] )    Spinal stenosis    DDD (degenerative disc disease), lumbosacral    Depression    Esophageal reflux    Hepatitis B    Hepatitis C virus infection without hepatic coma    Physical debility    Chronic pain    Severe sepsis (HCC)    Neurogenic bladder    Pneumonia due to infectious organism           11/26/18 1700   Restrictions/Precautions   Weight Bearing Precautions Per Order No   Pain Assessment   Pain Assessment No/denies pain   Pain Score No Pain   ADL   LB Dressing Assistance 6  Modified independent   LB Dressing Deficit Requires assistive device for steadying   Functional Standing Tolerance   Time 1-2 mins   Activity LE dressing    Comments RW   Bed Mobility   Rolling R 6  Modified independent   Additional items Bedrails; Increased time required   Supine to Sit 6  Modified independent   Additional items Bedrails; Increased time required   Transfers   Sit to Stand 6  Modified independent   Additional items Increased time required   Stand to Sit 6  Modified independent   Additional items Increased time required;Armrests   Functional Mobility   Functional Mobility 5  Supervision   Additional Comments Line managment    Additional items Rolling walker   Therapeutic Excerise-Strength   UE Strength Yes  (2 sets of 10 w/c pushups )   Cognition   Overall Cognitive Status [facility]   Arousal/Participation Alert; Responsive; Cooperative   Attention Within functional limits   Orientation Level Oriented X4   Memory Within functional limits Following Commands Follows one step commands without difficulty   Activity Tolerance   Activity Tolerance Patient tolerated treatment well   Medical Staff Made Aware nsg   Assessment   Assessment Pt was seen for a 40 min OT tx with a focus on b/l UE strength, transfer safety, functional mobility, functional balance, and activity tolerance(currently=good 15-20 mins)  Pt tolerated sitting EOB for LE dressing and completed it mod I with use of RW for stability when pulling pants up  Pt sat EOB with g/f+ balance  Pt completed 2 sets of 10 reps of wheelchair push ups for increased b/l UE strength  Pt was educated on fall risks and energy conservation strategies  Pt demonstrated g transfer safety w/ g hand placement without verbal cueing  Pt demonstrated f+/f standing balance during functional mobility task  Continue PT/OT, pt perfers home  Plan   Treatment Interventions ADL retraining;Functional transfer training;UE strengthening/ROM; Endurance training;Patient/family training; Activityengagement; Energy conservation   Goal Expiration Date 12/03/18   Treatment Day 1   OT Frequency 2-3x/wk   Recommendation   OT Discharge Recommendation Other (Comment)  (Continue PT/OT)   OT - OK to Discharge Yes  (When medically appropriate)   Barthel Index   Feeding 10   Bathing 5   Grooming Score 5   Dressing Score 10   Bladder Score 0   Bowels Score 10   Toilet Use Score 10   Transfers (Bed/Chair) Score 15   Mobility (Level Surface) Score 0   Stairs Score 0   Barthel Index Score 65   Anne Sandoval, OTS

## 2018-11-26 NOTE — SOCIAL WORK
Pt is known to CM department and was assessed on 11/12/18 from previous admission  This assigned CM met with pt again at bedside to determine if there had been any changes since last admission; pt reported "No; everything is the same "     At that admission it was reported: "CM met with pt at bedside to discuss discharge needs  Pt lives in a 3rd floor apartment with elevator access  Pt lives with his brother  ADL's are completed independently  Pt ambulates with a RW; wheels on front and back  PCP identified as Dr Shaila Oliveira  Pharmacy identified as [facility]   Pt reports hx with [facility]; hx of STR at [facility]  Pt does drive but reports that he will not be able to for a little bit  His brother is able to assist with transportation  Pt reports that he will call his sister for a ride home at D/C  Pt denies POA; CM provided pt with information  No needs expressed or identified at this time  CM following "    Pt is being recommended for PT & OT at [facility]  CM attempted to provide a list of other rehab facilities since pt stated to attending that he did not want to return to [facility] (CM took that as he didn't want to return THERE, but might be willing to be d/c to a different STR)  CM expressed that this was the suggestion based upon his functionality  Pt stated that he always ends up sick when he goes to rehab  CM inquired if he would be willing to have home PT & OT come into his home; once again, he stated "No, he was not agreeable to it "     CM made pt aware that, due to his  PCP, his 30-day readmission, and the fact he has diabetes and CKD, he should be scheduled with a TCM/HRR appt upon discharge  Pt reported that he would make his own appt  He did not want CM to make that f/u appt for him because he knows his schedule and ability to get rides  At this point, there are no further needs that CM would be addressing, however, will be available if that changes

## 2018-11-26 NOTE — PLAN OF CARE
Problem: OCCUPATIONAL THERAPY ADULT  Goal: Performs self-care activities at highest level of function for planned discharge setting  See evaluation for individualized goals  Treatment Interventions: Functional transfer training, UE strengthening/ROM, Endurance training, Compensatory technique education, Activityengagement, Energy conservation          See flowsheet documentation for full assessment, interventions and recommendations  Limitation: Decreased endurance, Decreased Safe judgement during ADL, Decreased UE strength, Decreased self-care trans, Decreased high-level ADLs  Prognosis: Fair  Assessment: Pt was seen for a 40 min OT tx with a focus on b/l UE strength, transfer safety, functional mobility, functional balance, and activity tolerance(currently=good 15-20 mins)  Pt tolerated sitting EOB for LE dressing and completed it mod I with use of RW for stability when pulling pants up  Pt sat EOB with g/f+ balance  Pt completed 2 sets of 10 reps of wheelchair push ups for increased b/l UE strength  Pt was educated on fall risks and energy conservation strategies  Pt demonstrated g transfer safety w/ g hand placement without verbal cueing  Pt demonstrated f+/f standing balance during functional mobility task  Continue PT/OT, pt perfers home        OT Discharge Recommendation: Other (Comment) (Continue PT/OT)  OT - OK to Discharge: Yes (When medically appropriate)

## 2018-11-26 NOTE — SPEECH THERAPY NOTE
Speech Language/Pathology  Repeat cxr noted  Spoke w/ Dr Ivan Real  I went to see the pt this am  Still on 4L o2  He continues to refuse VBS "i had it already" and refuses to try thick liquids  Attempted to give him rationale for study  Appeared annoyed and replied "what did I just tell you?"  I apologized as my intent was not to upset him  Will d/c pt at this time  Please notify if pt is agreeable to either the VBS or (nectar) thick liquids  Recommend reflux and aspiration precautions

## 2018-11-26 NOTE — PLAN OF CARE

## 2018-11-26 NOTE — ASSESSMENT & PLAN NOTE
Lab Results   Component Value Date    HGBA1C 6 3 11/22/2018       Recent Labs      11/25/18 2122 11/25/18 2146  11/26/18   0007  11/26/18   0711   POCGLU  68  76  148*  139     Sugar stable on current dose of glargine and standing lispro

## 2018-11-26 NOTE — ASSESSMENT & PLAN NOTE
Chronic hepatitis-C with active viral load    Should follow up with gastroenterology for treatment options

## 2018-11-26 NOTE — ASSESSMENT & PLAN NOTE
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 at [facility] prior to arrival   Most recent PT notes still recommending rehab

## 2018-11-26 NOTE — ASSESSMENT & PLAN NOTE
Acute kidney injury on CKD3 in the setting of pneumonia  Continue gentle IV fluids    No evidence of outflow obstruction from SPT

## 2018-11-26 NOTE — ASSESSMENT & PLAN NOTE
Hcap versus aspiration pneumonia  Improvement in procalcitonin was zosyn  Down to 3-4 L nasal cannula from 5 L nasal cannula  Seen by speech pathologist recommendation for VBS however patient refusing  Zosyn day ##4 ; antibiotics day ##5

## 2018-11-26 NOTE — PROGRESS NOTES
Progress Note - Rosendo Range 1950, 70 y o  male MRN: [id number]    Unit/Bed#: [facility]els[facility] [address] 215-01 Encounter: [id number]    Primary Care Provider: Khai Bullard MD   Date and time admitted to hospital: 11/22/2018  6:16 AM        Assessment and Plan  * Severe sepsis [facility])   Assessment & Plan    Sepsis secondary to pneumonia  Blood cultures no growth  On zosyn  Pneumonia due to infectious organism   Assessment & Plan    Hcap versus aspiration pneumonia  Improvement in procalcitonin was zosyn  Down to 3-4 L nasal cannula from 5 L nasal cannula  Seen by speech pathologist recommendation for VBS however patient refusing  Zosyn day ##4 ; antibiotics day ##5  CYDNEY (acute kidney injury) [facility])   Assessment & Plan    Acute kidney injury on CKD3 in the setting of pneumonia  Continue gentle IV fluids  No evidence of outflow obstruction from SPT     Neurogenic bladder   Assessment & Plan    Neurogenic bladder with SPT which was exchange during last admission two weeks ago for UTI     Hepatitis C virus infection without hepatic coma   Assessment & Plan    Chronic hepatitis-C with active viral load  Should follow up with gastroenterology for treatment options     Hepatitis B   Assessment & Plan    Chronic hepatitis-B      DDD (degenerative disc disease), lumbosacral   Assessment & Plan    Patient scheduled for MRI by orthopedic surgeon at 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 at [facility] prior to arrival   Most recent PT notes still recommending rehab  Suprapubic catheter [facility])   Assessment & Plan    SPT with recent catheter change during hospitalization for complicated UTI     VTE Pharmacologic Prophylaxis: Heparin    Patient Centered Rounds: I have performed bedside rounds with nursing staff today  Discussions with Specialists or Other Care Team Provider:   Education and Discussions with Family / Patient: patient's brother Lou Mcclain    Time Spent for Care: 30 mins  More than 50% of total time spent on counseling and coordination of care as described above  Current Length of Stay: 4 day(s)  Current Patient Status: Inpatient   Certification Statement: The patient will continue to require additional inpatient hospital stay due to Severe sepsis [facility])    Discharge Plan / Estimated Discharge Date: TBD  Code Status: Level 1 - Full Code  ______________________________________________________________________________    Subjective:   Patient seen and examined  Still short of breath  Requesting MRI study of lumbar spine to be performed  Objective:   Vitals: Blood pressure 156/82, pulse 88, temperature 98 2 °F (36 8 °C), temperature source Temporal, resp  rate 18, height 6' (1 829 m), weight 93 4 kg (205 lb 14 6 oz), SpO2 93 %      Physical Exam:   General appearance: alert, appears stated age and cooperative  Head: Normocephalic, without obvious abnormality, atraumatic  Lungs: Rhonchi and coarse breath sounds right field  Heart: regular rate and rhythm  Abdomen: soft, non-tender, positive bowel sounds   Back: no tenderness to percussion or palpation  Extremities: no ulcers, gangrene or trophic changes  Neurologic:  Speech intact strength symmetric    Additional Data:   Labs:    Results from last 7 days  Lab Units 11/26/18  0500 11/25/18  0520 11/24/18  0506 11/23/18  0512 11/22/18  1020 11/22/18  0649 11/22/18  0648   WBC Thousand/uL 11 61* 15 86* 20 63* 24 41* --   --  13 28*   HEMOGLOBIN g/dL 8 3* 7 8* 7 8* 7 9*  --   --  10 5*   HEMATOCRIT % 26 0* 24 9* 24 4* 25 1*  --   --  33 5*   MCV fL 94 95 95 95  --   --  97   TOTAL NEUT ABS Thousand/uL 9 40* 14 12* 18 36* 22 95*  --   --   --    BANDS PCT %  --  3 5 4  --   --   --    PLATELETS Thousands/uL 262 228 234 232 249  --  328   INR   --   --   --   --   --  1 09  --        Results from last 7 days  Lab Units 11/26/18  0500 11/25/18  0521 11/24/18  0537 11/23/18  0512 11/22/18  0649   SODIUM mmol/L 136 137 133* 135* 137   POTASSIUM mmol/L 4 0 4 0 4 3 4 2 4 7   CHLORIDE mmol/L 100 102 101 102 101   CO2 mmol/L 27 25 24 23 27   ANION GAP mmol/L 9 10 8 10 9   BUN mg/dL 37* 38* 42* 45* 46*   CREATININE mg/dL 2 05* 2 20* 2 34* 2 33* 2 47*   CALCIUM mg/dL 9 2 9 0 8 8 8 4 8 9   ALBUMIN g/dL  --   --   --   --  2 9*   TOTAL BILIRUBIN mg/dL  --   --   --   --  0 40   ALK PHOS U/L  --   --   --   --  78   ALT U/L  --   --   --   --  20   AST U/L  --   --   --   --  25   EGFR ml/min/1 73sq m 32 29 27 27 26   GLUCOSE RANDOM mg/dL 136 107 111 91 127       Results from last 7 days  Lab Units 11/23/18  0512 11/22/18  0649   MAGNESIUM mg/dL 1 6 1 7   PHOSPHORUS mg/dL 3 8  --        Results from last 7 days  Lab Units 11/22/18  0649   TROPONIN I ng/mL 0 02       Results from last 7 days  Lab Units 11/22/18  0649   NT-PRO BNP pg/mL 1,922*        Results from last 7 days  Lab Units 11/25/18  0520  11/22/18  0648   LACTIC ACID mmol/L  --   --  1 5   PROCALCITONIN ng/ml 2 95*  < >  --    < > = values in this interval not displayed      Results from last 7 days  Lab Units 11/26/18  0711 11/26/18  0007 11/25/18  2146 11/25/18  2122 11/25/18  1533 11/25/18  1126 11/25/18  0718 11/24/18  2108 11/24/18  1640 11/24/18  1135 11/24/18  0728 11/23/18  2105   POC GLUCOSE mg/dl 139 148* 76 68 181* 151* 106 102 78 118 114 112       Results from last 7 days  Lab Units 11/22/18  1020   HEMOGLOBIN A1C % 6 3         * I Have Reviewed All Lab Data Listed Above     Cultures:     Results from last 7 days  Lab Units 11/22/18  0903 11/22/18  0834 11/22/18  0649 11/22/18  0648 11/22/18  9784   BLOOD CULTURE   --   --  No Growth at 72 hrs   --  No Growth at 72 hrs  URINE CULTURE  No Growth <100 cfu/mL  --   --   --   --    INFLUENZA A PCR   --   --   --  None Detected  --    INFLUENZA B PCR   --   --   --  None Detected  --    RSV PCR   --   --   --  None Detected  --    LEGIONELLA URINARY ANTIGEN   --  Negative  --   --   --    STREP PNEUMONIAE ANTIGEN, URINE   --  Negative  --   --   --          Imaging:  Imaging Reports Reviewed Today Include:   Procedure: Xr Chest Pa & Lateral  Result Date: 11/26/2018  Impression: Extensive pneumonia right lung   Workstation performed: [id number]         Scheduled Meds:  Current Facility-Administered Medications:  acetaminophen 650 mg Oral Q6H PRN Babetta Medal, CRNP    amLODIPine 5 mg Oral Daily Lilian Antony PA-C    DULoxetine 60 mg Oral Daily Charles Sonday, BLAINE    gabapentin 600 mg Oral BID Charles Sonday, BLAINE    guaiFENesin 600 mg Oral Q12H [facility] Lilian Mcgovern PA-C    heparin (porcine) 5,000 Units Subcutaneous [facility] Charles Sonday, BLAINE    insulin glargine 30 Units Subcutaneous HS Charles Sonday, CRNP    insulin lispro 1-5 Units Subcutaneous HS Charles Sonday, CRNP    insulin lispro 10 Units Subcutaneous TID With Meals Babetta Medal, BLAINE    insulin lispro 2-12 Units Subcutaneous TID [facility], BLAINE    levalbuterol 1 25 mg Nebulization Q6H Charles Sonday, BLAINE    metoprolol succinate 25 mg Oral Daily Lilian Mcgovern PA-C    morphine 15 mg Oral Q12H Lilian Mcgovern PA-C    oxyCODONE 10 mg Oral Q6H PRN Lilian Mcgovern PA-C    piperacillin-tazobactam 2 25 g Intravenous Q6H Lilian Mcgovern PA-C Last Rate: Stopped (11/26/18 0636)   polyethylene glycol 17 g Oral Daily PRN Babetta Medal, CRNP    sodium chloride 50 mL/hr Intravenous Continuous Lilian Mcgovern PA-C Last Rate: 50 mL/hr (11/25/18 1307)   sodium chloride 3 mL Nebulization Q6H Maxi Ordoñez DO    traZODone 100 mg Oral HS BLAINE Cheatham, DO [address] [facility]  Hospitalist    ** Please Note: This note has been constructed using a voice recognition system   **

## 2018-11-27 NOTE — PLAN OF CARE
Problem: PHYSICAL THERAPY ADULT  Goal: Performs mobility at highest level of function for planned discharge setting  See evaluation for individualized goals  Treatment/Interventions: Functional transfer training, LE strengthening/ROM, Therapeutic exercise, Endurance training, Patient/family training, Bed mobility, Gait training, Spoke to nursing  Equipment Recommended: Aracely Loyd (REA)       See flowsheet documentation for full assessment, interventions and recommendations  Outcome: Progressing  Prognosis: Fair  Problem List: Decreased strength, Decreased range of motion, Decreased endurance, Impaired balance, Decreased mobility, Decreased safety awareness, Decreased cognition, Decreased skin integrity, Pain  Assessment: Pt  supine in bed upon my arrival  Pt  reports increased fatigue at this time, RN reports pt  had a difficult night of sleep and has been sleeping all AM  Performance of HEP supine in bed with A of therapist provided for proper completion  Noted pt  required increased cueing to maintain awake and participate in therapy this AM  Progressed with transfers with cues for hand placement/technique  Progressed with a limited amb  trial with use of RW and Cristhian of therapist with cues for LE sequencing  Remains limited by fatigue, requiring a quick positioning in bedside chair  Pt  remained seated in bedside chair with alarm active at end of treatment session  At this time pt  will continue to benefit from rehab upon d/c for continued improvement of noted impairments above  However, pt  continues to refuse at this time  If d/c home will require HHPT and family support with all functional mobility when medically stable     Barriers to Discharge: Decreased caregiver support  Barriers to Discharge Comments: pt's brother works from 5am to 1pm  Recommendation: Short-term skilled PT (however pt prefers to return home)     PT - OK to Discharge: Yes (if d/c to rehab when medically stable )    See flowsheet documentation for full assessment

## 2018-11-27 NOTE — PROGRESS NOTES
Progress Note - Luis Garcia 1950, 70 y o  male MRN: [id number]  Unit/Bed#: [address] 215-01 Encounter: [id number]  Primary Care Provider: Julián Johnson MD   Date and time admitted to hospital: 11/22/2018  6:16 AM    * Severe sepsis [facility])   Assessment & Plan    · POA as evidenced by leukocytosis, CYDNEY  Sepsis secondary to pneumonia  · Leukocytosis has resolved  · Blood cultures are negative @ 5 days  · Procalcitonin trending downward  · 1 0 today     Pneumonia due to infectious organism   Assessment & Plan    · HCAP vs aspiration pneumonia  · Improvement in procalcitonin (1 0 today) with zosyn  Anticipate 1 more day of Zosyn  Discontinue once procalcitonin is normal   · Patient refuses to have further swallowing evaluation  · Encourage IS and flutter valve  · Wean oxygen to maintain sats >88%  Neurogenic bladder   Assessment & Plan    · Neurogenic bladder with SPT which was exchange during last admission two weeks ago for UTI     Chronic kidney disease, stage 3 (HCC)   Assessment & Plan    · Baseline creatinine: 1 8-2 3     Essential hypertension   Assessment & Plan    · Continue current dose of metoprolol and amlodipine       VTE Pharmacologic Prophylaxis:   Pharmacologic: Heparin  Mechanical: Mechanical VTE prophylaxis in place  Patient Centered Rounds: I have evaluated patient without nursing staff present due to nurse currently unavailable  Discussed after visit  Discussions with Specialists or Other Care Team Provider: None  Education and Discussions with Family / Patient: Unsuccessful attempt to reach patient's brother  Time Spent for Care: 20 minutes  More than 50% of total time spent on counseling and coordination of care as described above  Current Length of Stay: 5 day(s)  Current Patient Status: Inpatient   Certification Statement: The patient will continue to require additional inpatient hospital stay due to continued need for IV antibiotics      Discharge Plan: Anticipate possible discharge tomorrow  Patient likely discharged to home  Refusing VNA  Code Status: Level 1 - Full Code    Subjective:   Patient is sleeping/snoring when I enter the room  He briefly wakes up for my interview and denies any complaints  Objective:   Vitals:   Temp (24hrs), Av 6 °F (36 4 °C), Min:96 9 °F (36 1 °C), Max:98 2 °F (36 8 °C)    Temp:  [96 9 °F (36 1 °C)-98 2 °F (36 8 °C)] 98 2 °F (36 8 °C)  HR:  [72-86] 82  Resp:  [18-20] 20  BP: (144-167)/(78-89) 144/78  SpO2:  [92 %-96 %] 93 %  Body mass index is 27 45 kg/m²  Input and Output Summary (last 24 hours): Intake/Output Summary (Last 24 hours) at 18 1207  Last data filed at 18 1044   Gross per 24 hour   Intake           813 33 ml   Output             1700 ml   Net          -886 67 ml       Physical Exam:     Physical Exam   HENT:   Head: Normocephalic and atraumatic  Mouth/Throat: Oropharynx is clear and moist and mucous membranes are normal    Eyes: No scleral icterus  Cardiovascular: Normal rate and regular rhythm  No murmur heard  Pulmonary/Chest: Breath sounds normal  He has no wheezes  He has no rales  He exhibits no tenderness  Abdominal: Soft  Bowel sounds are normal  He exhibits no distension  There is no tenderness  Musculoskeletal: Normal range of motion  He exhibits no edema  Skin: Skin is warm and dry  No rash noted  Psychiatric: He has a normal mood and affect  Vitals reviewed      Additional Data:   Labs:    Results from last 7 days  Lab Units 18  0444 18  0500  18  0648   WBC Thousand/uL 8 02 11 61*  < > 13 28*   HEMOGLOBIN g/dL 7 8* 8 3*  < > 10 5*   HEMATOCRIT % 24 7* 26 0*  < > 33 5*   PLATELETS Thousands/uL 244 262  < > 328   NEUTROS PCT %  --   --   --  88*   LYMPHS PCT %  --   --   --  5*   LYMPHO PCT %  --  8*  < >  --    MONOS PCT %  --   --   --  5   MONO PCT %  --  8  < >  --    EOS PCT %  --  3  < > 1   < > = values in this interval not displayed  Results from last 7 days  Lab Units 11/27/18  0444   POTASSIUM mmol/L 3 8   CHLORIDE mmol/L 103   CO2 mmol/L 28   BUN mg/dL 35*   CREATININE mg/dL 1 96*   CALCIUM mg/dL 9 2   ALK PHOS U/L 82   ALT U/L 24   AST U/L 28       Results from last 7 days  Lab Units 11/22/18  0649   INR  1 09       * I Have Reviewed All Lab Data Listed Above  * Additional Pertinent Lab Tests Reviewed: All Labs Within Last 24 Hours Reviewed    Imaging:    Imaging Reports Reviewed Today Include: None new    Cultures:   Blood Culture:   Lab Results   Component Value Date    BLOODCX No Growth After 5 Days  11/22/2018    BLOODCX No Growth After 5 Days  11/22/2018    BLOODCX No Growth After 5 Days  11/10/2018    BLOODCX No Growth After 5 Days  11/10/2018    BLOODCX No Growth After 5 Days  09/30/2017    BLOODCX No Growth After 5 Days   09/30/2017     Urine Culture:   Lab Results   Component Value Date    URINECX No Growth <100 cfu/mL 11/22/2018    URINECX >100,000 cfu/ml Enterobacter cloacae complex (A) 11/10/2018    URINECX (A) 11/10/2018     >100,000 cfu/ml Beta Hemolytic Streptococcus Group B    URINECX >100,000 cfu/ml Candida species NOT albicans (A) 09/30/2017    URINECX 50,000-59,000 cfu/ml Stenotrophomonas maltophilia (A) 09/30/2017     Sputum Culture: No components found for: SPUTUMCX  Wound Culture: No results found for: WOUNDCULT    Last 24 Hours Medication List:     Current Facility-Administered Medications:  acetaminophen 650 mg Oral Q6H PRN Charles Sonday, BLAINE    amLODIPine 5 mg Oral Daily Lilian Mcgovern PA-C    DULoxetine 60 mg Oral Daily Charles Sonday, BLAINE    gabapentin 600 mg Oral BID Charles Sonday, BLAINE    guaiFENesin 600 mg Oral Q12H [address] Lilian Mcgovern PA-C    heparin (porcine) 5,000 Units Subcutaneous [facility] [address] Charles Sonday, BLAINE    insulin glargine 30 Units Subcutaneous HS Charles Sonday, CRNP    insulin lispro 1-5 Units Subcutaneous HS Charles Sonday, CRNP    insulin lispro 10 Units Subcutaneous TID With Meals BLAINE Marcano    insulin lispro 2-12 Units Subcutaneous TID [facility], BLAINE    levalbuterol 1 25 mg Nebulization Q6H BLAINE Rodriguez    metoprolol succinate 25 mg Oral Daily Lilian Mcgovern PA-C    morphine 15 mg Oral Q12H Lilian Mcgovern PA-C    oxyCODONE 10 mg Oral Q6H PRN Lilian Mcgovern PA-C    piperacillin-tazobactam 2 25 g Intravenous Q6H Lilian Mcgovern PA-C Last Rate: 2 25 g (11/27/18 0619)   polyethylene glycol 17 g Oral Daily PRN BLAINE Marcano    sodium chloride 50 mL/hr Intravenous Continuous Lilian Mcgovern PA-C Last Rate: 50 mL/hr (11/26/18 1247)   sodium chloride 3 mL Nebulization Q6H Maxi Ordoñez DO    traZODone 100 mg Oral HS BLAINE Marcano         Today, Patient Was Seen By: Raimundo Sarabia PA-C    ** Please Note: Dragon 360 Dictation voice to text software may have been used in the creation of this document   **

## 2018-11-27 NOTE — PHYSICAL THERAPY NOTE
Physical Therapy Progress Note     11/27/18 1213   Pain Assessment   Pain Assessment 0-10   Pain Score 5   Pain Type Chronic pain   Pain Location Back   Pain Orientation Bilateral   Hospital Pain Intervention(s) Ambulation/increased activity;Repositioned   Response to Interventions Tolerated  Restrictions/Precautions   Weight Bearing Precautions Per Order No   Other Precautions Fall Risk;Bed Alarm; Chair Alarm;Cognitive;Multiple lines;O2   General   Chart Reviewed Yes   Response to Previous Treatment Patient reporting fatigue but able to participate  Family/Caregiver Present No   Subjective   Subjective Willing to participate in therapy this PM    Bed Mobility   Supine to Sit 6  Modified independent   Additional items HOB elevated; Bedrails; Increased time required   Transfers   Sit to Stand 5  Supervision   Additional items Assist x 1;Bedrails; Increased time required;Verbal cues   Stand to Sit 5  Supervision   Additional items Assist x 1; Armrests; Increased time required;Verbal cues   Ambulation/Elevation   Gait pattern Decreased foot clearance; Forward Flexion; Short stride; Excessively slow; Inconsistent princess; Shuffling   Gait Assistance 4  Minimal assist   Additional items Assist x 1;Verbal cues; Tactile cues   Assistive Device Rolling walker   Distance 20' limited by fatigue   Balance   Static Sitting Fair +   Dynamic Sitting Fair   Static Standing Fair -   Dynamic Standing Poor +   Ambulatory Poor +   Endurance Deficit   Endurance Deficit Yes   Endurance Deficit Description fatigue   Activity Tolerance   Activity Tolerance Patient limited by fatigue   Nurse Made Aware Yes   Exercises   TKR Supine;10 reps;AAROM; Bilateral   Assessment   Prognosis Fair   Problem List Decreased strength;Decreased range of motion;Decreased endurance; Impaired balance;Decreased mobility; Decreased safety awareness;Decreased cognition;Decreased skin integrity;Pain   Assessment Pt  supine in bed upon my arrival  Pt  reports increased fatigue at this time, RN reports pt  had a difficult night of sleep and has been sleeping all AM  Performance of HEP supine in bed with A of therapist provided for proper completion  Noted pt  required increased cueing to maintain awake and participate in therapy this AM  Progressed with transfers with cues for hand placement/technique  Progressed with a limited amb  trial with use of RW and Cristhian of therapist with cues for LE sequencing  Remains limited by fatigue, requiring a quick positioning in bedside chair  Pt  remained seated in bedside chair with alarm active at end of treatment session  At this time pt  will continue to benefit from rehab upon d/c for continued improvement of noted impairments above  However, pt  continues to refuse at this time  If d/c home will require HHPT and family support with all functional mobility when medically stable  Barriers to Discharge Decreased caregiver support   Barriers to Discharge Comments pt's brother works from 5am to [employer]   Goals   Patient Goals To go home  STG Expiration Date 12/03/18   Treatment Day 1   Plan   Treatment/Interventions Functional transfer training;LE strengthening/ROM; Therapeutic exercise; Endurance training;Bed mobility;Gait training;Spoke to nursing;Spoke to case management   Progress Slow progress, decreased activity tolerance   PT Frequency Other (Comment)  (4-5x/wk)   Recommendation   Recommendation Short-term skilled PT  (however pt prefers to return home)   Equipment Recommended Walker  (RW)   PT - OK to Discharge Yes  (if d/c to rehab when medically stable )     Sparkle Orr PTA

## 2018-11-27 NOTE — ASSESSMENT & PLAN NOTE
· HCAP vs aspiration pneumonia  · Improvement in procalcitonin (1 0 today) with zosyn  Anticipate 1 more day of Zosyn  Discontinue once procalcitonin is normal   · Patient refuses to have further swallowing evaluation  · Encourage IS and flutter valve  · Wean oxygen to maintain sats >88%

## 2018-11-27 NOTE — ASSESSMENT & PLAN NOTE
· POA as evidenced by leukocytosis, CYDNEY  Sepsis secondary to pneumonia  · Leukocytosis has resolved  · Blood cultures are negative @ 5 days  · Procalcitonin trending downward    · 1 0 today

## 2018-11-28 PROBLEM — J96.01 ACUTE RESPIRATORY FAILURE WITH HYPOXIA (HCC): Status: ACTIVE | Noted: 2018-01-01

## 2018-11-28 NOTE — ASSESSMENT & PLAN NOTE
· O2 saturation at rest today 84%  · Suspect this is secondary to extensive right-sided pneumonia   · Will obtain CT chest (noncontrast due to renal failure) and rule out development of parapneumonic effusion or other etiology  · Ambulatory O2 sat

## 2018-11-28 NOTE — PROGRESS NOTES
Progress Note - Luz Maria Mancini 1950, 70 y o  male MRN: [id number]  Unit/Bed[facility] [address] 215-01 Encounter: [id number]  Primary Care Provider: Ramiro Hallman MD   Date and time admitted to hospital: 11/22/2018  6:16 AM    Pneumonia due to infectious organism   Assessment & Plan    · HCAP vs aspiration pneumonia  · Vancomycin discontinued due to negative MRSA screen  · Improvement in procalcitonin (0 5 today) with zosyn  Discontinue once procalcitonin is normal   · Patient refuses to have further swallowing evaluation and denies dysphagia  · Encourage IS and flutter valve  · Wean oxygen to maintain sats >90%  · Will check ambulatory pulse ox     * Severe sepsis (HCC)   Assessment & Plan    · POA as evidenced by leukocytosis, CYDNEY  Sepsis secondary to pneumonia  · Leukocytosis has resolved  · Blood cultures are negative @ 5 days  · Procalcitonin trending downward     Acute respiratory failure with hypoxia (HCC)   Assessment & Plan    · O2 saturation at rest today 84%  · Suspect this is secondary to extensive right-sided pneumonia   · Will obtain CT chest (noncontrast due to renal failure) and rule out development of parapneumonic effusion or other etiology  · Ambulatory O2 sat     Neurogenic bladder   Assessment & Plan    · Neurogenic bladder with SPT which was exchange during last admission two weeks ago for UTI     Hepatitis C virus infection without hepatic coma   Assessment & Plan    · Chronic hepatitis-C with active viral load  Should follow up with gastroenterology for treatment options     DDD (degenerative disc disease), lumbosacral   Assessment & Plan    · Patient scheduled for MRI by orthopedic surgeon at [place] [id number]  Patient requesting study here    · MRI result stable     Chronic kidney disease, stage 3 (HCC)   Assessment & Plan    · Baseline creatinine: 1 8-2 3     Essential hypertension   Assessment & Plan    · Continue current dose of metoprolol and amlodipine     Type 2 diabetes mellitus with hyperlipidemia [facility])   Assessment & Plan    Lab Results   Component Value Date    HGBA1C 6 3 2018     Recent Labs      18   1611  18   2107  18   0801  18   1107   POCGLU  78  149*  74  83     Sugar stable on current dose of glargine and standing lispro     Charcot-Bre-Tooth disease   Assessment & Plan    · 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 at [facility] prior to arrival   Most recent PT notes still recommending rehab but patient refusing  Also refusing VNA  VTE Pharmacologic Prophylaxis:   Pharmacologic: Heparin  Mechanical VTE Prophylaxis in Place: Yes    Patient Centered Rounds: I have performed bedside rounds with nursing staff today  Discussions with Specialists or Other Care Team Provider: lyn     Education and Discussions with Family / Patient:   Patient    Time Spent for Care: 30 minutes  More than 50% of total time spent on counseling and coordination of care as described above  Current Length of Stay: 6 day(s)    Current Patient Status: Inpatient   Certification Statement: The patient will continue to require additional inpatient hospital stay due to Minimal clinical improvement, hypoxia, IV antibiotics, safe discharge planning    Discharge Plan / Estimated Discharge Date:   Pending clinical course, continue IV antibiotics, check CT chest    Code Status: Level 1 - Full Code      Subjective:   Patient states he still bringing up a lot of sputum with cough but feels better from admission despite oxygen saturations being in the mid to low 80s    Objective:     Vitals:   Temp (24hrs), Av 3 °F (36 8 °C), Min:98 1 °F (36 7 °C), Max:98 4 °F (36 9 °C)    Temp:  [98 1 °F (36 7 °C)-98 4 °F (36 9 °C)] 98 1 °F (36 7 °C)  HR:  [78-85] 85  Resp:  [19-20] 20  BP: (138-143)/(70-81) 143/81  SpO2:  [90 %-94 %] 90 %  Body mass index is 27 27 kg/m²  Input and Output Summary (last 24 hours):        Intake/Output Summary (Last 24 hours) at 11/28/18 1251  Last data filed at 11/28/18 1106   Gross per 24 hour   Intake          4488 34 ml   Output             1300 ml   Net          3188 34 ml       Physical Exam:     Physical Exam   Constitutional: He is oriented to person, place, and time  He appears well-developed and well-nourished  HENT:   Head: Normocephalic and atraumatic  Eyes: EOM are normal    Neck: Normal range of motion  Neck supple  Cardiovascular: Normal rate, regular rhythm and normal heart sounds  Pulmonary/Chest: Effort normal    Decreased on right  On 4 L nasal cannula  Abdominal: Soft  Bowel sounds are normal    Neurological: He is alert and oriented to person, place, and time  Skin: Skin is warm and dry  Psychiatric: He has a normal mood and affect  Additional Data:     Labs:      Results from last 7 days  Lab Units 11/28/18  0548   WBC Thousand/uL 7 80   HEMOGLOBIN g/dL 8 0*   HEMATOCRIT % 26 2*   PLATELETS Thousands/uL 278   NEUTROS PCT % 62   LYMPHS PCT % 16   MONOS PCT % 13*   EOS PCT % 8*       Results from last 7 days  Lab Units 11/28/18  0548 11/27/18  0444   POTASSIUM mmol/L 4 1 3 8   CHLORIDE mmol/L 103 103   CO2 mmol/L 27 28   BUN mg/dL 36* 35*   CREATININE mg/dL 2 04* 1 96*   CALCIUM mg/dL 9 0 9 2   ALK PHOS U/L  --  82   ALT U/L  --  24   AST U/L  --  28       Results from last 7 days  Lab Units 11/22/18  0649   INR  1 09       * I Have Reviewed All Lab Data Listed Above  * Additional Pertinent Lab Tests Reviewed: All Labs For Current Hospital Admission Reviewed    Imaging:    Imaging Reports Reviewed Today Include:   None  Imaging Personally Reviewed by Myself Includes: All    Recent Cultures (last 7 days):       Results from last 7 days  Lab Units 11/26/18  1133 11/22/18  0903 11/22/18  0834 11/22/18  0649 11/22/18  0648 11/22/18  1544   BLOOD CULTURE   --   --   --  No Growth After 5 Days  --  No Growth After 5 Days     SPUTUM CULTURE  2+ Growth of Candida sp  presumptively albicans* 2+ Growth of   --   --   --   --   --    GRAM STAIN RESULT  1+ Epithelial cells per low power field  2+ Polys  1+ Gram positive cocci in pairs  Rare Gram positive rods  --   --   --   --   --    URINE CULTURE   --  No Growth <100 cfu/mL  --   --   --   --    INFLUENZA B PCR   --   --   --   --  None Detected  --    RSV PCR   --   --   --   --  None Detected  --    LEGIONELLA URINARY ANTIGEN   --   --  Negative  --   --   --        Last 24 Hours Medication List:     Current Facility-Administered Medications:  acetaminophen 650 mg Oral Q6H PRN Ninfa Head Sonday, BLAINE    amLODIPine 5 mg Oral Daily Lilian Mcgovern PA-C    DULoxetine 60 mg Oral Daily Charles Sonday, BLAINE    gabapentin 600 mg Oral BID Charles Sonday, BLAINE    guaiFENesin 600 mg Oral Q12H [address] Lilian Mcgovern PA-C    heparin (porcine) 5,000 Units Subcutaneous [facility] [address] Charles Sonday, BLAINE    insulin glargine 30 Units Subcutaneous HS Charles Sonday, BLAINE    insulin lispro 1-5 Units Subcutaneous HS Charles Sonday, BLAINE    insulin lispro 10 Units Subcutaneous TID With Meals Ninfa Head Sonday, BLAINE    insulin lispro 2-12 Units Subcutaneous TID [facility] Charles Sonday, BLAINE    levalbuterol 1 25 mg Nebulization TID Shira Paige MD    metoprolol succinate 25 mg Oral Daily Lilian Mcgovern PA-C    oxyCODONE 10 mg Oral Q6H PRN Lilian Mcgovern PA-C    piperacillin-tazobactam 2 25 g Intravenous Q6H Lilian Mcgovern PA-C Last Rate: Stopped (11/28/18 1222)   polyethylene glycol 17 g Oral Daily PRN BLAINE Landeros    sodium chloride 50 mL/hr Intravenous Continuous Lilian Mcgovern PA-C Last Rate: Stopped (11/28/18 1106)   sodium chloride 3 mL Nebulization TID Shira Paige MD    traZODone 100 mg Oral HS BLAINE Landeros         Today, Patient Was Seen By: Leslee Chavarria PA-C    ** Please Note: This note has been constructed using a voice recognition system   **

## 2018-11-28 NOTE — UTILIZATION REVIEW
Continued Stay Review    Date: 11/28/2018 for 11/26/2018    Vital Signs: /84 (BP Location: Left arm)   Pulse 88   Temp (!) 97 3 °F (36 3 °C) (Temporal)   Resp 19   Ht 6' (1 829 m)   Wt 91 2 kg (201 lb 1 oz)   SpO2 90%   BMI 27 27 kg/m²     Medications:   Scheduled Meds:   Current Facility-Administered Medications:  acetaminophen 650 mg Oral Q6H PRN [place] Zamudioday, BLAINE    amLODIPine 5 mg Oral Daily Lilian Mcgovern PA-C    DULoxetine 60 mg Oral Daily Charles Sonday, BLAINE    gabapentin 600 mg Oral BID Charles Sonday, BLAINE    guaiFENesin 600 mg Oral Q12H [facility] Lilian Mcgovern PA-C    heparin (porcine) 5,000 Units Subcutaneous [facility] Charles Sonday, BLAINE    insulin glargine 30 Units Subcutaneous HS Charles Sonday, CRNP    insulin lispro 1-5 Units Subcutaneous HS Charles Sonday, BLAINE    insulin lispro 10 Units Subcutaneous TID With Meals Jem Grant, BLAINE    insulin lispro 2-12 Units Subcutaneous TID [facility], BLAINE    levalbuterol 1 25 mg Nebulization TID Dolores Pro MD    metoprolol succinate 25 mg Oral Daily Lilian Mcgovern PA-C    morphine 15 mg Oral Q12H [facility] Gustavo Lee PA-C    oxyCODONE 10 mg Oral Q6H PRN Lilian Mcgovern PA-C    piperacillin-tazobactam 2 25 g Intravenous Q6H Lilian Mcgovern PA-C Last Rate: Stopped (11/28/18 1222)   polyethylene glycol 17 g Oral Daily PRN Amaryllis BLAINE Grant    sodium chloride 50 mL/hr Intravenous Continuous Lilian Mcgovern PA-C Last Rate: Stopped (11/28/18 1106)   sodium chloride 3 mL Nebulization TID Dolores Pro MD    traZODone 100 mg Oral HS Ammauricios BLAINE Grant      Continuous Infusions:   sodium chloride 50 mL/hr Last Rate: Stopped (11/28/18 1106)     PRN Meds:   acetaminophen    oxyCODONE    polyethylene glycol    Abnormal Labs/Diagnostic Results:wbc 11 61--hgb 8 3/26 0--bs 215--bun 37--cr 2 05  Chest-  Extensive pneumonia right lung  Age/Sex: 70 y o  male     Assessment/Plan: Assessment and Plan      * Severe sepsis ([facility] [address] ) Assessment & Plan     Sepsis secondary to pneumonia  Blood cultures no growth  On zosyn       Pneumonia due to infectious organism   Assessment & Plan     Hcap versus aspiration pneumonia  Improvement in procalcitonin was zosyn  Down to 3-4 L nasal cannula from 5 L nasal cannula  Seen by speech pathologist recommendation for VBS however patient refusing  Zosyn day ##4 ; antibiotics day ##5        CYDNEY (acute kidney injury) ([facility] [address] )   Assessment & Plan     Acute kidney injury on CKD3 in the setting of pneumonia  Continue gentle IV fluids  No evidence of outflow obstruction from SPT      Neurogenic bladder   Assessment & Plan     Neurogenic bladder with SPT which was exchange during last admission two weeks ago for UTI      Hepatitis C virus infection without hepatic coma   Assessment & Plan     Chronic hepatitis-C with active viral load  Should follow up with gastroenterology for treatment options      Hepatitis B   Assessment & Plan     Chronic hepatitis-B       DDD (degenerative disc disease), lumbosacral   Assessment & Plan     Patient scheduled for MRI by orthopedic surgeon at [facility]    Patient requesting study here       Essential hypertension   Assessment & Plan     Continue current dose of metoprolol and amlodipine     Inpatient   Certification Statement: The patient will continue to require additional inpatient hospital stay due to Severe sepsis [facility])    Discharge Plan: recommending str

## 2018-11-28 NOTE — ASSESSMENT & PLAN NOTE
· Chronic hepatitis-C with active viral load    Should follow up with gastroenterology for treatment options

## 2018-11-28 NOTE — ASSESSMENT & PLAN NOTE
· POA as evidenced by leukocytosis, CYDNEY  Sepsis secondary to pneumonia  · Leukocytosis has resolved  · Blood cultures are negative @ 5 days    · Procalcitonin trending downward

## 2018-11-28 NOTE — OCCUPATIONAL THERAPY NOTE
Occupational Therapy Treatment Note:         11/28/18 2515   Restrictions/Precautions   Weight Bearing Precautions Per Order No   Other Precautions Pain; Fall Risk;O2;Chair Alarm;Cognitive; Bed Alarm   Pain Assessment   Pain Assessment 0-10   Pain Score 7   Pain Type Chronic pain   Pain Location Back   Pain Orientation Bilateral   ADL   Where Assessed Edge of bed   Eating Assistance 5  Supervision/Setup   Grooming Assistance 5  Supervision/Setup   LB Dressing Assistance 4  Minimal Assistance   LB Dressing Deficit Setup;Steadying; Requires assistive device for steadying;Verbal cueing;Supervision/safety; Increased time to complete; Don/doff R sock; Don/doff L sock; Thread LLE into pants; Thread RLE into pants;Pull up over hips   LB Dressing Comments increased back pain noted with activity  Functional Standing Tolerance   Time 2 mins   Activity dynamic stand balance activitiy  Comments increased fatigue/pain noted with activity  Bed Mobility   Supine to Sit 5  Supervision   Sit to Supine 5  Supervision   Additional Comments back pain again noted   Transfers   Sit to Stand 5  Supervision   Additional items Assist x 1; Increased time required;Verbal cues   Stand to Sit 5  Supervision   Additional items Assist x 1   Stand pivot 5  Supervision   Additional items Assist x 1   Additional Comments increased weakness noted with activities  Functional Mobility   Functional Mobility 5  Supervision   Additional Comments x1   Additional items Rolling walker   Cognition   Overall Cognitive Status WFL   Arousal/Participation Alert; Responsive; Cooperative   Attention Attends with cues to redirect   Orientation Level Oriented X4   Memory Decreased short term memory   Following Commands Follows multistep commands with increased time or repetition   Comments slow processing noted this tx session      Additional Activities   Additional Activities Other (Comment)  (reviewed energy conservation/compensatory breathing techs  )   Additional Activities Comments Pt reports having good understanding  Activity Tolerance   Activity Tolerance Patient limited by fatigue;Patient limited by pain   Medical Staff Made Aware reported all findings to JEWEL Sylvester  Pt with c/o of back pain  Assessment   Assessment Pt was seen for skilled OT with focus on completion of self care tasks, functional mobility, review of fall prevention and review of current plan of care  Pt with initial resistance noted to OOB activities  Pt agreeable to EOB activities after review of compensatory breathing techs  SAT 88% on 3 liters while seated EOB  Min A overall for functional transfers  Increased back pain noted with activity  Pt reports havng 7/10 pain overall with activity in back  Reported findings to nursing staff  Increased fatigue noted with functional reach while seated EOB  See above levels of A required for all functional tasks  Pt reports having good understanding of reviewed information  Pt may benefit from further rehab with focus on achieving optimal performance levels with all functional tasks  Continue to recommend Inpatient rehab   Plan   Treatment Interventions ADL retraining;Functional transfer training; Endurance training;Cognitive reorientation   Goal Expiration Date 12/03/18   Treatment Day 2   OT Frequency 2-3x/wk   Recommendation   OT Discharge Recommendation (continue OT/PT)   Barthel Index   Feeding 10   Bathing 5   Grooming Score 5   Dressing Score 10   Bladder Score 0   Bowels Score 10   Toilet Use Score 10   Transfers (Bed/Chair) Score 15   Mobility (Level Surface) Score 0   Stairs Score 0   Barthel Index Score 65   Modified Cristofer Scale   Modified Cristofer Scale 4   [place] K[place]ie, [address]

## 2018-11-28 NOTE — PLAN OF CARE
Problem: OCCUPATIONAL THERAPY ADULT  Goal: Performs self-care activities at highest level of function for planned discharge setting  See evaluation for individualized goals  Treatment Interventions: Functional transfer training, UE strengthening/ROM, Endurance training, Compensatory technique education, Activityengagement, Energy conservation          See flowsheet documentation for full assessment, interventions and recommendations  Outcome: Progressing  Limitation: Decreased endurance, Decreased Safe judgement during ADL, Decreased UE strength, Decreased self-care trans, Decreased high-level ADLs  Prognosis: Fair  Assessment: Pt was seen for skilled OT with focus on completion of self care tasks, functional mobility, review of fall prevention and review of current plan of care  Pt with initial resistance noted to OOB activities  Pt agreeable to EOB activities after review of compensatory breathing techs  SAT 88% on 3 liters while seated EOB  Min A overall for functional transfers  Increased back pain noted with activity  Pt reports havng 7/10 pain overall with activity in back  Reported findings to nursing staff  Increased fatigue noted with functional reach while seated EOB  See above levels of A required for all functional tasks  Pt reports having good understanding of reviewed information  Pt may benefit from further rehab with focus on achieving optimal performance levels with all functional tasks   Continue to recommend Inpatient rehab     OT Discharge Recommendation:  (continue OT/PT)  OT - OK to Discharge: Yes (When medically appropriate)      Comments: Dereje Jacobo, [address]

## 2018-11-28 NOTE — ASSESSMENT & PLAN NOTE
· HCAP vs aspiration pneumonia  · Vancomycin discontinued due to negative MRSA screen  · Improvement in procalcitonin (0 5 today) with zosyn  Discontinue once procalcitonin is normal   · Patient refuses to have further swallowing evaluation and denies dysphagia  · Encourage IS and flutter valve  · Wean oxygen to maintain sats >90%    · Will check ambulatory pulse ox

## 2018-11-28 NOTE — ASSESSMENT & PLAN NOTE
Lab Results   Component Value Date    HGBA1C 6 3 11/22/2018     Recent Labs      11/27/18   1611  11/27/18   2107  11/28/18   0801  11/28/18   1107   POCGLU  78  149*  74  83     Sugar stable on current dose of glargine and standing lispro

## 2018-11-28 NOTE — ASSESSMENT & PLAN NOTE
· Patient scheduled for MRI by orthopedic surgeon at [facility] [address]  Patient requesting study here    · MRI result stable

## 2018-11-28 NOTE — ASSESSMENT & PLAN NOTE
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

## 2018-11-29 NOTE — PROGRESS NOTES
Progress Note - Beti Bell 1950, 70 y o  male MRN: [id number]  Unit/Bed#: [place] [address] [id number] Encounter: [id number] DOS: 11/29/18  Primary Care Provider: Larry Agrawal MD   Date and time admitted to hospital: 11/22/2018  6:16 AM    Pneumonia due to infectious organism   Assessment & Plan    · HCAP vs aspiration pneumonia  · Vancomycin discontinued due to negative MRSA screen  · Improvement in procalcitonin with zosyn, today day #7  · Patient was refusing to have further swallowing evaluation and denies dysphagia  · Now agreeable and VBS order placed   · Encourage IS and flutter valve  · Wean oxygen to maintain sats >90%  · Would appreciate pulm eval and CT chest shows developing infiltrate JASMYNE      * Severe sepsis (HCC)   Assessment & Plan    · POA as evidenced by leukocytosis, CYDNEY  Sepsis secondary to pneumonia  · Leukocytosis has resolved  · Blood cultures are negative @ 5 days  · Procalcitonin trending downward     Acute respiratory failure with hypoxia (HCC)   Assessment & Plan    · O2 saturation today upon ambulatory respiratory assessment dropped to ~84% on 4L NC  · Suspect this is secondary to extensive right-sided pneumonia, pleural effusions, now developing JASMYNE consolidation, ongoing aspiration   · Would appreciate pulmonology evaluation given above   · Continue abx   · Received total of 40mg IV lasix today   · Wean O2 to keep sats >90%     Neurogenic bladder   Assessment & Plan    · Neurogenic bladder with SPT which was exchange during last admission two weeks ago for UTI     Hepatitis C virus infection without hepatic coma   Assessment & Plan    · Chronic hepatitis-C with active viral load  Should follow up with gastroenterology for treatment options     Esophageal reflux   Assessment & Plan    · Patient evaluated by SLP 11/23 the patient was found to have delayed cough when trialed thin liquids and was unclear if this was due to aspiration versus reflux    · Patient was declining further workup for aspiration versus reflux but now agreeable to VBS   · Patient refusing nectar thick liquids  · Start PPI and pepcid      DDD (degenerative disc disease), lumbosacral   Assessment & Plan    · Patient scheduled for MRI by orthopedic surgeon at [facility]  Patient requesting study here  · MRI result stable     Chronic kidney disease, stage 3 (HCC)   Assessment & Plan    · Baseline creatinine: 1 8-2 3     Essential hypertension   Assessment & Plan    · Continue current dose of metoprolol and amlodipine     Type 2 diabetes mellitus with hyperlipidemia [facility])   Assessment & Plan    Lab Results   Component Value Date    HGBA1C 6 3 11/22/2018     Recent Labs      11/29/18   0713  11/29/18   0759  11/29/18   0841  11/29/18   1104   POCGLU  42*  61*  112  102     Will decrease lantus to 15units qHS and d/c mealtime insulin for now  Continue SSI  Charcot-Bre-Tooth disease   Assessment & Plan    · 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 at [facility] prior to arrival   Most recent PT notes still recommending rehab but patient refusing  Also refusing VNA  VTE Pharmacologic Prophylaxis:   Pharmacologic: Heparin  Mechanical VTE Prophylaxis in Place: Yes    Patient Centered Rounds: I have performed bedside rounds with nursing staff today  Discussions with Specialists or Other Care Team Provider: nursing, pulmonology     Education and Discussions with Family / Patient: patient, called brother and left short VM     Time Spent for Care: 30 minutes  More than 50% of total time spent on counseling and coordination of care as described above      Current Length of Stay: 7 day(s)    Current Patient Status: Inpatient   Certification Statement: The patient will continue to require additional inpatient hospital stay due to persistent hypoxia, need for IV antibiotics, pulmonology evaluation, eval for home O2, safe d/c planning     Discharge Plan / Estimated Discharge Date: pending improvement in hypoxia     Code Status: Level 1 - Full Code      Subjective:   Pt seen and examined at bedside  States he feels better  No fevers or chills  Objective:     Vitals:   Temp (24hrs), Av 2 °F (36 2 °C), Min:97 1 °F (36 2 °C), Max:97 3 °F (36 3 °C)    Temp:  [97 1 °F (36 2 °C)-97 3 °F (36 3 °C)] 97 1 °F (36 2 °C)  HR:  [75-88] 86  Resp:  [18-19] 19  BP: (128-183)/(65-86) 183/86  SpO2:  [90 %-96 %] 96 %  Body mass index is 27 57 kg/m²  Input and Output Summary (last 24 hours): Intake/Output Summary (Last 24 hours) at 18 1439  Last data filed at 18 1355   Gross per 24 hour   Intake               50 ml   Output             4250 ml   Net            -4200 ml     Physical Exam:     Physical Exam   Constitutional: He is oriented to person, place, and time  He appears well-developed and well-nourished  Sitting in chair    HENT:   Head: Normocephalic and atraumatic  Deformity of bridge of nose    Eyes: EOM are normal  No scleral icterus  Neck: Normal range of motion  Neck supple  Cardiovascular: Normal rate, regular rhythm and normal heart sounds  Pulmonary/Chest: Effort normal  He has rales (bibasilar rales )  On 4 L NC  Decreased on right  Abdominal: Soft  Bowel sounds are normal    Genitourinary:   Genitourinary Comments: SPC    Musculoskeletal: Normal range of motion  He exhibits deformity  He exhibits no edema  Neurological: He is alert and oriented to person, place, and time  Skin: Skin is warm and dry  Psychiatric: He has a normal mood and affect       Additional Data:     Labs:      Results from last 7 days  Lab Units 18  0553   WBC Thousand/uL 8 47   HEMOGLOBIN g/dL 8 3*   HEMATOCRIT % 26 9*   PLATELETS Thousands/uL 334   NEUTROS PCT % 60   LYMPHS PCT % 19   MONOS PCT % 12   EOS PCT % 8*       Results from last 7 days  Lab Units 18  0553  18  0444   POTASSIUM mmol/L 4 2  < > 3 8   CHLORIDE mmol/L 104  < > 103   CO2 mmol/L 28  < > 28   BUN mg/dL 35*  < > 35*   CREATININE mg/dL 2 06*  < > 1 96*   CALCIUM mg/dL 9 3  < > 9 2   ALK PHOS U/L  --   --  82   ALT U/L  --   --  24   AST U/L  --   --  28   < > = values in this interval not displayed  * I Have Reviewed All Lab Data Listed Above  * Additional Pertinent Lab Tests Reviewed:  [facility] 66 Admission Reviewed    Imaging:    Imaging Reports Reviewed Today Include: all  Imaging Personally Reviewed by Myself Includes:  none    Recent Cultures (last 7 days):       Results from last 7 days  Lab Units 11/26/18  1133   SPUTUM CULTURE  2+ Growth of Candida sp  presumptively albicans*  2+ Growth of    GRAM STAIN RESULT  1+ Epithelial cells per low power field  2+ Polys  1+ Gram positive cocci in pairs  Rare Gram positive rods       Last 24 Hours Medication List:     Current Facility-Administered Medications:  acetaminophen 650 mg Oral Q6H PRN Peola Iveth Sonday, DAVIANNP    amLODIPine 5 mg Oral Daily Lilian Mcgovern PA-C    dextrose 50 mL Intravenous Once Bossman Lee PA-C    DULoxetine 60 mg Oral Daily Charles SondayBLAINE    gabapentin 1,800 mg Oral HS Gustavo Lee PA-C    gabapentin 600 mg Oral BID Charles SondayBLAINE    glucose 16 g Oral Once Sealed Air DALE Campoverde    guaiFENesin 600 mg Oral Q12H [address] Lilian Mcgovern PA-C    heparin (porcine) 5,000 Units Subcutaneous [facility] [address] Charles Sonday, BLAINE    insulin glargine 15 Units Subcutaneous HS Gustavo Lee PA-C    insulin lispro 1-5 Units Subcutaneous HS Charles SondayBLAINE    insulin lispro 2-12 Units Subcutaneous TID [facility] Rey RobbdayBLAINE    levalbuterol 1 25 mg Nebulization TID Rajinder Ray MD    metoprolol succinate 25 mg Oral Daily Lilian Mcgovern PA-C    morphine 15 mg Oral Q12H [address] Gustavo Lee PA-C    piperacillin-tazobactam 2 25 g Intravenous Q6H Lilian Mcgovern PA-C Last Rate: Stopped (11/29/18 1355)   polyethylene glycol 17 g Oral Daily PRN Mayra Market, CRNP    sodium chloride 3 mL Nebulization TID Concepcion Childress MD    traZODone 100 mg Oral HS BLAINE Draper         Today, Patient Was Seen By: Ruthann Francisco PA-C    ** Please Note: This note has been constructed using a voice recognition system   **

## 2018-11-29 NOTE — PROGRESS NOTES
Went to disconnect patient from IV fluids  He said he felt that his blood sugar was low  I checked him with a result of <33  I pushed 25ml of 50% dextrose as patient is in and out of sleep  I will continue to monitor patient and re-check blood glucose

## 2018-11-29 NOTE — ASSESSMENT & PLAN NOTE
· Patient scheduled for MRI by orthopedic surgeon at [facility] [address]  Patient requesting study here    · MRI result stable

## 2018-11-29 NOTE — PROGRESS NOTES
Progress Note - Pulmonary   Phuong Olmedo 70 y o  male MRN: [id number]  Unit/Bed#: [place] [id number] Encounter: [id number]      Assessment:  1  Acute hypoxic respiratory failure  2  HCAP day 7 of Zosyn  Extensive right-sided  Procalcitonin trending down, leukocytosis resolved and blood cultures negative  3  Small bilateral pleural effusions  4  Chronic kidney disease stage 3  5  Esophageal reflux questionable aspiration    11/20/2018 CT chest without contrast reviewed with attending     IMPRESSION:     1  Stable extensive right-sided pneumonia with additional focus of pneumonia in the anterior left upper lobe      2   Bilateral pleural effusions with overlying compressive atelectasis      3   Mild mediastinal lymphadenopathy, likely reactive  Plan:  1  Titrate oxygen to maintain O2 saturation greater than 90%  2  Case discussed with Dr Cooper England  Seven day course of Zosyn should be sufficient and DC antibiotics  Evaluate patient for home O2 requirements  Repeat chest x-ray in 4-6 weeks or sooner if patient decompensates or has worsening symptoms  3  Speech/swallow eval   VBS pending   4  Outpatient follow-up chest x-ray ordered placed  Pulmonary follow-up as per discharge instructions   5  Pulmonary toilet/incentive spirometry     Subjective:   Patient seen and examined  Pulmonary was asked to re-evaluate patient secondary to persistent hypoxemia  Patient remains on a 4 L nasal cannula  Denies shortness of breath at rest however states positive dyspnea on exertion when walking the hallways  Positive cough small amount of greenish sputum  Denies any fevers, chills, chest pain, hemoptysis or calf pain  Objective:     Vitals: Blood pressure 169/92, pulse 70, temperature (!) 96 8 °F (36 °C), temperature source Temporal, resp  rate 20, height 6' (1 829 m), weight 92 2 kg (203 lb 4 2 oz), SpO2 97 %  , 4 L nasal cannula, Body mass index is 27 57 kg/m²        Intake/Output Summary (Last 24 hours) at 11/29/18 1617  Last data filed at 11/29/18 1355   Gross per 24 hour   Intake               50 ml   Output             4250 ml   Net            -4200 ml         Physical Exam  Gen: Awake, alert, oriented x 3, no acute distress  HEENT: Mucous membranes moist, no oral lesions, no thrush  NECK: No accessory muscle use  Cardiac: Regular, single S1, single S2, no murmurs  Lungs:  Decreased breath sounds right with few bilateral crackles  Abdomen: normoactive bowel sounds, soft nontender  Extremities: no cyanosis, no clubbing, no edema  No calf pain with palpation    Labs: I have personally reviewed pertinent lab results  , ABG: No results found for: PHART, PLS0FBD, PO2ART, HCL3EQB, G4RWZKPL, BEART, SOURCE, BNP: No results found for: BNP, CBC:   Lab Results   Component Value Date    WBC 8 47 11/29/2018    HGB 8 3 (L) 11/29/2018    HCT 26 9 (L) 11/29/2018    MCV 97 11/29/2018     11/29/2018    MCH 30 0 11/29/2018    MCHC 30 9 (L) 11/29/2018    RDW 13 8 11/29/2018    MPV 9 8 11/29/2018    NRBC 0 11/29/2018   , CMP:   Lab Results   Component Value Date    SODIUM 139 11/29/2018    K 4 2 11/29/2018     11/29/2018    CO2 28 11/29/2018    BUN 35 (H) 11/29/2018    CREATININE 2 06 (H) 11/29/2018    CALCIUM 9 3 11/29/2018    EGFR 32 11/29/2018   , PT/INR: No results found for: PT, INR, Troponin: No results found for: TROPONINI  Imaging and other studies: I have personally reviewed pertinent reports     and I have personally reviewed pertinent films in PACS      Kelly Gordon PA-C

## 2018-11-29 NOTE — ASSESSMENT & PLAN NOTE
Lab Results   Component Value Date    HGBA1C 6 3 11/22/2018     Recent Labs      11/29/18   0713  11/29/18   0759  11/29/18   0841  11/29/18   1104   POCGLU  42*  61*  112  102     Will decrease lantus to 15units qHS and d/c mealtime insulin for now  Continue SSI

## 2018-11-29 NOTE — PLAN OF CARE
CARDIOVASCULAR - ADULT     Maintains optimal cardiac output and hemodynamic stability Progressing     Absence of cardiac dysrhythmias or at baseline rhythm Progressing        DISCHARGE PLANNING - CARE MANAGEMENT     Discharge to post-acute care or home with appropriate resources Progressing        HEMATOLOGIC - ADULT     Maintains hematologic stability Progressing        METABOLIC, FLUID AND ELECTROLYTES - ADULT     Electrolytes maintained within normal limits Progressing     Fluid balance maintained Progressing     Glucose maintained within target range Progressing        MUSCULOSKELETAL - ADULT     Maintain or return mobility to safest level of function Progressing     Maintain proper alignment of affected body part Progressing        Nutrition/Hydration-ADULT     Nutrient/Hydration intake appropriate for improving, restoring or maintaining nutritional needs Progressing        Potential for Falls     Patient will remain free of falls Progressing        Prexisting or High Potential for Compromised Skin Integrity     Skin integrity is maintained or improved Progressing        RESPIRATORY - ADULT     Achieves optimal ventilation and oxygenation Progressing        SKIN/TISSUE INTEGRITY - ADULT     Skin integrity remains intact Progressing     Incision(s), wounds(s) or drain site(s) healing without S/S of infection Progressing     Oral mucous membranes remain intact Progressing

## 2018-11-29 NOTE — ASSESSMENT & PLAN NOTE
· HCAP vs aspiration pneumonia  · Vancomycin discontinued due to negative MRSA screen  · Improvement in procalcitonin with zosyn, today day #7  · Patient was refusing to have further swallowing evaluation and denies dysphagia  · Now agreeable and VBS order placed   · Encourage IS and flutter valve  · Wean oxygen to maintain sats >90%    · Would appreciate pulm eval and CT chest shows developing infiltrate JASMYNE

## 2018-11-29 NOTE — ASSESSMENT & PLAN NOTE
· O2 saturation today upon ambulatory respiratory assessment dropped to ~84% on 4L NC     · Suspect this is secondary to extensive right-sided pneumonia, pleural effusions, now developing JASMYNE consolidation, ongoing aspiration   · Would appreciate pulmonology evaluation given above   · Continue abx   · Received total of 40mg IV lasix today   · Wean O2 to keep sats >90%

## 2018-11-29 NOTE — ASSESSMENT & PLAN NOTE
· Patient evaluated by SLP 11/23 the patient was found to have delayed cough when trialed thin liquids and was unclear if this was due to aspiration versus reflux    · Patient was declining further workup for aspiration versus reflux but now agreeable to VBS   · Patient refusing nectar thick liquids  · Start PPI and pepcid

## 2018-11-29 NOTE — ASSESSMENT & PLAN NOTE
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

## 2018-11-30 PROBLEM — A41.9 SEVERE SEPSIS (HCC): Status: RESOLVED | Noted: 2018-01-01 | Resolved: 2018-01-01

## 2018-11-30 PROBLEM — R65.20 SEVERE SEPSIS (HCC): Status: RESOLVED | Noted: 2018-01-01 | Resolved: 2018-01-01

## 2018-11-30 NOTE — PHYSICAL THERAPY NOTE
Physical Therapy Progress Note     11/30/18 4400   Pain Assessment   Pain Assessment 0-10   Pain Score 5   Pain Type Chronic pain   Pain Location Back   Pain Orientation Bilateral   Hospital Pain Intervention(s) Ambulation/increased activity;Repositioned   Response to Interventions Tolerated  Restrictions/Precautions   Weight Bearing Precautions Per Order No   Other Precautions Pain; Fall Risk; Chair Alarm;Multiple lines;O2   General   Chart Reviewed Yes   Response to Previous Treatment Patient reporting fatigue but able to participate  Family/Caregiver Present No   Subjective   Subjective Willing to participate in therapy this PM    Transfers   Sit to Stand 5  Supervision   Additional items Assist x 1; Armrests; Increased time required;Verbal cues   Stand to Sit 5  Supervision   Additional items Assist x 1; Armrests; Increased time required;Verbal cues   Ambulation/Elevation   Gait pattern Decreased foot clearance; Forward Flexion; Short stride; Excessively slow; Shuffling; Inconsistent princess   Gait Assistance 5  Supervision   Additional items Assist x 1;Verbal cues; Tactile cues   Assistive Device Rolling walker   Distance 100' x 2 with standing resting period in between gait trials  Balance   Static Sitting Fair +   Dynamic Sitting Fair   Static Standing Fair   Dynamic Standing Fair   Ambulatory Fair   Endurance Deficit   Endurance Deficit No   Activity Tolerance   Activity Tolerance Patient tolerated treatment well   Nurse Made Aware Yes   Exercises   TKR Sitting;10 reps;AAROM; Bilateral   Assessment   Prognosis Fair   Problem List Decreased strength;Decreased range of motion;Decreased endurance; Impaired balance;Decreased mobility; Decreased safety awareness;Pain   Assessment Pt  seated in bedside chair upon my arrival  Pt  reporting feeling much better this treatment session, and is eager to go home  O2 saturation measured on 4L at rest at 90%  Applied 6L for therapeutic intervention   Progressed with transfers being able to complete practicing proper technique with no noted LOB  Progressed with an increased amb  trial with use of RW and standbyA of therapist with cues for LE sequencing  Required a standing resting period in between gait trials  After a second amb  trial pt  returned to room and repositioned seated in bedside chair  O2 saturation measured seated in bedside chair at 85% on 6L requiring additional time to return to 94%  Reapplied 4L for rest as pt  remained seated in bedside chair at end of treatment session  PT will recommend d/c home with HHPT and family support as needed when medically stable  Barriers to Discharge Decreased caregiver support   Barriers to Discharge Comments pt's brother works from 5am to [employer]   Goals   Patient Goals To go home  STG Expiration Date 12/03/18   Treatment Day 2   Plan   Treatment/Interventions Functional transfer training;LE strengthening/ROM; Endurance training; Therapeutic exercise;Gait training;Spoke to nursing;Spoke to case management   Progress Progressing toward goals   PT Frequency Other (Comment)  (4-5x/wk)   Recommendation   Recommendation Home PT; Home with family support   Equipment Recommended Walker  (RW)   PT - OK to Discharge Yes  (if d/c when medically stable )     Jil Maravilla, PTA

## 2018-11-30 NOTE — ASSESSMENT & PLAN NOTE
· Patient scheduled for MRI by orthopedic surgeon at [facility] [address]  Patient requesting study here    · MRI result stable

## 2018-11-30 NOTE — PROCEDURES
Video Swallow Study      Patient Name: Zamzam Mcdonald  ZVZVN'S Date: 11/30/2018        Past Medical History  Past Medical History:   Diagnosis Date    BPH with obstruction/lower urinary tract symptoms     Bundle branch block, right     Charcot-Bre-Tooth disease     Diabetes mellitus ([facility] )     Fracture of clavicle     GERD (gastroesophageal reflux disease)     Hypertension     Neuropathy     charcot- Bre tooth    Renal disorder     Severe [facility] ) 11/22/2018    Spinal stenosis     Ureteral calculus     Urinary retention         Past Surgical History  Past Surgical History:   Procedure Laterality Date    CYSTOSCOPY  2014, 2015, 2016, 2017    CYSTOSCOPY W/ RETROGRADES Bilateral     CYSTOSCOPY W/ URETERAL STENT PLACEMENT Right 12/30/2015    CYSTOSCOPY W/ URETERAL STENT REMOVAL Right 2016    CYSTOSCOPY W/ URETEROSCOPY W/ LITHOTRIPSY Right 12/30/2015    FOOT SURGERY Right     Amputation of toe, Onset - 10/1/12    HEMORROIDECTOMY      KNEE SURGERY Left     PCL and MCL - meniscus, Onset - 1981    LUMBAR FUSION      Transverse lumbar interbody fusion L2-L3 and METRx Lt hemilaminectomy and ema foraminotomy-decompressive at L4-L5 Dr Joe Vila and Dr Merline Severance; Onset - 5/13/13       TRANSURETHRAL RESECTION OF PROSTATE  01/2014     HPI per critical care: 11/22/18  HPI:    Zamzam Mcdonald is a 70 y o  male who presents acute onset of fever, chills, and rigors  Patient was recently discharged from [place] [address]  on 11/16/2018 secondary to urosepsis and was transition to [address] for physical deconditioning and need for rehab  He reports feeling his normal self until this a m  When he developed fever, chills, and rigors  He was transferred to [address] [place] [address] secondary to these symptoms and found to have hypoxia    On initial emergency department exam his pulse ox was 87% on room air and he was found to have a right-sided infiltrate on chest x-ray  He was referred for admission secondary to mild hypotension and high mild hypoxia with an associated right lower lobe pulmonary infiltrate      Upon record review he was recently admitted 11/10/2018 through 11/16/2018 the [facility] with chief complaints of generalized weakness, somnolence, and falls witnessed by his family  He resides in apartment with his brother  He was brought to the emergency room secondary to the aforementioned symptoms on 11/10/2018 where was noticed that he had purulent urine  He was admitted on 11/10/2018 and was hydrated with IV fluids secondary to fever, tachycardia, and leukocytosis with indications of sepsis  He was treated with broad-spectrum antibiotics and his temperature normalized and had improvement with somnolence and weakness  He was found to have an oral back tear and strep in his urine culture and Urology was consulted  He had a suprapubic catheter changed  He had associated acute renal failure secondary to sepsis with a creatinine of 2 3 which improved to 1 8 at the time of his discharge on 11/16/2018  He was [facility] 11/16/2018 and now returns for the aforementioned symptoms  11/28/18 CT chest:  IMPRESSION:  1   Stable extensive right-sided pneumonia with additional focus of pneumonia in the anterior left upper lobe  2   Bilateral pleural effusions with overlying compressive atelectasis  3   Mild mediastinal lymphadenopathy, likely reactive    Video Barium Swallow Study    Summary:   Oral stage is prolonged due to lengthy mastication but otherwise WNL  No gross aspiration noted this study  Epiglottic inversion is complete, anterior movement of the hyoid is WNL, though mildly reduced for elevation  Cervical osteophytes are present  Pharyngeal constriction is inconsistently mildly reduced in the area of the osteophytes  There is mild to mod vallecular retention w/ mixed, soft, and hard solids   Most of this clears w/ a secondary swallow  There is a mild cp prominence w/ intermittent trace retropulsion observed  The 13mm pill is retained in the vallecular WITHOUT patient awareness  He needed additional thin liquid and then additional puree to clear the pill  Per gross esophageal screen, there is retention of solids that clears w/ liquids  There is intermittent esophageal retropulsion  The 13 mm pill was retained distally (pt unaware)  He required additional PO to clear the pill  Images are on PACS for review  Noted he was recently started on a PPI  Recommendations:  Diet: regular (softer if needed or pt desires)  He has only partial dentition  Liquids: thin  Meds: in apple sauce (or other puree)  Follow w/ small sips or additional appelsauce  Take 1-2 at a time  He states he has no large pills  Strategies: meds in apple sauce, alternate solids w/ liquids, slow rate, reflux precautions, elevate HOB at rest  Upright position  F/u ST tx: ? X 1  Therapy Prognosis: favorable as long as he follows reflux precautions and takes meds as recommended  Reflux Precautions  Results reviewed with: pt, nursing  Aspiration/relux  precautions posted  If a dedicated assessment of the esophagus is desired, consider esophagram/barium swallow or EGD     Goals:  Pt will tolerate least restrictive diet w/out s/s aspiration or oral/pharyngeal difficulties  Pt will follow strategies listed above for safe swallow  Pt is a  78yom w/ large R sided pna referred for VBS  Previous VBS 2016 at [facility] showed silent aspiration of thin liquids  The test was repeated there 2016 and the pt did not aspirate  Previous VBS:  VBS results from 2/26/16 [facility] (see [facility] records for complete details)  Oral Stage: Minimal base of tongue/oral residuals noted with all consistencies  Pt independently double swallowed to clear oral cavity  Adequate AP transfer with solid consistencies   Mild prolonged mastication yet functional  Pt accepted small amounts of solid trials  Pt noted to independently use oral prep set with thin liquids via cup/straw which aided in better bolus control from previous VFSS  Decreased bolus control noted with ambient fluid from mixed trials which spilled towards valleculae prior to swallow  Pharyngeal Stage: Mild delayed pharyngeal swallow with PO spilling valleculae prior to swallow initiation  Mild decreased hyolaryngeal elevation with slightly decreased epiglottic inversion  This resulted in minimal valleculae/pyriform sinus retention which was cleared with double swallows  Epiglottis noted to slowly invert but able to protect patients airway during swallow  Minimal transient penetration observed with thin liquid with and without oral prep set/ cued and not cued via cup/straw  No tracheal aspiration observed on study  VBS results from 2/16/16 [facility] (see [facility] records for complete details)  Diagnosis: Mild Oropharyngeal Dysphagia  Oral Stage: Reduced bolus control with thin liquid and ambient fluid from mixed consistencies  Pt with better bolus control with oral prep set with thin via cup  Functional mastication of soft and dry solids  Pharyngeal Stage: Mild delayed pharyngeal swallow with PO filling valleculae prior to swallow initiation  Decreased hyolaryngeal elevation/anterior movement resulting in decreased epiglottic inversion  Noted cervical osteophyte at the level of the epiglottis- ? Contributing to dysphagia/not allowing for full inversion of epiglottis  Reduced bolus control of ambient fluid from mixed along with delayed swallow resulted in deep penetration prior to and during swallow  This resulted in trace silent aspiration anteriorly into airway without cough response  Deep laryngeal penetration also observed with thin liquids via cup without oral prep set  With oral prep set, penetration was observed to be less   No other episodes of tracheal aspiration observed    Current Diet:  regular  Dentition:  Partial  O2 requirement:  4L  Cognitive status:  Pleasant, A&O    Consistencies administered: Barium laden applesauce, cheerios/nectar, , hard solid, turkey sandwich, thin liquids, 13mm barium pill  Liquids were administered by cup and straw  Pt was seated laterally at 90 degrees  Oral stage:  WNL  Lip closure:wnl  Mastication:prolonged but wnl  Bolus formation:wnl  Bolus control: wnl  Transfer:wnl  Residue:trace/none    Pharyngeal stage: WNL/Mild  Swallow promptness:prompt  Spill to valleculae:thin  Epiglottic inversion: complete  Laryngeal rise: mildly reduced  Anterior movement >superior  Pharyngeal constriction: mildly reduced  Vallecular retention:mild to mod w/ mixed, soft, and hard solid  Osteophytes: C spine  CP prominence: small  Retropulsion from prominence: trace, inconsistent  Transient penetration: thin liquids  Penetration: none  Transient penetration w/ thin  Aspiration: none  Strategies: secondary swallow cleared most pharyngeal retention  Pt did this independently  Pt coughed x 1 during the study when I walked away to get additional water       Screening of Esophageal stage:  Dysmotility  Retropulsion  Retention: solids, then pill distally  Images on PACS

## 2018-11-30 NOTE — NURSING NOTE
All discharge instructions were reviewed with the patient and his brother  His brother called for his home oxygen to be set up  They would be meeting them at their house  I reviewed the new prescriptions for the patient  He took all belongings with when discharged

## 2018-11-30 NOTE — ASSESSMENT & PLAN NOTE
Lab Results   Component Value Date    HGBA1C 6 3 11/22/2018     Recent Labs      11/29/18   1555  11/29/18   2105  11/30/18   0734  11/30/18   1112   POCGLU  143*  127  111  185*     Resume home insulin regimen upon d/c

## 2018-11-30 NOTE — RESPIRATORY THERAPY NOTE
Home Oxygen Qualifying Test       Patient name: Leslie Morales        : 1950   Date of Test:  2018  Diagnosis:      Home Oxygen Test:    **Medicare Guidelines require item(s) 1-5 on all ambulatory patients or 1 and 2 on non-ambulatory patients  1   Baseline SPO2 on Room Air at rest 83 %  2   SPO2 during exercise on Room Air NA%  During exercise monitor SpO2  If SPO2 increases >=89% with ambulation do not add supplemental oxygen  If <= 88% on room air add O2 via NC and titrate patient  Patient must be ambulated with O2 and titrated to > 88% with exertion  3   SPO2 on Oxygen at rest 91% 4 lpm     4   SPO2 during exercise on Oxygen  92% a liter flow of 6 lpm     5   Exercise performed:        6 minute walk           [x]  Supplemental Home Oxygen is indicated  []  Client does not qualify for home oxygen        Respiratory Additional Notes-   Edison Garcia, RT

## 2018-11-30 NOTE — DISCHARGE SUMMARY
Discharge- Lori Omalley 1950, 70 y o  male MRN: [id number]  Unit/Bed#: [address]01 Encounter: [id number] DOS: 11/30/18  Primary Care Provider: Konstantin Milan MD   Date and time admitted to hospital: 11/22/2018  6:16 AM    Pneumonia due to infectious organism   Assessment & Plan    · HCAP vs aspiration pneumonia  Suspect GNR  · Vancomycin discontinued due to negative MRSA screen  · Improvement in procalcitonin with zosyn x 7day course   · S/p VBS today showing esophageal dysmotility and no sxs aspiration  Continue pepcid and PPI  · Encourage IS and flutter valve  · Wean oxygen to maintain sats >90%  · Appreciate pulm eval - plan to d/c home today with home O2 and outpatient pulm follow up  Repeat CXR 4-6 weeks  * Severe sepsis (HCC)resolved as of 11/30/2018   Assessment & Plan    · POA as evidenced by leukocytosis, CYDNEY  Sepsis secondary to pneumonia  · Leukocytosis has resolved  · Blood cultures are negative @ 5 days  · Procalcitonin trending downward     Hepatitis C virus infection without hepatic coma   Assessment & Plan    · Chronic hepatitis-C with active viral load  Should follow up with gastroenterology for treatment options     DDD (degenerative disc disease), lumbosacral   Assessment & Plan    · Patient scheduled for MRI by orthopedic surgeon [facility]  Patient requesting study here    · MRI result stable     Chronic kidney disease, stage 3 (HCC)   Assessment & Plan    · Baseline creatinine: 1 8-2 3     Essential hypertension   Assessment & Plan    · Continue current dose of metoprolol and amlodipine     Type 2 diabetes mellitus with hyperlipidemia [facility])   Assessment & Plan    Lab Results   Component Value Date    HGBA1C 6 3 11/22/2018     Recent Labs      11/29/18   1555  11/29/18   2105  11/30/18   0734  11/30/18   1112   POCGLU  143*  127  111  185*     Resume home insulin regimen upon d/c      Charcot-Bre-Tooth disease   Assessment & Plan · 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 at [facility] prior to arrival   Most recent PT notes still recommending rehab but patient refusing  Also refusing VNA  Discharging Physician / Practitioner: Edmundo Feldman PA-C  PCP: Mark Lopez MD  Admission Date:   Admission Orders     Ordered        11/22/18 9458  Inpatient Admission (expected length of stay for this patient is greater than two midnights)  Once             Discharge Date: 11/30/18    Resolved Problems  Date Reviewed: 11/30/2018          Resolved    UTI (urinary tract infection) 11/24/2018     Resolved by  Anisha Rolle PA-C    * (Principal)Severe sepsis ([address] ) 11/30/2018     Resolved by  Edmundo Feldman PA-C          Consultations During Hospital Stay:  · Critical care  · Urology  · SLP   · Pulmonology     Procedures Performed:   · VBS 11/30/18- no signs or symptoms of aspiration, but intermittent esophageal retropulsion noted    Significant Findings / Test Results:     · Chest x-ray 11/22- new right lung infiltrate consistent with pneumonia  · Chest x-ray 11/24- extensive pneumonia right lung  · MRI lumbar spine- Stable degenerative disc disease at the L1-2 and L2-3 levels with mild canal stenosis and mild to moderate foraminal narrowing  Stable posterior fusion at the L2-3 level  Since the prior examination patient has undergone posterior fusion at L3-4 and L4-5  There has not been posterior decompression at the L3-4 level and there is significant progression of degenerative change, primarily left-sided with a broad-based left foraminal and lateral disc protrusion  These findings result in moderate canal stenosis  Significant bilateral foraminal narrowing with compression of both exiting nerves  At the L4-5 level the patient has undergone fusion of the disc space and posterior decompression with improving canal stenosis but persistent right foraminal narrowing with compression of the exiting nerve    · CT chest 11/28- stable extensive right-sided pneumonia with additional focus of pneumonia in the anterior left upper lobe, bilateral pleural effusions, compressive atelectasis, mild mediastinal lymphadenopathy likely reactive  · Chest x-ray 11/30 slight worsening of multifocal multi lobar pneumonia  · Elevated procalcitonin, trending down  · Sepsis secondary to right-sided pneumonia, suspect gram-negative amada, Hcap versus aspiration  · Acute hypoxic respiratory failure    Incidental Findings:   · None    Test Results Pending at Discharge (will require follow up): · None     Outpatient Tests Requested:  · Follow-up with PCP, pulmonology    Complications:  Hypoxia    Reason for Admission:  Shortness of breath    Hospital Course:     June Lomax is a 70 y o  male patient with past medical history significant for chronic kidney disease, ambulatory dysfunction, hypertension, diabetes, chronic low back pain, hepatitis-C, neurogenic bladder status post suprapubic catheter placement who originally presented to the hospital on 11/22/2018 due to shortness of breath  Patient was initially admitted to ICU step-down due to severe sepsis secondary to right-sided pneumonia  He was transferred out of ICU and maintained on IV Zosyn for possible aspiration pneumonia as patient was noted to be coughing with thin liquids  Patient initially refused video barium study but was eventually agreeable and this was negative for aspiration but did show esophageal dysmotility at times  He was also refusing short-term rehab and VNA upon discharge  Patient was evaluated by pulmonology as he was status post 7 days of IV antibiotics and was still desatting on 4 L nasal cannula with ambulation  Pulmonology recommended home oxygen and follow up with Pulmonary office setting with repeat chest x-ray in 4-6 weeks  Despite worsening oxygenation, patient clinically was improving with less shortness of breath and improved cough    He is medically stable for discharge home with close outpatient follow-up  Please see above list of diagnoses and related plan for additional information  Condition at Discharge: stable     Discharge Day Visit / Exam:     Subjective:  Patient states his shortness of breath and cough have improved  Hopes to be discharged  Vitals: Blood Pressure: 164/85 (11/30/18 0735)  Pulse: 91 (11/30/18 0735)  Temperature: 98 8 °F (37 1 °C) (11/30/18 0735)  Temp Source: Temporal (11/30/18 0735)  Respirations: 22 (11/30/18 0735)  Height: 6' (182 9 cm) (11/22/18 1000)  Weight - Scale: 90 6 kg (199 lb 11 8 oz) (11/30/18 0600)  SpO2: 90 % (11/30/18 1350)    Exam:   Physical Exam   Constitutional: He is oriented to person, place, and time  He appears well-developed and well-nourished  HENT:   Head: Normocephalic and atraumatic  Deformity to bridge of nose   Eyes: EOM are normal  No scleral icterus  Neck: Normal range of motion  Neck supple  Cardiovascular: Normal rate  Pulmonary/Chest: Effort normal  No respiratory distress  He has rales  Breath sounds decreased on the right  Mild bibasilar rales noted  On 4 L nasal cannula in no acute distress  Speaking in full sentences  Abdominal: Soft  Bowel sounds are normal    Genitourinary:   Genitourinary Comments: Suprapubic catheter in place   Musculoskeletal: Normal range of motion  He exhibits no edema  Neurological: He is alert and oriented to person, place, and time  Skin: Skin is warm and dry  Psychiatric: He has a normal mood and affect  Discussion with Family: n/a    Discharge instructions/Information to patient and family:   See after visit summary for information provided to patient and family  Provisions for Follow-Up Care:  See after visit summary for information related to follow-up care and any pertinent home health orders        Disposition:     Home    For Discharges to [facility] SNF:   · Not Applicable to this Patient - Not Applicable to this Patient    Planned Readmission: none     Discharge Statement:  I spent 45 minutes discharging the patient  This time was spent on the day of discharge  I had direct contact with the patient on the day of discharge  Greater than 50% of the total time was spent examining patient, answering all patient questions, arranging and discussing plan of care with patient as well as directly providing post-discharge instructions  Additional time then spent on discharge activities  Discharge Medications:  See after visit summary for reconciled discharge medications provided to patient and family        ** Please Note: This note has been constructed using a voice recognition system **

## 2018-11-30 NOTE — DISCHARGE INSTR - DIET
Video Barium Swallow Study 11/30/18     Summary:   Oral stage is prolonged due to lengthy mastication but otherwise WNL  No gross aspiration noted this study  Epiglottic inversion is complete, anterior movement of the hyoid is WNL, though mildly reduced for elevation  Cervical osteophytes are present  Pharyngeal constriction is inconsistently mildly reduced in the area of the osteophytes  There is mild to mod vallecular retention w/ mixed, soft, and hard solids  Most of this clears w/ a secondary swallow  There is a mild cp prominence w/ intermittent trace retropulsion observed  The 13mm pill is retained in the vallecular WITHOUT patient awareness  He needed additional thin liquid and then additional puree to clear the pill  Per gross esophageal screen, there is retention of solids that clears w/ liquids  There is intermittent esophageal retropulsion  The 13 mm pill was retained distally (pt unaware)  He required additional PO to clear the pill  Images are on PACS for review  Noted he was recently started on a PPI      Recommendations:  Diet: regular (softer if needed or pt desires)  He has only partial dentition  Liquids: thin  Meds: in apple sauce (or other puree)  Follow w/ small sips or additional appelsauce  Take 1-2 at a time  He states he has no large pills  Strategies: meds in apple sauce, alternate solids w/ liquids, slow rate, reflux precautions, elevate HOB at rest  Upright position  F/u ST tx: ? X 1  Therapy Prognosis: favorable as long as he follows reflux precautions and takes meds as recommended  Reflux Precautions  Results reviewed with: pt, nursing  Aspiration/relux  precautions posted  If a dedicated assessment of the esophagus is desired, consider esophagram/barium swallow or EGD      Goals:  Pt will tolerate least restrictive diet w/out s/s aspiration or oral/pharyngeal difficulties    Pt will follow strategies listed above for safe swallow      Pt is a  68yom w/ large R sided pna referred for VBS  Previous VBS 2016 at [facility] showed silent aspiration of thin liquids  The test was repeated there 2016 and the pt did not aspirate       Previous VBS:  VBS results from 2/26/16 [facility] (see [facility] records for complete details)  Oral Stage: Minimal base of tongue/oral residuals noted with all consistencies  Pt independently double swallowed to clear oral cavity  Adequate AP transfer with solid consistencies  Mild prolonged mastication yet functional  Pt accepted small amounts of solid trials  Pt noted to independently use oral prep set with thin liquids via cup/straw which aided in better bolus control from previous VFSS  Decreased bolus control noted with ambient fluid from mixed trials which spilled towards valleculae prior to swallow  Pharyngeal Stage: Mild delayed pharyngeal swallow with PO spilling valleculae prior to swallow initiation  Mild decreased hyolaryngeal elevation with slightly decreased epiglottic inversion  This resulted in minimal valleculae/pyriform sinus retention which was cleared with double swallows  Epiglottis noted to slowly invert but able to protect patients airway during swallow  Minimal transient penetration observed with thin liquid with and without oral prep set/ cued and not cued via cup/straw  No tracheal aspiration observed on study  VBS results from 2/16/16 [facility] (see [facility] records for complete details)  Diagnosis: Mild Oropharyngeal Dysphagia  Oral Stage: Reduced bolus control with thin liquid and ambient fluid from mixed consistencies  Pt with better bolus control with oral prep set with thin via cup  Functional mastication of soft and dry solids  Pharyngeal Stage: Mild delayed pharyngeal swallow with PO filling valleculae prior to swallow initiation  Decreased hyolaryngeal elevation/anterior movement resulting in decreased epiglottic inversion  Noted cervical osteophyte at the level of the epiglottis- ? Contributing to dysphagia/not allowing for full inversion of epiglottis  Reduced bolus control of ambient fluid from mixed along with delayed swallow resulted in deep penetration prior to and during swallow  This resulted in trace silent aspiration anteriorly into airway without cough response  Deep laryngeal penetration also observed with thin liquids via cup without oral prep set  With oral prep set, penetration was observed to be less  No other episodes of tracheal aspiration observed    Current Diet:  regular  Dentition:  Partial  O2 requirement:  4L  Cognitive status:  Pleasant, A&O     Consistencies administered: Barium laden applesauce, cheerios/nectar, , hard solid, turkey sandwich, thin liquids, 13mm barium pill  Liquids were administered by cup and straw       Pt was seated laterally at 90 degrees       Oral stage:  WNL  Lip closure:wnl  Mastication:prolonged but wnl  Bolus formation:wnl  Bolus control: wnl  Transfer:wnl  Residue:trace/none     Pharyngeal stage: WNL/Mild  Swallow promptness:prompt  Spill to valleculae:thin  Epiglottic inversion: complete  Laryngeal rise: mildly reduced  Anterior movement >superior  Pharyngeal constriction: mildly reduced  Vallecular retention:mild to mod w/ mixed, soft, and hard solid  Osteophytes: C spine  CP prominence: small  Retropulsion from prominence: trace, inconsistent  Transient penetration: thin liquids  Penetration: none  Transient penetration w/ thin  Aspiration: none  Strategies: secondary swallow cleared most pharyngeal retention  Pt did this independently    Pt coughed x 1 during the study when I walked away to get additional water       Screening of Esophageal stage:  Dysmotility  Retropulsion  Retention: solids, then pill distally  Images on PACS

## 2018-11-30 NOTE — PROGRESS NOTES
Progress Note - Pulmonary   Donovan Barnhart 70 y o  male MRN: [id number]  Unit/Bed#: [facility] 2 -01 Encounter: [id number]      Assessment/Plan:    1  Acute hypoxic respiratory failure  1  Continue titrate oxygen maintain SpO2 greater than or equal to 80%  2  Pulmonary toilet:  Incentive spirometry, flutter valve with increasing time out of bed and ambulation as tolerated  3  Home O2 was completed and demonstrated hypoxia requiring up to 6 L with ambulation-oxygen we provided with discharge  2  Hcap  1  Final completed 7 days of Zosyn  2  Repeat chest x-ray in 4-6 weeks  3  Pulmonary toilet as indicated above  3  CKD 3  4  GERD  1  Video barium swallow completed today, no aspiration         * appropriate for discharge home with outpatient Pulmonary follow-up  O2 for discharge 6 L with ambulation, 4 LNC at rest  Repeat CXR in 4-6 weeks        Subjective:     Maxim Mcintosh was seen sitting in a chair upon entering  He denies acute overnight events  He reports significant improvement in breathing since time of admission and feels ready to be discharged home  He reports a mild cough with scant amounts of clear sputum  Currently denies:  Chest pain, pain inspiration of fevers, chills, night sweats, nausea vomiting diarrhea headache dizziness, bronchospasm hemoptysis    Objective:         Vitals: Blood pressure 164/85, pulse 91, temperature 98 8 °F (37 1 °C), temperature source Temporal, resp  rate 22, height 6' (1 829 m), weight 90 6 kg (199 lb 11 8 oz), SpO2 91 %  ,4LNC , Body mass index is 27 09 kg/m²        Intake/Output Summary (Last 24 hours) at 11/30/18 1220  Last data filed at 11/30/18 9379   Gross per 24 hour   Intake               50 ml   Output             3250 ml   Net            -3200 ml         Physical Exam  Gen: Awake, alert, oriented x 3, no acute distress  HEENT: Mucous membranes moist, no oral lesions, no thrush, oxygen via nasal cannula  NECK: No accessory muscle use, JVP not elevated  Cardiac: Regular, single S1, single S2, no murmurs, no rubs, no gallops  Lungs:  Decreased breath sounds bilaterally, with scattered bibasilar rales, no rhonchi, or wheezes appreciated  Abdomen: normoactive bowel sounds, soft nontender, nondistended, no rebound or rigidity, no guarding  Extremities: no cyanosis, no clubbing, no edema    Labs: I have personally reviewed pertinent lab results  , CBC: No results found for: WBC, HGB, HCT, MCV, PLT, ADJUSTEDWBC, MCH, MCHC, RDW, MPV, NRBC, CMP: No results found for: SODIUM, K, CL, CO2, ANIONGAP, BUN, CREATININE, GLUCOSE, CALCIUM, AST, ALT, ALKPHOS, PROT, BILITOT, EGFR  Imaging and other studies: I have personally reviewed pertinent reports     and I have personally reviewed pertinent films in PACS  Chest x-ray 11/30/2018:  Slight worsening multifocal pneumonia  With chronic elevated right hemidiaphragm    Lisa Carroll

## 2018-11-30 NOTE — SOCIAL WORK
Notes for home O2 printed out  Entered into [facility]  Referral sent to [facility]  Liaison will deliver O2 to bedside  IMM presented and pt updated about O2, and confirmed he was still not agreeable to any other services, which he said he was not  No further needs from CM at this time

## 2018-11-30 NOTE — ASSESSMENT & PLAN NOTE
· 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 at [facility] prior to arrival   Most recent PT notes still recommending rehab but patient refusing  Also refusing VNA

## 2018-11-30 NOTE — ASSESSMENT & PLAN NOTE
· HCAP vs aspiration pneumonia  Suspect GNR  · Vancomycin discontinued due to negative MRSA screen  · Improvement in procalcitonin with zosyn x 7day course   · S/p VBS today showing esophageal dysmotility and no sxs aspiration  Continue pepcid and PPI  · Encourage IS and flutter valve  · Wean oxygen to maintain sats >90%  · Appreciate pulm eval - plan to d/c home today with home O2 and outpatient pulm follow up  Repeat CXR 4-6 weeks

## 2018-11-30 NOTE — DISCHARGE INSTRUCTIONS
Follow up with PCP and pulmonology   DO NOT SMOKE while wearing oxygen   Take protonix daily and pepcid twice a day   Wear 4 L at rest and 6 L while ambulating      Repeat chest x ray in 4-6 weeks and follow up with pulmonology     Hypoxia   WHAT YOU NEED TO KNOW:   What is hypoxia? Hypoxia is a decreased level of oxygen in all or part of your body, such as your brain  What causes hypoxia? Some conditions can cause hypoxia to occur suddenly  Other conditions may cause hypoxia to occur over time  Hypoxia may be caused by any of the following:  · Travel to a high altitude      · Near drowning or choking     · Carbon monoxide poisoning     · Exposure to cold for a long period of time     · Severe anemia     · Chronic lung disease, such as emphysema     · Congestive heart failure  What are the signs and symptoms of hypoxia? · Confusion or memory loss     · Clumsiness      · Drowsiness      · Changes in behavior      · Vision changes     · Bluish-gray lips or nails      · Dizziness, lightheadedness, or fainting  How is hypoxia diagnosed? · A pulse oximeter is a device that measures the amount of oxygen in your blood       · Blood tests may be done to measure blood gases, such as oxygen, acids, and carbon dioxide  Blood tests may also be used to find the cause of your hypoxia  How is hypoxia treated? Treatment depends on the cause of your hypoxia  Oxygen therapy will be used to help you breathe easier  You may also need medicines to treat the cause of your hypoxia  Mechanical ventilation and IV fluids may be used for more severe forms of hypoxia  A ventilator is a machine that gives you oxygen The ventilator breathes for you when you cannot breathe well on your own  When should I contact my healthcare provider? · Your muscles jerk       · You have questions or concerns about your condition or care  When should I seek immediate care or call 911?    · You have a seizure       · You faint       · You are irritable, confused, or unusually drowsy  CARE AGREEMENT:   You have the right to help plan your care  Learn about your health condition and how it may be treated  Discuss treatment options with your caregivers to decide what care you want to receive  You always have the right to refuse treatment  The above information is an  only  It is not intended as medical advice for individual conditions or treatments  Talk to your doctor, nurse or pharmacist before following any medical regimen to see if it is safe and effective for you  © 2017 [employer]  Information is for End User's use only and may not be sold, redistributed or otherwise used for commercial purposes  All illustrations and images included in CareNotes® are the copyrighted property of [employer]  or [employer]      Bacterial Pneumonia   WHAT YOU NEED TO KNOW:   Bacterial pneumonia is a lung infection caused by bacteria  It makes your lungs inflamed, which means they cannot work well  Bacterial pneumonia germs are easily spread when an infected person coughs, sneezes, or has close contact with others          DISCHARGE INSTRUCTIONS:   Seek care immediately if:   · You are confused and cannot think clearly      · You are urinating less or not at all      · You cough up blood      · You have more trouble breathing, or your breathing seems faster than normal      · Your heart or pulse beats more than 100 times in 1 minute      · Your lips or fingernails turn blue  Contact your healthcare provider if:   · Your symptoms are the same or get worse 48 hours after you start antibiotics      · You cannot eat or have loss of appetite, nausea, or are vomiting      · You have questions or concerns about your condition or care  Medicines:   · Antibiotics treat pneumonia caused by bacteria      · Acetaminophen decreases pain and fever  It is available without a doctor's order  Ask how much to take and how often to take it  Follow directions  Read the labels of all other medicines you are using to see if they also contain acetaminophen, or ask your doctor or pharmacist  Acetaminophen can cause liver damage if not taken correctly  Do not use more than 4 grams (4,000 milligrams) total of acetaminophen in one day       · NSAIDs , such as ibuprofen, help decrease swelling, pain, and fever  This medicine is available with or without a doctor's order  NSAIDs can cause stomach bleeding or kidney problems in certain people  If you take blood thinner medicine, always ask your healthcare provider if NSAIDs are safe for you  Always read the medicine label and follow directions      · Take your medicine as directed  Contact your healthcare provider if you think your medicine is not helping or if you have side effects  Tell him or her if you are allergic to any medicine  Keep a list of the medicines, vitamins, and herbs you take  Include the amounts, and when and why you take them  Bring the list or the pill bottles to follow-up visits  Carry your medicine list with you in case of an emergency  Follow up with your healthcare provider as directed: Write down your questions so you remember to ask them during your visits  Manage your symptoms:   · Rest as needed  Rest often while you recover  Slowly start to do more each day      · Drink liquids as directed  Ask how much liquid to drink each day and which liquids are best for you  Liquids help thin your mucus, which may make it easier for you to cough it up       · Do not smoke  Avoid secondhand smoke  Smoking increases your risk for pneumonia  Smoking also makes it harder for you to get better after you have had pneumonia  Ask your healthcare provider for information if you currently smoke and need help to quit  E-cigarettes or smokeless tobacco still contain nicotine   Talk to your healthcare provider before you use these products       · Use a cool mist humidifier to increase air moisture in your home  This may make it easier for you to breathe and help decrease your cough       · Keep your head elevated  You may be able to breathe better if you lie down with the head of your bed up  Prevent bacterial pneumonia:   · Prevent the spread of germs  Wash your hands often with soap and water  Use gel hand cleanser when there is no soap and water available  Do not touch your eyes, nose, or mouth unless you have washed your hands first  Cover your mouth when you cough  Cough into a tissue or your shirtsleeve so you do not spread germs from your hands  If you are sick, stay away from others as much as possible            · Limit alcohol  Women should limit alcohol to 1 drink a day  Men should limit alcohol to 2 drinks a day  A drink of alcohol is 12 ounces of beer, 5 ounces of wine, or 1½ ounces of liquor       · Ask about vaccines  You may need a vaccine to help prevent pneumonia  Get an influenza (flu) vaccine every year as soon as it becomes available  © 2017 [facility] Information is for End User's use only and may not be sold, redistributed or otherwise used for commercial purposes  All illustrations and images included in CareNotes® are the copyrighted property of [employer]  or [employer]  The above information is an  only  It is not intended as medical advice for individual conditions or treatments   Talk to your doctor, nurse or pharmacist before following any medical regimen to see if it is safe and effective for you

## 2018-12-10 NOTE — TELEPHONE ENCOUNTER
Pt calling- pt of Dr Adam Harris, cannot attend apt before 1 PM due to no transportation, rescheduled to 12/14 w Dr Alonso Louis, is this appropriate    [phone]

## 2018-12-10 NOTE — TELEPHONE ENCOUNTER
Pt is due for yearly appt and hospital f/u  Can see Dr Jhon Allred and then transfer care back to Dr Ewa Alejandro

## 2018-12-13 NOTE — PROGRESS NOTES
Assessment/Plan:  Oxygen saturations at rest without oxygen was 98% on room air, on exertion in the hot office was 90% without supplemental oxygen  I advised that he can stop using the oxygen unless he plans her more sustained exertion  Clinically he seems to be improving  Advised him to monitor his blood sugars closely  Explained that NovoLog 70 30 mix is a not a 24 hour insulin  Before hospital stay he was on 20 units of insulin twice a day  He has a follow-up with Urology tomorrow for changing the suprapubic catheter  He will try to follow up with his pain management physician  He had a lumbar spine MRI done during the hospital stay  He will come back to follow up with Dr Tristen Smith in a month  Will get an x-ray and blood work just before the visit to ensure that kidney function is stable and consolidation is improved  In the interim I recommended he call us if he has any symptoms  He was referred to see GI for hepatitis C but he notes that he does not plan to go for that appointment  I encouraged him to keep his appointment even though he does not have any symptoms  Diagnoses and all orders for this visit:    Pneumonia due to infectious organism, unspecified laterality, unspecified part of lung  -     CBC and differential  -     Comprehensive metabolic panel  -     XR chest pa & lateral; Future    Acute respiratory failure with hypoxia (HCC)  -     CBC and differential    Essential hypertension  -     CBC and differential  -     Comprehensive metabolic panel    Type 2 diabetes mellitus with hyperlipidemia (HCC)  -     CBC and differential  -     Comprehensive metabolic panel  -     insulin aspart protamine-insulin aspart (NovoLOG 70/30) 100 units/mL injection; Inject 20 Units under the skin 2 (two) times a day before meals    Chronic kidney disease, stage 3 (HCC)  -     CBC and differential  -     Comprehensive metabolic panel    Physical debility  -     baclofen 10 mg tablet;  Take 1 tablet (10 mg total) by mouth 3 (three) times a day as needed for muscle spasms          Subjective:   Chief Complaint   Patient presents with    Transition of Care Management     d/c [facility] on 11/30/2018 Severe sepsis         Patient ID: Nelsy Mccray is a 70 y o  male  TCM Call (since 11/12/2018)     Date and time call was made  12/4/2018 10:16 AM    Patient was hospitialized at  [facility]    Date of Admission  11/22/18    Date of discharge  11/30/18    Diagnosis  Pneumonia    Disposition  Home    Were the patients medications reviewed and updated  Yes    Current Symptoms  Fatigue    Fatigue severity  Mild      TCM Call (since 11/12/2018)     Post hospital issues  None    Should patient be enrolled in antico monitoring? No    Scheduled for follow up? Yes    Not clinically warranted  transferred to [facility]    Did you obtain your prescribed medications  Yes    Do you need help managing your prescriptions or medications  No    Is transportation to your appointment needed  No    I have advised the patient to call PCP with any new or worsening symptoms  Salvador Ramirez          He comes in for follow-up after hospitalization  He he was initially admitted for UTI and sent to rehab for acute rehabilitation  He was readmitted for sepsis secondary to pneumonia  He was treated with IV antibiotics and transition to oral antibiotics  He clinically improved but continued to require oxygen at discharge  He was discharged home with home oxygen  Since discharge he notes that his breathing has slowly improved  He denies any fevers or cough  Appetite has improved and he is eating better  He notes that his blood sugars have been erratic with most readings around 200  He has been using his insulin only once a day since he had several episodes of hypoglycemia during the hospital stay in the early morning hours  He does not have any physical therapy but he is trying to do exercises by himself at home          The following portions of the patient's history were reviewed and updated as appropriate: current medications, past medical history, past social history and past surgical history      PHQ-9 Depression Screening    PHQ-9:    Frequency of the following problems over the past two weeks:                Current Outpatient Prescriptions:     amLODIPine (NORVASC) 5 mg tablet, Take 1 tablet (5 mg total) by mouth daily, Disp: , Rfl: 0    dextran 70-hypromellose (GENTEAL TEARS) 0 1-0 3 % ophthalmic solution, Administer 1 drop to both eyes every 3 (three) hours as needed (dry eyes), Disp: , Rfl: 0    dimethicone (REMEDY NUTRASHIELD) 1 % cream, Apply topically 2 (two) times a day as needed for dry skin, Disp: , Rfl:     DULoxetine (CYMBALTA) 60 mg delayed release capsule, Take 1 capsule (60 mg total) by mouth daily, Disp: 14 capsule, Rfl: 0    famotidine (PEPCID) 10 mg tablet, Take 1 tablet (10 mg total) by mouth 2 (two) times a day, Disp: 60 tablet, Rfl: 0    gabapentin (NEURONTIN) 600 MG tablet, 1 TABLET TWICE A DAY & 3 TABLETS AT BEDTIME, Disp: 450 tablet, Rfl: 0    insulin aspart protamine-insulin aspart (NovoLOG 70/30) 100 units/mL injection, Inject 20 Units under the skin 2 (two) times a day before meals, Disp: , Rfl: 0    metoprolol succinate (TOPROL-XL) 25 mg 24 hr tablet, Take 1 tablet (25 mg total) by mouth daily, Disp: , Rfl: 0    morphine (MS CONTIN) 15 mg 12 hr tablet, Take 15 mg by mouth every 12 (twelve) hours, Disp: , Rfl: 0    oxyCODONE (ROXICODONE) 15 mg immediate release tablet, Take 15 mg by mouth 4 (four) times a day as needed, Disp: , Rfl: 0    Polyethylene Glycol 3350 (MIRALAX PO), Take by mouth, Disp: , Rfl:     simvastatin (ZOCOR) 40 mg tablet, Take 1 tablet by mouth daily, Disp: , Rfl:     traZODone (DESYREL) 100 mg tablet, TAKE 1 TABLET (100 MG TOTAL) BY MOUTH DAILY AT BEDTIME, Disp: 90 tablet, Rfl: 1    baclofen 10 mg tablet, Take 1 tablet (10 mg total) by mouth 3 (three) times a day as needed for muscle spasms, Disp: , Rfl: 0    Cholecalciferol (VITAMIN D3) 1000 units CAPS, Take by mouth, Disp: , Rfl:     [employer] ACID CONTROLLER 10 MG tablet, TAKE 1 TABLET BY MOUTH TWICE A DAY, Disp: , Rfl: 0    pantoprazole (PROTONIX) 40 mg tablet, Take 1 tablet (40 mg total) by mouth daily in the early morning (Patient not taking: Reported on 12/13/2018 ), Disp: 30 tablet, Rfl: 0    Review of Systems   Constitutional: Positive for fatigue  Negative for fever and unexpected weight change  HENT: Negative for ear pain, hearing loss and sore throat  Eyes: Negative for pain and discharge  Respiratory: Negative for cough, chest tightness and shortness of breath  Cardiovascular: Negative for chest pain and palpitations  Gastrointestinal: Negative for abdominal pain, blood in stool, constipation, diarrhea and nausea  Genitourinary: Negative for dysuria, frequency and hematuria  Musculoskeletal: Negative for arthralgias and joint swelling  Skin: Negative for rash  Allergic/Immunologic: Negative for immunocompromised state  Neurological: Negative for dizziness and headaches  Hematological: Negative for adenopathy  Psychiatric/Behavioral: Negative for confusion and sleep disturbance  Objective:  /70   Pulse 96   Temp 98 5 °F (36 9 °C)   Resp 16   Ht 6' (1 829 m)   Wt 78 7 kg (173 lb 6 4 oz)   BMI 23 52 kg/m²      Physical Exam   Constitutional: He appears well-developed and well-nourished  HENT:   Head: Normocephalic and atraumatic  Right Ear: Tympanic membrane normal    Left Ear: Tympanic membrane normal    Nose: Nose normal    Mouth/Throat: Oropharynx is clear and moist  No posterior oropharyngeal edema or posterior oropharyngeal erythema  Eyes: Pupils are equal, round, and reactive to light  Conjunctivae are normal  Right eye exhibits no discharge  Neck: Normal range of motion  Neck supple  No thyromegaly present     Cardiovascular: Normal rate, regular rhythm, S1 normal, S2 normal and normal heart sounds  PMI is not displaced  No murmur heard  Pulmonary/Chest: Effort normal  No accessory muscle usage  No apnea  No respiratory distress  He has decreased breath sounds  He has no rhonchi  He has no rales  Abdominal: Soft  Normal appearance and bowel sounds are normal  He exhibits no shifting dullness  There is no hepatosplenomegaly  There is no tenderness  There is no rebound and no CVA tenderness  Musculoskeletal: Normal range of motion  He exhibits no edema or tenderness  Lymphadenopathy:     He has no cervical adenopathy  Neurological: He is alert  Skin: Skin is warm and intact  No rash noted  Psychiatric: He has a normal mood and affect  His speech is normal    Nursing note and vitals reviewed          Recent Results (from the past 1008 hour(s))   ECG 12 lead    Collection Time: 11/10/18  1:23 PM   Result Value Ref Range    Ventricular Rate 73 BPM    Atrial Rate 73 BPM    OH Interval 178 ms    QRSD Interval 144 ms    QT Interval 450 ms    QTC Interval 495 ms    P Axis 86 degrees    QRS Axis 18 degrees    T Wave Axis 38 degrees   Comprehensive metabolic panel    Collection Time: 11/10/18  1:48 PM   Result Value Ref Range    Sodium 139 136 - 145 mmol/L    Potassium 4 0 3 5 - 5 3 mmol/L    Chloride 102 100 - 108 mmol/L    CO2 29 21 - 32 mmol/L    ANION GAP 8 4 - 13 mmol/L    BUN 40 (H) 5 - 25 mg/dL    Creatinine 2 32 (H) 0 60 - 1 30 mg/dL    Glucose 145 (H) 65 - 140 mg/dL    Calcium 9 5 8 3 - 10 1 mg/dL    AST 23 5 - 45 U/L    ALT 19 12 - 78 U/L    Alkaline Phosphatase 100 46 - 116 U/L    Total Protein 8 1 6 4 - 8 2 g/dL    Albumin 3 5 3 5 - 5 0 g/dL    Total Bilirubin 0 44 0 20 - 1 00 mg/dL    eGFR 28 ml/min/1 73sq m   CBC and differential    Collection Time: 11/10/18  1:48 PM   Result Value Ref Range    WBC 12 40 (H) 4 31 - 10 16 Thousand/uL    RBC 3 81 (L) 3 88 - 5 62 Million/uL    Hemoglobin 11 5 (L) 12 0 - 17 0 g/dL    Hematocrit 36 2 (L) 36 5 - 49 3 % MCV 95 82 - 98 fL    MCH 30 2 26 8 - 34 3 pg    MCHC 31 8 31 4 - 37 4 g/dL    RDW 14 4 11 6 - 15 1 %    MPV 9 9 8 9 - 12 7 fL    Platelets 774 525 - 909 Thousands/uL    nRBC 0 /100 WBCs    Neutrophils Relative 77 (H) 43 - 75 %    Immat GRANS % 1 0 - 2 %    Lymphocytes Relative 12 (L) 14 - 44 %    Monocytes Relative 7 4 - 12 %    Eosinophils Relative 3 0 - 6 %    Basophils Relative 0 0 - 1 %    Neutrophils Absolute 9 69 (H) 1 85 - 7 62 Thousands/µL    Immature Grans Absolute 0 07 0 00 - 0 20 Thousand/uL    Lymphocytes Absolute 1 47 0 60 - 4 47 Thousands/µL    Monocytes Absolute 0 82 0 17 - 1 22 Thousand/µL    Eosinophils Absolute 0 32 0 00 - 0 61 Thousand/µL    Basophils Absolute 0 03 0 00 - 0 10 Thousands/µL   Troponin I    Collection Time: 11/10/18  1:48 PM   Result Value Ref Range    Troponin I <0 02 <=0 04 ng/mL   Lipase    Collection Time: 11/10/18  1:48 PM   Result Value Ref Range    Lipase 178 73 - 393 u/L   Protime-INR    Collection Time: 11/10/18  3:10 PM   Result Value Ref Range    Protime 13 8 11 8 - 14 2 seconds    INR 1 05 0 86 - 1 17   APTT    Collection Time: 11/10/18  3:10 PM   Result Value Ref Range    PTT 34 24 - 36 seconds   Ammonia    Collection Time: 11/10/18  4:28 PM   Result Value Ref Range    Ammonia 16 11 - 35 umol/L   UA w Reflex to Microscopic w Reflex to Culture    Collection Time: 11/10/18  5:38 PM   Result Value Ref Range    Color, UA Yellow     Clarity, UA Slightly Cloudy     Specific [place], UA 1 025 1 003 - 1 030    pH, UA 6 0 4 5 - 8 0    Leukocytes, UA Moderate (A) Negative    Nitrite, UA Negative Negative    Protein,  (2+) (A) Negative mg/dl    Glucose, UA Negative Negative mg/dl    Ketones, UA Negative Negative mg/dl    Urobilinogen, UA 0 2 0 2, 1 0 E U /dl E U /dl    Bilirubin, UA Negative Negative    Blood, UA Moderate (A) Negative   Urine Microscopic    Collection Time: 11/10/18  5:38 PM   Result Value Ref Range    RBC, UA 4-10 (A) None Seen, 0-5 /hpf    WBC, UA Innumerable (A) None Seen, 0-5, 5-55, 5-65 /hpf    Epithelial Cells Occasional None Seen, Occasional /hpf    Bacteria, UA Innumerable (A) None Seen, Occasional /hpf   Urine culture    Collection Time: 11/10/18  5:38 PM   Result Value Ref Range    Urine Culture >100,000 cfu/ml Enterobacter cloacae complex (A)     Urine Culture (A)      >100,000 cfu/ml Beta Hemolytic Streptococcus Group B       Susceptibility    Enterobacter cloacae complex - KATLIN     ZID Performed Yes       Amoxicillin + Clavulanate >16/8 Resistant ug/ml     Ampicillin ($$) 16 00 Resistant ug/ml     Ampicillin + Sulbactam ($) 4/2 Resistant ug/ml     Aztreonam ($$$)  <4 Susceptible ug/ml     Cefazolin ($) >16 00 Resistant ug/ml     Cefotaxime ($) <2 00 Susceptible ug/ml     Ceftazidime ($$) <1 Susceptible ug/ml     Ceftriaxone ($$) <1 00 Susceptible ug/ml     Cefuroxime ($$) 16 Resistant ug/ml     Ciprofloxacin ($)  <1 00 Susceptible ug/ml     Ertapenem ($$$) <0 5 Susceptible ug/ml     Gentamicin ($$) <1 Susceptible ug/ml     Levofloxacin ($) <0 25 Susceptible ug/ml     Nitrofurantoin >64 Resistant ug/ml     Piperacillin + Tazobactam ($$$) <4 Susceptible ug/ml     Tetracycline <4 Susceptible ug/ml     Tobramycin ($) <1 Susceptible ug/ml     Trimethoprim + Sulfamethoxazole ($$$) <2/38 Susceptible ug/ml   Fingerstick Glucose (POCT)    Collection Time: 11/10/18  6:01 PM   Result Value Ref Range    POC Glucose 147 (H) 65 - 140 mg/dl   Blood culture    Collection Time: 11/10/18  7:00 PM   Result Value Ref Range    Blood Culture No Growth After 5 Days  Blood culture    Collection Time: 11/10/18  7:01 PM   Result Value Ref Range    Blood Culture No Growth After 5 Days      Fingerstick Glucose (POCT)    Collection Time: 11/10/18  8:52 PM   Result Value Ref Range    POC Glucose 167 (H) 65 - 140 mg/dl   Comprehensive metabolic panel    Collection Time: 11/11/18  5:04 AM   Result Value Ref Range    Sodium 141 136 - 145 mmol/L    Potassium 4 0 3 5 - 5 3 mmol/L Chloride 106 100 - 108 mmol/L    CO2 26 21 - 32 mmol/L    ANION GAP 9 4 - 13 mmol/L    BUN 40 (H) 5 - 25 mg/dL    Creatinine 2 35 (H) 0 60 - 1 30 mg/dL    Glucose 158 (H) 65 - 140 mg/dL    Calcium 9 2 8 3 - 10 1 mg/dL    AST 17 5 - 45 U/L    ALT 16 12 - 78 U/L    Alkaline Phosphatase 83 46 - 116 U/L    Total Protein 7 3 6 4 - 8 2 g/dL    Albumin 2 9 (L) 3 5 - 5 0 g/dL    Total Bilirubin 0 41 0 20 - 1 00 mg/dL    eGFR 27 ml/min/1 73sq m   CBC (With Platelets)    Collection Time: 11/11/18  5:04 AM   Result Value Ref Range    WBC 10 80 (H) 4 31 - 10 16 Thousand/uL    RBC 3 48 (L) 3 88 - 5 62 Million/uL    Hemoglobin 10 6 (L) 12 0 - 17 0 g/dL    Hematocrit 33 0 (L) 36 5 - 49 3 %    MCV 95 82 - 98 fL    MCH 30 5 26 8 - 34 3 pg    MCHC 32 1 31 4 - 37 4 g/dL    RDW 14 5 11 6 - 15 1 %    Platelets 109 781 - 345 Thousands/uL    MPV 9 8 8 9 - 12 7 fL   Fingerstick Glucose (POCT)    Collection Time: 11/11/18  7:15 AM   Result Value Ref Range    POC Glucose 150 (H) 65 - 140 mg/dl   Fingerstick Glucose (POCT)    Collection Time: 11/11/18 11:15 AM   Result Value Ref Range    POC Glucose 86 65 - 140 mg/dl   Fingerstick Glucose (POCT)    Collection Time: 11/11/18  4:00 PM   Result Value Ref Range    POC Glucose 86 65 - 140 mg/dl   Fingerstick Glucose (POCT)    Collection Time: 11/11/18  8:47 PM   Result Value Ref Range    POC Glucose 135 65 - 140 mg/dl   Chronic Hepatitis Panel    Collection Time: 11/12/18  1:13 AM   Result Value Ref Range    Hepatitis B Surface Ag Non-reactive Non-reactive, NonReactive - Confirmed    Hepatitis C Ab High Reactive (A) Non-reactive    Hep B C IgM Non-reactive Non-reactive    Hep B Core Total Ab Reactive (A) Non-reactive   Hemoglobin A1C w/ EAG Estimation    Collection Time: 11/12/18  5:37 AM   Result Value Ref Range    Hemoglobin A1C 6 7 (H) 4 2 - 6 3 %     mg/dl   Fingerstick Glucose (POCT)    Collection Time: 11/12/18  7:31 AM   Result Value Ref Range    POC Glucose 112 65 - 140 mg/dl Fingerstick Glucose (POCT)    Collection Time: 11/12/18 11:24 AM   Result Value Ref Range    POC Glucose 92 65 - 140 mg/dl   Fingerstick Glucose (POCT)    Collection Time: 11/12/18  4:07 PM   Result Value Ref Range    POC Glucose 148 (H) 65 - 140 mg/dl   Fingerstick Glucose (POCT)    Collection Time: 11/12/18  9:17 PM   Result Value Ref Range    POC Glucose 67 65 - 140 mg/dl   Fingerstick Glucose (POCT)    Collection Time: 11/13/18  3:58 AM   Result Value Ref Range    POC Glucose 126 65 - 140 mg/dl   CBC and differential    Collection Time: 11/13/18  5:36 AM   Result Value Ref Range    WBC 10 17 (H) 4 31 - 10 16 Thousand/uL    RBC 3 19 (L) 3 88 - 5 62 Million/uL    Hemoglobin 9 6 (L) 12 0 - 17 0 g/dL    Hematocrit 29 8 (L) 36 5 - 49 3 %    MCV 93 82 - 98 fL    MCH 30 1 26 8 - 34 3 pg    MCHC 32 2 31 4 - 37 4 g/dL    RDW 13 7 11 6 - 15 1 %    MPV 9 8 8 9 - 12 7 fL    Platelets 062 914 - 833 Thousands/uL    nRBC 0 /100 WBCs    Neutrophils Relative 65 43 - 75 %    Immat GRANS % 0 0 - 2 %    Lymphocytes Relative 21 14 - 44 %    Monocytes Relative 11 4 - 12 %    Eosinophils Relative 3 0 - 6 %    Basophils Relative 0 0 - 1 %    Neutrophils Absolute 6 60 1 85 - 7 62 Thousands/µL    Immature Grans Absolute 0 04 0 00 - 0 20 Thousand/uL    Lymphocytes Absolute 2 10 0 60 - 4 47 Thousands/µL    Monocytes Absolute 1 11 0 17 - 1 22 Thousand/µL    Eosinophils Absolute 0 30 0 00 - 0 61 Thousand/µL    Basophils Absolute 0 02 0 00 - 0 10 Thousands/µL   Basic metabolic panel    Collection Time: 11/13/18  5:36 AM   Result Value Ref Range    Sodium 135 (L) 136 - 145 mmol/L    Potassium 3 8 3 5 - 5 3 mmol/L    Chloride 99 (L) 100 - 108 mmol/L    CO2 28 21 - 32 mmol/L    ANION GAP 8 4 - 13 mmol/L    BUN 29 (H) 5 - 25 mg/dL    Creatinine 1 86 (H) 0 60 - 1 30 mg/dL    Glucose 122 65 - 140 mg/dL    Calcium 8 7 8 3 - 10 1 mg/dL    eGFR 36 ml/min/1 73sq m   Fingerstick Glucose (POCT)    Collection Time: 11/13/18  7:40 AM   Result Value Ref Range    POC Glucose 125 65 - 140 mg/dl   Fingerstick Glucose (POCT)    Collection Time: 11/13/18 10:57 AM   Result Value Ref Range    POC Glucose 140 65 - 140 mg/dl   Microalbumin / creatinine urine ratio    Collection Time: 11/13/18  3:54 PM   Result Value Ref Range    Creatinine, Ur 91 7 mg/dL    Microalbum  ,U,Random 1,410 0 (H) 0 0 - 20 0 mg/L    Microalb Creat Ratio 1,538 (H) 0 - 30 mg/g creatinine   Fingerstick Glucose (POCT)    Collection Time: 11/13/18  4:27 PM   Result Value Ref Range    POC Glucose 96 65 - 140 mg/dl   Fingerstick Glucose (POCT)    Collection Time: 11/13/18  9:10 PM   Result Value Ref Range    POC Glucose 117 65 - 140 mg/dl   Fingerstick Glucose (POCT)    Collection Time: 11/14/18  7:52 AM   Result Value Ref Range    POC Glucose 102 65 - 140 mg/dl   Fingerstick Glucose (POCT)    Collection Time: 11/14/18 10:56 AM   Result Value Ref Range    POC Glucose 157 (H) 65 - 140 mg/dl   Fingerstick Glucose (POCT)    Collection Time: 11/14/18  4:21 PM   Result Value Ref Range    POC Glucose 105 65 - 140 mg/dl   Basic metabolic panel    Collection Time: 11/15/18  4:25 AM   Result Value Ref Range    Sodium 134 (L) 136 - 145 mmol/L    Potassium 3 9 3 5 - 5 3 mmol/L    Chloride 99 (L) 100 - 108 mmol/L    CO2 26 21 - 32 mmol/L    ANION GAP 9 4 - 13 mmol/L    BUN 37 (H) 5 - 25 mg/dL    Creatinine 1 89 (H) 0 60 - 1 30 mg/dL    Glucose 125 65 - 140 mg/dL    Calcium 9 4 8 3 - 10 1 mg/dL    eGFR 35 ml/min/1 73sq m   Fingerstick Glucose (POCT)    Collection Time: 11/15/18  8:18 AM   Result Value Ref Range    POC Glucose 132 65 - 140 mg/dl   Fingerstick Glucose (POCT)    Collection Time: 11/15/18 10:57 AM   Result Value Ref Range    POC Glucose 191 (H) 65 - 140 mg/dl   Fingerstick Glucose (POCT)    Collection Time: 11/15/18  3:56 PM   Result Value Ref Range    POC Glucose 113 65 - 140 mg/dl   Fingerstick Glucose (POCT)    Collection Time: 11/15/18  9:16 PM   Result Value Ref Range    POC Glucose 110 65 - 140 mg/dl   Hepatitis C RNA, quantitative, PCR    Collection Time: 11/16/18  5:05 AM   Result Value Ref Range    HCV PCR Quantitative See Final Results IU/mL    Test Information Comment    Hepatitis B surface antibody    Collection Time: 11/16/18  5:05 AM   Result Value Ref Range    Hep B S Ab 7 67 mIU/mL   HCV RNA-REFLEX    Collection Time: 11/16/18  5:05 AM   Result Value Ref Range    Hepatitis C RNA-PCR [id number] IU/mL    HCV Quantitative Log 7 062 log10 IU/mL   Fingerstick Glucose (POCT)    Collection Time: 11/16/18  8:13 AM   Result Value Ref Range    POC Glucose 115 65 - 140 mg/dl   Fingerstick Glucose (POCT)    Collection Time: 11/16/18 11:17 AM   Result Value Ref Range    POC Glucose 107 65 - 140 mg/dl   ECG 12 lead    Collection Time: 11/22/18  6:18 AM   Result Value Ref Range    Ventricular Rate 99 BPM    Atrial Rate 99 BPM    OK Interval 186 ms    QRSD Interval 130 ms    QT Interval 358 ms    QTC Interval 459 ms    P Axis 68 degrees    QRS Axis 27 degrees    T Wave Axis 54 degrees   Blood culture #2    Collection Time: 11/22/18  6:23 AM   Result Value Ref Range    Blood Culture No Growth After 5 Days      CBC and differential    Collection Time: 11/22/18  6:48 AM   Result Value Ref Range    WBC 13 28 (H) 4 31 - 10 16 Thousand/uL    RBC 3 46 (L) 3 88 - 5 62 Million/uL    Hemoglobin 10 5 (L) 12 0 - 17 0 g/dL    Hematocrit 33 5 (L) 36 5 - 49 3 %    MCV 97 82 - 98 fL    MCH 30 3 26 8 - 34 3 pg    MCHC 31 3 (L) 31 4 - 37 4 g/dL    RDW 13 5 11 6 - 15 1 %    MPV 9 5 8 9 - 12 7 fL    Platelets 318 203 - 425 Thousands/uL    nRBC 0 /100 WBCs    Neutrophils Relative 88 (H) 43 - 75 %    Immat GRANS % 1 0 - 2 %    Lymphocytes Relative 5 (L) 14 - 44 %    Monocytes Relative 5 4 - 12 %    Eosinophils Relative 1 0 - 6 %    Basophils Relative 0 0 - 1 %    Neutrophils Absolute 11 68 (H) 1 85 - 7 62 Thousands/µL    Immature Grans Absolute 0 07 0 00 - 0 20 Thousand/uL    Lymphocytes Absolute 0 69 0 60 - 4 47 Thousands/µL Monocytes Absolute 0 72 0 17 - 1 22 Thousand/µL    Eosinophils Absolute 0 10 0 00 - 0 61 Thousand/µL    Basophils Absolute 0 02 0 00 - 0 10 Thousands/µL   Rapid Influenza Screen with Reflex PCR    Collection Time: 11/22/18  6:48 AM   Result Value Ref Range    Rapid Influenza A Ag Negative Negative, Indeterminate    Rapid Influenza B Ag Negative Negative, Indeterminate   Lactic acid x2 Q2H    Collection Time: 11/22/18  6:48 AM   Result Value Ref Range    LACTIC ACID 1 5 0 5 - 2 0 mmol/L   Influenza A/B and RSV by PCR    Collection Time: 11/22/18  6:48 AM   Result Value Ref Range    INFLU A PCR None Detected None Detected    INFLU B PCR None Detected None Detected    RSV PCR None Detected None Detected   Protime-INR    Collection Time: 11/22/18  6:49 AM   Result Value Ref Range    Protime 14 2 11 8 - 14 2 seconds    INR 1 09 0 86 - 1 17   APTT    Collection Time: 11/22/18  6:49 AM   Result Value Ref Range    PTT 33 26 - 38 seconds   Blood culture #1    Collection Time: 11/22/18  6:49 AM   Result Value Ref Range    Blood Culture No Growth After 5 Days      Comprehensive metabolic panel    Collection Time: 11/22/18  6:49 AM   Result Value Ref Range    Sodium 137 136 - 145 mmol/L    Potassium 4 7 3 5 - 5 3 mmol/L    Chloride 101 100 - 108 mmol/L    CO2 27 21 - 32 mmol/L    ANION GAP 9 4 - 13 mmol/L    BUN 46 (H) 5 - 25 mg/dL    Creatinine 2 47 (H) 0 60 - 1 30 mg/dL    Glucose 127 65 - 140 mg/dL    Calcium 8 9 8 3 - 10 1 mg/dL    AST 25 5 - 45 U/L    ALT 20 12 - 78 U/L    Alkaline Phosphatase 78 46 - 116 U/L    Total Protein 7 3 6 4 - 8 2 g/dL    Albumin 2 9 (L) 3 5 - 5 0 g/dL    Total Bilirubin 0 40 0 20 - 1 00 mg/dL    eGFR 26 ml/min/1 73sq m   Magnesium    Collection Time: 11/22/18  6:49 AM   Result Value Ref Range    Magnesium 1 7 1 6 - 2 6 mg/dL   Troponin I    Collection Time: 11/22/18  6:49 AM   Result Value Ref Range    Troponin I 0 02 <=0 04 ng/mL   B-type natriuretic peptide    Collection Time: 11/22/18  6:49 AM Result Value Ref Range    NT-proBNP 1,922 (H) <125 pg/mL   Ammonia    Collection Time: 11/22/18  7:01 AM   Result Value Ref Range    Ammonia 19 11 - 35 umol/L   Procalcitonin    Collection Time: 11/22/18  8:29 AM   Result Value Ref Range    Procalcitonin 2 80 (H) <=0 25 ng/ml   Legionella antigen, urine    Collection Time: 11/22/18  8:34 AM   Result Value Ref Range    Legionella Urinary Antigen Negative Negative   Strep Pneumoniae, Urine    Collection Time: 11/22/18  8:34 AM   Result Value Ref Range    Strep pneumoniae antigen, urine Negative Negative   ED Urine Macroscopic    Collection Time: 11/22/18  8:51 AM   Result Value Ref Range    Color, UA Yellow     Clarity, UA Clear     pH, UA 5 0 4 5 - 8 0    Leukocytes, UA Small (A) Negative    Nitrite, UA Negative Negative    Protein,  (2+) (A) Negative mg/dl    Glucose, UA Negative Negative mg/dl    Ketones, UA Negative Negative mg/dl    Urobilinogen, UA 0 2 0 2, 1 0 E U /dl E U /dl    Bilirubin, UA Negative Negative    Blood, UA Trace (A) Negative    Specific Gravity, UA 1 020 1 003 - 1 030   Urine Microscopic    Collection Time: 11/22/18  9:03 AM   Result Value Ref Range    RBC, UA 1-2 (A) None Seen, 0-5 /hpf    WBC, UA 30-50 (A) None Seen, 0-5, 5-55, 5-65 /hpf    Epithelial Cells Occasional None Seen, Occasional /hpf    Bacteria, UA Moderate (A) None Seen, Occasional /hpf   Urine culture    Collection Time: 11/22/18  9:03 AM   Result Value Ref Range    Urine Culture No Growth <100 cfu/mL    Platelet count    Collection Time: 11/22/18 10:20 AM   Result Value Ref Range    Platelets 457 568 - 750 Thousands/uL    MPV 8 8 (L) 8 9 - 12 7 fL   Hemoglobin A1c w/EAG Estimation (Orders if not completed within the last 90 days)    Collection Time: 11/22/18 10:20 AM   Result Value Ref Range    Hemoglobin A1C 6 3 4 2 - 6 3 %     mg/dl   MRSA culture    Collection Time: 11/22/18 10:56 AM   Result Value Ref Range    MRSA Culture Only       No Methicillin Resistant Staphlyococcus aureus (MRSA) isolated   Fingerstick Glucose (POCT)    Collection Time: 11/22/18 11:18 AM   Result Value Ref Range    POC Glucose 170 (H) 65 - 140 mg/dl   Fingerstick Glucose (POCT)    Collection Time: 11/22/18  4:44 PM   Result Value Ref Range    POC Glucose 145 (H) 65 - 140 mg/dl   Fingerstick Glucose (POCT)    Collection Time: 11/22/18  9:01 PM   Result Value Ref Range    POC Glucose 134 65 - 140 mg/dl   CBC and differential    Collection Time: 11/23/18  5:12 AM   Result Value Ref Range    WBC 24 41 (H) 4 31 - 10 16 Thousand/uL    RBC 2 63 (L) 3 88 - 5 62 Million/uL    Hemoglobin 7 9 (L) 12 0 - 17 0 g/dL    Hematocrit 25 1 (L) 36 5 - 49 3 %    MCV 95 82 - 98 fL    MCH 30 0 26 8 - 34 3 pg    MCHC 31 5 31 4 - 37 4 g/dL    RDW 13 7 11 6 - 15 1 %    MPV 9 4 8 9 - 12 7 fL    Platelets 668 417 - 896 Thousands/uL    nRBC 0 /100 WBCs   Basic metabolic panel    Collection Time: 11/23/18  5:12 AM   Result Value Ref Range    Sodium 135 (L) 136 - 145 mmol/L    Potassium 4 2 3 5 - 5 3 mmol/L    Chloride 102 100 - 108 mmol/L    CO2 23 21 - 32 mmol/L    ANION GAP 10 4 - 13 mmol/L    BUN 45 (H) 5 - 25 mg/dL    Creatinine 2 33 (H) 0 60 - 1 30 mg/dL    Glucose 91 65 - 140 mg/dL    Calcium 8 4 8 3 - 10 1 mg/dL    eGFR 27 ml/min/1 73sq m   Magnesium    Collection Time: 11/23/18  5:12 AM   Result Value Ref Range    Magnesium 1 6 1 6 - 2 6 mg/dL   Phosphorus    Collection Time: 11/23/18  5:12 AM   Result Value Ref Range    Phosphorus 3 8 2 3 - 4 1 mg/dL   Calcium, ionized    Collection Time: 11/23/18  5:12 AM   Result Value Ref Range    Calcium, Ionized 1 11 (L) 1 12 - 1 32 mmol/L   Procalcitonin    Collection Time: 11/23/18  5:12 AM   Result Value Ref Range    Procalcitonin 5 95 (H) <=0 25 ng/ml   Manual Differential(PHLEBS Do Not Order)    Collection Time: 11/23/18  5:12 AM   Result Value Ref Range    Segmented % 90 (H) 43 - 75 %    Bands % 4 0 - 8 %    Lymphocytes % 6 (L) 14 - 44 %    Monocytes % 0 (L) 4 - 12 % Eosinophils, % 0 0 - 6 %    Basophils % 0 0 - 1 %    Absolute Neutrophils 22 95 (H) 1 85 - 7 62 Thousand/uL    Lymphocytes Absolute 1 46 0 60 - 4 47 Thousand/uL    Monocytes Absolute 0 00 0 00 - 1 22 Thousand/uL    Eosinophils Absolute 0 00 0 00 - 0 40 Thousand/uL    Basophils Absolute 0 00 0 00 - 0 10 Thousand/uL    Total Counted 100     RBC Morphology Normal     Platelet Estimate Adequate Adequate   Fingerstick Glucose (POCT)    Collection Time: 11/23/18  7:31 AM   Result Value Ref Range    POC Glucose 103 65 - 140 mg/dl   Fingerstick Glucose (POCT)    Collection Time: 11/23/18 11:17 AM   Result Value Ref Range    POC Glucose 103 65 - 140 mg/dl   Fingerstick Glucose (POCT)    Collection Time: 11/23/18  4:08 PM   Result Value Ref Range    POC Glucose 130 65 - 140 mg/dl   Fingerstick Glucose (POCT)    Collection Time: 11/23/18  9:05 PM   Result Value Ref Range    POC Glucose 112 65 - 140 mg/dl   CBC and differential    Collection Time: 11/24/18  5:06 AM   Result Value Ref Range    WBC 20 63 (H) 4 31 - 10 16 Thousand/uL    RBC 2 56 (L) 3 88 - 5 62 Million/uL    Hemoglobin 7 8 (L) 12 0 - 17 0 g/dL    Hematocrit 24 4 (L) 36 5 - 49 3 %    MCV 95 82 - 98 fL    MCH 30 5 26 8 - 34 3 pg    MCHC 32 0 31 4 - 37 4 g/dL    RDW 13 8 11 6 - 15 1 %    MPV 9 9 8 9 - 12 7 fL    Platelets 495 891 - 075 Thousands/uL    nRBC 0 /100 WBCs   Procalcitonin    Collection Time: 11/24/18  5:06 AM   Result Value Ref Range    Procalcitonin 4 31 (H) <=0 25 ng/ml   Manual Differential(PHLEBS Do Not Order)    Collection Time: 11/24/18  5:06 AM   Result Value Ref Range    Segmented % 84 (H) 43 - 75 %    Bands % 5 0 - 8 %    Lymphocytes % 4 (L) 14 - 44 %    Monocytes % 6 4 - 12 %    Eosinophils, % 1 0 - 6 %    Basophils % 0 0 - 1 %    Absolute Neutrophils 18 36 (H) 1 85 - 7 62 Thousand/uL    Lymphocytes Absolute 0 83 0 60 - 4 47 Thousand/uL    Monocytes Absolute 1 24 (H) 0 00 - 1 22 Thousand/uL    Eosinophils Absolute 0 21 0 00 - 0 40 Thousand/uL Basophils Absolute 0 00 0 00 - 0 10 Thousand/uL    Total Counted 100     RBC Morphology Normal     Platelet Estimate Adequate Adequate   Basic metabolic panel    Collection Time: 11/24/18  5:37 AM   Result Value Ref Range    Sodium 133 (L) 136 - 145 mmol/L    Potassium 4 3 3 5 - 5 3 mmol/L    Chloride 101 100 - 108 mmol/L    CO2 24 21 - 32 mmol/L    ANION GAP 8 4 - 13 mmol/L    BUN 42 (H) 5 - 25 mg/dL    Creatinine 2 34 (H) 0 60 - 1 30 mg/dL    Glucose 111 65 - 140 mg/dL    Calcium 8 8 8 3 - 10 1 mg/dL    eGFR 27 ml/min/1 73sq m   Fingerstick Glucose (POCT)    Collection Time: 11/24/18  7:28 AM   Result Value Ref Range    POC Glucose 114 65 - 140 mg/dl   Fingerstick Glucose (POCT)    Collection Time: 11/24/18 11:35 AM   Result Value Ref Range    POC Glucose 118 65 - 140 mg/dl   Fingerstick Glucose (POCT)    Collection Time: 11/24/18  4:40 PM   Result Value Ref Range    POC Glucose 78 65 - 140 mg/dl   Fingerstick Glucose (POCT)    Collection Time: 11/24/18  9:08 PM   Result Value Ref Range    POC Glucose 102 65 - 140 mg/dl   CBC and differential    Collection Time: 11/25/18  5:20 AM   Result Value Ref Range    WBC 15 86 (H) 4 31 - 10 16 Thousand/uL    RBC 2 62 (L) 3 88 - 5 62 Million/uL    Hemoglobin 7 8 (L) 12 0 - 17 0 g/dL    Hematocrit 24 9 (L) 36 5 - 49 3 %    MCV 95 82 - 98 fL    MCH 29 8 26 8 - 34 3 pg    MCHC 31 3 (L) 31 4 - 37 4 g/dL    RDW 13 8 11 6 - 15 1 %    MPV 9 7 8 9 - 12 7 fL    Platelets 043 794 - 955 Thousands/uL    nRBC 0 /100 WBCs   Procalcitonin    Collection Time: 11/25/18  5:20 AM   Result Value Ref Range    Procalcitonin 2 95 (H) <=0 25 ng/ml   Manual Differential(PHLEBS Do Not Order)    Collection Time: 11/25/18  5:20 AM   Result Value Ref Range    Segmented % 86 (H) 43 - 75 %    Bands % 3 0 - 8 %    Lymphocytes % 8 (L) 14 - 44 %    Monocytes % 3 (L) 4 - 12 %    Eosinophils, % 0 0 - 6 %    Basophils % 0 0 - 1 %    Absolute Neutrophils 14 12 (H) 1 85 - 7 62 Thousand/uL    Lymphocytes Absolute 1  27 0 60 - 4 47 Thousand/uL    Monocytes Absolute 0 48 0 00 - 1 22 Thousand/uL    Eosinophils Absolute 0 00 0 00 - 0 40 Thousand/uL    Basophils Absolute 0 00 0 00 - 0 10 Thousand/uL    Total Counted 100     RBC Morphology Normal     Platelet Estimate Adequate Adequate   Basic metabolic panel    Collection Time: 11/25/18  5:21 AM   Result Value Ref Range    Sodium 137 136 - 145 mmol/L    Potassium 4 0 3 5 - 5 3 mmol/L    Chloride 102 100 - 108 mmol/L    CO2 25 21 - 32 mmol/L    ANION GAP 10 4 - 13 mmol/L    BUN 38 (H) 5 - 25 mg/dL    Creatinine 2 20 (H) 0 60 - 1 30 mg/dL    Glucose 107 65 - 140 mg/dL    Calcium 9 0 8 3 - 10 1 mg/dL    eGFR 29 ml/min/1 73sq m   Fingerstick Glucose (POCT)    Collection Time: 11/25/18  7:18 AM   Result Value Ref Range    POC Glucose 106 65 - 140 mg/dl   Fingerstick Glucose (POCT)    Collection Time: 11/25/18 11:26 AM   Result Value Ref Range    POC Glucose 151 (H) 65 - 140 mg/dl   Fingerstick Glucose (POCT)    Collection Time: 11/25/18  3:33 PM   Result Value Ref Range    POC Glucose 181 (H) 65 - 140 mg/dl   Fingerstick Glucose (POCT)    Collection Time: 11/25/18  9:22 PM   Result Value Ref Range    POC Glucose 68 65 - 140 mg/dl   Fingerstick Glucose (POCT)    Collection Time: 11/25/18  9:46 PM   Result Value Ref Range    POC Glucose 76 65 - 140 mg/dl   Fingerstick Glucose (POCT)    Collection Time: 11/26/18 12:07 AM   Result Value Ref Range    POC Glucose 148 (H) 65 - 140 mg/dl   CBC and differential    Collection Time: 11/26/18  5:00 AM   Result Value Ref Range    WBC 11 61 (H) 4 31 - 10 16 Thousand/uL    RBC 2 76 (L) 3 88 - 5 62 Million/uL    Hemoglobin 8 3 (L) 12 0 - 17 0 g/dL    Hematocrit 26 0 (L) 36 5 - 49 3 %    MCV 94 82 - 98 fL    MCH 30 1 26 8 - 34 3 pg    MCHC 31 9 31 4 - 37 4 g/dL    RDW 13 8 11 6 - 15 1 %    MPV 9 6 8 9 - 12 7 fL    Platelets 515 295 - 934 Thousands/uL    nRBC 0 /100 WBCs   Basic metabolic panel    Collection Time: 11/26/18  5:00 AM   Result Value Ref Range    Sodium 136 136 - 145 mmol/L    Potassium 4 0 3 5 - 5 3 mmol/L    Chloride 100 100 - 108 mmol/L    CO2 27 21 - 32 mmol/L    ANION GAP 9 4 - 13 mmol/L    BUN 37 (H) 5 - 25 mg/dL    Creatinine 2 05 (H) 0 60 - 1 30 mg/dL    Glucose 136 65 - 140 mg/dL    Calcium 9 2 8 3 - 10 1 mg/dL    eGFR 32 ml/min/1 73sq m   Manual Differential(PHLEBS Do Not Order)    Collection Time: 11/26/18  5:00 AM   Result Value Ref Range    Segmented % 81 (H) 43 - 75 %    Lymphocytes % 8 (L) 14 - 44 %    Monocytes % 8 4 - 12 %    Eosinophils, % 3 0 - 6 %    Basophils % 0 0 - 1 %    Absolute Neutrophils 9 40 (H) 1 85 - 7 62 Thousand/uL    Lymphocytes Absolute 0 93 0 60 - 4 47 Thousand/uL    Monocytes Absolute 0 93 0 00 - 1 22 Thousand/uL    Eosinophils Absolute 0 35 0 00 - 0 40 Thousand/uL    Basophils Absolute 0 00 0 00 - 0 10 Thousand/uL    Total Counted 100     RBC Morphology Normal     Platelet Estimate Adequate Adequate   Fingerstick Glucose (POCT)    Collection Time: 11/26/18  7:11 AM   Result Value Ref Range    POC Glucose 139 65 - 140 mg/dl   Fingerstick Glucose (POCT)    Collection Time: 11/26/18 11:23 AM   Result Value Ref Range    POC Glucose 215 (H) 65 - 140 mg/dl   Sputum culture and Gram stain    Collection Time: 11/26/18 11:33 AM   Result Value Ref Range    Sputum Culture 2+ Growth of Candida sp  presumptively albicans (A)     Sputum Culture 2+ Growth of      Gram Stain Result 1+ Epithelial cells per low power field     Gram Stain Result 2+ Polys     Gram Stain Result 1+ Gram positive cocci in pairs     Gram Stain Result Rare Gram positive rods    Fingerstick Glucose (POCT)    Collection Time: 11/26/18  4:05 PM   Result Value Ref Range    POC Glucose 108 65 - 140 mg/dl   Fingerstick Glucose (POCT)    Collection Time: 11/26/18  9:22 PM   Result Value Ref Range    POC Glucose 84 65 - 140 mg/dl   CBC    Collection Time: 11/27/18  4:44 AM   Result Value Ref Range    WBC 8 02 4 31 - 10 16 Thousand/uL    RBC 2 59 (L) 3 88 - 5 62 Million/uL    Hemoglobin 7 8 (L) 12 0 - 17 0 g/dL    Hematocrit 24 7 (L) 36 5 - 49 3 %    MCV 95 82 - 98 fL    MCH 30 1 26 8 - 34 3 pg    MCHC 31 6 31 4 - 37 4 g/dL    RDW 13 7 11 6 - 15 1 %    Platelets 485 989 - 876 Thousands/uL    MPV 9 1 8 9 - 12 7 fL   Comprehensive metabolic panel    Collection Time: 11/27/18  4:44 AM   Result Value Ref Range    Sodium 138 136 - 145 mmol/L    Potassium 3 8 3 5 - 5 3 mmol/L    Chloride 103 100 - 108 mmol/L    CO2 28 21 - 32 mmol/L    ANION GAP 7 4 - 13 mmol/L    BUN 35 (H) 5 - 25 mg/dL    Creatinine 1 96 (H) 0 60 - 1 30 mg/dL    Glucose 85 65 - 140 mg/dL    Calcium 9 2 8 3 - 10 1 mg/dL    AST 28 5 - 45 U/L    ALT 24 12 - 78 U/L    Alkaline Phosphatase 82 46 - 116 U/L    Total Protein 6 5 6 4 - 8 2 g/dL    Albumin 2 0 (L) 3 5 - 5 0 g/dL    Total Bilirubin 0 45 0 20 - 1 00 mg/dL    eGFR 34 ml/min/1 73sq m   Procalcitonin    Collection Time: 11/27/18  4:44 AM   Result Value Ref Range    Procalcitonin 1 00 (H) <=0 25 ng/ml   Fingerstick Glucose (POCT)    Collection Time: 11/27/18  8:10 AM   Result Value Ref Range    POC Glucose 83 65 - 140 mg/dl   Fingerstick Glucose (POCT)    Collection Time: 11/27/18 11:02 AM   Result Value Ref Range    POC Glucose 211 (H) 65 - 140 mg/dl   Fingerstick Glucose (POCT)    Collection Time: 11/27/18  4:11 PM   Result Value Ref Range    POC Glucose 78 65 - 140 mg/dl   Fingerstick Glucose (POCT)    Collection Time: 11/27/18  9:07 PM   Result Value Ref Range    POC Glucose 149 (H) 65 - 140 mg/dl   Procalcitonin    Collection Time: 11/28/18  5:48 AM   Result Value Ref Range    Procalcitonin 0 55 (H) <=0 25 ng/ml   Basic metabolic panel    Collection Time: 11/28/18  5:48 AM   Result Value Ref Range    Sodium 139 136 - 145 mmol/L    Potassium 4 1 3 5 - 5 3 mmol/L    Chloride 103 100 - 108 mmol/L    CO2 27 21 - 32 mmol/L    ANION GAP 9 4 - 13 mmol/L    BUN 36 (H) 5 - 25 mg/dL    Creatinine 2 04 (H) 0 60 - 1 30 mg/dL    Glucose 81 65 - 140 mg/dL    Calcium 9 0 8 3 - 10 1 mg/dL    eGFR 32 ml/min/1 73sq m   CBC and differential    Collection Time: 11/28/18  5:48 AM   Result Value Ref Range    WBC 7 80 4 31 - 10 16 Thousand/uL    RBC 2 71 (L) 3 88 - 5 62 Million/uL    Hemoglobin 8 0 (L) 12 0 - 17 0 g/dL    Hematocrit 26 2 (L) 36 5 - 49 3 %    MCV 97 82 - 98 fL    MCH 29 5 26 8 - 34 3 pg    MCHC 30 5 (L) 31 4 - 37 4 g/dL    RDW 13 8 11 6 - 15 1 %    MPV 9 5 8 9 - 12 7 fL    Platelets 693 652 - 102 Thousands/uL    nRBC 0 /100 WBCs    Neutrophils Relative 62 43 - 75 %    Immat GRANS % 1 0 - 2 %    Lymphocytes Relative 16 14 - 44 %    Monocytes Relative 13 (H) 4 - 12 %    Eosinophils Relative 8 (H) 0 - 6 %    Basophils Relative 0 0 - 1 %    Neutrophils Absolute 4 78 1 85 - 7 62 Thousands/µL    Immature Grans Absolute 0 09 0 00 - 0 20 Thousand/uL    Lymphocytes Absolute 1 27 0 60 - 4 47 Thousands/µL    Monocytes Absolute 1 00 0 17 - 1 22 Thousand/µL    Eosinophils Absolute 0 63 (H) 0 00 - 0 61 Thousand/µL    Basophils Absolute 0 03 0 00 - 0 10 Thousands/µL   Fingerstick Glucose (POCT)    Collection Time: 11/28/18  8:01 AM   Result Value Ref Range    POC Glucose 74 65 - 140 mg/dl   Fingerstick Glucose (POCT)    Collection Time: 11/28/18 11:07 AM   Result Value Ref Range    POC Glucose 83 65 - 140 mg/dl   Fingerstick Glucose (POCT)    Collection Time: 11/28/18  4:08 PM   Result Value Ref Range    POC Glucose 68 65 - 140 mg/dl   Fingerstick Glucose (POCT)    Collection Time: 11/28/18  8:53 PM   Result Value Ref Range    POC Glucose 153 (H) 65 - 140 mg/dl   CBC and differential    Collection Time: 11/29/18  5:53 AM   Result Value Ref Range    WBC 8 47 4 31 - 10 16 Thousand/uL    RBC 2 77 (L) 3 88 - 5 62 Million/uL    Hemoglobin 8 3 (L) 12 0 - 17 0 g/dL    Hematocrit 26 9 (L) 36 5 - 49 3 %    MCV 97 82 - 98 fL    MCH 30 0 26 8 - 34 3 pg    MCHC 30 9 (L) 31 4 - 37 4 g/dL    RDW 13 8 11 6 - 15 1 %    MPV 9 8 8 9 - 12 7 fL    Platelets 082 225 - 797 Thousands/uL    nRBC 0 /100 WBCs Neutrophils Relative 60 43 - 75 %    Immat GRANS % 1 0 - 2 %    Lymphocytes Relative 19 14 - 44 %    Monocytes Relative 12 4 - 12 %    Eosinophils Relative 8 (H) 0 - 6 %    Basophils Relative 0 0 - 1 %    Neutrophils Absolute 5 04 1 85 - 7 62 Thousands/µL    Immature Grans Absolute 0 09 0 00 - 0 20 Thousand/uL    Lymphocytes Absolute 1 62 0 60 - 4 47 Thousands/µL    Monocytes Absolute 1 00 0 17 - 1 22 Thousand/µL    Eosinophils Absolute 0 69 (H) 0 00 - 0 61 Thousand/µL    Basophils Absolute 0 03 0 00 - 0 10 Thousands/µL   Basic metabolic panel    Collection Time: 11/29/18  5:53 AM   Result Value Ref Range    Sodium 139 136 - 145 mmol/L    Potassium 4 2 3 5 - 5 3 mmol/L    Chloride 104 100 - 108 mmol/L    CO2 28 21 - 32 mmol/L    ANION GAP 7 4 - 13 mmol/L    BUN 35 (H) 5 - 25 mg/dL    Creatinine 2 06 (H) 0 60 - 1 30 mg/dL    Glucose 53 (L) 65 - 140 mg/dL    Calcium 9 3 8 3 - 10 1 mg/dL    eGFR 32 ml/min/1 73sq m   Procalcitonin    Collection Time: 11/29/18  5:53 AM   Result Value Ref Range    Procalcitonin 0 38 (H) <=0 25 ng/ml   Fingerstick Glucose (POCT)    Collection Time: 11/29/18  7:13 AM   Result Value Ref Range    POC Glucose 42 (L) 65 - 140 mg/dl   Fingerstick Glucose (POCT)    Collection Time: 11/29/18  7:59 AM   Result Value Ref Range    POC Glucose 61 (L) 65 - 140 mg/dl   Fingerstick Glucose (POCT)    Collection Time: 11/29/18  8:41 AM   Result Value Ref Range    POC Glucose 112 65 - 140 mg/dl   Fingerstick Glucose (POCT)    Collection Time: 11/29/18 11:04 AM   Result Value Ref Range    POC Glucose 102 65 - 140 mg/dl   Fingerstick Glucose (POCT)    Collection Time: 11/29/18  2:24 PM   Result Value Ref Range    POC Glucose 33 (LL) 65 - 140 mg/dl   Fingerstick Glucose (POCT)    Collection Time: 11/29/18  2:57 PM   Result Value Ref Range    POC Glucose 145 (H) 65 - 140 mg/dl   Fingerstick Glucose (POCT)    Collection Time: 11/29/18  3:55 PM   Result Value Ref Range    POC Glucose 143 (H) 65 - 140 mg/dl Fingerstick Glucose (POCT)    Collection Time: 11/29/18  9:05 PM   Result Value Ref Range    POC Glucose 127 65 - 140 mg/dl   Fingerstick Glucose (POCT)    Collection Time: 11/30/18  7:34 AM   Result Value Ref Range    POC Glucose 111 65 - 140 mg/dl   Fingerstick Glucose (POCT)    Collection Time: 11/30/18 11:12 AM   Result Value Ref Range    POC Glucose 185 (H) 65 - 140 mg/dl   ]    Procedure: Ct Abdomen Pelvis Wo Contrast    Result Date: 11/10/2018  Narrative: CT ABDOMEN AND PELVIS WITHOUT IV CONTRAST INDICATION:   abd pain  Patient fell  COMPARISON:  None  TECHNIQUE:  CT examination of the abdomen and pelvis was performed without intravenous contrast   Axial, sagittal, and coronal 2D reformatted images were created from the source data and submitted for interpretation  Radiation dose length product (DLP) for this visit:  445 mGy-cm   This examination, like all CT scans performed in the [facility], was performed utilizing techniques to minimize radiation dose exposure, including the use of iterative reconstruction and automated exposure control  Enteric contrast was not administered  The absence of intravenous and gastrointestinal contrast significantly limits evaluation of the abdominal and pelvic viscera  FINDINGS: ABDOMEN LOWER CHEST:  Mild subsegmental atelectasis in the base of the right lower lobe  LIVER/BILIARY TREE:  Unremarkable  GALLBLADDER:  Several small calcified gallstones layering dependently  SPLEEN:  Unremarkable  PANCREAS:  Unremarkable  ADRENAL GLANDS:  Unremarkable  KIDNEYS/URETERS:  No hydronephrosis or calculus  Mild to moderate left ureterectasis to the level of the iliac vessels, chronicity and etiology not known  STOMACH AND BOWEL:  Stomach and small intestine unremarkable  Large amount of feces throughout the entire colon, including within a distended rectum, consistent with chronic constipation  Colon otherwise unremarkable   APPENDIX:  No findings to suggest appendicitis  ABDOMINOPELVIC CAVITY: No lymphadenopathy or mass  No ascites  No extraluminal gas  VESSELS:  Unremarkable for patient's age  No aortic aneurysm  PELVIS REPRODUCTIVE ORGANS:  There appears to have been a prostatectomy  URINARY BLADDER:  Suprapubic catheter in place  ABDOMINAL WALL/INGUINAL REGIONS:  Unremarkable  OSSEOUS STRUCTURES:  Prior fusions at L2-L3 and L4-L5  Hardware intact  No evidence of recent fracture or destructive lesion  Impression: 1  Cholelithiasis  2   Mild left ureterectasis of unknown etiology, without hydronephrosis  3   Large amount of feces throughout distended colon, consistent with constipation  4   No evidence of acute abnormality in the abdomen or pelvis  Workstation performed: [id number]     Procedure: X-ray Chest 2 Views    Result Date: 11/11/2018  Narrative: CHEST INDICATION:   chest pain  Status post fall COMPARISON:  Chest radiograph 5/11/2013 EXAM PERFORMED/VIEWS:  XR CHEST PA & LATERAL FINDINGS: Heart size is normal   Mediastinal contours are unchanged, with unchanged fullness in the right paratracheal region since 4/27/2010  Elevation right hemidiaphragm is unchanged The lungs are clear  No pneumothorax or pleural effusion  Osseous structures appear within normal limits for patient age  Impression: No acute cardiopulmonary disease  Workstation performed: [id number]     Procedure: Xr Hips Bilateral 2 Vw W Pelvis If Performed    Result Date: 11/11/2018  Narrative: BILATERAL HIPS AND PELVIS INDICATION:   trauma  Status post fall COMPARISON:  Abdomen pelvis CT 11/10/2018 VIEWS:  XR HIPS BILATERAL 2 VW W PELVIS IF PERFORMED  FINDINGS: The bony pelvis appears intact  Postoperative changes in the lumbar spine are not evaluated on these hip radiograph  LEFT HIP: Mild left hip osteoarthritis is seen  Joint space alignment is maintained  Soft tissues are unremarkable  RIGHT HIP: Mild right hip osteoarthritis is seen  Joint space alignment is maintained   Soft tissues are unremarkable  Impression: No acute osseous abnormality  Workstation performed: [id number]     Procedure: Xr Knee 4+ Vw Left Injury    Result Date: 11/11/2018  Narrative: LEFT KNEE INDICATION:   Recent fall, some leg pain  Patient is a very poor historian per history from [employer]  COMPARISON:  None VIEWS:  XR KNEE 4+ VW LEFT INJURY FINDINGS: There is no acute fracture or dislocation  There is a small joint effusion  Tricompartmental osteoarthritis with severe narrowing of the medial tibiofemoral joint space and osteophytes seen in all 3 compartments  No lytic or blastic lesions are seen  Soft tissues are unremarkable  Evaluation of the anterior lower extremity soft tissues is limited by technique  Impression: No fracture  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, mild elsewhere  Workstation performed: [id number]     Procedure: Xr Knee 4+ Vw Right Injury    Result Date: 11/11/2018  Narrative: RIGHT KNEE INDICATION:   trauma  Status post fall COMPARISON:  None VIEWS:  XR KNEE 4+ VW RIGHT INJURY FINDINGS: There is no acute fracture or dislocation  There is a trace joint effusion  Minimal osteophyte formation, without significant joint space narrowing No lytic or blastic lesions are seen  There are atherosclerotic calcifications  Soft tissues are otherwise unremarkable  Impression: No acute osseous abnormality  Workstation performed: [id number]     Procedure: Xr Ankle 3+ Views Left    Result Date: 11/11/2018  Narrative: LEFT ANKLE INDICATION:   trauma  Status post fall COMPARISON:  Left foot radiographs 5/13/2013 VIEWS:  XR ANKLE 3+ VW LEFT FINDINGS: There is no convincing acute fracture or dislocation  Well-corticated ossifications about the medial and lateral malleolus likely sequela of remote injury No significant degenerative changes  No lytic or blastic lesions seen  Mild medial soft tissue swelling  Impression: No acute osseous abnormality  Workstation performed: [id number]     Procedure: Ct Head Without Contrast    Result Date: 11/10/2018  Narrative: CT BRAIN - WITHOUT CONTRAST INDICATION:   AMS/falls  COMPARISON:  CT from October 1, 2017  TECHNIQUE:  CT examination of the brain was performed  In addition to axial images, coronal 2D reformatted images were created and submitted for interpretation  Radiation dose length product (DLP) for this visit:  987 mGy-cm   This examination, like all CT scans performed in the [facility], was performed utilizing techniques to minimize radiation dose exposure, including the use of iterative reconstruction and automated exposure control  IMAGE QUALITY:  Diagnostic  FINDINGS: PARENCHYMA:  No intracranial mass, mass effect or midline shift  No CT signs of acute infarction  No acute parenchymal hemorrhage  VENTRICLES AND EXTRA-AXIAL SPACES:  Normal for the patient's age  No extra-axial blood  VISUALIZED ORBITS AND PARANASAL SINUSES:  Mucosal thickening in both maxillary sinuses  Other paranasal sinuses clear  Orbits unremarkable  CALVARIUM AND EXTRACRANIAL SOFT TISSUES:  Left frontal scalp swelling  Skull intact  Impression: No acute intracranial abnormality  Workstation performed: [id number]     Procedure: Ct Spine Cervical Without Contrast    Result Date: 11/10/2018  Narrative: CT CERVICAL SPINE - WITHOUT CONTRAST INDICATION:   fall  COMPARISON:  None  TECHNIQUE:  CT examination of the cervical spine was performed without intravenous contrast   Contiguous axial images were obtained  Sagittal and coronal reconstructions were performed  Radiation dose length product (DLP) for this visit:  595 mGy-cm   This examination, like all CT scans performed in the [facility], was performed utilizing techniques to minimize radiation dose exposure, including the use of iterative reconstruction and automated exposure control  IMAGE QUALITY:  Diagnostic   FINDINGS: ALIGNMENT: Extensive fusion from C3 through C6  Straightening of the cervical spine resulting from the fusion  Alignment otherwise unremarkable  VERTEBRAE:  Small nondisplaced cleft in the right side of the posterior ring of C1  This appears to have been present on an MRI of the cervical spine from October 2, 2017 (best appreciated on series 6, image 9)  This is either a congenital fusion anomaly or, more likely, an old unhealed posterior ring fracture  In any event, there is no acute C1 fracture  No other evidence of fracture  No bone destruction or osteoblastic lesion  DISC SPACES:  Extensive cervical fusion as described above  Advanced degenerative disc disease at C3-C4 and C7-T1  ATLANTOAXIAL ARTICULATION: Bilateral lateral displacement of the C1 articular masses, consistent with the sequela of a Geo fracture  Odontoid process intact  Anterior atlantodental interval is normal   Calcification of the transverse atlantal ligament  FACET JOINTS:  Advanced multilevel bilateral degenerative facet arthropathy  FORAMINA:   Foraminal stenosis at multiple levels  SPINAL CANAL:  Mild to moderate spinal stenosis at C3-C4, unchanged since the earlier MRI  Spinal canal otherwise normal in caliber  PREVERTEBRAL AND PARASPINAL SOFT TISSUES:  Normal  OTHER SOFT TISSUES OF THE NECK: Normal  INCLUDED SKULL BASE: Normal  IMAGED PORTIONS OF THE BRAIN: Normal  THORACIC INLET:  Normal  Lung apices clear  Impression: 1  Nondisplaced disruption of the right posterior ring of C1, probably an old ununited fracture, which appears to have been present on an MRI of the cervical spine from 10/2/2017  2   No evidence of acute fracture or traumatic malalignment  3   Widespread degenerative disease and prior fusions from C3 through C6   Workstation performed: [id number]

## 2018-12-14 NOTE — PROGRESS NOTES
Assessment/Plan:      Diagnoses and all orders for this visit:    Urinary retention due to benign prostatic hyperplasia    Benign prostatic hyperplasia with urinary obstruction    Suprapubic catheter ([address] )    Other orders  -     [employer] ACID CONTROLLER 10 MG tablet; TAKE 1 TABLET BY MOUTH TWICE A DAY  -     morphine (MS CONTIN) 15 mg 12 hr tablet; Take 15 mg by mouth every 12 (twelve) hours  -     oxyCODONE (ROXICODONE) 15 mg immediate release tablet; Take 15 mg by mouth 4 (four) times a day as needed          Subjective:  No complaints     Patient ID: Beti Bell is a 70 y o  male  HPI  This is a 78-year-old- male with a prostatic obstruction and concurrent diabetic neurogenic bladder dysfunction  He has been in urinary retention with an indwelling suprapubic catheter for over 4 years, followed routinely by Dr Ibrahim Points  He was due for to have an office suprapubic tube changed today  No recent hematuria, abdominal or flank pains, fevers, chills, nausea or vomiting      Chronic urinary colonization, not certain if has truly symptomatic UTI at this time        Pt says he used to take cipro as needed at home, will discourage this to prevent reisistant organisms  Review of Systems   Constitutional: Negative  Negative for fever  HENT: Negative  Eyes: Negative  Respiratory: Negative  Cardiovascular: Negative  Gastrointestinal: Negative  Endocrine: Negative  Genitourinary: Positive for difficulty urinating  Negative for discharge, dysuria, flank pain and hematuria  Musculoskeletal: Negative  Skin: Negative  Allergic/Immunologic: Negative  Neurological: Negative  Hematological: Negative  Psychiatric/Behavioral: Negative  Objective:     Physical Exam   Constitutional: He is oriented to person, place, and time  He appears well-developed and well-nourished  No distress  HENT:   Head: Normocephalic and atraumatic     Nose: Nose normal    Mouth/Throat: Oropharynx is clear and moist    Eyes: Pupils are equal, round, and reactive to light  Conjunctivae and EOM are normal  No scleral icterus  Neck: Normal range of motion  Neck supple  Cardiovascular: Normal rate, regular rhythm, normal heart sounds and intact distal pulses  No murmur heard  Pulmonary/Chest: Effort normal and breath sounds normal  No respiratory distress  He has no wheezes  He has no rales  Abdominal: Soft  Bowel sounds are normal  He exhibits no distension and no mass  There is no tenderness  Suprapubic tube in place, changed   Musculoskeletal: Normal range of motion  He exhibits no edema or tenderness  Lymphadenopathy:     He has no cervical adenopathy  Neurological: He is alert and oriented to person, place, and time  No cranial nerve deficit  Skin: Skin is warm and dry  No rash noted  No erythema  No pallor  Psychiatric: He has a normal mood and affect  His behavior is normal  Judgment and thought content normal    Nursing note and vitals reviewed        Suprapubic tube changed today

## 2018-12-14 NOTE — PROGRESS NOTES
Procedures  Patient here for office visit and S/P tube change  Patient seen by Dr Shelby Kent and has no complaints  Patient placed in supine position  Catheter was gently removed  Sterile 18 [place] catheter was place in ostomy  Ostomy is clean and free from debris  Patient was cleaned with betadine  Catheter was gently placed in ostomy  Used 5cc of sterile water into the balloon  Urine return was positive  Patient uses a plug rather than a bag  Patient will return in 5 weeks to have catheter replaced

## 2019-01-01 ENCOUNTER — TELEPHONE (OUTPATIENT)
Dept: UROLOGY | Facility: MEDICAL CENTER | Age: 69
End: 2019-01-01

## 2019-01-01 ENCOUNTER — APPOINTMENT (EMERGENCY)
Dept: CT IMAGING | Facility: HOSPITAL | Age: 69
DRG: 682 | End: 2019-01-01
Payer: MEDICARE

## 2019-01-01 ENCOUNTER — TELEPHONE (OUTPATIENT)
Dept: NEPHROLOGY | Facility: CLINIC | Age: 69
End: 2019-01-01

## 2019-01-01 ENCOUNTER — APPOINTMENT (INPATIENT)
Dept: CT IMAGING | Facility: HOSPITAL | Age: 69
DRG: 682 | End: 2019-01-01
Payer: MEDICARE

## 2019-01-01 ENCOUNTER — APPOINTMENT (EMERGENCY)
Dept: RADIOLOGY | Facility: HOSPITAL | Age: 69
DRG: 917 | End: 2019-01-01
Payer: MEDICARE

## 2019-01-01 ENCOUNTER — TELEPHONE (OUTPATIENT)
Dept: INTERNAL MEDICINE CLINIC | Facility: CLINIC | Age: 69
End: 2019-01-01

## 2019-01-01 ENCOUNTER — EPISODE CHANGES (OUTPATIENT)
Dept: CASE MANAGEMENT | Facility: HOSPITAL | Age: 69
End: 2019-01-01

## 2019-01-01 ENCOUNTER — APPOINTMENT (INPATIENT)
Dept: CT IMAGING | Facility: HOSPITAL | Age: 69
DRG: 682 | End: 2019-01-01
Attending: INTERNAL MEDICINE
Payer: MEDICARE

## 2019-01-01 ENCOUNTER — APPOINTMENT (INPATIENT)
Dept: RADIOLOGY | Facility: HOSPITAL | Age: 69
DRG: 682 | End: 2019-01-01
Payer: MEDICARE

## 2019-01-01 ENCOUNTER — PATIENT OUTREACH (OUTPATIENT)
Dept: INTERNAL MEDICINE CLINIC | Facility: CLINIC | Age: 69
End: 2019-01-01

## 2019-01-01 ENCOUNTER — APPOINTMENT (EMERGENCY)
Dept: CT IMAGING | Facility: HOSPITAL | Age: 69
DRG: 917 | End: 2019-01-01
Payer: MEDICARE

## 2019-01-01 ENCOUNTER — PROCEDURE VISIT (OUTPATIENT)
Dept: UROLOGY | Facility: MEDICAL CENTER | Age: 69
End: 2019-01-01

## 2019-01-01 ENCOUNTER — HOSPITAL ENCOUNTER (INPATIENT)
Facility: HOSPITAL | Age: 69
LOS: 1 days | DRG: 917 | End: 2019-06-24
Attending: EMERGENCY MEDICINE | Admitting: INTERNAL MEDICINE
Payer: MEDICARE

## 2019-01-01 ENCOUNTER — OFFICE VISIT (OUTPATIENT)
Dept: INTERNAL MEDICINE CLINIC | Facility: CLINIC | Age: 69
End: 2019-01-01
Payer: MEDICARE

## 2019-01-01 ENCOUNTER — APPOINTMENT (OUTPATIENT)
Dept: NEUROLOGY | Facility: HOSPITAL | Age: 69
DRG: 917 | End: 2019-01-01
Payer: MEDICARE

## 2019-01-01 ENCOUNTER — TELEPHONE (OUTPATIENT)
Dept: PULMONOLOGY | Facility: CLINIC | Age: 69
End: 2019-01-01

## 2019-01-01 ENCOUNTER — APPOINTMENT (INPATIENT)
Dept: ULTRASOUND IMAGING | Facility: HOSPITAL | Age: 69
DRG: 682 | End: 2019-01-01
Payer: MEDICARE

## 2019-01-01 ENCOUNTER — EPISODE CHANGES (OUTPATIENT)
Dept: CASE MANAGEMENT | Facility: OTHER | Age: 69
End: 2019-01-01

## 2019-01-01 ENCOUNTER — PROCEDURE VISIT (OUTPATIENT)
Dept: UROLOGY | Facility: MEDICAL CENTER | Age: 69
End: 2019-01-01
Payer: MEDICARE

## 2019-01-01 ENCOUNTER — HOSPITAL ENCOUNTER (INPATIENT)
Facility: HOSPITAL | Age: 69
LOS: 15 days | Discharge: NURSING, REHAB OR LONG-TERM CARE | DRG: 682 | End: 2019-05-29
Attending: EMERGENCY MEDICINE | Admitting: INTERNAL MEDICINE
Payer: MEDICARE

## 2019-01-01 ENCOUNTER — OFFICE VISIT (OUTPATIENT)
Dept: NEPHROLOGY | Facility: CLINIC | Age: 69
End: 2019-01-01
Payer: MEDICARE

## 2019-01-01 ENCOUNTER — PATIENT OUTREACH (OUTPATIENT)
Dept: CASE MANAGEMENT | Facility: OTHER | Age: 69
End: 2019-01-01

## 2019-01-01 ENCOUNTER — TRANSITIONAL CARE MANAGEMENT (OUTPATIENT)
Dept: INTERNAL MEDICINE CLINIC | Facility: CLINIC | Age: 69
End: 2019-01-01

## 2019-01-01 ENCOUNTER — APPOINTMENT (INPATIENT)
Dept: RADIOLOGY | Facility: HOSPITAL | Age: 69
DRG: 917 | End: 2019-01-01
Payer: MEDICARE

## 2019-01-01 VITALS — HEART RATE: 67 BPM | RESPIRATION RATE: 16 BRPM | SYSTOLIC BLOOD PRESSURE: 150 MMHG | DIASTOLIC BLOOD PRESSURE: 70 MMHG

## 2019-01-01 VITALS
RESPIRATION RATE: 15 BRPM | DIASTOLIC BLOOD PRESSURE: 48 MMHG | HEART RATE: 75 BPM | BODY MASS INDEX: 22.75 KG/M2 | HEIGHT: 72 IN | SYSTOLIC BLOOD PRESSURE: 91 MMHG | TEMPERATURE: 98.2 F | WEIGHT: 168 LBS

## 2019-01-01 VITALS
RESPIRATION RATE: 16 BRPM | DIASTOLIC BLOOD PRESSURE: 40 MMHG | HEART RATE: 76 BPM | TEMPERATURE: 97.1 F | OXYGEN SATURATION: 100 % | WEIGHT: 191.14 LBS | SYSTOLIC BLOOD PRESSURE: 136 MMHG | BODY MASS INDEX: 25.92 KG/M2

## 2019-01-01 VITALS
RESPIRATION RATE: 16 BRPM | BODY MASS INDEX: 20.72 KG/M2 | HEART RATE: 82 BPM | TEMPERATURE: 96.6 F | WEIGHT: 153 LBS | HEIGHT: 72 IN | OXYGEN SATURATION: 98 % | DIASTOLIC BLOOD PRESSURE: 81 MMHG | SYSTOLIC BLOOD PRESSURE: 163 MMHG

## 2019-01-01 VITALS
DIASTOLIC BLOOD PRESSURE: 50 MMHG | RESPIRATION RATE: 15 BRPM | BODY MASS INDEX: 23.7 KG/M2 | TEMPERATURE: 97.5 F | SYSTOLIC BLOOD PRESSURE: 100 MMHG | HEART RATE: 76 BPM | WEIGHT: 175 LBS | HEIGHT: 72 IN

## 2019-01-01 DIAGNOSIS — M48.062 SPINAL STENOSIS OF LUMBAR REGION WITH NEUROGENIC CLAUDICATION: ICD-10-CM

## 2019-01-01 DIAGNOSIS — J96.01 ACUTE HYPOXEMIC RESPIRATORY FAILURE (HCC): ICD-10-CM

## 2019-01-01 DIAGNOSIS — N17.9 AKI (ACUTE KIDNEY INJURY) (HCC): ICD-10-CM

## 2019-01-01 DIAGNOSIS — Z93.59 SUPRAPUBIC CATHETER (HCC): ICD-10-CM

## 2019-01-01 DIAGNOSIS — G93.40 ACUTE ENCEPHALOPATHY: ICD-10-CM

## 2019-01-01 DIAGNOSIS — N18.4 ANEMIA OF CHRONIC RENAL FAILURE, STAGE 4 (SEVERE) (HCC): ICD-10-CM

## 2019-01-01 DIAGNOSIS — E11.42 DIABETIC POLYNEUROPATHY ASSOCIATED WITH TYPE 2 DIABETES MELLITUS (HCC): ICD-10-CM

## 2019-01-01 DIAGNOSIS — E78.5 TYPE 2 DIABETES MELLITUS WITH HYPERLIPIDEMIA (HCC): Primary | ICD-10-CM

## 2019-01-01 DIAGNOSIS — N18.4 STAGE 4 CHRONIC KIDNEY DISEASE (HCC): Primary | ICD-10-CM

## 2019-01-01 DIAGNOSIS — N31.9 NEUROGENIC BLADDER: ICD-10-CM

## 2019-01-01 DIAGNOSIS — B18.2 CHRONIC HEPATITIS C WITHOUT HEPATIC COMA (HCC): ICD-10-CM

## 2019-01-01 DIAGNOSIS — J18.9 PNEUMONIA: ICD-10-CM

## 2019-01-01 DIAGNOSIS — G92.9 TOXIC ENCEPHALOPATHY: ICD-10-CM

## 2019-01-01 DIAGNOSIS — Z79.4 TYPE 2 DIABETES MELLITUS WITH DIABETIC POLYNEUROPATHY, WITH LONG-TERM CURRENT USE OF INSULIN (HCC): Primary | ICD-10-CM

## 2019-01-01 DIAGNOSIS — N18.30 CHRONIC KIDNEY DISEASE, STAGE 3 (HCC): ICD-10-CM

## 2019-01-01 DIAGNOSIS — F17.200 SMOKING: ICD-10-CM

## 2019-01-01 DIAGNOSIS — G62.9 NEUROPATHY: ICD-10-CM

## 2019-01-01 DIAGNOSIS — N17.9 ACUTE KIDNEY INJURY SUPERIMPOSED ON CKD (HCC): ICD-10-CM

## 2019-01-01 DIAGNOSIS — J18.9 PNEUMONIA DUE TO INFECTIOUS ORGANISM, UNSPECIFIED LATERALITY, UNSPECIFIED PART OF LUNG: Primary | ICD-10-CM

## 2019-01-01 DIAGNOSIS — I10 ESSENTIAL HYPERTENSION: ICD-10-CM

## 2019-01-01 DIAGNOSIS — R33.9 RETENTION OF URINE: Primary | ICD-10-CM

## 2019-01-01 DIAGNOSIS — E11.69 TYPE 2 DIABETES MELLITUS WITH HYPERLIPIDEMIA (HCC): Primary | ICD-10-CM

## 2019-01-01 DIAGNOSIS — E78.00 HYPERCHOLESTEROLEMIA: ICD-10-CM

## 2019-01-01 DIAGNOSIS — E11.21 DIABETIC NEPHROPATHY ASSOCIATED WITH TYPE 2 DIABETES MELLITUS (HCC): ICD-10-CM
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
## 2019-01-01 DIAGNOSIS — N18.4 CKD (CHRONIC KIDNEY DISEASE) STAGE 4, GFR 15-29 ML/MIN (HCC): ICD-10-CM

## 2019-01-01 DIAGNOSIS — G89.4 CHRONIC PAIN SYNDROME: Primary | ICD-10-CM

## 2019-01-01 DIAGNOSIS — N25.81 SECONDARY HYPERPARATHYROIDISM OF RENAL ORIGIN (HCC): ICD-10-CM

## 2019-01-01 DIAGNOSIS — G60.0 CHARCOT-MARIE-TOOTH DISEASE: ICD-10-CM

## 2019-01-01 DIAGNOSIS — D64.9 ANEMIA, UNSPECIFIED TYPE: ICD-10-CM

## 2019-01-01 DIAGNOSIS — E11.42 TYPE 2 DIABETES MELLITUS WITH DIABETIC POLYNEUROPATHY, WITH LONG-TERM CURRENT USE OF INSULIN (HCC): Primary | ICD-10-CM

## 2019-01-01 DIAGNOSIS — D63.1 ANEMIA OF CHRONIC RENAL FAILURE, STAGE 4 (SEVERE) (HCC): ICD-10-CM

## 2019-01-01 DIAGNOSIS — G47.00 INSOMNIA, UNSPECIFIED TYPE: ICD-10-CM

## 2019-01-01 DIAGNOSIS — T50.901A OVERDOSE: Primary | ICD-10-CM

## 2019-01-01 DIAGNOSIS — N18.9 ACUTE KIDNEY INJURY SUPERIMPOSED ON CKD (HCC): ICD-10-CM

## 2019-01-01 DIAGNOSIS — I12.9 HYPERTENSIVE CHRONIC KIDNEY DISEASE WITH STAGE 1 THROUGH STAGE 4 CHRONIC KIDNEY DISEASE, OR UNSPECIFIED CHRONIC KIDNEY DISEASE: ICD-10-CM

## 2019-01-01 LAB
ABO GROUP BLD: NORMAL
ALBUMIN SERPL BCP-MCNC: 2.5 G/DL (ref 3.5–5)
ALBUMIN SERPL BCP-MCNC: 2.7 G/DL (ref 3.5–5)
ALBUMIN SERPL BCP-MCNC: 3 G/DL (ref 3.5–5)
ALBUMIN SERPL ELPH-MCNC: 3 G/DL (ref 3.5–5)
ALBUMIN SERPL ELPH-MCNC: 47.6 % (ref 52–65)
ALBUMIN UR ELPH-MCNC: 61.7 %
ALP SERPL-CCNC: 64 U/L (ref 46–116)
ALP SERPL-CCNC: 70 U/L (ref 46–116)
ALP SERPL-CCNC: 73 U/L (ref 46–116)
ALPHA1 GLOB MFR UR ELPH: 6.9 %
ALPHA1 GLOB SERPL ELPH-MCNC: 0.36 G/DL (ref 0.1–0.4)
ALPHA1 GLOB SERPL ELPH-MCNC: 5.7 % (ref 2.5–5)
ALPHA2 GLOB MFR UR ELPH: 5.5 %
ALPHA2 GLOB SERPL ELPH-MCNC: 0.79 G/DL (ref 0.4–1.2)
ALPHA2 GLOB SERPL ELPH-MCNC: 12.5 % (ref 7–13)
ALT SERPL W P-5'-P-CCNC: 17 U/L (ref 12–78)
ALT SERPL W P-5'-P-CCNC: 18 U/L (ref 12–78)
ALT SERPL W P-5'-P-CCNC: 21 U/L (ref 12–78)
AMPHETAMINES SERPL QL SCN: NEGATIVE
ANA HOMOGEN SER QL IF: NORMAL
ANA HOMOGEN TITR SER: NORMAL {TITER}
ANION GAP SERPL CALCULATED.3IONS-SCNC: 10 MMOL/L (ref 4–13)
ANION GAP SERPL CALCULATED.3IONS-SCNC: 10 MMOL/L (ref 4–13)
ANION GAP SERPL CALCULATED.3IONS-SCNC: 14 MMOL/L (ref 4–13)
ANION GAP SERPL CALCULATED.3IONS-SCNC: 5 MMOL/L (ref 4–13)
ANION GAP SERPL CALCULATED.3IONS-SCNC: 5 MMOL/L (ref 4–13)
ANION GAP SERPL CALCULATED.3IONS-SCNC: 6 MMOL/L (ref 4–13)
ANION GAP SERPL CALCULATED.3IONS-SCNC: 6 MMOL/L (ref 4–13)
ANION GAP SERPL CALCULATED.3IONS-SCNC: 7 MMOL/L (ref 4–13)
ANION GAP SERPL CALCULATED.3IONS-SCNC: 8 MMOL/L (ref 4–13)
ANION GAP SERPL CALCULATED.3IONS-SCNC: 9 MMOL/L (ref 4–13)
APAP SERPL-MCNC: <2 UG/ML (ref 10–20)
APTT PPP: 36 SECONDS (ref 26–38)
ARTERIAL PATENCY WRIST A: YES
AST SERPL W P-5'-P-CCNC: 17 U/L (ref 5–45)
AST SERPL W P-5'-P-CCNC: 21 U/L (ref 5–45)
AST SERPL W P-5'-P-CCNC: 22 U/L (ref 5–45)
ATRIAL RATE: 110 BPM
ATRIAL RATE: 59 BPM
B-GLOBULIN MFR UR ELPH: 5.6 %
BACTERIA UR CULT: ABNORMAL
BACTERIA UR CULT: ABNORMAL
BACTERIA UR QL AUTO: ABNORMAL /HPF
BARBITURATES UR QL: NEGATIVE
BASE EXCESS BLDA CALC-SCNC: -12.8 MMOL/L
BASE EXCESS BLDA CALC-SCNC: -8.3 MMOL/L
BASE EXCESS BLDA CALC-SCNC: 1.9 MMOL/L
BASOPHILS # BLD AUTO: 0.01 THOUSANDS/ΜL (ref 0–0.1)
BASOPHILS # BLD AUTO: 0.02 THOUSANDS/ΜL (ref 0–0.1)
BASOPHILS # BLD AUTO: 0.03 THOUSANDS/ΜL (ref 0–0.1)
BASOPHILS NFR BLD AUTO: 0 % (ref 0–1)
BENZODIAZ UR QL: NEGATIVE
BETA GLOB ABNORMAL SERPL ELPH-MCNC: 0.3 G/DL (ref 0.4–0.8)
BETA1 GLOB SERPL ELPH-MCNC: 4.7 % (ref 5–13)
BETA2 GLOB SERPL ELPH-MCNC: 6.8 % (ref 2–8)
BETA2+GAMMA GLOB SERPL ELPH-MCNC: 0.43 G/DL (ref 0.2–0.5)
BILIRUB SERPL-MCNC: 0.31 MG/DL (ref 0.2–1)
BILIRUB SERPL-MCNC: 0.33 MG/DL (ref 0.2–1)
BILIRUB SERPL-MCNC: 0.34 MG/DL (ref 0.2–1)
BILIRUB UR QL STRIP: NEGATIVE
BLD GP AB SCN SERPL QL: NEGATIVE
BUN SERPL-MCNC: 36 MG/DL (ref 5–25)
BUN SERPL-MCNC: 36 MG/DL (ref 5–25)
BUN SERPL-MCNC: 38 MG/DL (ref 5–25)
BUN SERPL-MCNC: 39 MG/DL (ref 5–25)
BUN SERPL-MCNC: 43 MG/DL (ref 5–25)
BUN SERPL-MCNC: 44 MG/DL (ref 5–25)
BUN SERPL-MCNC: 44 MG/DL (ref 5–25)
BUN SERPL-MCNC: 45 MG/DL (ref 5–25)
BUN SERPL-MCNC: 46 MG/DL (ref 5–25)
BUN SERPL-MCNC: 48 MG/DL (ref 5–25)
BUN SERPL-MCNC: 49 MG/DL (ref 5–25)
BUN SERPL-MCNC: 49 MG/DL (ref 5–25)
BUN SERPL-MCNC: 50 MG/DL (ref 5–25)
BUN SERPL-MCNC: 51 MG/DL (ref 5–25)
BUN SERPL-MCNC: 51 MG/DL (ref 5–25)
BUN SERPL-MCNC: 56 MG/DL (ref 5–25)
BUN SERPL-MCNC: 57 MG/DL (ref 5–25)
BUN SERPL-MCNC: 57 MG/DL (ref 5–25)
C-ANCA TITR SER IF: NORMAL TITER
C3 SERPL-MCNC: 84.6 MG/DL (ref 90–180)
C4 SERPL-MCNC: 27 MG/DL (ref 10–40)
CA-I BLD-SCNC: 1.08 MMOL/L (ref 1.12–1.32)
CA-I BLD-SCNC: 1.13 MMOL/L (ref 1.12–1.32)
CA-I BLD-SCNC: 1.14 MMOL/L (ref 1.12–1.32)
CALCIUM SERPL-MCNC: 8.2 MG/DL (ref 8.3–10.1)
CALCIUM SERPL-MCNC: 8.3 MG/DL (ref 8.3–10.1)
CALCIUM SERPL-MCNC: 8.4 MG/DL (ref 8.3–10.1)
CALCIUM SERPL-MCNC: 8.6 MG/DL (ref 8.3–10.1)
CALCIUM SERPL-MCNC: 8.7 MG/DL (ref 8.3–10.1)
CALCIUM SERPL-MCNC: 8.8 MG/DL (ref 8.3–10.1)
CALCIUM SERPL-MCNC: 8.9 MG/DL (ref 8.3–10.1)
CALCIUM SERPL-MCNC: 9 MG/DL (ref 8.3–10.1)
CALCIUM SERPL-MCNC: 9.1 MG/DL (ref 8.3–10.1)
CALCIUM SERPL-MCNC: 9.2 MG/DL (ref 8.3–10.1)
CALCIUM SERPL-MCNC: 9.2 MG/DL (ref 8.3–10.1)
CALCIUM SERPL-MCNC: 9.3 MG/DL (ref 8.3–10.1)
CALCIUM SERPL-MCNC: 9.5 MG/DL (ref 8.3–10.1)
CALCIUM SERPL-MCNC: 9.6 MG/DL (ref 8.3–10.1)
CHLORIDE SERPL-SCNC: 102 MMOL/L (ref 100–108)
CHLORIDE SERPL-SCNC: 103 MMOL/L (ref 100–108)
CHLORIDE SERPL-SCNC: 104 MMOL/L (ref 100–108)
CHLORIDE SERPL-SCNC: 105 MMOL/L (ref 100–108)
CHLORIDE SERPL-SCNC: 106 MMOL/L (ref 100–108)
CHLORIDE SERPL-SCNC: 107 MMOL/L (ref 100–108)
CHLORIDE SERPL-SCNC: 107 MMOL/L (ref 100–108)
CHLORIDE SERPL-SCNC: 108 MMOL/L (ref 100–108)
CLARITY UR: ABNORMAL
CLARITY UR: CLEAR
CLARITY UR: CLEAR
CO2 SERPL-SCNC: 19 MMOL/L (ref 21–32)
CO2 SERPL-SCNC: 22 MMOL/L (ref 21–32)
CO2 SERPL-SCNC: 23 MMOL/L (ref 21–32)
CO2 SERPL-SCNC: 24 MMOL/L (ref 21–32)
CO2 SERPL-SCNC: 25 MMOL/L (ref 21–32)
CO2 SERPL-SCNC: 26 MMOL/L (ref 21–32)
CO2 SERPL-SCNC: 27 MMOL/L (ref 21–32)
CO2 SERPL-SCNC: 28 MMOL/L (ref 21–32)
CO2 SERPL-SCNC: 29 MMOL/L (ref 21–32)
COCAINE UR QL: NEGATIVE
COLOR UR: YELLOW
CREAT SERPL-MCNC: 2.47 MG/DL (ref 0.6–1.3)
CREAT SERPL-MCNC: 2.58 MG/DL (ref 0.6–1.3)
CREAT SERPL-MCNC: 2.59 MG/DL (ref 0.6–1.3)
CREAT SERPL-MCNC: 2.62 MG/DL (ref 0.6–1.3)
CREAT SERPL-MCNC: 2.63 MG/DL (ref 0.6–1.3)
CREAT SERPL-MCNC: 2.63 MG/DL (ref 0.6–1.3)
CREAT SERPL-MCNC: 2.67 MG/DL (ref 0.6–1.3)
CREAT SERPL-MCNC: 2.67 MG/DL (ref 0.6–1.3)
CREAT SERPL-MCNC: 2.8 MG/DL (ref 0.6–1.3)
CREAT SERPL-MCNC: 2.93 MG/DL (ref 0.6–1.3)
CREAT SERPL-MCNC: 2.94 MG/DL (ref 0.6–1.3)
CREAT SERPL-MCNC: 2.94 MG/DL (ref 0.6–1.3)
CREAT SERPL-MCNC: 3.03 MG/DL (ref 0.6–1.3)
CREAT SERPL-MCNC: 3.05 MG/DL (ref 0.6–1.3)
CREAT SERPL-MCNC: 3.08 MG/DL (ref 0.6–1.3)
CREAT SERPL-MCNC: 3.11 MG/DL (ref 0.6–1.3)
CREAT SERPL-MCNC: 3.17 MG/DL (ref 0.6–1.3)
CREAT SERPL-MCNC: 3.17 MG/DL (ref 0.6–1.3)
CREAT SERPL-MCNC: 3.53 MG/DL (ref 0.6–1.3)
CREAT SERPL-MCNC: 3.63 MG/DL (ref 0.6–1.3)
CREAT SERPL-MCNC: 3.78 MG/DL (ref 0.6–1.3)
CREAT UR-MCNC: 69.2 MG/DL
CRYOGLOB SER QL 1D COLD INC: NORMAL
EOSINOPHIL # BLD AUTO: 0.19 THOUSAND/ΜL (ref 0–0.61)
EOSINOPHIL # BLD AUTO: 0.2 THOUSAND/ΜL (ref 0–0.61)
EOSINOPHIL # BLD AUTO: 0.24 THOUSAND/ΜL (ref 0–0.61)
EOSINOPHIL NFR BLD AUTO: 2 % (ref 0–6)
EOSINOPHIL NFR BLD AUTO: 2 % (ref 0–6)
EOSINOPHIL NFR BLD AUTO: 3 % (ref 0–6)
ERYTHROCYTE [DISTWIDTH] IN BLOOD BY AUTOMATED COUNT: 13.9 % (ref 11.6–15.1)
ERYTHROCYTE [DISTWIDTH] IN BLOOD BY AUTOMATED COUNT: 13.9 % (ref 11.6–15.1)
ERYTHROCYTE [DISTWIDTH] IN BLOOD BY AUTOMATED COUNT: 14.1 % (ref 11.6–15.1)
ERYTHROCYTE [DISTWIDTH] IN BLOOD BY AUTOMATED COUNT: 14.3 % (ref 11.6–15.1)
ERYTHROCYTE [DISTWIDTH] IN BLOOD BY AUTOMATED COUNT: 14.3 % (ref 11.6–15.1)
ERYTHROCYTE [DISTWIDTH] IN BLOOD BY AUTOMATED COUNT: 14.4 % (ref 11.6–15.1)
ERYTHROCYTE [DISTWIDTH] IN BLOOD BY AUTOMATED COUNT: 14.6 % (ref 11.6–15.1)
ERYTHROCYTE [DISTWIDTH] IN BLOOD BY AUTOMATED COUNT: 14.7 % (ref 11.6–15.1)
EST. AVERAGE GLUCOSE BLD GHB EST-MCNC: 120 MG/DL
ETHANOL SERPL-MCNC: <3 MG/DL (ref 0–3)
ETHYLENE GLYCOL SERPLBLD-MCNC: NEGATIVE UG/ML
FERRITIN SERPL-MCNC: 266 NG/ML (ref 8–388)
GAMMA GLOB ABNORMAL SERPL ELPH-MCNC: 1.43 G/DL (ref 0.5–1.6)
GAMMA GLOB MFR UR ELPH: 20.3 %
GAMMA GLOB SERPL ELPH-MCNC: 22.7 % (ref 12–22)
GBM AB SER IA-ACNC: 25 UNITS (ref 0–20)
GBM AB SER IA-ACNC: 26 UNITS (ref 0–20)
GFR SERPL CREATININE-BSD FRML MDRD: 15 ML/MIN/1.73SQ M
GFR SERPL CREATININE-BSD FRML MDRD: 16 ML/MIN/1.73SQ M
GFR SERPL CREATININE-BSD FRML MDRD: 17 ML/MIN/1.73SQ M
GFR SERPL CREATININE-BSD FRML MDRD: 19 ML/MIN/1.73SQ M
GFR SERPL CREATININE-BSD FRML MDRD: 20 ML/MIN/1.73SQ M
GFR SERPL CREATININE-BSD FRML MDRD: 21 ML/MIN/1.73SQ M
GFR SERPL CREATININE-BSD FRML MDRD: 22 ML/MIN/1.73SQ M
GFR SERPL CREATININE-BSD FRML MDRD: 23 ML/MIN/1.73SQ M
GFR SERPL CREATININE-BSD FRML MDRD: 23 ML/MIN/1.73SQ M
GFR SERPL CREATININE-BSD FRML MDRD: 24 ML/MIN/1.73SQ M
GFR SERPL CREATININE-BSD FRML MDRD: 26 ML/MIN/1.73SQ M
GLUCOSE SERPL-MCNC: 100 MG/DL (ref 65–140)
GLUCOSE SERPL-MCNC: 101 MG/DL (ref 65–140)
GLUCOSE SERPL-MCNC: 102 MG/DL (ref 65–140)
GLUCOSE SERPL-MCNC: 102 MG/DL (ref 65–140)
GLUCOSE SERPL-MCNC: 103 MG/DL (ref 65–140)
GLUCOSE SERPL-MCNC: 104 MG/DL (ref 65–140)
GLUCOSE SERPL-MCNC: 104 MG/DL (ref 65–140)
GLUCOSE SERPL-MCNC: 105 MG/DL (ref 65–140)
GLUCOSE SERPL-MCNC: 106 MG/DL (ref 65–140)
GLUCOSE SERPL-MCNC: 106 MG/DL (ref 65–140)
GLUCOSE SERPL-MCNC: 108 MG/DL (ref 65–140)
GLUCOSE SERPL-MCNC: 108 MG/DL (ref 65–140)
GLUCOSE SERPL-MCNC: 110 MG/DL (ref 65–140)
GLUCOSE SERPL-MCNC: 110 MG/DL (ref 65–140)
GLUCOSE SERPL-MCNC: 112 MG/DL (ref 65–140)
GLUCOSE SERPL-MCNC: 116 MG/DL (ref 65–140)
GLUCOSE SERPL-MCNC: 117 MG/DL (ref 65–140)
GLUCOSE SERPL-MCNC: 121 MG/DL (ref 65–140)
GLUCOSE SERPL-MCNC: 123 MG/DL (ref 65–140)
GLUCOSE SERPL-MCNC: 124 MG/DL (ref 65–140)
GLUCOSE SERPL-MCNC: 124 MG/DL (ref 65–140)
GLUCOSE SERPL-MCNC: 125 MG/DL (ref 65–140)
GLUCOSE SERPL-MCNC: 128 MG/DL (ref 65–140)
GLUCOSE SERPL-MCNC: 128 MG/DL (ref 65–140)
GLUCOSE SERPL-MCNC: 129 MG/DL (ref 65–140)
GLUCOSE SERPL-MCNC: 130 MG/DL (ref 65–140)
GLUCOSE SERPL-MCNC: 131 MG/DL (ref 65–140)
GLUCOSE SERPL-MCNC: 132 MG/DL (ref 65–140)
GLUCOSE SERPL-MCNC: 136 MG/DL (ref 65–140)
GLUCOSE SERPL-MCNC: 137 MG/DL (ref 65–140)
GLUCOSE SERPL-MCNC: 139 MG/DL (ref 65–140)
GLUCOSE SERPL-MCNC: 141 MG/DL (ref 65–140)
GLUCOSE SERPL-MCNC: 143 MG/DL (ref 65–140)
GLUCOSE SERPL-MCNC: 143 MG/DL (ref 65–140)
GLUCOSE SERPL-MCNC: 144 MG/DL (ref 65–140)
GLUCOSE SERPL-MCNC: 144 MG/DL (ref 65–140)
GLUCOSE SERPL-MCNC: 145 MG/DL (ref 65–140)
GLUCOSE SERPL-MCNC: 148 MG/DL (ref 65–140)
GLUCOSE SERPL-MCNC: 148 MG/DL (ref 65–140)
GLUCOSE SERPL-MCNC: 149 MG/DL (ref 65–140)
GLUCOSE SERPL-MCNC: 149 MG/DL (ref 65–140)
GLUCOSE SERPL-MCNC: 151 MG/DL (ref 65–140)
GLUCOSE SERPL-MCNC: 152 MG/DL (ref 65–140)
GLUCOSE SERPL-MCNC: 152 MG/DL (ref 65–140)
GLUCOSE SERPL-MCNC: 153 MG/DL (ref 65–140)
GLUCOSE SERPL-MCNC: 154 MG/DL (ref 65–140)
GLUCOSE SERPL-MCNC: 154 MG/DL (ref 65–140)
GLUCOSE SERPL-MCNC: 156 MG/DL (ref 65–140)
GLUCOSE SERPL-MCNC: 161 MG/DL (ref 65–140)
GLUCOSE SERPL-MCNC: 162 MG/DL (ref 65–140)
GLUCOSE SERPL-MCNC: 166 MG/DL (ref 65–140)
GLUCOSE SERPL-MCNC: 170 MG/DL (ref 65–140)
GLUCOSE SERPL-MCNC: 171 MG/DL (ref 65–140)
GLUCOSE SERPL-MCNC: 171 MG/DL (ref 65–140)
GLUCOSE SERPL-MCNC: 177 MG/DL (ref 65–140)
GLUCOSE SERPL-MCNC: 180 MG/DL (ref 65–140)
GLUCOSE SERPL-MCNC: 184 MG/DL (ref 65–140)
GLUCOSE SERPL-MCNC: 184 MG/DL (ref 65–140)
GLUCOSE SERPL-MCNC: 195 MG/DL (ref 65–140)
GLUCOSE SERPL-MCNC: 197 MG/DL (ref 65–140)
GLUCOSE SERPL-MCNC: 200 MG/DL (ref 65–140)
GLUCOSE SERPL-MCNC: 202 MG/DL (ref 65–140)
GLUCOSE SERPL-MCNC: 223 MG/DL (ref 65–140)
GLUCOSE SERPL-MCNC: 226 MG/DL (ref 65–140)
GLUCOSE SERPL-MCNC: 246 MG/DL (ref 65–140)
GLUCOSE SERPL-MCNC: 285 MG/DL (ref 65–140)
GLUCOSE SERPL-MCNC: 301 MG/DL (ref 65–140)
GLUCOSE SERPL-MCNC: 476 MG/DL (ref 65–140)
GLUCOSE SERPL-MCNC: 49 MG/DL (ref 65–140)
GLUCOSE SERPL-MCNC: 53 MG/DL (ref 65–140)
GLUCOSE SERPL-MCNC: 57 MG/DL (ref 65–140)
GLUCOSE SERPL-MCNC: 84 MG/DL (ref 65–140)
GLUCOSE SERPL-MCNC: 86 MG/DL (ref 65–140)
GLUCOSE SERPL-MCNC: 92 MG/DL (ref 65–140)
GLUCOSE SERPL-MCNC: 92 MG/DL (ref 65–140)
GLUCOSE SERPL-MCNC: 93 MG/DL (ref 65–140)
GLUCOSE SERPL-MCNC: 99 MG/DL (ref 65–140)
GLUCOSE UR STRIP-MCNC: NEGATIVE MG/DL
GLYCOLATE SERPL-MCNC: NEGATIVE
HBA1C MFR BLD: 5.8 % (ref 4.2–6.3)
HCO3 BLDA-SCNC: 15 MMOL/L (ref 22–28)
HCO3 BLDA-SCNC: 18.8 MMOL/L (ref 22–28)
HCO3 BLDA-SCNC: 24.8 MMOL/L (ref 22–28)
HCT VFR BLD AUTO: 20.5 % (ref 36.5–49.3)
HCT VFR BLD AUTO: 22.3 % (ref 36.5–49.3)
HCT VFR BLD AUTO: 25.1 % (ref 36.5–49.3)
HCT VFR BLD AUTO: 25.2 % (ref 36.5–49.3)
HCT VFR BLD AUTO: 25.3 % (ref 36.5–49.3)
HCT VFR BLD AUTO: 25.7 % (ref 36.5–49.3)
HCT VFR BLD AUTO: 26.2 % (ref 36.5–49.3)
HCT VFR BLD AUTO: 26.8 % (ref 36.5–49.3)
HCT VFR BLD AUTO: 27 % (ref 36.5–49.3)
HCT VFR BLD AUTO: 27.4 % (ref 36.5–49.3)
HCT VFR BLD AUTO: 29.7 % (ref 36.5–49.3)
HCT VFR BLD AUTO: 29.9 % (ref 36.5–49.3)
HCT VFR BLD AUTO: 32 % (ref 36.5–49.3)
HCV RNA SERPL NAA+PROBE-ACNC: NORMAL IU/ML
HCV RNA SERPL NAA+PROBE-LOG IU: 6.43 LOG10 IU/ML
HGB BLD-MCNC: 10.2 G/DL (ref 12–17)
HGB BLD-MCNC: 6.6 G/DL (ref 12–17)
HGB BLD-MCNC: 7.2 G/DL (ref 12–17)
HGB BLD-MCNC: 7.7 G/DL (ref 12–17)
HGB BLD-MCNC: 7.9 G/DL (ref 12–17)
HGB BLD-MCNC: 7.9 G/DL (ref 12–17)
HGB BLD-MCNC: 8 G/DL (ref 12–17)
HGB BLD-MCNC: 8.3 G/DL (ref 12–17)
HGB BLD-MCNC: 8.4 G/DL (ref 12–17)
HGB BLD-MCNC: 8.7 G/DL (ref 12–17)
HGB BLD-MCNC: 8.9 G/DL (ref 12–17)
HGB BLD-MCNC: 9.3 G/DL (ref 12–17)
HGB BLD-MCNC: 9.5 G/DL (ref 12–17)
HGB UR QL STRIP.AUTO: ABNORMAL
HGB UR QL STRIP.AUTO: ABNORMAL
HGB UR QL STRIP.AUTO: NEGATIVE
HOROWITZ INDEX BLDA+IHG-RTO: 100 MM[HG]
HOROWITZ INDEX BLDA+IHG-RTO: 40 MM[HG]
HOROWITZ INDEX BLDA+IHG-RTO: 50 MM[HG]
IGG/ALB SER: 0.91 {RATIO} (ref 1.1–1.8)
IMM GRANULOCYTES # BLD AUTO: 0.03 THOUSAND/UL (ref 0–0.2)
IMM GRANULOCYTES # BLD AUTO: 0.03 THOUSAND/UL (ref 0–0.2)
IMM GRANULOCYTES # BLD AUTO: 0.04 THOUSAND/UL (ref 0–0.2)
IMM GRANULOCYTES NFR BLD AUTO: 0 % (ref 0–2)
INR PPP: 1.01 (ref 0.86–1.17)
INR PPP: 1.1 (ref 0.86–1.17)
IRON SATN MFR SERPL: 13 %
IRON SERPL-MCNC: 23 UG/DL (ref 65–175)
IRON SERPL-MCNC: 26 UG/DL (ref 65–175)
KAPPA LC FREE SER-MCNC: 128.6 MG/L (ref 3.3–19.4)
KAPPA LC FREE/LAMBDA FREE SER: 1.19 {RATIO} (ref 0.26–1.65)
KETONES UR STRIP-MCNC: NEGATIVE MG/DL
LACTATE SERPL-SCNC: 2.8 MMOL/L (ref 0.5–2)
LAMBDA LC FREE SERPL-MCNC: 107.7 MG/L (ref 5.7–26.3)
LEUKOCYTE ESTERASE UR QL STRIP: ABNORMAL
LYMPHOCYTES # BLD AUTO: 1.5 THOUSANDS/ΜL (ref 0.6–4.47)
LYMPHOCYTES # BLD AUTO: 1.77 THOUSANDS/ΜL (ref 0.6–4.47)
LYMPHOCYTES # BLD AUTO: 1.91 THOUSANDS/ΜL (ref 0.6–4.47)
LYMPHOCYTES NFR BLD AUTO: 17 % (ref 14–44)
LYMPHOCYTES NFR BLD AUTO: 17 % (ref 14–44)
LYMPHOCYTES NFR BLD AUTO: 21 % (ref 14–44)
MAGNESIUM SERPL-MCNC: 1.8 MG/DL (ref 1.6–2.6)
MAGNESIUM SERPL-MCNC: 1.9 MG/DL (ref 1.6–2.6)
MAGNESIUM SERPL-MCNC: 1.9 MG/DL (ref 1.6–2.6)
MCH RBC QN AUTO: 31.1 PG (ref 26.8–34.3)
MCH RBC QN AUTO: 31.6 PG (ref 26.8–34.3)
MCH RBC QN AUTO: 31.7 PG (ref 26.8–34.3)
MCH RBC QN AUTO: 31.8 PG (ref 26.8–34.3)
MCH RBC QN AUTO: 31.9 PG (ref 26.8–34.3)
MCH RBC QN AUTO: 31.9 PG (ref 26.8–34.3)
MCH RBC QN AUTO: 32.2 PG (ref 26.8–34.3)
MCHC RBC AUTO-ENTMCNC: 30.6 G/DL (ref 31.4–37.4)
MCHC RBC AUTO-ENTMCNC: 30.7 G/DL (ref 31.4–37.4)
MCHC RBC AUTO-ENTMCNC: 31 G/DL (ref 31.4–37.4)
MCHC RBC AUTO-ENTMCNC: 31.2 G/DL (ref 31.4–37.4)
MCHC RBC AUTO-ENTMCNC: 31.9 G/DL (ref 31.4–37.4)
MCHC RBC AUTO-ENTMCNC: 31.9 G/DL (ref 31.4–37.4)
MCHC RBC AUTO-ENTMCNC: 32 G/DL (ref 31.4–37.4)
MCHC RBC AUTO-ENTMCNC: 32.1 G/DL (ref 31.4–37.4)
MCHC RBC AUTO-ENTMCNC: 32.2 G/DL (ref 31.4–37.4)
MCHC RBC AUTO-ENTMCNC: 32.2 G/DL (ref 31.4–37.4)
MCHC RBC AUTO-ENTMCNC: 32.3 G/DL (ref 31.4–37.4)
MCHC RBC AUTO-ENTMCNC: 32.5 G/DL (ref 31.4–37.4)
MCV RBC AUTO: 100 FL (ref 82–98)
MCV RBC AUTO: 100 FL (ref 82–98)
MCV RBC AUTO: 102 FL (ref 82–98)
MCV RBC AUTO: 103 FL (ref 82–98)
MCV RBC AUTO: 104 FL (ref 82–98)
MCV RBC AUTO: 98 FL (ref 82–98)
MCV RBC AUTO: 99 FL (ref 82–98)
METHADONE UR QL: NEGATIVE
MONOCYTES # BLD AUTO: 0.76 THOUSAND/ΜL (ref 0.17–1.22)
MONOCYTES # BLD AUTO: 0.9 THOUSAND/ΜL (ref 0.17–1.22)
MONOCYTES # BLD AUTO: 1.01 THOUSAND/ΜL (ref 0.17–1.22)
MONOCYTES NFR BLD AUTO: 11 % (ref 4–12)
MONOCYTES NFR BLD AUTO: 8 % (ref 4–12)
MONOCYTES NFR BLD AUTO: 9 % (ref 4–12)
MYELOPEROXIDASE AB SER IA-ACNC: <9 U/ML (ref 0–9)
NEUTROPHILS # BLD AUTO: 4.58 THOUSANDS/ΜL (ref 1.85–7.62)
NEUTROPHILS # BLD AUTO: 7.79 THOUSANDS/ΜL (ref 1.85–7.62)
NEUTROPHILS # BLD AUTO: 8.27 THOUSANDS/ΜL (ref 1.85–7.62)
NEUTS SEG NFR BLD AUTO: 65 % (ref 43–75)
NEUTS SEG NFR BLD AUTO: 72 % (ref 43–75)
NEUTS SEG NFR BLD AUTO: 73 % (ref 43–75)
NITRITE UR QL STRIP: NEGATIVE
NON-SQ EPI CELLS URNS QL MICRO: ABNORMAL /HPF
NRBC BLD AUTO-RTO: 0 /100 WBCS
O2 CT BLDA-SCNC: 12 ML/DL (ref 16–23)
O2 CT BLDA-SCNC: 14.9 ML/DL (ref 16–23)
O2 CT BLDA-SCNC: 9.9 ML/DL (ref 16–23)
OPIATES UR QL SCN: POSITIVE
OTHER STN SPEC: ABNORMAL
OXYHGB MFR BLDA: 97.2 % (ref 94–97)
OXYHGB MFR BLDA: 97.5 % (ref 94–97)
OXYHGB MFR BLDA: 99 % (ref 94–97)
P AXIS: 104 DEGREES
P AXIS: 95 DEGREES
P-ANCA ATYPICAL TITR SER IF: NORMAL TITER
P-ANCA TITR SER IF: NORMAL TITER
PCO2 BLDA: 31.2 MM HG (ref 36–44)
PCO2 BLDA: 43.1 MM HG (ref 36–44)
PCO2 BLDA: 45.7 MM HG (ref 36–44)
PCP UR QL: NEGATIVE
PEEP RESPIRATORY: 5 CM[H2O]
PH BLDA: 7.16 [PH] (ref 7.35–7.45)
PH BLDA: 7.23 [PH] (ref 7.35–7.45)
PH BLDA: 7.52 [PH] (ref 7.35–7.45)
PH UR STRIP.AUTO: 6 [PH]
PH UR STRIP.AUTO: 6 [PH]
PH UR STRIP.AUTO: 6 [PH] (ref 4.5–8)
PHOSPHATE SERPL-MCNC: 1.7 MG/DL (ref 2.3–4.1)
PHOSPHATE SERPL-MCNC: 2.8 MG/DL (ref 2.3–4.1)
PHOSPHATE SERPL-MCNC: 3.9 MG/DL (ref 2.3–4.1)
PHOSPHATE SERPL-MCNC: 4 MG/DL (ref 2.3–4.1)
PHOSPHATE SERPL-MCNC: 4.4 MG/DL (ref 2.3–4.1)
PLATELET # BLD AUTO: 125 THOUSANDS/UL (ref 149–390)
PLATELET # BLD AUTO: 219 THOUSANDS/UL (ref 149–390)
PLATELET # BLD AUTO: 220 THOUSANDS/UL (ref 149–390)
PLATELET # BLD AUTO: 236 THOUSANDS/UL (ref 149–390)
PLATELET # BLD AUTO: 241 THOUSANDS/UL (ref 149–390)
PLATELET # BLD AUTO: 242 THOUSANDS/UL (ref 149–390)
PLATELET # BLD AUTO: 250 THOUSANDS/UL (ref 149–390)
PLATELET # BLD AUTO: 263 THOUSANDS/UL (ref 149–390)
PLATELET # BLD AUTO: 269 THOUSANDS/UL (ref 149–390)
PLATELET # BLD AUTO: 272 THOUSANDS/UL (ref 149–390)
PLATELET # BLD AUTO: 286 THOUSANDS/UL (ref 149–390)
PLATELET # BLD AUTO: 300 THOUSANDS/UL (ref 149–390)
PLATELET # BLD AUTO: 304 THOUSANDS/UL (ref 149–390)
PMV BLD AUTO: 10 FL (ref 8.9–12.7)
PMV BLD AUTO: 10.1 FL (ref 8.9–12.7)
PMV BLD AUTO: 10.1 FL (ref 8.9–12.7)
PMV BLD AUTO: 10.3 FL (ref 8.9–12.7)
PMV BLD AUTO: 9.4 FL (ref 8.9–12.7)
PMV BLD AUTO: 9.5 FL (ref 8.9–12.7)
PMV BLD AUTO: 9.6 FL (ref 8.9–12.7)
PMV BLD AUTO: 9.7 FL (ref 8.9–12.7)
PMV BLD AUTO: 9.9 FL (ref 8.9–12.7)
PO2 BLDA: 113.8 MM HG (ref 75–129)
PO2 BLDA: 141.3 MM HG (ref 75–129)
PO2 BLDA: 484.1 MM HG (ref 75–129)
POTASSIUM SERPL-SCNC: 3.4 MMOL/L (ref 3.5–5.3)
POTASSIUM SERPL-SCNC: 3.5 MMOL/L (ref 3.5–5.3)
POTASSIUM SERPL-SCNC: 3.6 MMOL/L (ref 3.5–5.3)
POTASSIUM SERPL-SCNC: 3.7 MMOL/L (ref 3.5–5.3)
POTASSIUM SERPL-SCNC: 3.8 MMOL/L (ref 3.5–5.3)
POTASSIUM SERPL-SCNC: 3.8 MMOL/L (ref 3.5–5.3)
POTASSIUM SERPL-SCNC: 3.9 MMOL/L (ref 3.5–5.3)
POTASSIUM SERPL-SCNC: 4 MMOL/L (ref 3.5–5.3)
POTASSIUM SERPL-SCNC: 4.1 MMOL/L (ref 3.5–5.3)
POTASSIUM SERPL-SCNC: 4.1 MMOL/L (ref 3.5–5.3)
POTASSIUM SERPL-SCNC: 4.2 MMOL/L (ref 3.5–5.3)
POTASSIUM SERPL-SCNC: 4.2 MMOL/L (ref 3.5–5.3)
POTASSIUM SERPL-SCNC: 4.4 MMOL/L (ref 3.5–5.3)
POTASSIUM SERPL-SCNC: 4.5 MMOL/L (ref 3.5–5.3)
POTASSIUM SERPL-SCNC: 4.6 MMOL/L (ref 3.5–5.3)
POTASSIUM SERPL-SCNC: 4.7 MMOL/L (ref 3.5–5.3)
PR INTERVAL: 133 MS
PR INTERVAL: 190 MS
PROCALCITONIN SERPL-MCNC: 0.07 NG/ML
PROT PATTERN SERPL ELPH-IMP: ABNORMAL
PROT PATTERN UR ELPH-IMP: ABNORMAL
PROT SERPL-MCNC: 6.3 G/DL (ref 6.4–8.2)
PROT SERPL-MCNC: 7.1 G/DL (ref 6.4–8.2)
PROT SERPL-MCNC: 7.6 G/DL (ref 6.4–8.2)
PROT SERPL-MCNC: 7.7 G/DL (ref 6.4–8.2)
PROT UR STRIP-MCNC: >=300 MG/DL
PROT UR STRIP-MCNC: ABNORMAL MG/DL
PROT UR STRIP-MCNC: ABNORMAL MG/DL
PROT UR-MCNC: 160 MG/DL
PROT UR-MCNC: 307 MG/DL
PROT/CREAT UR: 4.44 MG/G{CREAT} (ref 0–0.1)
PROTEINASE3 AB SER IA-ACNC: <3.5 U/ML (ref 0–3.5)
PROTHROMBIN TIME: 13.4 SECONDS (ref 11.8–14.2)
PROTHROMBIN TIME: 14.3 SECONDS (ref 11.8–14.2)
PTH-INTACT SERPL-MCNC: 188.1 PG/ML (ref 18.4–80.1)
QRS AXIS: 33 DEGREES
QRS AXIS: 80 DEGREES
QRSD INTERVAL: 154 MS
QRSD INTERVAL: 163 MS
QT INTERVAL: 392 MS
QT INTERVAL: 454 MS
QTC INTERVAL: 449 MS
QTC INTERVAL: 531 MS
RBC # BLD AUTO: 2.08 MILLION/UL (ref 3.88–5.62)
RBC # BLD AUTO: 2.26 MILLION/UL (ref 3.88–5.62)
RBC # BLD AUTO: 2.43 MILLION/UL (ref 3.88–5.62)
RBC # BLD AUTO: 2.5 MILLION/UL (ref 3.88–5.62)
RBC # BLD AUTO: 2.53 MILLION/UL (ref 3.88–5.62)
RBC # BLD AUTO: 2.54 MILLION/UL (ref 3.88–5.62)
RBC # BLD AUTO: 2.63 MILLION/UL (ref 3.88–5.62)
RBC # BLD AUTO: 2.64 MILLION/UL (ref 3.88–5.62)
RBC # BLD AUTO: 2.7 MILLION/UL (ref 3.88–5.62)
RBC # BLD AUTO: 2.79 MILLION/UL (ref 3.88–5.62)
RBC # BLD AUTO: 3.01 MILLION/UL (ref 3.88–5.62)
RBC # BLD AUTO: 3.22 MILLION/UL (ref 3.88–5.62)
RBC #/AREA URNS AUTO: ABNORMAL /HPF
RH BLD: POSITIVE
RYE IGE QN: POSITIVE
SALICYLATES SERPL-MCNC: <3 MG/DL (ref 3–20)
SODIUM SERPL-SCNC: 133 MMOL/L (ref 136–145)
SODIUM SERPL-SCNC: 136 MMOL/L (ref 136–145)
SODIUM SERPL-SCNC: 137 MMOL/L (ref 136–145)
SODIUM SERPL-SCNC: 138 MMOL/L (ref 136–145)
SODIUM SERPL-SCNC: 139 MMOL/L (ref 136–145)
SODIUM SERPL-SCNC: 140 MMOL/L (ref 136–145)
SODIUM SERPL-SCNC: 141 MMOL/L (ref 136–145)
SODIUM SERPL-SCNC: 142 MMOL/L (ref 136–145)
SP GR UR STRIP.AUTO: 1.01 (ref 1–1.03)
SP GR UR STRIP.AUTO: 1.02 (ref 1–1.03)
SP GR UR STRIP.AUTO: 1.02 (ref 1–1.03)
SPECIMEN EXPIRATION DATE: NORMAL
SPECIMEN SOURCE: ABNORMAL
T WAVE AXIS: -16 DEGREES
T WAVE AXIS: 49 DEGREES
TEST INFORMATION: NORMAL
THC UR QL: NEGATIVE
TIBC SERPL-MCNC: 179 UG/DL (ref 250–450)
TROPONIN I SERPL-MCNC: 0.02 NG/ML
TROPONIN I SERPL-MCNC: 0.06 NG/ML
UROBILINOGEN UR QL STRIP.AUTO: 0.2 E.U./DL
VENT AC: 16
VENT AC: 18
VENT AC: 26
VENT- AC: AC
VENTRICULAR RATE: 110 BPM
VENTRICULAR RATE: 59 BPM
VT SETTING VENT: 400 ML
WBC # BLD AUTO: 10.02 THOUSAND/UL (ref 4.31–10.16)
WBC # BLD AUTO: 10.43 THOUSAND/UL (ref 4.31–10.16)
WBC # BLD AUTO: 10.72 THOUSAND/UL (ref 4.31–10.16)
WBC # BLD AUTO: 11.44 THOUSAND/UL (ref 4.31–10.16)
WBC # BLD AUTO: 11.45 THOUSAND/UL (ref 4.31–10.16)
WBC # BLD AUTO: 12.71 THOUSAND/UL (ref 4.31–10.16)
WBC # BLD AUTO: 20.98 THOUSAND/UL (ref 4.31–10.16)
WBC # BLD AUTO: 7.12 THOUSAND/UL (ref 4.31–10.16)
WBC # BLD AUTO: 8.31 THOUSAND/UL (ref 4.31–10.16)
WBC # BLD AUTO: 8.54 THOUSAND/UL (ref 4.31–10.16)
WBC # BLD AUTO: 8.82 THOUSAND/UL (ref 4.31–10.16)
WBC # BLD AUTO: 9.91 THOUSAND/UL (ref 4.31–10.16)
WBC #/AREA URNS AUTO: ABNORMAL /HPF

## 2019-01-01 PROCEDURE — 99214 OFFICE O/P EST MOD 30 MIN: CPT | Performed by: INTERNAL MEDICINE

## 2019-01-01 PROCEDURE — 83550 IRON BINDING TEST: CPT | Performed by: PHYSICIAN ASSISTANT

## 2019-01-01 PROCEDURE — 86255 FLUORESCENT ANTIBODY SCREEN: CPT | Performed by: INTERNAL MEDICINE

## 2019-01-01 PROCEDURE — 84100 ASSAY OF PHOSPHORUS: CPT | Performed by: INTERNAL MEDICINE

## 2019-01-01 PROCEDURE — 85025 COMPLETE CBC W/AUTO DIFF WBC: CPT | Performed by: INTERNAL MEDICINE

## 2019-01-01 PROCEDURE — 80048 BASIC METABOLIC PNL TOTAL CA: CPT | Performed by: PHYSICIAN ASSISTANT

## 2019-01-01 PROCEDURE — 96374 THER/PROPH/DIAG INJ IV PUSH: CPT

## 2019-01-01 PROCEDURE — 82948 REAGENT STRIP/BLOOD GLUCOSE: CPT

## 2019-01-01 PROCEDURE — 80048 BASIC METABOLIC PNL TOTAL CA: CPT | Performed by: INTERNAL MEDICINE

## 2019-01-01 PROCEDURE — 97116 GAIT TRAINING THERAPY: CPT

## 2019-01-01 PROCEDURE — 31500 INSERT EMERGENCY AIRWAY: CPT | Performed by: NURSE PRACTITIONER

## 2019-01-01 PROCEDURE — 88305 TISSUE EXAM BY PATHOLOGIST: CPT | Performed by: INTERNAL MEDICINE

## 2019-01-01 PROCEDURE — 36556 INSERT NON-TUNNEL CV CATH: CPT | Performed by: NURSE PRACTITIONER

## 2019-01-01 PROCEDURE — P9016 RBC LEUKOCYTES REDUCED: HCPCS

## 2019-01-01 PROCEDURE — 99223 1ST HOSP IP/OBS HIGH 75: CPT | Performed by: INTERNAL MEDICINE

## 2019-01-01 PROCEDURE — 85025 COMPLETE CBC W/AUTO DIFF WBC: CPT | Performed by: EMERGENCY MEDICINE

## 2019-01-01 PROCEDURE — 97163 PT EVAL HIGH COMPLEX 45 MIN: CPT

## 2019-01-01 PROCEDURE — 99232 SBSQ HOSP IP/OBS MODERATE 35: CPT | Performed by: INTERNAL MEDICINE

## 2019-01-01 PROCEDURE — 87077 CULTURE AEROBIC IDENTIFY: CPT

## 2019-01-01 PROCEDURE — 72131 CT LUMBAR SPINE W/O DYE: CPT

## 2019-01-01 PROCEDURE — 99284 EMERGENCY DEPT VISIT MOD MDM: CPT | Performed by: PHYSICIAN ASSISTANT

## 2019-01-01 PROCEDURE — 82330 ASSAY OF CALCIUM: CPT | Performed by: INTERNAL MEDICINE

## 2019-01-01 PROCEDURE — 85018 HEMOGLOBIN: CPT | Performed by: INTERNAL MEDICINE

## 2019-01-01 PROCEDURE — 88350 IMFLUOR EA ADDL 1ANTB STN PX: CPT | Performed by: INTERNAL MEDICINE

## 2019-01-01 PROCEDURE — 94003 VENT MGMT INPAT SUBQ DAY: CPT

## 2019-01-01 PROCEDURE — 97535 SELF CARE MNGMENT TRAINING: CPT

## 2019-01-01 PROCEDURE — 86900 BLOOD TYPING SEROLOGIC ABO: CPT | Performed by: NURSE PRACTITIONER

## 2019-01-01 PROCEDURE — 86160 COMPLEMENT ANTIGEN: CPT | Performed by: PHYSICIAN ASSISTANT

## 2019-01-01 PROCEDURE — 84484 ASSAY OF TROPONIN QUANT: CPT | Performed by: NURSE PRACTITIONER

## 2019-01-01 PROCEDURE — 77012 CT SCAN FOR NEEDLE BIOPSY: CPT | Performed by: RADIOLOGY

## 2019-01-01 PROCEDURE — 99291 CRITICAL CARE FIRST HOUR: CPT | Performed by: EMERGENCY MEDICINE

## 2019-01-01 PROCEDURE — 83883 ASSAY NEPHELOMETRY NOT SPEC: CPT | Performed by: PHYSICIAN ASSISTANT

## 2019-01-01 PROCEDURE — 99285 EMERGENCY DEPT VISIT HI MDM: CPT

## 2019-01-01 PROCEDURE — 85027 COMPLETE CBC AUTOMATED: CPT | Performed by: INTERNAL MEDICINE

## 2019-01-01 PROCEDURE — 80053 COMPREHEN METABOLIC PANEL: CPT | Performed by: PHYSICIAN ASSISTANT

## 2019-01-01 PROCEDURE — 36620 INSERTION CATHETER ARTERY: CPT

## 2019-01-01 PROCEDURE — 99291 CRITICAL CARE FIRST HOUR: CPT

## 2019-01-01 PROCEDURE — 83735 ASSAY OF MAGNESIUM: CPT | Performed by: INTERNAL MEDICINE

## 2019-01-01 PROCEDURE — 99232 SBSQ HOSP IP/OBS MODERATE 35: CPT | Performed by: NURSE PRACTITIONER

## 2019-01-01 PROCEDURE — 51705 CHANGE OF BLADDER TUBE: CPT | Performed by: UROLOGY

## 2019-01-01 PROCEDURE — 81001 URINALYSIS AUTO W/SCOPE: CPT | Performed by: PHYSICIAN ASSISTANT

## 2019-01-01 PROCEDURE — 97530 THERAPEUTIC ACTIVITIES: CPT

## 2019-01-01 PROCEDURE — 81001 URINALYSIS AUTO W/SCOPE: CPT | Performed by: INTERNAL MEDICINE

## 2019-01-01 PROCEDURE — 87086 URINE CULTURE/COLONY COUNT: CPT

## 2019-01-01 PROCEDURE — 82570 ASSAY OF URINE CREATININE: CPT | Performed by: INTERNAL MEDICINE

## 2019-01-01 PROCEDURE — 82805 BLOOD GASES W/O2 SATURATION: CPT | Performed by: NURSE PRACTITIONER

## 2019-01-01 PROCEDURE — G8987 SELF CARE CURRENT STATUS: HCPCS

## 2019-01-01 PROCEDURE — 82330 ASSAY OF CALCIUM: CPT | Performed by: NURSE PRACTITIONER

## 2019-01-01 PROCEDURE — 97110 THERAPEUTIC EXERCISES: CPT

## 2019-01-01 PROCEDURE — 87147 CULTURE TYPE IMMUNOLOGIC: CPT

## 2019-01-01 PROCEDURE — 83735 ASSAY OF MAGNESIUM: CPT | Performed by: NURSE PRACTITIONER

## 2019-01-01 PROCEDURE — NC001 PR NO CHARGE: Performed by: NURSE PRACTITIONER

## 2019-01-01 PROCEDURE — 86039 ANTINUCLEAR ANTIBODIES (ANA): CPT | Performed by: INTERNAL MEDICINE

## 2019-01-01 PROCEDURE — 88329 PATH CONSLTJ DRG SURG: CPT | Performed by: PATHOLOGY

## 2019-01-01 PROCEDURE — 80053 COMPREHEN METABOLIC PANEL: CPT | Performed by: EMERGENCY MEDICINE

## 2019-01-01 PROCEDURE — 88300 SURGICAL PATH GROSS: CPT | Performed by: PATHOLOGY

## 2019-01-01 PROCEDURE — 36415 COLL VENOUS BLD VENIPUNCTURE: CPT | Performed by: PHYSICIAN ASSISTANT

## 2019-01-01 PROCEDURE — 80053 COMPREHEN METABOLIC PANEL: CPT | Performed by: NURSE PRACTITIONER

## 2019-01-01 PROCEDURE — 84165 PROTEIN E-PHORESIS SERUM: CPT | Performed by: PHYSICIAN ASSISTANT

## 2019-01-01 PROCEDURE — 84156 ASSAY OF PROTEIN URINE: CPT | Performed by: INTERNAL MEDICINE

## 2019-01-01 PROCEDURE — 76942 ECHO GUIDE FOR BIOPSY: CPT

## 2019-01-01 PROCEDURE — G8978 MOBILITY CURRENT STATUS: HCPCS

## 2019-01-01 PROCEDURE — 82595 ASSAY OF CRYOGLOBULIN: CPT | Performed by: PHYSICIAN ASSISTANT

## 2019-01-01 PROCEDURE — 96376 TX/PRO/DX INJ SAME DRUG ADON: CPT

## 2019-01-01 PROCEDURE — 93010 ELECTROCARDIOGRAM REPORT: CPT | Performed by: INTERNAL MEDICINE

## 2019-01-01 PROCEDURE — 71250 CT THORAX DX C-: CPT

## 2019-01-01 PROCEDURE — 5A1935Z RESPIRATORY VENTILATION, LESS THAN 24 CONSECUTIVE HOURS: ICD-10-PCS | Performed by: INTERNAL MEDICINE

## 2019-01-01 PROCEDURE — 74176 CT ABD & PELVIS W/O CONTRAST: CPT

## 2019-01-01 PROCEDURE — 97112 NEUROMUSCULAR REEDUCATION: CPT

## 2019-01-01 PROCEDURE — 99239 HOSP IP/OBS DSCHRG MGMT >30: CPT | Performed by: INTERNAL MEDICINE

## 2019-01-01 PROCEDURE — 85610 PROTHROMBIN TIME: CPT | Performed by: EMERGENCY MEDICINE

## 2019-01-01 PROCEDURE — 84166 PROTEIN E-PHORESIS/URINE/CSF: CPT | Performed by: PATHOLOGY

## 2019-01-01 PROCEDURE — 84145 PROCALCITONIN (PCT): CPT | Performed by: INTERNAL MEDICINE

## 2019-01-01 PROCEDURE — 90945 DIALYSIS ONE EVALUATION: CPT

## 2019-01-01 PROCEDURE — 99152 MOD SED SAME PHYS/QHP 5/>YRS: CPT | Performed by: RADIOLOGY

## 2019-01-01 PROCEDURE — 71045 X-RAY EXAM CHEST 1 VIEW: CPT

## 2019-01-01 PROCEDURE — 80320 DRUG SCREEN QUANTALCOHOLS: CPT | Performed by: EMERGENCY MEDICINE

## 2019-01-01 PROCEDURE — 83540 ASSAY OF IRON: CPT | Performed by: NURSE PRACTITIONER

## 2019-01-01 PROCEDURE — 99153 MOD SED SAME PHYS/QHP EA: CPT

## 2019-01-01 PROCEDURE — 93005 ELECTROCARDIOGRAM TRACING: CPT

## 2019-01-01 PROCEDURE — 84484 ASSAY OF TROPONIN QUANT: CPT | Performed by: EMERGENCY MEDICINE

## 2019-01-01 PROCEDURE — 90945 DIALYSIS ONE EVALUATION: CPT | Performed by: INTERNAL MEDICINE

## 2019-01-01 PROCEDURE — 82693 ASSAY OF ETHYLENE GLYCOL: CPT | Performed by: NURSE PRACTITIONER

## 2019-01-01 PROCEDURE — 0BH17EZ INSERTION OF ENDOTRACHEAL AIRWAY INTO TRACHEA, VIA NATURAL OR ARTIFICIAL OPENING: ICD-10-PCS | Performed by: INTERNAL MEDICINE

## 2019-01-01 PROCEDURE — 86923 COMPATIBILITY TEST ELECTRIC: CPT

## 2019-01-01 PROCEDURE — 83970 ASSAY OF PARATHORMONE: CPT | Performed by: PHYSICIAN ASSISTANT

## 2019-01-01 PROCEDURE — 86901 BLOOD TYPING SEROLOGIC RH(D): CPT | Performed by: NURSE PRACTITIONER

## 2019-01-01 PROCEDURE — 99222 1ST HOSP IP/OBS MODERATE 55: CPT | Performed by: INTERNAL MEDICINE

## 2019-01-01 PROCEDURE — 83540 ASSAY OF IRON: CPT | Performed by: PHYSICIAN ASSISTANT

## 2019-01-01 PROCEDURE — 86850 RBC ANTIBODY SCREEN: CPT | Performed by: NURSE PRACTITIONER

## 2019-01-01 PROCEDURE — 5A1D90Z PERFORMANCE OF URINARY FILTRATION, CONTINUOUS, GREATER THAN 18 HOURS PER DAY: ICD-10-PCS | Performed by: INTERNAL MEDICINE

## 2019-01-01 PROCEDURE — 70450 CT HEAD/BRAIN W/O DYE: CPT

## 2019-01-01 PROCEDURE — 76770 US EXAM ABDO BACK WALL COMP: CPT

## 2019-01-01 PROCEDURE — 97167 OT EVAL HIGH COMPLEX 60 MIN: CPT

## 2019-01-01 PROCEDURE — 87077 CULTURE AEROBIC IDENTIFY: CPT | Performed by: INTERNAL MEDICINE

## 2019-01-01 PROCEDURE — 82728 ASSAY OF FERRITIN: CPT | Performed by: PHYSICIAN ASSISTANT

## 2019-01-01 PROCEDURE — 36415 COLL VENOUS BLD VENIPUNCTURE: CPT | Performed by: EMERGENCY MEDICINE

## 2019-01-01 PROCEDURE — 84165 PROTEIN E-PHORESIS SERUM: CPT | Performed by: PATHOLOGY

## 2019-01-01 PROCEDURE — 99291 CRITICAL CARE FIRST HOUR: CPT | Performed by: NURSE PRACTITIONER

## 2019-01-01 PROCEDURE — 80307 DRUG TEST PRSMV CHEM ANLYZR: CPT | Performed by: EMERGENCY MEDICINE

## 2019-01-01 PROCEDURE — 87186 SC STD MICRODIL/AGAR DIL: CPT

## 2019-01-01 PROCEDURE — 30233N1 TRANSFUSION OF NONAUTOLOGOUS RED BLOOD CELLS INTO PERIPHERAL VEIN, PERCUTANEOUS APPROACH: ICD-10-PCS | Performed by: INTERNAL MEDICINE

## 2019-01-01 PROCEDURE — 99449 NTRPROF PH1/NTRNET/EHR 31/>: CPT | Performed by: EMERGENCY MEDICINE

## 2019-01-01 PROCEDURE — G8979 MOBILITY GOAL STATUS: HCPCS

## 2019-01-01 PROCEDURE — 85049 AUTOMATED PLATELET COUNT: CPT | Performed by: INTERNAL MEDICINE

## 2019-01-01 PROCEDURE — 86038 ANTINUCLEAR ANTIBODIES: CPT | Performed by: INTERNAL MEDICINE

## 2019-01-01 PROCEDURE — 83520 IMMUNOASSAY QUANT NOS NONAB: CPT | Performed by: INTERNAL MEDICINE

## 2019-01-01 PROCEDURE — G8988 SELF CARE GOAL STATUS: HCPCS

## 2019-01-01 PROCEDURE — 81001 URINALYSIS AUTO W/SCOPE: CPT

## 2019-01-01 PROCEDURE — 72128 CT CHEST SPINE W/O DYE: CPT

## 2019-01-01 PROCEDURE — 85610 PROTHROMBIN TIME: CPT | Performed by: INTERNAL MEDICINE

## 2019-01-01 PROCEDURE — NC001 PR NO CHARGE: Performed by: EMERGENCY MEDICINE

## 2019-01-01 PROCEDURE — 72100 X-RAY EXAM L-S SPINE 2/3 VWS: CPT

## 2019-01-01 PROCEDURE — 50200 RENAL BIOPSY PERQ: CPT

## 2019-01-01 PROCEDURE — 88348 ELECTRON MICROSCOPY DX: CPT | Performed by: INTERNAL MEDICINE

## 2019-01-01 PROCEDURE — 85027 COMPLETE CBC AUTOMATED: CPT | Performed by: NURSE PRACTITIONER

## 2019-01-01 PROCEDURE — 99291 CRITICAL CARE FIRST HOUR: CPT | Performed by: INTERNAL MEDICINE

## 2019-01-01 PROCEDURE — 84100 ASSAY OF PHOSPHORUS: CPT | Performed by: PHYSICIAN ASSISTANT

## 2019-01-01 PROCEDURE — 85014 HEMATOCRIT: CPT | Performed by: INTERNAL MEDICINE

## 2019-01-01 PROCEDURE — 83036 HEMOGLOBIN GLYCOSYLATED A1C: CPT | Performed by: INTERNAL MEDICINE

## 2019-01-01 PROCEDURE — 50200 RENAL BIOPSY PERQ: CPT | Performed by: RADIOLOGY

## 2019-01-01 PROCEDURE — 84166 PROTEIN E-PHORESIS/URINE/CSF: CPT | Performed by: PHYSICIAN ASSISTANT

## 2019-01-01 PROCEDURE — 77012 CT SCAN FOR NEEDLE BIOPSY: CPT

## 2019-01-01 PROCEDURE — 85730 THROMBOPLASTIN TIME PARTIAL: CPT | Performed by: EMERGENCY MEDICINE

## 2019-01-01 PROCEDURE — 83605 ASSAY OF LACTIC ACID: CPT | Performed by: NURSE PRACTITIONER

## 2019-01-01 PROCEDURE — 0TB03ZX EXCISION OF RIGHT KIDNEY, PERCUTANEOUS APPROACH, DIAGNOSTIC: ICD-10-PCS | Performed by: RADIOLOGY

## 2019-01-01 PROCEDURE — 95816 EEG AWAKE AND DROWSY: CPT | Performed by: PSYCHIATRY & NEUROLOGY

## 2019-01-01 PROCEDURE — 88313 SPECIAL STAINS GROUP 2: CPT | Performed by: INTERNAL MEDICINE

## 2019-01-01 PROCEDURE — 94002 VENT MGMT INPAT INIT DAY: CPT

## 2019-01-01 PROCEDURE — 99222 1ST HOSP IP/OBS MODERATE 55: CPT | Performed by: PSYCHIATRY & NEUROLOGY

## 2019-01-01 PROCEDURE — 99221 1ST HOSP IP/OBS SF/LOW 40: CPT | Performed by: PHYSICIAN ASSISTANT

## 2019-01-01 PROCEDURE — 36600 WITHDRAWAL OF ARTERIAL BLOOD: CPT

## 2019-01-01 PROCEDURE — 96361 HYDRATE IV INFUSION ADD-ON: CPT

## 2019-01-01 PROCEDURE — 02HV33Z INSERTION OF INFUSION DEVICE INTO SUPERIOR VENA CAVA, PERCUTANEOUS APPROACH: ICD-10-PCS | Performed by: INTERNAL MEDICINE

## 2019-01-01 PROCEDURE — 80048 BASIC METABOLIC PNL TOTAL CA: CPT | Performed by: NURSE PRACTITIONER

## 2019-01-01 PROCEDURE — 87086 URINE CULTURE/COLONY COUNT: CPT | Performed by: INTERNAL MEDICINE

## 2019-01-01 PROCEDURE — 80329 ANALGESICS NON-OPIOID 1 OR 2: CPT | Performed by: EMERGENCY MEDICINE

## 2019-01-01 PROCEDURE — 94660 CPAP INITIATION&MGMT: CPT

## 2019-01-01 PROCEDURE — 88346 IMFLUOR 1ST 1ANTB STAIN PX: CPT | Performed by: INTERNAL MEDICINE

## 2019-01-01 PROCEDURE — 87522 HEPATITIS C REVRS TRNSCRPJ: CPT | Performed by: INTERNAL MEDICINE

## 2019-01-01 PROCEDURE — 95816 EEG AWAKE AND DROWSY: CPT

## 2019-01-01 PROCEDURE — 99233 SBSQ HOSP IP/OBS HIGH 50: CPT | Performed by: INTERNAL MEDICINE

## 2019-01-01 PROCEDURE — 99285 EMERGENCY DEPT VISIT HI MDM: CPT | Performed by: PHYSICIAN ASSISTANT

## 2019-01-01 PROCEDURE — 85025 COMPLETE CBC W/AUTO DIFF WBC: CPT | Performed by: PHYSICIAN ASSISTANT

## 2019-01-01 RX ORDER — HYDROMORPHONE HCL/PF 1 MG/ML
0.2 SYRINGE (ML) INJECTION ONCE
Status: DISCONTINUED | OUTPATIENT
Start: 2019-01-01 | End: 2019-01-01

## 2019-01-01 RX ORDER — AMLODIPINE BESYLATE 5 MG/1
5 TABLET ORAL DAILY
Qty: 90 TABLET | Refills: 0 | Status: SHIPPED | OUTPATIENT
Start: 2019-01-01 | End: 2019-01-01 | Stop reason: ALTCHOICE

## 2019-01-01 RX ORDER — DOCUSATE SODIUM 100 MG/1
100 CAPSULE, LIQUID FILLED ORAL 2 TIMES DAILY
Status: DISCONTINUED | OUTPATIENT
Start: 2019-01-01 | End: 2019-01-01 | Stop reason: HOSPADM

## 2019-01-01 RX ORDER — DULOXETIN HYDROCHLORIDE 60 MG/1
60 CAPSULE, DELAYED RELEASE ORAL DAILY
Qty: 90 CAPSULE | Refills: 1 | Status: SHIPPED | OUTPATIENT
Start: 2019-01-01 | End: 2019-01-01 | Stop reason: HOSPADM

## 2019-01-01 RX ORDER — IRON POLYSACCHARIDE COMPLEX 150 MG
150 CAPSULE ORAL DAILY
Status: DISCONTINUED | OUTPATIENT
Start: 2019-01-01 | End: 2019-01-01 | Stop reason: HOSPADM

## 2019-01-01 RX ORDER — BACLOFEN 10 MG/1
10 TABLET ORAL 3 TIMES DAILY PRN
Status: DISCONTINUED | OUTPATIENT
Start: 2019-01-01 | End: 2019-01-01

## 2019-01-01 RX ORDER — OXYCODONE HYDROCHLORIDE 10 MG/1
10 TABLET, FILM COATED, EXTENDED RELEASE ORAL EVERY 12 HOURS SCHEDULED
Qty: 60 TABLET | Refills: 0 | Status: SHIPPED | OUTPATIENT
Start: 2019-01-01 | End: 2019-01-01 | Stop reason: HOSPADM

## 2019-01-01 RX ORDER — HEPARIN SODIUM 5000 [USP'U]/ML
5000 INJECTION, SOLUTION INTRAVENOUS; SUBCUTANEOUS EVERY 8 HOURS SCHEDULED
Status: DISCONTINUED | OUTPATIENT
Start: 2019-01-01 | End: 2019-01-01 | Stop reason: HOSPADM

## 2019-01-01 RX ORDER — POLYVINYL ALCOHOL 14 MG/ML
1 SOLUTION/ DROPS OPHTHALMIC
Qty: 15 ML | Refills: 0
Start: 2019-01-01 | End: 2019-01-01 | Stop reason: HOSPADM

## 2019-01-01 RX ORDER — BACLOFEN 10 MG/1
10 TABLET ORAL 3 TIMES DAILY
COMMUNITY
End: 2019-01-01 | Stop reason: SDUPTHER

## 2019-01-01 RX ORDER — LIDOCAINE 50 MG/G
1 PATCH TOPICAL DAILY
Qty: 30 PATCH | Refills: 0
Start: 2019-01-01 | End: 2019-01-01 | Stop reason: HOSPADM

## 2019-01-01 RX ORDER — POLYETHYLENE GLYCOL 3350 17 G/17G
17 POWDER, FOR SOLUTION ORAL DAILY
Status: DISCONTINUED | OUTPATIENT
Start: 2019-01-01 | End: 2019-01-01 | Stop reason: HOSPADM

## 2019-01-01 RX ORDER — HYDROMORPHONE HYDROCHLORIDE 2 MG/1
2 TABLET ORAL EVERY 6 HOURS PRN
Qty: 30 TABLET | Refills: 0 | Status: SHIPPED | OUTPATIENT
Start: 2019-01-01 | End: 2019-01-01

## 2019-01-01 RX ORDER — GABAPENTIN 300 MG/1
300 CAPSULE ORAL DAILY
Status: DISCONTINUED | OUTPATIENT
Start: 2019-01-01 | End: 2019-01-01

## 2019-01-01 RX ORDER — HYDRALAZINE HYDROCHLORIDE 25 MG/1
25 TABLET, FILM COATED ORAL ONCE
Status: COMPLETED | OUTPATIENT
Start: 2019-01-01 | End: 2019-01-01

## 2019-01-01 RX ORDER — POLYVINYL ALCOHOL 14 MG/ML
1 SOLUTION/ DROPS OPHTHALMIC
Status: DISCONTINUED | OUTPATIENT
Start: 2019-01-01 | End: 2019-01-01

## 2019-01-01 RX ORDER — MAGNESIUM SULFATE 1 G/100ML
1 INJECTION INTRAVENOUS ONCE
Status: COMPLETED | OUTPATIENT
Start: 2019-01-01 | End: 2019-01-01

## 2019-01-01 RX ORDER — LORAZEPAM 2 MG/ML
1 INJECTION INTRAMUSCULAR EVERY 2 HOUR PRN
Status: DISCONTINUED | OUTPATIENT
Start: 2019-01-01 | End: 2019-01-01 | Stop reason: HOSPADM

## 2019-01-01 RX ORDER — LIDOCAINE 50 MG/G
2 PATCH TOPICAL DAILY
Status: DISCONTINUED | OUTPATIENT
Start: 2019-01-01 | End: 2019-01-01

## 2019-01-01 RX ORDER — HYDRALAZINE HYDROCHLORIDE 25 MG/1
25 TABLET, FILM COATED ORAL EVERY 8 HOURS SCHEDULED
Status: DISCONTINUED | OUTPATIENT
Start: 2019-01-01 | End: 2019-01-01

## 2019-01-01 RX ORDER — METOPROLOL SUCCINATE 25 MG/1
25 TABLET, EXTENDED RELEASE ORAL DAILY
Status: DISCONTINUED | OUTPATIENT
Start: 2019-01-01 | End: 2019-01-01 | Stop reason: HOSPADM

## 2019-01-01 RX ORDER — GABAPENTIN 300 MG/1
300 CAPSULE ORAL
Status: DISCONTINUED | OUTPATIENT
Start: 2019-01-01 | End: 2019-01-01

## 2019-01-01 RX ORDER — TRAZODONE HYDROCHLORIDE 50 MG/1
50 TABLET ORAL
Status: DISCONTINUED | OUTPATIENT
Start: 2019-01-01 | End: 2019-01-01

## 2019-01-01 RX ORDER — OXYCODONE HYDROCHLORIDE 10 MG/1
10 TABLET ORAL EVERY 4 HOURS PRN
Status: DISCONTINUED | OUTPATIENT
Start: 2019-01-01 | End: 2019-01-01

## 2019-01-01 RX ORDER — OXYCODONE HYDROCHLORIDE 10 MG/1
TABLET, FILM COATED, EXTENDED RELEASE ORAL
Refills: 0 | COMMUNITY
Start: 2019-01-01 | End: 2019-01-01 | Stop reason: SDUPTHER

## 2019-01-01 RX ORDER — BACLOFEN 10 MG/1
10 TABLET ORAL 3 TIMES DAILY
Qty: 270 TABLET | Refills: 1 | Status: SHIPPED | OUTPATIENT
Start: 2019-01-01 | End: 2019-01-01 | Stop reason: HOSPADM

## 2019-01-01 RX ORDER — HYDROMORPHONE HYDROCHLORIDE 2 MG/1
2 TABLET ORAL EVERY 6 HOURS PRN
Status: DISCONTINUED | OUTPATIENT
Start: 2019-01-01 | End: 2019-01-01 | Stop reason: HOSPADM

## 2019-01-01 RX ORDER — DEXTROSE MONOHYDRATE 25 G/50ML
INJECTION, SOLUTION INTRAVENOUS
Status: COMPLETED
Start: 2019-01-01 | End: 2019-01-01

## 2019-01-01 RX ORDER — HYDROMORPHONE HCL/PF 1 MG/ML
0.5 SYRINGE (ML) INJECTION EVERY 4 HOURS PRN
Status: DISCONTINUED | OUTPATIENT
Start: 2019-01-01 | End: 2019-01-01

## 2019-01-01 RX ORDER — METOPROLOL SUCCINATE 25 MG/1
25 TABLET, EXTENDED RELEASE ORAL DAILY
Refills: 0
Start: 2019-01-01 | End: 2019-01-01 | Stop reason: HOSPADM

## 2019-01-01 RX ORDER — SODIUM CHLORIDE, SODIUM LACTATE, POTASSIUM CHLORIDE, CALCIUM CHLORIDE 600; 310; 30; 20 MG/100ML; MG/100ML; MG/100ML; MG/100ML
125 INJECTION, SOLUTION INTRAVENOUS CONTINUOUS
Status: DISCONTINUED | OUTPATIENT
Start: 2019-01-01 | End: 2019-01-01

## 2019-01-01 RX ORDER — SODIUM CHLORIDE 9 MG/ML
125 INJECTION, SOLUTION INTRAVENOUS CONTINUOUS
Status: DISCONTINUED | OUTPATIENT
Start: 2019-01-01 | End: 2019-01-01

## 2019-01-01 RX ORDER — HYDROMORPHONE HCL/PF 1 MG/ML
1 SYRINGE (ML) INJECTION EVERY 6 HOURS PRN
Status: DISCONTINUED | OUTPATIENT
Start: 2019-01-01 | End: 2019-01-01

## 2019-01-01 RX ORDER — TRAZODONE HYDROCHLORIDE 100 MG/1
100 TABLET ORAL
Qty: 90 TABLET | Refills: 1 | Status: SHIPPED | OUTPATIENT
Start: 2019-01-01 | End: 2019-01-01 | Stop reason: HOSPADM

## 2019-01-01 RX ORDER — MIDAZOLAM HYDROCHLORIDE 1 MG/ML
INJECTION INTRAMUSCULAR; INTRAVENOUS CODE/TRAUMA/SEDATION MEDICATION
Status: COMPLETED | OUTPATIENT
Start: 2019-01-01 | End: 2019-01-01

## 2019-01-01 RX ORDER — MAGNESIUM SULFATE HEPTAHYDRATE 40 MG/ML
2 INJECTION, SOLUTION INTRAVENOUS ONCE
Status: COMPLETED | OUTPATIENT
Start: 2019-01-01 | End: 2019-01-01

## 2019-01-01 RX ORDER — BACLOFEN 10 MG/1
10 TABLET ORAL 3 TIMES DAILY
Status: DISCONTINUED | OUTPATIENT
Start: 2019-01-01 | End: 2019-01-01

## 2019-01-01 RX ORDER — IRON POLYSACCHARIDE COMPLEX 150 MG
150 CAPSULE ORAL DAILY
Refills: 0
Start: 2019-01-01 | End: 2019-01-01 | Stop reason: HOSPADM

## 2019-01-01 RX ORDER — HYDROMORPHONE HCL/PF 1 MG/ML
0.5 SYRINGE (ML) INJECTION
Status: DISCONTINUED | OUTPATIENT
Start: 2019-01-01 | End: 2019-01-01 | Stop reason: HOSPADM

## 2019-01-01 RX ORDER — NALOXONE HYDROCHLORIDE 1 MG/ML
INJECTION INTRAMUSCULAR; INTRAVENOUS; SUBCUTANEOUS
Status: COMPLETED
Start: 2019-01-01 | End: 2019-01-01

## 2019-01-01 RX ORDER — AMLODIPINE BESYLATE 5 MG/1
5 TABLET ORAL DAILY
Status: DISCONTINUED | OUTPATIENT
Start: 2019-01-01 | End: 2019-01-01

## 2019-01-01 RX ORDER — POTASSIUM CHLORIDE 14.9 MG/ML
20 INJECTION INTRAVENOUS ONCE
Status: COMPLETED | OUTPATIENT
Start: 2019-01-01 | End: 2019-01-01

## 2019-01-01 RX ORDER — ACETAMINOPHEN 325 MG/1
975 TABLET ORAL EVERY 8 HOURS SCHEDULED
Status: DISCONTINUED | OUTPATIENT
Start: 2019-01-01 | End: 2019-01-01 | Stop reason: HOSPADM

## 2019-01-01 RX ORDER — NALOXONE HYDROCHLORIDE 1 MG/ML
2 INJECTION INTRAMUSCULAR; INTRAVENOUS; SUBCUTANEOUS ONCE
Status: COMPLETED | OUTPATIENT
Start: 2019-01-01 | End: 2019-01-01

## 2019-01-01 RX ORDER — HYDROMORPHONE HYDROCHLORIDE 2 MG/1
2 TABLET ORAL EVERY 6 HOURS
Status: DISCONTINUED | OUTPATIENT
Start: 2019-01-01 | End: 2019-01-01 | Stop reason: HOSPADM

## 2019-01-01 RX ORDER — NICOTINE 21 MG/24HR
1 PATCH, TRANSDERMAL 24 HOURS TRANSDERMAL DAILY
Status: DISCONTINUED | OUTPATIENT
Start: 2019-01-01 | End: 2019-01-01 | Stop reason: HOSPADM

## 2019-01-01 RX ORDER — HYDROMORPHONE HYDROCHLORIDE 2 MG/1
2 TABLET ORAL EVERY 6 HOURS
Status: DISCONTINUED | OUTPATIENT
Start: 2019-01-01 | End: 2019-01-01 | Stop reason: SDUPTHER

## 2019-01-01 RX ORDER — TRAZODONE HYDROCHLORIDE 100 MG/1
100 TABLET ORAL
Status: DISCONTINUED | OUTPATIENT
Start: 2019-01-01 | End: 2019-01-01

## 2019-01-01 RX ORDER — OXYCODONE HYDROCHLORIDE 10 MG/1
20 TABLET ORAL EVERY 4 HOURS PRN
Status: DISCONTINUED | OUTPATIENT
Start: 2019-01-01 | End: 2019-01-01

## 2019-01-01 RX ORDER — HYDROMORPHONE HCL/PF 1 MG/ML
0.5 SYRINGE (ML) INJECTION ONCE
Status: COMPLETED | OUTPATIENT
Start: 2019-01-01 | End: 2019-01-01

## 2019-01-01 RX ORDER — DOCUSATE SODIUM 100 MG/1
100 CAPSULE, LIQUID FILLED ORAL 2 TIMES DAILY
Qty: 10 CAPSULE | Refills: 0 | Status: SHIPPED | OUTPATIENT
Start: 2019-01-01 | End: 2019-01-01 | Stop reason: HOSPADM

## 2019-01-01 RX ORDER — SODIUM CHLORIDE 9 MG/ML
75 INJECTION, SOLUTION INTRAVENOUS CONTINUOUS
Status: DISCONTINUED | OUTPATIENT
Start: 2019-01-01 | End: 2019-01-01

## 2019-01-01 RX ORDER — FENTANYL 12 UG/H
12 PATCH TRANSDERMAL
Status: DISCONTINUED | OUTPATIENT
Start: 2019-01-01 | End: 2019-01-01

## 2019-01-01 RX ORDER — SODIUM CHLORIDE, SODIUM GLUCONATE, SODIUM ACETATE, POTASSIUM CHLORIDE, MAGNESIUM CHLORIDE, SODIUM PHOSPHATE, DIBASIC, AND POTASSIUM PHOSPHATE .53; .5; .37; .037; .03; .012; .00082 G/100ML; G/100ML; G/100ML; G/100ML; G/100ML; G/100ML; G/100ML
100 INJECTION, SOLUTION INTRAVENOUS CONTINUOUS
Status: DISCONTINUED | OUTPATIENT
Start: 2019-01-01 | End: 2019-01-01

## 2019-01-01 RX ORDER — TRAMADOL HYDROCHLORIDE 50 MG/1
25 TABLET ORAL EVERY 12 HOURS
Status: DISCONTINUED | OUTPATIENT
Start: 2019-01-01 | End: 2019-01-01 | Stop reason: HOSPADM

## 2019-01-01 RX ORDER — GABAPENTIN 300 MG/1
600 CAPSULE ORAL 2 TIMES DAILY
Status: DISCONTINUED | OUTPATIENT
Start: 2019-01-01 | End: 2019-01-01

## 2019-01-01 RX ORDER — HYDROMORPHONE HCL/PF 1 MG/ML
1 SYRINGE (ML) INJECTION EVERY 6 HOURS
Status: DISCONTINUED | OUTPATIENT
Start: 2019-01-01 | End: 2019-01-01

## 2019-01-01 RX ORDER — GABAPENTIN 600 MG/1
TABLET ORAL
Qty: 450 TABLET | Refills: 0 | Status: SHIPPED | OUTPATIENT
Start: 2019-01-01 | End: 2019-01-01 | Stop reason: SDUPTHER

## 2019-01-01 RX ORDER — HYDRALAZINE HYDROCHLORIDE 50 MG/1
50 TABLET, FILM COATED ORAL EVERY 8 HOURS SCHEDULED
Refills: 0
Start: 2019-01-01 | End: 2019-01-01 | Stop reason: HOSPADM

## 2019-01-01 RX ORDER — GABAPENTIN 300 MG/1
300 CAPSULE ORAL 2 TIMES DAILY
Status: DISCONTINUED | OUTPATIENT
Start: 2019-01-01 | End: 2019-01-01 | Stop reason: HOSPADM

## 2019-01-01 RX ORDER — DULOXETIN HYDROCHLORIDE 60 MG/1
60 CAPSULE, DELAYED RELEASE ORAL DAILY
Status: DISCONTINUED | OUTPATIENT
Start: 2019-01-01 | End: 2019-01-01 | Stop reason: HOSPADM

## 2019-01-01 RX ORDER — GABAPENTIN 300 MG/1
900 CAPSULE ORAL
Status: DISCONTINUED | OUTPATIENT
Start: 2019-01-01 | End: 2019-01-01

## 2019-01-01 RX ORDER — FENTANYL 25 UG/H
25 PATCH TRANSDERMAL
Status: DISCONTINUED | OUTPATIENT
Start: 2019-01-01 | End: 2019-01-01

## 2019-01-01 RX ORDER — FENTANYL CITRATE 50 UG/ML
INJECTION, SOLUTION INTRAMUSCULAR; INTRAVENOUS CODE/TRAUMA/SEDATION MEDICATION
Status: COMPLETED | OUTPATIENT
Start: 2019-01-01 | End: 2019-01-01

## 2019-01-01 RX ORDER — ONDANSETRON 2 MG/ML
4 INJECTION INTRAMUSCULAR; INTRAVENOUS EVERY 6 HOURS PRN
Status: DISCONTINUED | OUTPATIENT
Start: 2019-01-01 | End: 2019-01-01 | Stop reason: HOSPADM

## 2019-01-01 RX ORDER — SODIUM CHLORIDE 9 MG/ML
100 INJECTION, SOLUTION INTRAVENOUS CONTINUOUS
Status: DISPENSED | OUTPATIENT
Start: 2019-01-01 | End: 2019-01-01

## 2019-01-01 RX ORDER — HEPARIN SODIUM 5000 [USP'U]/ML
5000 INJECTION, SOLUTION INTRAVENOUS; SUBCUTANEOUS EVERY 8 HOURS SCHEDULED
Status: DISCONTINUED | OUTPATIENT
Start: 2019-01-01 | End: 2019-01-01

## 2019-01-01 RX ORDER — SODIUM CHLORIDE, SODIUM LACTATE, POTASSIUM CHLORIDE, CALCIUM CHLORIDE 600; 310; 30; 20 MG/100ML; MG/100ML; MG/100ML; MG/100ML
1000 INJECTION, SOLUTION INTRAVENOUS ONCE
Status: COMPLETED | OUTPATIENT
Start: 2019-01-01 | End: 2019-01-01

## 2019-01-01 RX ORDER — HYDRALAZINE HYDROCHLORIDE 20 MG/ML
10 INJECTION INTRAMUSCULAR; INTRAVENOUS EVERY 6 HOURS PRN
Status: DISCONTINUED | OUTPATIENT
Start: 2019-01-01 | End: 2019-01-01

## 2019-01-01 RX ORDER — GABAPENTIN 300 MG/1
600 CAPSULE ORAL 2 TIMES DAILY
Status: COMPLETED | OUTPATIENT
Start: 2019-01-01 | End: 2019-01-01

## 2019-01-01 RX ORDER — INSULIN ASPART 100 [IU]/ML
20 INJECTION, SUSPENSION SUBCUTANEOUS DAILY
Refills: 0
Start: 2019-01-01 | End: 2019-01-01 | Stop reason: HOSPADM

## 2019-01-01 RX ORDER — NALOXONE HYDROCHLORIDE 1 MG/ML
1 INJECTION INTRAMUSCULAR; INTRAVENOUS; SUBCUTANEOUS ONCE
Status: COMPLETED | OUTPATIENT
Start: 2019-01-01 | End: 2019-01-01

## 2019-01-01 RX ORDER — HYDRALAZINE HYDROCHLORIDE 20 MG/ML
INJECTION INTRAMUSCULAR; INTRAVENOUS CODE/TRAUMA/SEDATION MEDICATION
Status: COMPLETED | OUTPATIENT
Start: 2019-01-01 | End: 2019-01-01

## 2019-01-01 RX ORDER — SODIUM CHLORIDE 9 MG/ML
50 INJECTION, SOLUTION INTRAVENOUS CONTINUOUS
Status: DISCONTINUED | OUTPATIENT
Start: 2019-01-01 | End: 2019-01-01

## 2019-01-01 RX ORDER — HYDRALAZINE HYDROCHLORIDE 20 MG/ML
5 INJECTION INTRAMUSCULAR; INTRAVENOUS EVERY 6 HOURS PRN
Status: DISCONTINUED | OUTPATIENT
Start: 2019-01-01 | End: 2019-01-01

## 2019-01-01 RX ORDER — INSULIN ASPART 100 [IU]/ML
10 INJECTION, SUSPENSION SUBCUTANEOUS
Status: DISCONTINUED | OUTPATIENT
Start: 2019-01-01 | End: 2019-01-01

## 2019-01-01 RX ORDER — SIMVASTATIN 40 MG
40 TABLET ORAL
COMMUNITY
End: 2019-01-01 | Stop reason: HOSPADM

## 2019-01-01 RX ORDER — OXYCODONE HCL 10 MG/1
10 TABLET, FILM COATED, EXTENDED RELEASE ORAL EVERY 12 HOURS SCHEDULED
Qty: 20 TABLET | Refills: 0 | Status: SHIPPED | OUTPATIENT
Start: 2019-01-01 | End: 2019-01-01

## 2019-01-01 RX ORDER — AMLODIPINE BESYLATE 5 MG/1
5 TABLET ORAL 2 TIMES DAILY
Status: DISCONTINUED | OUTPATIENT
Start: 2019-01-01 | End: 2019-01-01 | Stop reason: HOSPADM

## 2019-01-01 RX ORDER — HYDRALAZINE HYDROCHLORIDE 25 MG/1
50 TABLET, FILM COATED ORAL EVERY 8 HOURS SCHEDULED
Status: DISCONTINUED | OUTPATIENT
Start: 2019-01-01 | End: 2019-01-01 | Stop reason: HOSPADM

## 2019-01-01 RX ORDER — INSULIN ASPART 100 [IU]/ML
5 INJECTION, SUSPENSION SUBCUTANEOUS
Status: DISCONTINUED | OUTPATIENT
Start: 2019-01-01 | End: 2019-01-01

## 2019-01-01 RX ORDER — GABAPENTIN 600 MG/1
TABLET ORAL
Qty: 450 TABLET | Refills: 0 | Status: SHIPPED | OUTPATIENT
Start: 2019-01-01 | End: 2019-01-01 | Stop reason: HOSPADM

## 2019-01-01 RX ORDER — ACETAMINOPHEN 325 MG/1
975 TABLET ORAL EVERY 8 HOURS SCHEDULED
Qty: 30 TABLET | Refills: 0 | Status: SHIPPED | OUTPATIENT
Start: 2019-01-01 | End: 2019-01-01 | Stop reason: HOSPADM

## 2019-01-01 RX ORDER — TRAZODONE HYDROCHLORIDE 100 MG/1
100 TABLET ORAL
Status: DISCONTINUED | OUTPATIENT
Start: 2019-01-01 | End: 2019-01-01 | Stop reason: HOSPADM

## 2019-01-01 RX ORDER — POTASSIUM CHLORIDE 29.8 MG/ML
40 INJECTION INTRAVENOUS ONCE
Status: COMPLETED | OUTPATIENT
Start: 2019-01-01 | End: 2019-01-01

## 2019-01-01 RX ORDER — LACTULOSE 20 G/30ML
10 SOLUTION ORAL ONCE
Status: COMPLETED | OUTPATIENT
Start: 2019-01-01 | End: 2019-01-01

## 2019-01-01 RX ORDER — DOPAMINE HYDROCHLORIDE 160 MG/100ML
1-20 INJECTION, SOLUTION INTRAVENOUS CONTINUOUS
Status: DISCONTINUED | OUTPATIENT
Start: 2019-01-01 | End: 2019-01-01

## 2019-01-01 RX ORDER — MORPHINE SULFATE 15 MG/1
15 TABLET, FILM COATED, EXTENDED RELEASE ORAL EVERY 12 HOURS SCHEDULED
Status: DISCONTINUED | OUTPATIENT
Start: 2019-01-01 | End: 2019-01-01

## 2019-01-01 RX ORDER — LORAZEPAM 2 MG/ML
2 INJECTION INTRAMUSCULAR ONCE
Status: COMPLETED | OUTPATIENT
Start: 2019-01-01 | End: 2019-01-01

## 2019-01-01 RX ORDER — POLYVINYL ALCOHOL 14 MG/ML
1 SOLUTION/ DROPS OPHTHALMIC
Status: DISCONTINUED | OUTPATIENT
Start: 2019-01-01 | End: 2019-01-01 | Stop reason: HOSPADM

## 2019-01-01 RX ORDER — NICOTINE 21 MG/24HR
1 PATCH, TRANSDERMAL 24 HOURS TRANSDERMAL DAILY
Qty: 28 PATCH | Refills: 0
Start: 2019-01-01 | End: 2019-01-01

## 2019-01-01 RX ORDER — AMLODIPINE BESYLATE 5 MG/1
5 TABLET ORAL 2 TIMES DAILY
Refills: 0
Start: 2019-01-01 | End: 2019-01-01

## 2019-01-01 RX ORDER — HYDROMORPHONE HCL/PF 1 MG/ML
1 SYRINGE (ML) INJECTION EVERY 8 HOURS
Status: DISCONTINUED | OUTPATIENT
Start: 2019-01-01 | End: 2019-01-01

## 2019-01-01 RX ORDER — BACLOFEN 10 MG/1
10 TABLET ORAL 4 TIMES DAILY
Status: DISCONTINUED | OUTPATIENT
Start: 2019-01-01 | End: 2019-01-01

## 2019-01-01 RX ORDER — DOPAMINE HYDROCHLORIDE 160 MG/100ML
1-20 INJECTION, SOLUTION INTRAVENOUS
Status: DISCONTINUED | OUTPATIENT
Start: 2019-01-01 | End: 2019-01-01

## 2019-01-01 RX ORDER — LIDOCAINE 50 MG/G
1 PATCH TOPICAL DAILY
Status: DISCONTINUED | OUTPATIENT
Start: 2019-01-01 | End: 2019-01-01 | Stop reason: HOSPADM

## 2019-01-01 RX ORDER — GABAPENTIN 300 MG/1
300 CAPSULE ORAL 2 TIMES DAILY
Refills: 0
Start: 2019-01-01 | End: 2019-01-01 | Stop reason: HOSPADM

## 2019-01-01 RX ORDER — HYDROMORPHONE HYDROCHLORIDE 2 MG/1
TABLET ORAL
Refills: 0 | COMMUNITY
Start: 2019-01-01 | End: 2019-01-01 | Stop reason: ALTCHOICE

## 2019-01-01 RX ADMIN — ACETAMINOPHEN 975 MG: 325 TABLET ORAL at 01:18

## 2019-01-01 RX ADMIN — SODIUM CHLORIDE 70 ML/HR: 0.9 INJECTION, SOLUTION INTRAVENOUS at 16:24

## 2019-01-01 RX ADMIN — GABAPENTIN 1800 MG: 400 CAPSULE ORAL at 21:34

## 2019-01-01 RX ADMIN — GABAPENTIN 300 MG: 300 CAPSULE ORAL at 17:30

## 2019-01-01 RX ADMIN — LIDOCAINE 2 PATCH: 50 PATCH TOPICAL at 09:13

## 2019-01-01 RX ADMIN — DOCUSATE SODIUM 100 MG: 100 CAPSULE, LIQUID FILLED ORAL at 17:23

## 2019-01-01 RX ADMIN — HEPARIN SODIUM 5000 UNITS: 5000 INJECTION INTRAVENOUS; SUBCUTANEOUS at 05:00

## 2019-01-01 RX ADMIN — LIDOCAINE 2 PATCH: 50 PATCH TOPICAL at 08:18

## 2019-01-01 RX ADMIN — ACETAMINOPHEN 975 MG: 325 TABLET ORAL at 09:36

## 2019-01-01 RX ADMIN — FENTANYL 25 MCG: 25 PATCH TRANSDERMAL at 12:49

## 2019-01-01 RX ADMIN — DOCUSATE SODIUM 100 MG: 100 CAPSULE, LIQUID FILLED ORAL at 08:29

## 2019-01-01 RX ADMIN — GABAPENTIN 600 MG: 300 CAPSULE ORAL at 04:29

## 2019-01-01 RX ADMIN — INSULIN ASPART 10 UNITS: 100 INJECTION, SUSPENSION SUBCUTANEOUS at 15:56

## 2019-01-01 RX ADMIN — HYDRALAZINE HYDROCHLORIDE 50 MG: 25 TABLET ORAL at 05:54

## 2019-01-01 RX ADMIN — INSULIN ASPART 10 UNITS: 100 INJECTION, SUSPENSION SUBCUTANEOUS at 15:45

## 2019-01-01 RX ADMIN — AMLODIPINE BESYLATE 5 MG: 5 TABLET ORAL at 09:30

## 2019-01-01 RX ADMIN — INSULIN LISPRO 1 UNITS: 100 INJECTION, SOLUTION INTRAVENOUS; SUBCUTANEOUS at 12:20

## 2019-01-01 RX ADMIN — GABAPENTIN 1800 MG: 400 CAPSULE ORAL at 21:52

## 2019-01-01 RX ADMIN — GABAPENTIN 600 MG: 300 CAPSULE ORAL at 08:39

## 2019-01-01 RX ADMIN — OXYCODONE HYDROCHLORIDE 20 MG: 10 TABLET ORAL at 16:17

## 2019-01-01 RX ADMIN — DULOXETINE HYDROCHLORIDE 60 MG: 60 CAPSULE, DELAYED RELEASE ORAL at 08:02

## 2019-01-01 RX ADMIN — INSULIN ASPART 10 UNITS: 100 INJECTION, SUSPENSION SUBCUTANEOUS at 17:03

## 2019-01-01 RX ADMIN — BACLOFEN 10 MG: 10 TABLET ORAL at 08:17

## 2019-01-01 RX ADMIN — HYDRALAZINE HYDROCHLORIDE 25 MG: 25 TABLET ORAL at 05:28

## 2019-01-01 RX ADMIN — NICOTINE 7 MG: 7 PATCH TRANSDERMAL at 18:11

## 2019-01-01 RX ADMIN — GABAPENTIN 600 MG: 300 CAPSULE ORAL at 08:38

## 2019-01-01 RX ADMIN — INSULIN LISPRO 1 UNITS: 100 INJECTION, SOLUTION INTRAVENOUS; SUBCUTANEOUS at 11:38

## 2019-01-01 RX ADMIN — POLYETHYLENE GLYCOL 3350 17 G: 17 POWDER, FOR SOLUTION ORAL at 09:35

## 2019-01-01 RX ADMIN — HEPARIN SODIUM 5000 UNITS: 5000 INJECTION INTRAVENOUS; SUBCUTANEOUS at 21:33

## 2019-01-01 RX ADMIN — DULOXETINE HYDROCHLORIDE 60 MG: 60 CAPSULE, DELAYED RELEASE ORAL at 09:09

## 2019-01-01 RX ADMIN — METOPROLOL SUCCINATE 25 MG: 25 TABLET, EXTENDED RELEASE ORAL at 09:29

## 2019-01-01 RX ADMIN — INSULIN LISPRO 1 UNITS: 100 INJECTION, SOLUTION INTRAVENOUS; SUBCUTANEOUS at 21:04

## 2019-01-01 RX ADMIN — INSULIN ASPART 10 UNITS: 100 INJECTION, SUSPENSION SUBCUTANEOUS at 15:17

## 2019-01-01 RX ADMIN — HYDRALAZINE HYDROCHLORIDE 50 MG: 25 TABLET ORAL at 22:28

## 2019-01-01 RX ADMIN — SODIUM CHLORIDE, SODIUM GLUCONATE, SODIUM ACETATE, POTASSIUM CHLORIDE, MAGNESIUM CHLORIDE, SODIUM PHOSPHATE, DIBASIC, AND POTASSIUM PHOSPHATE 100 ML/HR: .53; .5; .37; .037; .03; .012; .00082 INJECTION, SOLUTION INTRAVENOUS at 02:01

## 2019-01-01 RX ADMIN — OXYCODONE HYDROCHLORIDE 20 MG: 10 TABLET ORAL at 07:36

## 2019-01-01 RX ADMIN — FENTANYL 25 MCG: 25 PATCH TRANSDERMAL at 11:43

## 2019-01-01 RX ADMIN — DULOXETINE HYDROCHLORIDE 60 MG: 60 CAPSULE, DELAYED RELEASE ORAL at 08:28

## 2019-01-01 RX ADMIN — HYDROMORPHONE HYDROCHLORIDE 1 MG: 1 INJECTION, SOLUTION INTRAMUSCULAR; INTRAVENOUS; SUBCUTANEOUS at 04:35

## 2019-01-01 RX ADMIN — INSULIN LISPRO 1 UNITS: 100 INJECTION, SOLUTION INTRAVENOUS; SUBCUTANEOUS at 12:02

## 2019-01-01 RX ADMIN — SODIUM CHLORIDE, PRESERVATIVE FREE 0.04 MG: 5 INJECTION INTRAVENOUS at 09:55

## 2019-01-01 RX ADMIN — DULOXETINE HYDROCHLORIDE 60 MG: 60 CAPSULE, DELAYED RELEASE ORAL at 09:29

## 2019-01-01 RX ADMIN — INSULIN ASPART 10 UNITS: 100 INJECTION, SUSPENSION SUBCUTANEOUS at 09:51

## 2019-01-01 RX ADMIN — GABAPENTIN 600 MG: 300 CAPSULE ORAL at 05:28

## 2019-01-01 RX ADMIN — NICOTINE 1 PATCH: 14 PATCH TRANSDERMAL at 09:30

## 2019-01-01 RX ADMIN — ACETAMINOPHEN 975 MG: 325 TABLET ORAL at 18:00

## 2019-01-01 RX ADMIN — AMLODIPINE BESYLATE 5 MG: 5 TABLET ORAL at 17:30

## 2019-01-01 RX ADMIN — TRAZODONE HYDROCHLORIDE 100 MG: 100 TABLET ORAL at 21:56

## 2019-01-01 RX ADMIN — POLYSACCHARIDE-IRON COMPLEX 150 MG: 150 CAPSULE ORAL at 08:18

## 2019-01-01 RX ADMIN — POLYSACCHARIDE-IRON COMPLEX 150 MG: 150 CAPSULE ORAL at 09:08

## 2019-01-01 RX ADMIN — LIDOCAINE 1 PATCH: 50 PATCH TOPICAL at 08:21

## 2019-01-01 RX ADMIN — HEPARIN SODIUM 5000 UNITS: 5000 INJECTION INTRAVENOUS; SUBCUTANEOUS at 05:15

## 2019-01-01 RX ADMIN — HEPARIN SODIUM 5000 UNITS: 5000 INJECTION INTRAVENOUS; SUBCUTANEOUS at 05:18

## 2019-01-01 RX ADMIN — OXYCODONE HYDROCHLORIDE 20 MG: 10 TABLET ORAL at 03:23

## 2019-01-01 RX ADMIN — POLYETHYLENE GLYCOL 3350 17 G: 17 POWDER, FOR SOLUTION ORAL at 09:37

## 2019-01-01 RX ADMIN — HYDROMORPHONE HYDROCHLORIDE 2 MG: 2 TABLET ORAL at 09:30

## 2019-01-01 RX ADMIN — HYDRALAZINE HYDROCHLORIDE 25 MG: 25 TABLET ORAL at 00:31

## 2019-01-01 RX ADMIN — SODIUM CHLORIDE, SODIUM LACTATE, POTASSIUM CHLORIDE, AND CALCIUM CHLORIDE 1000 ML/HR: .6; .31; .03; .02 INJECTION, SOLUTION INTRAVENOUS at 21:16

## 2019-01-01 RX ADMIN — GABAPENTIN 600 MG: 300 CAPSULE ORAL at 09:09

## 2019-01-01 RX ADMIN — DOCUSATE SODIUM 100 MG: 100 CAPSULE, LIQUID FILLED ORAL at 09:30

## 2019-01-01 RX ADMIN — HEPARIN SODIUM 5000 UNITS: 5000 INJECTION INTRAVENOUS; SUBCUTANEOUS at 13:47

## 2019-01-01 RX ADMIN — INSULIN LISPRO 1 UNITS: 100 INJECTION, SOLUTION INTRAVENOUS; SUBCUTANEOUS at 22:29

## 2019-01-01 RX ADMIN — INSULIN LISPRO 1 UNITS: 100 INJECTION, SOLUTION INTRAVENOUS; SUBCUTANEOUS at 22:36

## 2019-01-01 RX ADMIN — LIDOCAINE 2 PATCH: 50 PATCH TOPICAL at 08:43

## 2019-01-01 RX ADMIN — HYDROMORPHONE HYDROCHLORIDE 1 MG: 1 INJECTION, SOLUTION INTRAMUSCULAR; INTRAVENOUS; SUBCUTANEOUS at 17:20

## 2019-01-01 RX ADMIN — AMLODIPINE BESYLATE 5 MG: 5 TABLET ORAL at 08:17

## 2019-01-01 RX ADMIN — HYDROMORPHONE HYDROCHLORIDE 2 MG: 2 TABLET ORAL at 15:43

## 2019-01-01 RX ADMIN — HYDROMORPHONE HYDROCHLORIDE 1 MG: 1 INJECTION, SOLUTION INTRAMUSCULAR; INTRAVENOUS; SUBCUTANEOUS at 09:36

## 2019-01-01 RX ADMIN — GABAPENTIN 600 MG: 300 CAPSULE ORAL at 05:54

## 2019-01-01 RX ADMIN — INSULIN LISPRO 1 UNITS: 100 INJECTION, SOLUTION INTRAVENOUS; SUBCUTANEOUS at 17:23

## 2019-01-01 RX ADMIN — HEPARIN SODIUM 5000 UNITS: 5000 INJECTION INTRAVENOUS; SUBCUTANEOUS at 21:56

## 2019-01-01 RX ADMIN — HYDRALAZINE HYDROCHLORIDE 5 MG: 20 INJECTION INTRAMUSCULAR; INTRAVENOUS at 12:27

## 2019-01-01 RX ADMIN — HYDRALAZINE HYDROCHLORIDE 50 MG: 25 TABLET ORAL at 13:35

## 2019-01-01 RX ADMIN — INSULIN LISPRO 1 UNITS: 100 INJECTION, SOLUTION INTRAVENOUS; SUBCUTANEOUS at 11:50

## 2019-01-01 RX ADMIN — HEPARIN SODIUM 5000 UNITS: 5000 INJECTION INTRAVENOUS; SUBCUTANEOUS at 22:28

## 2019-01-01 RX ADMIN — CEFTRIAXONE SODIUM 1000 MG: 10 INJECTION, POWDER, FOR SOLUTION INTRAVENOUS at 15:45

## 2019-01-01 RX ADMIN — INSULIN ASPART 10 UNITS: 100 INJECTION, SUSPENSION SUBCUTANEOUS at 08:17

## 2019-01-01 RX ADMIN — INSULIN LISPRO 1 UNITS: 100 INJECTION, SOLUTION INTRAVENOUS; SUBCUTANEOUS at 17:27

## 2019-01-01 RX ADMIN — LIDOCAINE 1 PATCH: 50 PATCH TOPICAL at 09:31

## 2019-01-01 RX ADMIN — CEFTRIAXONE SODIUM 1000 MG: 10 INJECTION, POWDER, FOR SOLUTION INTRAVENOUS at 15:14

## 2019-01-01 RX ADMIN — MORPHINE SULFATE 15 MG: 15 TABLET, EXTENDED RELEASE ORAL at 09:30

## 2019-01-01 RX ADMIN — SODIUM BICARBONATE 125 ML/HR: 84 INJECTION, SOLUTION INTRAVENOUS at 21:25

## 2019-01-01 RX ADMIN — SODIUM CHLORIDE 50 ML/HR: 0.9 INJECTION, SOLUTION INTRAVENOUS at 01:54

## 2019-01-01 RX ADMIN — OXYCODONE HYDROCHLORIDE 10 MG: 10 TABLET ORAL at 08:41

## 2019-01-01 RX ADMIN — GABAPENTIN 1800 MG: 400 CAPSULE ORAL at 21:35

## 2019-01-01 RX ADMIN — HYDRALAZINE HYDROCHLORIDE 50 MG: 25 TABLET ORAL at 05:36

## 2019-01-01 RX ADMIN — HYDRALAZINE HYDROCHLORIDE 50 MG: 25 TABLET ORAL at 05:03

## 2019-01-01 RX ADMIN — POLYVINYL ALCOHOL 1 DROP: 14 SOLUTION/ DROPS OPHTHALMIC at 21:11

## 2019-01-01 RX ADMIN — ACETAMINOPHEN 975 MG: 325 TABLET ORAL at 17:33

## 2019-01-01 RX ADMIN — HYDRALAZINE HYDROCHLORIDE 50 MG: 25 TABLET ORAL at 22:58

## 2019-01-01 RX ADMIN — GABAPENTIN 1800 MG: 400 CAPSULE ORAL at 22:02

## 2019-01-01 RX ADMIN — POLYETHYLENE GLYCOL 3350 17 G: 17 POWDER, FOR SOLUTION ORAL at 09:09

## 2019-01-01 RX ADMIN — DOCUSATE SODIUM 100 MG: 100 CAPSULE, LIQUID FILLED ORAL at 08:18

## 2019-01-01 RX ADMIN — POLYSACCHARIDE-IRON COMPLEX 150 MG: 150 CAPSULE ORAL at 08:28

## 2019-01-01 RX ADMIN — DOCUSATE SODIUM 100 MG: 100 CAPSULE, LIQUID FILLED ORAL at 08:41

## 2019-01-01 RX ADMIN — INSULIN ASPART 10 UNITS: 100 INJECTION, SUSPENSION SUBCUTANEOUS at 07:45

## 2019-01-01 RX ADMIN — HYDROMORPHONE HYDROCHLORIDE 1 MG: 1 INJECTION, SOLUTION INTRAMUSCULAR; INTRAVENOUS; SUBCUTANEOUS at 14:59

## 2019-01-01 RX ADMIN — MIDAZOLAM 1 MG: 1 INJECTION INTRAMUSCULAR; INTRAVENOUS at 14:16

## 2019-01-01 RX ADMIN — ACETAMINOPHEN 975 MG: 325 TABLET ORAL at 09:14

## 2019-01-01 RX ADMIN — HEPARIN SODIUM 5000 UNITS: 5000 INJECTION INTRAVENOUS; SUBCUTANEOUS at 14:18

## 2019-01-01 RX ADMIN — HEPARIN SODIUM 5000 UNITS: 5000 INJECTION INTRAVENOUS; SUBCUTANEOUS at 05:39

## 2019-01-01 RX ADMIN — AMLODIPINE BESYLATE 5 MG: 5 TABLET ORAL at 09:35

## 2019-01-01 RX ADMIN — HYDRALAZINE HYDROCHLORIDE 10 MG: 20 INJECTION INTRAMUSCULAR; INTRAVENOUS at 14:20

## 2019-01-01 RX ADMIN — HYDRALAZINE HYDROCHLORIDE 50 MG: 25 TABLET ORAL at 05:46

## 2019-01-01 RX ADMIN — BACLOFEN 10 MG: 10 TABLET ORAL at 09:09

## 2019-01-01 RX ADMIN — NALOXONE HYDROCHLORIDE 2 MG: 1 INJECTION PARENTERAL at 13:27

## 2019-01-01 RX ADMIN — ACETAMINOPHEN 975 MG: 325 TABLET ORAL at 02:41

## 2019-01-01 RX ADMIN — INSULIN LISPRO 1 UNITS: 100 INJECTION, SOLUTION INTRAVENOUS; SUBCUTANEOUS at 21:55

## 2019-01-01 RX ADMIN — POLYETHYLENE GLYCOL 3350 17 G: 17 POWDER, FOR SOLUTION ORAL at 08:39

## 2019-01-01 RX ADMIN — GABAPENTIN 600 MG: 300 CAPSULE ORAL at 08:29

## 2019-01-01 RX ADMIN — GABAPENTIN 300 MG: 300 CAPSULE ORAL at 18:00

## 2019-01-01 RX ADMIN — AMLODIPINE BESYLATE 5 MG: 5 TABLET ORAL at 16:17

## 2019-01-01 RX ADMIN — CEFTRIAXONE SODIUM 1000 MG: 10 INJECTION, POWDER, FOR SOLUTION INTRAVENOUS at 15:03

## 2019-01-01 RX ADMIN — DULOXETINE HYDROCHLORIDE 60 MG: 60 CAPSULE, DELAYED RELEASE ORAL at 08:41

## 2019-01-01 RX ADMIN — SODIUM CHLORIDE 6 UNITS/HR: 9 INJECTION, SOLUTION INTRAVENOUS at 04:16

## 2019-01-01 RX ADMIN — TRAMADOL HYDROCHLORIDE 25 MG: 50 TABLET, COATED ORAL at 12:51

## 2019-01-01 RX ADMIN — LORAZEPAM 2 MG: 2 INJECTION INTRAMUSCULAR; INTRAVENOUS at 23:01

## 2019-01-01 RX ADMIN — HYDROMORPHONE HYDROCHLORIDE 1 MG: 1 INJECTION, SOLUTION INTRAMUSCULAR; INTRAVENOUS; SUBCUTANEOUS at 22:08

## 2019-01-01 RX ADMIN — FAMOTIDINE 20 MG: 10 INJECTION, SOLUTION INTRAVENOUS at 09:06

## 2019-01-01 RX ADMIN — LIDOCAINE 1 PATCH: 50 PATCH TOPICAL at 09:29

## 2019-01-01 RX ADMIN — TRAZODONE HYDROCHLORIDE 100 MG: 100 TABLET ORAL at 21:35

## 2019-01-01 RX ADMIN — LEVETIRACETAM 500 MG: 100 INJECTION, SOLUTION INTRAVENOUS at 23:44

## 2019-01-01 RX ADMIN — ACETAMINOPHEN 975 MG: 325 TABLET ORAL at 02:15

## 2019-01-01 RX ADMIN — SODIUM CHLORIDE 1000 ML: 0.9 INJECTION, SOLUTION INTRAVENOUS at 10:24

## 2019-01-01 RX ADMIN — DULOXETINE HYDROCHLORIDE 60 MG: 60 CAPSULE, DELAYED RELEASE ORAL at 08:18

## 2019-01-01 RX ADMIN — BACLOFEN 10 MG: 10 TABLET ORAL at 09:36

## 2019-01-01 RX ADMIN — DOCUSATE SODIUM 100 MG: 100 CAPSULE, LIQUID FILLED ORAL at 09:12

## 2019-01-01 RX ADMIN — MORPHINE SULFATE 15 MG: 15 TABLET, EXTENDED RELEASE ORAL at 21:35

## 2019-01-01 RX ADMIN — HEPARIN SODIUM 5000 UNITS: 5000 INJECTION INTRAVENOUS; SUBCUTANEOUS at 22:35

## 2019-01-01 RX ADMIN — MORPHINE SULFATE 15 MG: 15 TABLET, EXTENDED RELEASE ORAL at 20:41

## 2019-01-01 RX ADMIN — SODIUM CHLORIDE 70 ML/HR: 0.9 INJECTION, SOLUTION INTRAVENOUS at 04:10

## 2019-01-01 RX ADMIN — HYDRALAZINE HYDROCHLORIDE 5 MG: 20 INJECTION INTRAMUSCULAR; INTRAVENOUS at 05:15

## 2019-01-01 RX ADMIN — INSULIN ASPART 10 UNITS: 100 INJECTION, SUSPENSION SUBCUTANEOUS at 16:16

## 2019-01-01 RX ADMIN — ACETAMINOPHEN 975 MG: 325 TABLET ORAL at 03:00

## 2019-01-01 RX ADMIN — OXYCODONE HYDROCHLORIDE 10 MG: 10 TABLET ORAL at 03:11

## 2019-01-01 RX ADMIN — GABAPENTIN 600 MG: 300 CAPSULE ORAL at 09:30

## 2019-01-01 RX ADMIN — SODIUM CHLORIDE 75 ML/HR: 0.9 INJECTION, SOLUTION INTRAVENOUS at 16:59

## 2019-01-01 RX ADMIN — HEPARIN SODIUM 5000 UNITS: 5000 INJECTION INTRAVENOUS; SUBCUTANEOUS at 05:30

## 2019-01-01 RX ADMIN — POLYETHYLENE GLYCOL 3350 17 G: 17 POWDER, FOR SOLUTION ORAL at 08:03

## 2019-01-01 RX ADMIN — LIDOCAINE 2 PATCH: 50 PATCH TOPICAL at 08:28

## 2019-01-01 RX ADMIN — OXYCODONE HYDROCHLORIDE 15 MG: 10 TABLET ORAL at 21:21

## 2019-01-01 RX ADMIN — SODIUM CHLORIDE 75 ML/HR: 0.9 INJECTION, SOLUTION INTRAVENOUS at 23:04

## 2019-01-01 RX ADMIN — INSULIN ASPART 10 UNITS: 100 INJECTION, SUSPENSION SUBCUTANEOUS at 07:34

## 2019-01-01 RX ADMIN — OXYCODONE HYDROCHLORIDE 10 MG: 10 TABLET ORAL at 02:23

## 2019-01-01 RX ADMIN — DOPAMINE HYDROCHLORIDE IN DEXTROSE 5 MCG/KG/MIN: 1.6 INJECTION, SOLUTION INTRAVENOUS at 15:14

## 2019-01-01 RX ADMIN — OXYCODONE HYDROCHLORIDE 15 MG: 10 TABLET ORAL at 23:16

## 2019-01-01 RX ADMIN — GABAPENTIN 600 MG: 300 CAPSULE ORAL at 17:32

## 2019-01-01 RX ADMIN — HYDROMORPHONE HYDROCHLORIDE 2 MG: 2 TABLET ORAL at 20:46

## 2019-01-01 RX ADMIN — HEPARIN SODIUM 5000 UNITS: 5000 INJECTION INTRAVENOUS; SUBCUTANEOUS at 14:20

## 2019-01-01 RX ADMIN — AMLODIPINE BESYLATE 5 MG: 5 TABLET ORAL at 08:13

## 2019-01-01 RX ADMIN — HYDRALAZINE HYDROCHLORIDE 10 MG: 20 INJECTION INTRAMUSCULAR; INTRAVENOUS at 10:14

## 2019-01-01 RX ADMIN — OXYCODONE HYDROCHLORIDE 10 MG: 10 TABLET ORAL at 21:21

## 2019-01-01 RX ADMIN — ACETAMINOPHEN 975 MG: 325 TABLET ORAL at 09:41

## 2019-01-01 RX ADMIN — DOCUSATE SODIUM 100 MG: 100 CAPSULE, LIQUID FILLED ORAL at 16:59

## 2019-01-01 RX ADMIN — AMLODIPINE BESYLATE 5 MG: 5 TABLET ORAL at 17:33

## 2019-01-01 RX ADMIN — HYDRALAZINE HYDROCHLORIDE 5 MG: 20 INJECTION INTRAMUSCULAR; INTRAVENOUS at 06:15

## 2019-01-01 RX ADMIN — NALOXONE HYDROCHLORIDE 1 MG: 1 INJECTION INTRAMUSCULAR; INTRAVENOUS; SUBCUTANEOUS at 10:16

## 2019-01-01 RX ADMIN — HEPARIN SODIUM 5000 UNITS: 5000 INJECTION INTRAVENOUS; SUBCUTANEOUS at 14:56

## 2019-01-01 RX ADMIN — POLYSACCHARIDE-IRON COMPLEX 150 MG: 150 CAPSULE ORAL at 09:30

## 2019-01-01 RX ADMIN — HYDRALAZINE HYDROCHLORIDE 50 MG: 25 TABLET ORAL at 21:19

## 2019-01-01 RX ADMIN — HYDROMORPHONE HYDROCHLORIDE 2 MG: 2 TABLET ORAL at 20:33

## 2019-01-01 RX ADMIN — LIDOCAINE 2 PATCH: 50 PATCH TOPICAL at 09:37

## 2019-01-01 RX ADMIN — INSULIN ASPART 10 UNITS: 100 INJECTION, SUSPENSION SUBCUTANEOUS at 09:08

## 2019-01-01 RX ADMIN — FOMEPIZOLE 900 MG: 1 INJECTION, SOLUTION INTRAVENOUS at 00:08

## 2019-01-01 RX ADMIN — HEPARIN SODIUM 5000 UNITS: 5000 INJECTION INTRAVENOUS; SUBCUTANEOUS at 05:46

## 2019-01-01 RX ADMIN — TRAZODONE HYDROCHLORIDE 100 MG: 100 TABLET ORAL at 21:19

## 2019-01-01 RX ADMIN — GABAPENTIN 600 MG: 300 CAPSULE ORAL at 03:57

## 2019-01-01 RX ADMIN — DOCUSATE SODIUM 100 MG: 100 CAPSULE, LIQUID FILLED ORAL at 09:36

## 2019-01-01 RX ADMIN — HYDROMORPHONE HYDROCHLORIDE 0.5 MG: 1 INJECTION, SOLUTION INTRAMUSCULAR; INTRAVENOUS; SUBCUTANEOUS at 23:58

## 2019-01-01 RX ADMIN — AMLODIPINE BESYLATE 5 MG: 5 TABLET ORAL at 08:41

## 2019-01-01 RX ADMIN — OXYCODONE HYDROCHLORIDE 15 MG: 10 TABLET ORAL at 06:28

## 2019-01-01 RX ADMIN — SODIUM BICARBONATE 50 MEQ: 84 INJECTION, SOLUTION INTRAVENOUS at 21:30

## 2019-01-01 RX ADMIN — GABAPENTIN 600 MG: 300 CAPSULE ORAL at 05:14

## 2019-01-01 RX ADMIN — AMLODIPINE BESYLATE 5 MG: 5 TABLET ORAL at 08:28

## 2019-01-01 RX ADMIN — TRAZODONE HYDROCHLORIDE 100 MG: 100 TABLET ORAL at 20:33

## 2019-01-01 RX ADMIN — IRON SUCROSE 200 MG: 20 INJECTION, SOLUTION INTRAVENOUS at 12:23

## 2019-01-01 RX ADMIN — POLYSACCHARIDE-IRON COMPLEX 150 MG: 150 CAPSULE ORAL at 14:56

## 2019-01-01 RX ADMIN — INSULIN LISPRO 1 UNITS: 100 INJECTION, SOLUTION INTRAVENOUS; SUBCUTANEOUS at 11:55

## 2019-01-01 RX ADMIN — OXYCODONE HYDROCHLORIDE 10 MG: 10 TABLET ORAL at 21:41

## 2019-01-01 RX ADMIN — OXYCODONE HYDROCHLORIDE 15 MG: 10 TABLET ORAL at 06:48

## 2019-01-01 RX ADMIN — HEPARIN SODIUM 5000 UNITS: 5000 INJECTION INTRAVENOUS; SUBCUTANEOUS at 13:25

## 2019-01-01 RX ADMIN — GABAPENTIN 600 MG: 300 CAPSULE ORAL at 08:42

## 2019-01-01 RX ADMIN — BACLOFEN 10 MG: 10 TABLET ORAL at 07:36

## 2019-01-01 RX ADMIN — HEPARIN SODIUM 5000 UNITS: 5000 INJECTION INTRAVENOUS; SUBCUTANEOUS at 21:18

## 2019-01-01 RX ADMIN — TRAZODONE HYDROCHLORIDE 100 MG: 100 TABLET ORAL at 21:21

## 2019-01-01 RX ADMIN — ACETAMINOPHEN 975 MG: 325 TABLET ORAL at 17:20

## 2019-01-01 RX ADMIN — EPINEPHRINE 2 MCG/MIN: 1 INJECTION, SOLUTION, CONCENTRATE INTRAVENOUS at 02:51

## 2019-01-01 RX ADMIN — HYDRALAZINE HYDROCHLORIDE 5 MG: 20 INJECTION INTRAMUSCULAR; INTRAVENOUS at 00:39

## 2019-01-01 RX ADMIN — SODIUM CHLORIDE 70 ML/HR: 0.9 INJECTION, SOLUTION INTRAVENOUS at 13:16

## 2019-01-01 RX ADMIN — HEPARIN SODIUM 5000 UNITS: 5000 INJECTION INTRAVENOUS; SUBCUTANEOUS at 21:58

## 2019-01-01 RX ADMIN — POLYSACCHARIDE-IRON COMPLEX 150 MG: 150 CAPSULE ORAL at 09:37

## 2019-01-01 RX ADMIN — INSULIN LISPRO 1 UNITS: 100 INJECTION, SOLUTION INTRAVENOUS; SUBCUTANEOUS at 11:08

## 2019-01-01 RX ADMIN — HYDRALAZINE HYDROCHLORIDE 50 MG: 25 TABLET ORAL at 21:03

## 2019-01-01 RX ADMIN — BACLOFEN 10 MG: 10 TABLET ORAL at 16:08

## 2019-01-01 RX ADMIN — POLYSACCHARIDE-IRON COMPLEX 150 MG: 150 CAPSULE ORAL at 08:53

## 2019-01-01 RX ADMIN — AMLODIPINE BESYLATE 5 MG: 5 TABLET ORAL at 09:29

## 2019-01-01 RX ADMIN — ACETAMINOPHEN 975 MG: 325 TABLET ORAL at 09:29

## 2019-01-01 RX ADMIN — TRAZODONE HYDROCHLORIDE 100 MG: 100 TABLET ORAL at 21:20

## 2019-01-01 RX ADMIN — POLYETHYLENE GLYCOL 3350 17 G: 17 POWDER, FOR SOLUTION ORAL at 08:43

## 2019-01-01 RX ADMIN — HEPARIN SODIUM 5000 UNITS: 5000 INJECTION INTRAVENOUS; SUBCUTANEOUS at 05:08

## 2019-01-01 RX ADMIN — SODIUM CHLORIDE 1000 ML: 0.9 INJECTION, SOLUTION INTRAVENOUS at 16:27

## 2019-01-01 RX ADMIN — DOCUSATE SODIUM 100 MG: 100 CAPSULE, LIQUID FILLED ORAL at 09:09

## 2019-01-01 RX ADMIN — TRAZODONE HYDROCHLORIDE 100 MG: 100 TABLET ORAL at 21:04

## 2019-01-01 RX ADMIN — NALOXONE HYDROCHLORIDE 2 MG: 1 INJECTION INTRAMUSCULAR; INTRAVENOUS; SUBCUTANEOUS at 12:11

## 2019-01-01 RX ADMIN — INSULIN ASPART 10 UNITS: 100 INJECTION, SUSPENSION SUBCUTANEOUS at 07:32

## 2019-01-01 RX ADMIN — ACETAMINOPHEN 975 MG: 325 TABLET ORAL at 17:35

## 2019-01-01 RX ADMIN — BACLOFEN 10 MG: 10 TABLET ORAL at 21:19

## 2019-01-01 RX ADMIN — INSULIN ASPART 10 UNITS: 100 INJECTION, SUSPENSION SUBCUTANEOUS at 17:52

## 2019-01-01 RX ADMIN — HEPARIN SODIUM 5000 UNITS: 5000 INJECTION INTRAVENOUS; SUBCUTANEOUS at 13:30

## 2019-01-01 RX ADMIN — Medication 20000 ML: at 22:57

## 2019-01-01 RX ADMIN — OXYCODONE HYDROCHLORIDE 15 MG: 10 TABLET ORAL at 11:40

## 2019-01-01 RX ADMIN — DOCUSATE SODIUM 100 MG: 100 CAPSULE, LIQUID FILLED ORAL at 21:34

## 2019-01-01 RX ADMIN — HEPARIN SODIUM 5000 UNITS: 5000 INJECTION INTRAVENOUS; SUBCUTANEOUS at 21:35

## 2019-01-01 RX ADMIN — DOCUSATE SODIUM 100 MG: 100 CAPSULE, LIQUID FILLED ORAL at 09:07

## 2019-01-01 RX ADMIN — AMLODIPINE BESYLATE 5 MG: 5 TABLET ORAL at 09:09

## 2019-01-01 RX ADMIN — FENTANYL 12 MCG: 12 PATCH, EXTENDED RELEASE TRANSDERMAL at 14:35

## 2019-01-01 RX ADMIN — TRAZODONE HYDROCHLORIDE 100 MG: 100 TABLET ORAL at 21:53

## 2019-01-01 RX ADMIN — SODIUM CHLORIDE 100 ML/HR: 0.9 INJECTION, SOLUTION INTRAVENOUS at 02:22

## 2019-01-01 RX ADMIN — MORPHINE SULFATE 2 MG: 2 INJECTION, SOLUTION INTRAMUSCULAR; INTRAVENOUS at 15:09

## 2019-01-01 RX ADMIN — HEPARIN SODIUM 5000 UNITS: 5000 INJECTION INTRAVENOUS; SUBCUTANEOUS at 22:08

## 2019-01-01 RX ADMIN — HYDRALAZINE HYDROCHLORIDE 25 MG: 25 TABLET ORAL at 21:20

## 2019-01-01 RX ADMIN — AMLODIPINE BESYLATE 5 MG: 5 TABLET ORAL at 09:12

## 2019-01-01 RX ADMIN — POLYETHYLENE GLYCOL 3350 17 G: 17 POWDER, FOR SOLUTION ORAL at 08:28

## 2019-01-01 RX ADMIN — HYDRALAZINE HYDROCHLORIDE 50 MG: 25 TABLET ORAL at 22:06

## 2019-01-01 RX ADMIN — AMLODIPINE BESYLATE 5 MG: 5 TABLET ORAL at 18:00

## 2019-01-01 RX ADMIN — DULOXETINE HYDROCHLORIDE 60 MG: 60 CAPSULE, DELAYED RELEASE ORAL at 09:36

## 2019-01-01 RX ADMIN — HEPARIN SODIUM 5000 UNITS: 5000 INJECTION INTRAVENOUS; SUBCUTANEOUS at 13:12

## 2019-01-01 RX ADMIN — SODIUM CHLORIDE 125 ML/HR: 0.9 INJECTION, SOLUTION INTRAVENOUS at 14:35

## 2019-01-01 RX ADMIN — DOCUSATE SODIUM 100 MG: 100 CAPSULE, LIQUID FILLED ORAL at 16:23

## 2019-01-01 RX ADMIN — SODIUM CHLORIDE, SODIUM GLUCONATE, SODIUM ACETATE, POTASSIUM CHLORIDE, MAGNESIUM CHLORIDE, SODIUM PHOSPHATE, DIBASIC, AND POTASSIUM PHOSPHATE 100 ML/HR: .53; .5; .37; .037; .03; .012; .00082 INJECTION, SOLUTION INTRAVENOUS at 14:39

## 2019-01-01 RX ADMIN — LIDOCAINE 2 PATCH: 50 PATCH TOPICAL at 12:30

## 2019-01-01 RX ADMIN — EPINEPHRINE 40 MCG/MIN: 1 INJECTION, SOLUTION, CONCENTRATE INTRAVENOUS at 21:18

## 2019-01-01 RX ADMIN — DOCUSATE SODIUM 100 MG: 100 CAPSULE, LIQUID FILLED ORAL at 17:30

## 2019-01-01 RX ADMIN — POLYSACCHARIDE-IRON COMPLEX 150 MG: 150 CAPSULE ORAL at 09:14

## 2019-01-01 RX ADMIN — GABAPENTIN 600 MG: 300 CAPSULE ORAL at 04:09

## 2019-01-01 RX ADMIN — HEPARIN SODIUM 5000 UNITS: 5000 INJECTION INTRAVENOUS; SUBCUTANEOUS at 06:32

## 2019-01-01 RX ADMIN — POLYETHYLENE GLYCOL 3350 17 G: 17 POWDER, FOR SOLUTION ORAL at 09:30

## 2019-01-01 RX ADMIN — HEPARIN SODIUM 5000 UNITS: 5000 INJECTION INTRAVENOUS; SUBCUTANEOUS at 15:13

## 2019-01-01 RX ADMIN — HYDRALAZINE HYDROCHLORIDE 50 MG: 25 TABLET ORAL at 06:32

## 2019-01-01 RX ADMIN — EPINEPHRINE 2 MCG/MIN: 1 INJECTION, SOLUTION, CONCENTRATE INTRAVENOUS at 17:53

## 2019-01-01 RX ADMIN — METOPROLOL SUCCINATE 25 MG: 25 TABLET, EXTENDED RELEASE ORAL at 11:54

## 2019-01-01 RX ADMIN — OXYCODONE HYDROCHLORIDE 20 MG: 10 TABLET ORAL at 01:21

## 2019-01-01 RX ADMIN — BACLOFEN 10 MG: 10 TABLET ORAL at 11:34

## 2019-01-01 RX ADMIN — TRAMADOL HYDROCHLORIDE 25 MG: 50 TABLET, COATED ORAL at 13:35

## 2019-01-01 RX ADMIN — OXYCODONE HYDROCHLORIDE 20 MG: 10 TABLET ORAL at 13:04

## 2019-01-01 RX ADMIN — BACLOFEN 10 MG: 10 TABLET ORAL at 13:12

## 2019-01-01 RX ADMIN — AMLODIPINE BESYLATE 5 MG: 5 TABLET ORAL at 12:41

## 2019-01-01 RX ADMIN — SODIUM CHLORIDE 125 ML/HR: 0.9 INJECTION, SOLUTION INTRAVENOUS at 18:30

## 2019-01-01 RX ADMIN — OXYCODONE HYDROCHLORIDE 20 MG: 10 TABLET ORAL at 21:19

## 2019-01-01 RX ADMIN — ACETAMINOPHEN 975 MG: 325 TABLET ORAL at 01:51

## 2019-01-01 RX ADMIN — CALCIUM GLUCONATE 1 G: 98 INJECTION, SOLUTION INTRAVENOUS at 06:19

## 2019-01-01 RX ADMIN — DULOXETINE HYDROCHLORIDE 60 MG: 60 CAPSULE, DELAYED RELEASE ORAL at 08:17

## 2019-01-01 RX ADMIN — IRON SUCROSE 300 MG: 20 INJECTION, SOLUTION INTRAVENOUS at 13:37

## 2019-01-01 RX ADMIN — INSULIN LISPRO 1 UNITS: 100 INJECTION, SOLUTION INTRAVENOUS; SUBCUTANEOUS at 12:36

## 2019-01-01 RX ADMIN — LIDOCAINE 2 PATCH: 50 PATCH TOPICAL at 08:52

## 2019-01-01 RX ADMIN — HYDRALAZINE HYDROCHLORIDE 5 MG: 20 INJECTION INTRAMUSCULAR; INTRAVENOUS at 00:25

## 2019-01-01 RX ADMIN — NALOXONE HYDROCHLORIDE 2 MG: 1 INJECTION PARENTERAL at 12:11

## 2019-01-01 RX ADMIN — HEPARIN SODIUM 5000 UNITS: 5000 INJECTION INTRAVENOUS; SUBCUTANEOUS at 21:04

## 2019-01-01 RX ADMIN — TRAZODONE HYDROCHLORIDE 100 MG: 100 TABLET ORAL at 22:29

## 2019-01-01 RX ADMIN — TRAMADOL HYDROCHLORIDE 25 MG: 50 TABLET, COATED ORAL at 02:15

## 2019-01-01 RX ADMIN — POLYETHYLENE GLYCOL 3350 17 G: 17 POWDER, FOR SOLUTION ORAL at 08:18

## 2019-01-01 RX ADMIN — HEPARIN SODIUM 5000 UNITS: 5000 INJECTION INTRAVENOUS; SUBCUTANEOUS at 05:54

## 2019-01-01 RX ADMIN — MAGNESIUM SULFATE HEPTAHYDRATE 2 G: 40 INJECTION, SOLUTION INTRAVENOUS at 23:51

## 2019-01-01 RX ADMIN — OXYCODONE HYDROCHLORIDE 15 MG: 10 TABLET ORAL at 16:36

## 2019-01-01 RX ADMIN — GABAPENTIN 300 MG: 300 CAPSULE ORAL at 21:20

## 2019-01-01 RX ADMIN — DOCUSATE SODIUM 100 MG: 100 CAPSULE, LIQUID FILLED ORAL at 18:00

## 2019-01-01 RX ADMIN — HEPARIN SODIUM 5000 UNITS: 5000 INJECTION INTRAVENOUS; SUBCUTANEOUS at 21:27

## 2019-01-01 RX ADMIN — OXYCODONE HYDROCHLORIDE 10 MG: 10 TABLET ORAL at 08:18

## 2019-01-01 RX ADMIN — DULOXETINE HYDROCHLORIDE 60 MG: 60 CAPSULE, DELAYED RELEASE ORAL at 08:53

## 2019-01-01 RX ADMIN — FENTANYL CITRATE 50 MCG: 50 INJECTION, SOLUTION INTRAMUSCULAR; INTRAVENOUS at 14:12

## 2019-01-01 RX ADMIN — DOCUSATE SODIUM 100 MG: 100 CAPSULE, LIQUID FILLED ORAL at 17:20

## 2019-01-01 RX ADMIN — TRAMADOL HYDROCHLORIDE 25 MG: 50 TABLET, COATED ORAL at 13:46

## 2019-01-01 RX ADMIN — DOCUSATE SODIUM 100 MG: 100 CAPSULE, LIQUID FILLED ORAL at 17:00

## 2019-01-01 RX ADMIN — INSULIN LISPRO 2 UNITS: 100 INJECTION, SOLUTION INTRAVENOUS; SUBCUTANEOUS at 12:22

## 2019-01-01 RX ADMIN — HEPARIN SODIUM 5000 UNITS: 5000 INJECTION INTRAVENOUS; SUBCUTANEOUS at 14:52

## 2019-01-01 RX ADMIN — GABAPENTIN 600 MG: 300 CAPSULE ORAL at 08:02

## 2019-01-01 RX ADMIN — CEFTRIAXONE SODIUM 1000 MG: 10 INJECTION, POWDER, FOR SOLUTION INTRAVENOUS at 15:56

## 2019-01-01 RX ADMIN — POLYSACCHARIDE-IRON COMPLEX 150 MG: 150 CAPSULE ORAL at 08:14

## 2019-01-01 RX ADMIN — SODIUM CHLORIDE 75 ML/HR: 0.9 INJECTION, SOLUTION INTRAVENOUS at 05:15

## 2019-01-01 RX ADMIN — DOCUSATE SODIUM 100 MG: 100 CAPSULE, LIQUID FILLED ORAL at 17:40

## 2019-01-01 RX ADMIN — POLYVINYL ALCOHOL 1 DROP: 14 SOLUTION/ DROPS OPHTHALMIC at 12:25

## 2019-01-01 RX ADMIN — HEPARIN SODIUM 5000 UNITS: 5000 INJECTION INTRAVENOUS; SUBCUTANEOUS at 05:36

## 2019-01-01 RX ADMIN — BACLOFEN 10 MG: 10 TABLET ORAL at 16:17

## 2019-01-01 RX ADMIN — OXYCODONE HYDROCHLORIDE 10 MG: 10 TABLET ORAL at 15:54

## 2019-01-01 RX ADMIN — INSULIN LISPRO 1 UNITS: 100 INJECTION, SOLUTION INTRAVENOUS; SUBCUTANEOUS at 21:37

## 2019-01-01 RX ADMIN — HYDRALAZINE HYDROCHLORIDE 50 MG: 25 TABLET ORAL at 13:36

## 2019-01-01 RX ADMIN — HEPARIN SODIUM 5000 UNITS: 5000 INJECTION INTRAVENOUS; SUBCUTANEOUS at 18:11

## 2019-01-01 RX ADMIN — HYDROMORPHONE HYDROCHLORIDE 0.5 MG: 1 INJECTION, SOLUTION INTRAMUSCULAR; INTRAVENOUS; SUBCUTANEOUS at 03:57

## 2019-01-01 RX ADMIN — HEPARIN SODIUM 5000 UNITS: 5000 INJECTION INTRAVENOUS; SUBCUTANEOUS at 21:52

## 2019-01-01 RX ADMIN — SODIUM PHOSPHATE, MONOBASIC, MONOHYDRATE 9 MMOL: 276; 142 INJECTION, SOLUTION INTRAVENOUS at 07:17

## 2019-01-01 RX ADMIN — CEFTRIAXONE SODIUM 1000 MG: 10 INJECTION, POWDER, FOR SOLUTION INTRAVENOUS at 16:28

## 2019-01-01 RX ADMIN — DOPAMINE HYDROCHLORIDE IN DEXTROSE 5 MCG/KG/MIN: 1.6 INJECTION, SOLUTION INTRAVENOUS at 20:19

## 2019-01-01 RX ADMIN — OXYCODONE HYDROCHLORIDE 20 MG: 10 TABLET ORAL at 17:32

## 2019-01-01 RX ADMIN — DULOXETINE HYDROCHLORIDE 60 MG: 60 CAPSULE, DELAYED RELEASE ORAL at 09:12

## 2019-01-01 RX ADMIN — HYDRALAZINE HYDROCHLORIDE 5 MG: 20 INJECTION INTRAMUSCULAR; INTRAVENOUS at 13:32

## 2019-01-01 RX ADMIN — HYDROMORPHONE HYDROCHLORIDE 1 MG: 1 INJECTION, SOLUTION INTRAMUSCULAR; INTRAVENOUS; SUBCUTANEOUS at 22:01

## 2019-01-01 RX ADMIN — HEPARIN SODIUM 5000 UNITS: 5000 INJECTION INTRAVENOUS; SUBCUTANEOUS at 13:35

## 2019-01-01 RX ADMIN — POLYSACCHARIDE-IRON COMPLEX 150 MG: 150 CAPSULE ORAL at 08:15

## 2019-01-01 RX ADMIN — HYDROMORPHONE HYDROCHLORIDE 0.5 MG: 1 INJECTION, SOLUTION INTRAMUSCULAR; INTRAVENOUS; SUBCUTANEOUS at 14:23

## 2019-01-01 RX ADMIN — HYDRALAZINE HYDROCHLORIDE 50 MG: 25 TABLET ORAL at 05:04

## 2019-01-01 RX ADMIN — HYDRALAZINE HYDROCHLORIDE 50 MG: 25 TABLET ORAL at 13:04

## 2019-01-01 RX ADMIN — TRAMADOL HYDROCHLORIDE 25 MG: 50 TABLET, COATED ORAL at 13:36

## 2019-01-01 RX ADMIN — GABAPENTIN 600 MG: 300 CAPSULE ORAL at 05:12

## 2019-01-01 RX ADMIN — HEPARIN SODIUM 5000 UNITS: 5000 INJECTION INTRAVENOUS; SUBCUTANEOUS at 05:14

## 2019-01-01 RX ADMIN — OXYCODONE HYDROCHLORIDE 10 MG: 10 TABLET ORAL at 08:19

## 2019-01-01 RX ADMIN — SODIUM CHLORIDE 70 ML/HR: 0.9 INJECTION, SOLUTION INTRAVENOUS at 22:02

## 2019-01-01 RX ADMIN — HYDRALAZINE HYDROCHLORIDE 25 MG: 25 TABLET ORAL at 14:35

## 2019-01-01 RX ADMIN — SODIUM CHLORIDE, SODIUM LACTATE, POTASSIUM CHLORIDE, AND CALCIUM CHLORIDE 125 ML/HR: .6; .31; .03; .02 INJECTION, SOLUTION INTRAVENOUS at 07:22

## 2019-01-01 RX ADMIN — HYDRALAZINE HYDROCHLORIDE 50 MG: 25 TABLET ORAL at 13:46

## 2019-01-01 RX ADMIN — HYDRALAZINE HYDROCHLORIDE 50 MG: 25 TABLET ORAL at 05:08

## 2019-01-01 RX ADMIN — NALOXONE HYDROCHLORIDE 1 MG: 1 INJECTION PARENTERAL at 10:16

## 2019-01-01 RX ADMIN — ACETAMINOPHEN 975 MG: 325 TABLET ORAL at 09:35

## 2019-01-01 RX ADMIN — SODIUM CHLORIDE 75 ML/HR: 0.9 INJECTION, SOLUTION INTRAVENOUS at 09:33

## 2019-01-01 RX ADMIN — HYDRALAZINE HYDROCHLORIDE 5 MG: 20 INJECTION INTRAMUSCULAR; INTRAVENOUS at 22:27

## 2019-01-01 RX ADMIN — TRAZODONE HYDROCHLORIDE 100 MG: 100 TABLET ORAL at 22:03

## 2019-01-01 RX ADMIN — HYDRALAZINE HYDROCHLORIDE 50 MG: 25 TABLET ORAL at 22:37

## 2019-01-01 RX ADMIN — OXYCODONE HYDROCHLORIDE 15 MG: 10 TABLET ORAL at 04:29

## 2019-01-01 RX ADMIN — NALOXONE HYDROCHLORIDE 2 MG: 1 INJECTION PARENTERAL at 14:31

## 2019-01-01 RX ADMIN — POLYSACCHARIDE-IRON COMPLEX 150 MG: 150 CAPSULE ORAL at 09:29

## 2019-01-01 RX ADMIN — DOCUSATE SODIUM 100 MG: 100 CAPSULE, LIQUID FILLED ORAL at 08:40

## 2019-01-01 RX ADMIN — LIDOCAINE 1 PATCH: 50 PATCH TOPICAL at 09:12

## 2019-01-01 RX ADMIN — HEPARIN SODIUM 5000 UNITS: 5000 INJECTION INTRAVENOUS; SUBCUTANEOUS at 14:35

## 2019-01-01 RX ADMIN — HEPARIN SODIUM 5000 UNITS: 5000 INJECTION INTRAVENOUS; SUBCUTANEOUS at 22:54

## 2019-01-01 RX ADMIN — INSULIN LISPRO 1 UNITS: 100 INJECTION, SOLUTION INTRAVENOUS; SUBCUTANEOUS at 12:03

## 2019-01-01 RX ADMIN — FOMEPIZOLE 1300 MG: 1 INJECTION, SOLUTION INTRAVENOUS at 18:17

## 2019-01-01 RX ADMIN — CEFTRIAXONE SODIUM 1000 MG: 10 INJECTION, POWDER, FOR SOLUTION INTRAVENOUS at 16:34

## 2019-01-01 RX ADMIN — POLYVINYL ALCOHOL 1 DROP: 14 SOLUTION/ DROPS OPHTHALMIC at 01:09

## 2019-01-01 RX ADMIN — DEXTROSE MONOHYDRATE 50 ML: 25 INJECTION, SOLUTION INTRAVENOUS at 12:05

## 2019-01-01 RX ADMIN — NICOTINE 7 MG: 7 PATCH TRANSDERMAL at 08:49

## 2019-01-01 RX ADMIN — INSULIN LISPRO 2 UNITS: 100 INJECTION, SOLUTION INTRAVENOUS; SUBCUTANEOUS at 21:32

## 2019-01-01 RX ADMIN — LEVETIRACETAM 500 MG: 100 INJECTION, SOLUTION INTRAVENOUS at 09:28

## 2019-01-01 RX ADMIN — LACTULOSE 10 G: 10 SOLUTION ORAL at 12:29

## 2019-01-01 RX ADMIN — EPINEPHRINE 40 MCG/MIN: 1 INJECTION, SOLUTION, CONCENTRATE INTRAVENOUS at 20:05

## 2019-01-01 RX ADMIN — TRAMADOL HYDROCHLORIDE 25 MG: 50 TABLET, COATED ORAL at 13:51

## 2019-01-01 RX ADMIN — Medication 50 MEQ: at 21:30

## 2019-01-01 RX ADMIN — HEPARIN SODIUM 5000 UNITS: 5000 INJECTION INTRAVENOUS; SUBCUTANEOUS at 13:04

## 2019-01-01 RX ADMIN — LIDOCAINE 2 PATCH: 50 PATCH TOPICAL at 08:04

## 2019-01-01 RX ADMIN — OXYCODONE HYDROCHLORIDE 20 MG: 10 TABLET ORAL at 20:45

## 2019-01-01 RX ADMIN — HEPARIN SODIUM 5000 UNITS: 5000 INJECTION INTRAVENOUS; SUBCUTANEOUS at 05:04

## 2019-01-01 RX ADMIN — HEPARIN SODIUM 5000 UNITS: 5000 INJECTION INTRAVENOUS; SUBCUTANEOUS at 13:36

## 2019-01-01 RX ADMIN — HEPARIN SODIUM 5000 UNITS: 5000 INJECTION INTRAVENOUS; SUBCUTANEOUS at 21:20

## 2019-01-01 RX ADMIN — POTASSIUM CHLORIDE 20 MEQ: 200 INJECTION, SOLUTION INTRAVENOUS at 22:41

## 2019-01-01 RX ADMIN — POTASSIUM CHLORIDE 40 MEQ: 400 INJECTION, SOLUTION INTRAVENOUS at 07:59

## 2019-01-01 RX ADMIN — BACLOFEN 10 MG: 10 TABLET ORAL at 13:04

## 2019-01-01 RX ADMIN — OXYCODONE HYDROCHLORIDE 15 MG: 10 TABLET ORAL at 13:16

## 2019-01-01 RX ADMIN — HYDRALAZINE HYDROCHLORIDE 50 MG: 25 TABLET ORAL at 14:06

## 2019-01-01 RX ADMIN — HEPARIN SODIUM 5000 UNITS: 5000 INJECTION INTRAVENOUS; SUBCUTANEOUS at 21:25

## 2019-01-01 RX ADMIN — HYDROMORPHONE HYDROCHLORIDE 2 MG: 2 TABLET ORAL at 03:45

## 2019-01-01 RX ADMIN — TRAZODONE HYDROCHLORIDE 50 MG: 50 TABLET ORAL at 22:54

## 2019-01-01 RX ADMIN — INSULIN ASPART 10 UNITS: 100 INJECTION, SUSPENSION SUBCUTANEOUS at 08:19

## 2019-01-01 RX ADMIN — HYDRALAZINE HYDROCHLORIDE 5 MG: 20 INJECTION INTRAMUSCULAR; INTRAVENOUS at 15:36

## 2019-01-01 RX ADMIN — INSULIN ASPART 10 UNITS: 100 INJECTION, SUSPENSION SUBCUTANEOUS at 15:06

## 2019-01-01 RX ADMIN — HYDROMORPHONE HYDROCHLORIDE 2 MG: 2 TABLET ORAL at 08:18

## 2019-01-01 RX ADMIN — ACETAMINOPHEN 975 MG: 325 TABLET ORAL at 10:30

## 2019-01-01 RX ADMIN — DOCUSATE SODIUM 100 MG: 100 CAPSULE, LIQUID FILLED ORAL at 16:17

## 2019-01-01 RX ADMIN — INSULIN ASPART 10 UNITS: 100 INJECTION, SUSPENSION SUBCUTANEOUS at 08:41

## 2019-01-01 RX ADMIN — SODIUM CHLORIDE 75 ML/HR: 0.9 INJECTION, SOLUTION INTRAVENOUS at 06:43

## 2019-01-01 RX ADMIN — DOCUSATE SODIUM 100 MG: 100 CAPSULE, LIQUID FILLED ORAL at 16:10

## 2019-01-01 RX ADMIN — HEPARIN SODIUM 5000 UNITS: 5000 INJECTION INTRAVENOUS; SUBCUTANEOUS at 22:27

## 2019-01-01 RX ADMIN — EPINEPHRINE 7.5 MCG/MIN: 1 INJECTION, SOLUTION, CONCENTRATE INTRAVENOUS at 12:00

## 2019-01-01 RX ADMIN — DOCUSATE SODIUM 100 MG: 100 CAPSULE, LIQUID FILLED ORAL at 09:29

## 2019-01-01 RX ADMIN — HYDRALAZINE HYDROCHLORIDE 50 MG: 25 TABLET ORAL at 05:11

## 2019-01-01 RX ADMIN — NALOXONE HYDROCHLORIDE 0.4 MG/HR: 1 INJECTION PARENTERAL at 12:39

## 2019-01-01 RX ADMIN — MAGNESIUM SULFATE HEPTAHYDRATE 1 G: 1 INJECTION, SOLUTION INTRAVENOUS at 07:15

## 2019-01-01 RX ADMIN — INSULIN LISPRO 1 UNITS: 100 INJECTION, SOLUTION INTRAVENOUS; SUBCUTANEOUS at 12:17

## 2019-01-01 RX ADMIN — LIDOCAINE 1 PATCH: 50 PATCH TOPICAL at 09:39

## 2019-01-01 RX ADMIN — AMLODIPINE BESYLATE 5 MG: 5 TABLET ORAL at 08:53

## 2019-01-01 RX ADMIN — HEPARIN SODIUM 5000 UNITS: 5000 INJECTION INTRAVENOUS; SUBCUTANEOUS at 05:10

## 2019-01-01 RX ADMIN — TRAMADOL HYDROCHLORIDE 25 MG: 50 TABLET, COATED ORAL at 01:10

## 2019-01-01 RX ADMIN — SODIUM CHLORIDE 75 ML/HR: 0.9 INJECTION, SOLUTION INTRAVENOUS at 17:19

## 2019-01-01 RX ADMIN — DOCUSATE SODIUM 100 MG: 100 CAPSULE, LIQUID FILLED ORAL at 17:51

## 2019-01-01 RX ADMIN — BACLOFEN 10 MG: 10 TABLET ORAL at 19:45

## 2019-01-01 RX ADMIN — TRAZODONE HYDROCHLORIDE 100 MG: 100 TABLET ORAL at 21:58

## 2019-01-01 RX ADMIN — DULOXETINE HYDROCHLORIDE 60 MG: 60 CAPSULE, DELAYED RELEASE ORAL at 08:39

## 2019-01-01 RX ADMIN — OXYCODONE HYDROCHLORIDE 10 MG: 10 TABLET ORAL at 12:24

## 2019-01-01 RX ADMIN — INSULIN ASPART 10 UNITS: 100 INJECTION, SUSPENSION SUBCUTANEOUS at 08:40

## 2019-01-01 RX ADMIN — DULOXETINE HYDROCHLORIDE 60 MG: 60 CAPSULE, DELAYED RELEASE ORAL at 09:30

## 2019-01-01 RX ADMIN — HYDRALAZINE HYDROCHLORIDE 50 MG: 25 TABLET ORAL at 15:16

## 2019-01-01 RX ADMIN — HYDROMORPHONE HYDROCHLORIDE 1 MG: 1 INJECTION, SOLUTION INTRAMUSCULAR; INTRAVENOUS; SUBCUTANEOUS at 04:13

## 2019-01-01 RX ADMIN — HYDROMORPHONE HYDROCHLORIDE 0.5 MG: 1 INJECTION, SOLUTION INTRAMUSCULAR; INTRAVENOUS; SUBCUTANEOUS at 10:16

## 2019-01-01 RX ADMIN — AMLODIPINE BESYLATE 5 MG: 5 TABLET ORAL at 16:23

## 2019-01-01 RX ADMIN — AMLODIPINE BESYLATE 5 MG: 5 TABLET ORAL at 17:39

## 2019-01-01 RX ADMIN — ACETAMINOPHEN 975 MG: 325 TABLET ORAL at 01:11

## 2019-01-01 RX ADMIN — HEPARIN SODIUM 5000 UNITS: 5000 INJECTION INTRAVENOUS; SUBCUTANEOUS at 06:07

## 2019-01-01 RX ADMIN — INSULIN LISPRO 1 UNITS: 100 INJECTION, SOLUTION INTRAVENOUS; SUBCUTANEOUS at 15:57

## 2019-01-01 RX ADMIN — AMLODIPINE BESYLATE 5 MG: 5 TABLET ORAL at 17:19

## 2019-01-01 RX ADMIN — ACETAMINOPHEN 975 MG: 325 TABLET ORAL at 17:30

## 2019-01-01 RX ADMIN — HEPARIN SODIUM 5000 UNITS: 5000 INJECTION INTRAVENOUS; SUBCUTANEOUS at 06:45

## 2019-01-01 RX ADMIN — HEPARIN SODIUM 5000 UNITS: 5000 INJECTION INTRAVENOUS; SUBCUTANEOUS at 05:28

## 2019-01-01 RX ADMIN — OXYCODONE HYDROCHLORIDE 10 MG: 10 TABLET ORAL at 14:55

## 2019-01-01 RX ADMIN — OXYCODONE HYDROCHLORIDE 10 MG: 10 TABLET ORAL at 20:30

## 2019-01-01 RX ADMIN — DOCUSATE SODIUM 100 MG: 100 CAPSULE, LIQUID FILLED ORAL at 08:28

## 2019-01-01 RX ADMIN — HEPARIN SODIUM 5000 UNITS: 5000 INJECTION INTRAVENOUS; SUBCUTANEOUS at 14:09

## 2019-01-01 RX ADMIN — INSULIN ASPART 10 UNITS: 100 INJECTION, SUSPENSION SUBCUTANEOUS at 15:15

## 2019-01-01 RX ADMIN — DOCUSATE SODIUM 100 MG: 100 CAPSULE, LIQUID FILLED ORAL at 08:02

## 2019-01-01 RX ADMIN — BACLOFEN 10 MG: 10 TABLET ORAL at 05:31

## 2019-01-01 RX ADMIN — TRAZODONE HYDROCHLORIDE 100 MG: 100 TABLET ORAL at 22:06

## 2019-01-01 RX ADMIN — INSULIN LISPRO 1 UNITS: 100 INJECTION, SOLUTION INTRAVENOUS; SUBCUTANEOUS at 22:04

## 2019-01-01 RX ADMIN — ACETAMINOPHEN 975 MG: 325 TABLET ORAL at 02:32

## 2019-01-01 RX ADMIN — IRON SUCROSE 100 MG: 20 INJECTION, SOLUTION INTRAVENOUS at 14:01

## 2019-01-01 RX ADMIN — HYDROMORPHONE HYDROCHLORIDE 0.5 MG: 1 INJECTION, SOLUTION INTRAMUSCULAR; INTRAVENOUS; SUBCUTANEOUS at 06:15

## 2019-01-01 RX ADMIN — MORPHINE SULFATE 15 MG: 15 TABLET, EXTENDED RELEASE ORAL at 08:40

## 2019-01-01 RX ADMIN — OXYCODONE HYDROCHLORIDE 15 MG: 10 TABLET ORAL at 01:31

## 2019-01-01 RX ADMIN — HYDRALAZINE HYDROCHLORIDE 25 MG: 25 TABLET ORAL at 21:21

## 2019-01-01 RX ADMIN — ACETAMINOPHEN 975 MG: 325 TABLET ORAL at 09:30

## 2019-01-01 RX ADMIN — HYDROMORPHONE HYDROCHLORIDE 0.5 MG: 1 INJECTION, SOLUTION INTRAMUSCULAR; INTRAVENOUS; SUBCUTANEOUS at 09:12

## 2019-01-01 RX ADMIN — SODIUM CHLORIDE 70 ML/HR: 0.9 INJECTION, SOLUTION INTRAVENOUS at 03:13

## 2019-01-01 RX ADMIN — NALOXONE HYDROCHLORIDE 3 MG/HR: 1 INJECTION PARENTERAL at 15:01

## 2019-01-01 RX ADMIN — OXYCODONE HYDROCHLORIDE 10 MG: 10 TABLET ORAL at 11:34

## 2019-01-01 RX ADMIN — GABAPENTIN 600 MG: 300 CAPSULE ORAL at 05:31

## 2019-01-01 RX ADMIN — TRAMADOL HYDROCHLORIDE 25 MG: 50 TABLET, COATED ORAL at 01:51

## 2019-01-01 RX ADMIN — HYDRALAZINE HYDROCHLORIDE 25 MG: 25 TABLET ORAL at 14:57

## 2019-01-01 RX ADMIN — POLYETHYLENE GLYCOL 3350 17 G: 17 POWDER, FOR SOLUTION ORAL at 08:51

## 2019-01-01 RX ADMIN — CEFTRIAXONE SODIUM 1000 MG: 10 INJECTION, POWDER, FOR SOLUTION INTRAVENOUS at 17:00

## 2019-01-01 RX ADMIN — BACLOFEN 10 MG: 10 TABLET ORAL at 17:32

## 2019-01-01 RX ADMIN — Medication 20000 ML: at 07:50

## 2019-01-01 RX ADMIN — ACETAMINOPHEN 975 MG: 325 TABLET ORAL at 16:23

## 2019-01-01 RX ADMIN — HYDRALAZINE HYDROCHLORIDE 50 MG: 25 TABLET ORAL at 14:52

## 2019-01-01 RX ADMIN — SODIUM CHLORIDE 100 ML/HR: 0.9 INJECTION, SOLUTION INTRAVENOUS at 12:40

## 2019-01-01 RX ADMIN — HYDRALAZINE HYDROCHLORIDE 50 MG: 25 TABLET ORAL at 21:58

## 2019-01-17 NOTE — TELEPHONE ENCOUNTER
Patient canceled his appointment for follow up and tube change for 01/18/2019 due to weather is requesting a nurse appointment for tube change for Thursday 01/24/19 after 1 pm due to transportation issues  Patient will make follow up appointment with  at later date    Please advise [phone]

## 2019-01-24 NOTE — PROGRESS NOTES
Cystostomy tube change     Date/Time 1/24/2019 2:27 PM     Performed by  KATELYN GURROLA     Authorized by Javy Woodall       Consent: Verbal consent obtained  Consent given by: patient  Patient understanding: patient states understanding of the procedure being performed  Patient consent: the patient's understanding of the procedure matches consent given  Patient identity confirmed: verbally with patient      Preparation: Patient was prepped and draped in the usual sterile fashion  Site preparation: Betadine    Local anesthesia used: no     Anesthesia   Local anesthesia used: no     Sedation   Patient sedated: no     Procedure Details   Procedure Notes:     Arabella Sherman  1950  [id number]            Procedure: Suprapubic Tube Change   Pt brings his own supplies  Current catheter removed without difficulty after deflation of an intact balloon  Site prepped with Betadine  New s/p tube #18F-10cc inserted via aseptic technique without incident  Irrigated easily for clear vishal urine return  Patient tolerated well   Catheter plug inserted at pt's request        Patient tolerance: Patient tolerated the procedure well with no immediate complications

## 2019-02-08 NOTE — TELEPHONE ENCOUNTER
1 Lm for patient to call us back so we can reschedule his appointment from 2/11 as well to find out why he wants to cancel  2  Lm for patient to call us back so we can reschedule his appointment from 2/11 as well to find out why he wants to cancel  He can be seen for 20mins with Dr Billy Lake  3  Lm for patient to call us back to reschedule his appointment from 2/1 as well to find out why he wants to cancel  He can be seen for 20mins with Dr Billy Lake  Sent him a letter for him to call us back to reschedule this appointment

## 2019-02-26 NOTE — TELEPHONE ENCOUNTER
Patient requesting a catheter appointment patient is requesting 02/28/2019 at 2 pm or after that time  Please advise

## 2019-02-28 NOTE — PROGRESS NOTES
Cystostomy tube change     Date/Time 2/28/2019 3:34 PM     Performed by  Karen Lujan by Edvin King MD       Consent: Verbal consent obtained  Consent given by: patient  Patient understanding: patient states understanding of the procedure being performed  Patient consent: the patient's understanding of the procedure matches consent given  Procedure consent: procedure consent matches procedure scheduled  Patient identity confirmed: verbally with patient      Preparation: Patient was prepped and draped in the usual sterile fashion  Site preparation: Betadine    Local anesthesia used: no     Anesthesia   Local anesthesia used: no     Procedure Details   Procedure Notes: Florida Garcia  1950  [id number]    Procedure: Suprapubic Tube Change     Pt brings his own supplies  Current s/p tube removed without difficulty after deflation of an intact balloon  Site prepped with Betadine  New #18F-10cc s/p tube changed using aseptic technique without incident  Irrigated easily for clear vishal urine return  Patient tolerated well   Catheter plug inserted at pt's request        Patient tolerance: Patient tolerated the procedure well with no immediate complications

## 2019-03-27 NOTE — PROGRESS NOTES
Cystostomy tube change     Date/Time 3/27/2019 2:12 PM     Performed by  Parth Parikh by Rosaura Becker MD       Consent: Verbal consent obtained  Consent given by: patient  Patient understanding: patient states understanding of the procedure being performed  Patient consent: the patient's understanding of the procedure matches consent given  Patient identity confirmed: verbally with patient      Preparation: Patient was prepped and draped in the usual sterile fashion  Site preparation: Betadine   Procedure Details   Procedure Notes:     Sangita Mcgovern  1950  [id number]    Procedure: Suprapubic Tube Change         Pt brings his own supplies  Current s/p tube removed without difficulty after deflation of an intact balloon  Site prepped with Betadine  New #18-F s/p tube inserted using aseptic technique without incident  10 ml balloon inflated with sterile water  Irrigated easily for clear vishal urine return  Patient tolerated well   Catheter plug inserted into s/p tube at pt's request             Patient tolerance: Patient tolerated the procedure well with no immediate complications

## 2019-03-28 PROBLEM — J18.9 PNEUMONIA DUE TO INFECTIOUS ORGANISM: Status: RESOLVED | Noted: 2018-01-01 | Resolved: 2019-01-01

## 2019-03-28 PROBLEM — J96.01 ACUTE RESPIRATORY FAILURE WITH HYPOXIA (HCC): Status: RESOLVED | Noted: 2018-01-01 | Resolved: 2019-01-01

## 2019-03-28 PROBLEM — N17.9 AKI (ACUTE KIDNEY INJURY) (HCC): Status: RESOLVED | Noted: 2017-10-01 | Resolved: 2019-01-01

## 2019-03-29 NOTE — PROGRESS NOTES
[facility]      NAME: June Lomax  AGE: 70 y o  SEX: male  : 1950   MRN: [id number]    DATE: 3/29/2019  TIME: 1:31 PM    Assessment and Plan   1  Type 2 diabetes mellitus with hyperlipidemia ([facility] )  The patient's diabetes has been under good control  He is due for hemoglobin A1c  He has had some hypoglycemia early in the morning  His evening insulin will be reduced from 20 units to 18 units   - CBC  - Comprehensive metabolic panel  - Hemoglobin A1C    2  Essential hypertension  Adequately controlled  The patient is prone to orthostatic hypotension  3  Chronic kidney disease, stage 3 (HCC)  Stable at present  4  Hypercholesterolemia  On simvastatin  Due for lipid profile  - Lipid panel    5  Diabetic polyneuropathy associated with type 2 diabetes mellitus (HCC)  Continue duloxetine   - DULoxetine (CYMBALTA) 60 mg delayed release capsule; Take 1 capsule (60 mg total) by mouth daily  Dispense: 90 capsule; Refill: 1    6  Suprapubic catheter [facility])  The patient sees Urology for this from time to time  7  Chronic hepatitis C without hepatic coma ([facility] )  I asked the patient to arrange GI evaluation to address this issue  - Ambulatory referral to Gastroenterology; Future    8  Spinal stenosis of lumbar region with neurogenic claudication  The patient has been having a lot of pain  He recently saw Dr Melva Roque  He is scheduled for an injection  If this fails, he will be asked to see neuro surgery again  The patient's various medical issues are reasonably stable  He is suffering related to his lumbar spine  Hopefully, his upcoming injection will be helpful  His insulin has been adjusted  His other medications will continue unchanged  It is to be noted that in the patient's health maintenance module he is listed as delinquent diabetic foot exam and diabetic eye exam   In fact, he sees Dr Lew Sommer of Podiatry regularly    He he saw Dr Shelley Seen about 8 months ago for ophthalmological examination  Return to office in:  3 months    Chief Complaint     Chief Complaint   Patient presents with    Follow-up     3 months, foot exam today, has not been to eye doctor       History of Present Illness     The patient returned to the office for re-evaluation of hypertension, type 2 diabetes complicated by neuropathy, hypercholesterolemia, chronic kidney disease stage 3, Charcot-Bre-Tooth disease, and lumbar spinal stenosis  Since his last visit he has been having a lot of back and leg pain  He recently saw Dr Alphonse Sawyer of Pain Management  He has been scheduled for injection  He is using a walker  Fortunately, he has not fallen recently  He has had some mild hypoglycemia in the morning  He is currently on twice daily 70/ 30  He has had no other issues with his medications  The following portions of the patient's history were reviewed and updated as appropriate: allergies, current medications, past family history, past medical history, past social history, past surgical history and problem list     Review of Systems   Review of Systems   Constitutional: Negative  HENT: Negative for congestion, ear pain, postnasal drip, rhinorrhea, sore throat and trouble swallowing  Eyes: Negative for pain, discharge, redness and visual disturbance  Respiratory: Negative for cough, shortness of breath and wheezing  Cardiovascular: Negative  Gastrointestinal: Negative  Endocrine: Negative  Genitourinary: Negative for difficulty urinating, dysuria, frequency, hematuria and urgency  Musculoskeletal: Positive for back pain and gait problem  Negative for arthralgias, joint swelling and myalgias  Skin: Negative for rash  Neurological: Positive for numbness  Negative for dizziness, speech difficulty, weakness, light-headedness and headaches  Hematological: Negative      Psychiatric/Behavioral: Negative for confusion, decreased concentration, dysphoric mood and sleep disturbance  The patient is not nervous/anxious  Active Problem List     Patient Active Problem List   Diagnosis    Suprapubic catheter ([facility] )    Charcot-Bre-Tooth disease    Type 2 diabetes mellitus with hyperlipidemia ([facility] )    Essential hypertension    BPH (benign prostatic hyperplasia)    Chronic kidney disease, stage 3 (HCC)    Hypercholesterolemia    Neuropathy in diabetes ([facility] )    Spinal stenosis    DDD (degenerative disc disease), lumbosacral    Depression    Esophageal reflux    Hepatitis B    Hepatitis C virus infection without hepatic coma    Physical debility    Chronic pain    Neurogenic bladder       Objective   /50 (BP Location: Left arm, Patient Position: Sitting, Cuff Size: Standard)   Pulse 76   Temp 97 5 °F (36 4 °C) (Tympanic)   Resp 15   Ht 6' (1 829 m)   Wt 79 4 kg (175 lb)   BMI 23 73 kg/m²     Physical Exam   Constitutional: He is oriented to person, place, and time  He appears well-developed and well-nourished  No distress  HENT:   Head: Normocephalic and atraumatic  Neck: Neck supple  No JVD present  No tracheal deviation present  No thyromegaly present  Cardiovascular: Normal rate, regular rhythm, normal heart sounds and intact distal pulses  Exam reveals no gallop and no friction rub  No murmur heard  Pulmonary/Chest: Effort normal and breath sounds normal  He has no wheezes  He has no rales  He exhibits no tenderness  Abdominal: Soft  Bowel sounds are normal  He exhibits no distension and no mass  There is no tenderness  There is no rebound  Musculoskeletal: Normal range of motion  He exhibits no edema or tenderness  Lymphadenopathy:     He has no cervical adenopathy  Neurological: He is alert and oriented to person, place, and time  A sensory deficit is present  Sensation is reduced in the lower legs  Skin: Skin is warm and dry  Psychiatric: He has a normal mood and affect   His behavior is normal  Judgment and thought content normal        Pertinent Laboratory/Diagnostic Studies:  No recent lab work        Current Medications     Current Outpatient Medications:     baclofen 10 mg tablet, Take 1 tablet (10 mg total) by mouth 3 (three) times a day as needed for muscle spasms, Disp: , Rfl: 0    dextran 70-hypromellose (GENTEAL TEARS) 0 1-0 3 % ophthalmic solution, Administer 1 drop to both eyes every 3 (three) hours as needed (dry eyes), Disp: , Rfl: 0    DULoxetine (CYMBALTA) 60 mg delayed release capsule, Take 1 capsule (60 mg total) by mouth daily, Disp: 90 capsule, Rfl: 1    gabapentin (NEURONTIN) 600 MG tablet, 1 tablets twice a day and 3 tablets at Bedtime, Disp: 450 tablet, Rfl: 0    insulin aspart protamine-insulin aspart (NovoLOG 70/30) 100 units/mL injection, Inject 20 Units under the skin 2 (two) times a day before meals, Disp: , Rfl: 0    morphine (MS CONTIN) 15 mg 12 hr tablet, Take 15 mg by mouth every 12 (twelve) hours, Disp: , Rfl: 0    oxyCODONE (ROXICODONE) 15 mg immediate release tablet, Take 15 mg by mouth 4 (four) times a day as needed, Disp: , Rfl: 0    Polyethylene Glycol 3350 (MIRALAX PO), Take by mouth, Disp: , Rfl:     traZODone (DESYREL) 100 mg tablet, TAKE 1 TABLET (100 MG TOTAL) BY MOUTH DAILY AT BEDTIME, Disp: 90 tablet, Rfl: 1    amLODIPine (NORVASC) 5 mg tablet, Take 1 tablet (5 mg total) by mouth daily (Patient not taking: Reported on 3/28/2019), Disp: , Rfl: 0    Cholecalciferol (VITAMIN D3) 1000 units CAPS, Take by mouth, Disp: , Rfl:     [employer] ACID CONTROLLER 10 MG tablet, TAKE 1 TABLET BY MOUTH TWICE A DAY, Disp: , Rfl: 0    dimethicone (REMEDY NUTRASHIELD) 1 % cream, Apply topically 2 (two) times a day as needed for dry skin, Disp: , Rfl:     famotidine (PEPCID) 10 mg tablet, Take 1 tablet (10 mg total) by mouth 2 (two) times a day (Patient not taking: Reported on 3/28/2019), Disp: 60 tablet, Rfl: 0    metoprolol succinate (TOPROL-XL) 25 mg 24 hr tablet, Take 1 tablet (25 mg total) by mouth daily (Patient not taking: Reported on 3/28/2019), Disp: , Rfl: 0    pantoprazole (PROTONIX) 40 mg tablet, Take 1 tablet (40 mg total) by mouth daily in the early morning (Patient not taking: Reported on 3/28/2019), Disp: 30 tablet, Rfl: 0    simvastatin (ZOCOR) 40 mg tablet, Take 1 tablet by mouth daily, Disp: , Rfl:       Larry Agrawal MD

## 2019-05-14 PROBLEM — Z79.4 TYPE 2 DIABETES MELLITUS WITH DIABETIC POLYNEUROPATHY, WITH LONG-TERM CURRENT USE OF INSULIN (HCC): Status: ACTIVE | Noted: 2017-10-01

## 2019-05-14 PROBLEM — D64.9 ANEMIA: Status: ACTIVE | Noted: 2019-01-01

## 2019-05-14 PROBLEM — E11.42 TYPE 2 DIABETES MELLITUS WITH DIABETIC POLYNEUROPATHY, WITH LONG-TERM CURRENT USE OF INSULIN (HCC): Status: ACTIVE | Noted: 2017-10-01

## 2019-05-14 PROBLEM — E11.9 DM (DIABETES MELLITUS), TYPE 2 (HCC): Status: ACTIVE | Noted: 2017-10-01

## 2019-05-14 PROBLEM — M54.9 BACK PAIN: Status: ACTIVE | Noted: 2019-01-01

## 2019-05-16 PROBLEM — R80.9 PROTEINURIA: Status: ACTIVE | Noted: 2019-01-01

## 2019-05-27 PROBLEM — E87.1 HYPONATREMIA: Status: ACTIVE | Noted: 2019-01-01

## 2019-05-27 PROBLEM — I12.9 BENIGN HYPERTENSION WITH CKD (CHRONIC KIDNEY DISEASE) STAGE IV (HCC): Status: ACTIVE | Noted: 2017-10-01

## 2019-05-27 PROBLEM — N18.4 BENIGN HYPERTENSION WITH CKD (CHRONIC KIDNEY DISEASE) STAGE IV (HCC): Status: ACTIVE | Noted: 2017-10-01

## 2019-06-11 PROBLEM — E11.21 DIABETIC NEPHROPATHY ASSOCIATED WITH TYPE 2 DIABETES MELLITUS (HCC): Status: ACTIVE | Noted: 2019-01-01

## 2019-06-23 PROBLEM — N18.4 ANEMIA DUE TO STAGE 4 CHRONIC KIDNEY DISEASE (HCC): Status: ACTIVE | Noted: 2019-01-01

## 2019-06-23 PROBLEM — R26.2 AMBULATORY DYSFUNCTION: Status: ACTIVE | Noted: 2019-01-01

## 2019-06-23 PROBLEM — N18.9 ACUTE KIDNEY INJURY SUPERIMPOSED ON CKD (HCC): Status: ACTIVE | Noted: 2017-10-01

## 2019-06-23 PROBLEM — D63.1 ANEMIA DUE TO STAGE 4 CHRONIC KIDNEY DISEASE (HCC): Status: ACTIVE | Noted: 2019-01-01

## 2019-06-23 PROBLEM — T14.91XA SUICIDAL BEHAVIOR WITH ATTEMPTED SELF-INJURY (HCC): Status: ACTIVE | Noted: 2019-01-01

## 2019-06-23 PROBLEM — T50.901A POLYSUBSTANCE OVERDOSE: Status: ACTIVE | Noted: 2019-01-01

## 2019-06-25 ENCOUNTER — TRANSITIONAL CARE MANAGEMENT (OUTPATIENT)
Dept: INTERNAL MEDICINE CLINIC | Facility: CLINIC | Age: 69
End: 2019-06-25

## 2019-06-25 LAB
ABO GROUP BLD BPU: NORMAL
BPU ID: NORMAL
CROSSMATCH: NORMAL
UNIT DISPENSE STATUS: NORMAL
UNIT PRODUCT CODE: NORMAL
UNIT RH: NORMAL

## 2020-02-14 NOTE — ASSESSMENT & PLAN NOTE
Patient scheduled for MRI by orthopedic surgeon at [facility] [address]  Patient requesting study here  diagnostic results/impressions and/or recommended studies/importance of adherence to chosen treatment

## 2021-06-24 NOTE — SOCIAL WORK
Pt arranged to go to [facility] today at 4:30 by wglenis Edwards with [facility]  CM made pt, nurse, physician, facility and pt's brother aware  No

## 2022-07-25 NOTE — TELEPHONE ENCOUNTER
Mr Khang Yoder is a 13-year-old male known to Dr Hattie Bay for neurogenic bladder  Suprapubic catheter was exchanged at [facility] [place] 11/12  Patient will require for week exchanged in office  Please contact patient to arrange nurse visit  Thank you  Review of Systems   Constitutional:  Negative for chills, fatigue and fever. HENT:  Negative for congestion and sore throat. Respiratory:  Negative for cough, shortness of breath and wheezing. Cardiovascular:  Negative for chest pain and palpitations. Gastrointestinal:  Negative for diarrhea, nausea and vomiting. Genitourinary:  Negative for difficulty urinating, dysuria, flank pain, frequency, hematuria and urgency.

## 2022-07-29 NOTE — ED PROVIDER NOTES
[address] [place] [address]  Phone: [phone]  Fax: 30 [place], [place]        July 29, 2022     Patient: Charleen Reyes   YOB: 1997   Date of Visit: 7/29/2022       To Whom it May Concern:    Suzanne Mcneil was seen in my clinic on 7/29/2022. She may return to work on 07/30/22. If you have any questions or concerns, please don't hesitate to call.     Sincerely,         JOSEE Perez History  Chief Complaint   Patient presents with    Fever - 9 weeks to 75 years     Pt brought in via EMS from [facility], per EMS pt with ongoing fever reports recently diagnosed with UTI and sepsis  Pt reports chronic back pain  pt is alert and oriented x3       History provided by:  Patient and medical records   used: No    Medical Problem - Major   Location:  Fever and rigors from the nursing home  Severity:  Severe  Onset quality:  Sudden  Duration: Just started this morning  Timing:  Constant  Progression:  Unchanged  Chronicity:  New  Relieved by:  Nothing  Worsened by:  Nothing  Ineffective treatments:  Was given Tylenol and has been on Levaquin  Associated symptoms: fever, nausea and shortness of breath    Associated symptoms: no abdominal pain, no chest pain, no congestion, no cough, no diarrhea, no headaches, no rhinorrhea, no sore throat, no vomiting and no wheezing     For recently admitted to the hospital for urosepsis discharged on the 16th  Went back to [facility] on the 18th for hypoglycemia  He has been continued on Levaquin  He has a suprapubic catheter for BPH and neurogenic bladder  He did not have respiratory symptoms or cough  No chest pain or abdominal pain  Had a little bit of nausea but no vomiting  No diarrhea  Rule his last day of Levaquin was today  He was noted to be hypoxic and febrile to and had rigors and so was sent here  Prior to Admission Medications   Prescriptions Last Dose Informant Patient Reported? Taking?    Cholecalciferol (VITAMIN D3) 1000 units CAPS   Yes Yes   Sig: Take by mouth   DULoxetine (CYMBALTA) 60 mg delayed release capsule   No Yes   Sig: Take 1 capsule (60 mg total) by mouth daily   Polyethylene Glycol 3350 (MIRALAX PO)   Yes Yes   Sig: Take by mouth   acetaminophen (TYLENOL) 325 mg tablet   No Yes   Sig: Take 2 tablets (650 mg total) by mouth every 4 (four) hours as needed for mild pain, headaches or fever   amLODIPine (NORVASC) 5 mg tablet   No Yes   Sig: Take 1 tablet (5 mg total) by mouth daily   dextran 70-hypromellose (GENTEAL TEARS) 0 1-0 3 % ophthalmic solution   No Yes   Sig: Administer 1 drop to both eyes every 3 (three) hours as needed (dry eyes)   dimethicone (REMEDY NUTRASHIELD) 1 % cream   Yes Yes   Sig: Apply topically 2 (two) times a day as needed for dry skin   gabapentin (NEURONTIN) 600 MG tablet   No Yes   Si TABLET TWICE A DAY & 3 TABLETS AT BEDTIME   insulin aspart protamine-insulin aspart (NovoLOG 70/30) 100 units/mL injection   Yes Yes   Sig: Inject under the skin 4 (four) times a day   levofloxacin (LEVAQUIN) 500 mg tablet   No Yes   Sig: Take 1 tablet (500 mg total) by mouth every 24 hours for 5 days   magnesium hydroxide (MILK OF MAGNESIA) 400 mg/5 mL oral suspension   Yes Yes   Sig: Take by mouth daily as needed for constipation   metoprolol succinate (TOPROL-XL) 25 mg 24 hr tablet   No Yes   Sig: Take 1 tablet (25 mg total) by mouth daily   morphine (MS CONTIN) 15 mg 12 hr tablet   No Yes   Sig: Take 1 tablet (15 mg total) by mouth every 12 (twelve) hours for 10 days Max Daily Amount: 30 mg   oxyCODONE (ROXICODONE) 10 MG TABS   No Yes   Sig: Take 1 tablet (10 mg total) by mouth every 6 (six) hours as needed for moderate pain for up to 10 days Max Daily Amount: 40 mg   Patient taking differently: Take 15 mg by mouth every 6 (six) hours as needed for moderate pain     simvastatin (ZOCOR) 40 mg tablet   Yes Yes   Sig: Take 1 tablet by mouth daily   traZODone (DESYREL) 100 mg tablet   No Yes   Sig: TAKE 1 TABLET (100 MG TOTAL) BY MOUTH DAILY AT BEDTIME      Facility-Administered Medications: None       Past Medical History:   Diagnosis Date    BPH with obstruction/lower urinary tract symptoms     Bundle branch block, right     Charcot-Bre-Tooth disease     Diabetes mellitus (HCC)     Fracture of clavicle     GERD (gastroesophageal reflux disease)     Hypertension     Neuropathy     charcot- Bre tooth    Renal disorder     Severe sepsis ([facility] [address] ) 11/22/2018    Spinal stenosis     Ureteral calculus     Urinary retention        Past Surgical History:   Procedure Laterality Date    CYSTOSCOPY  2014, 2015, 2016, 2017    CYSTOSCOPY W/ RETROGRADES Bilateral     CYSTOSCOPY W/ URETERAL STENT PLACEMENT Right 12/30/2015    CYSTOSCOPY W/ URETERAL STENT REMOVAL Right 2016    CYSTOSCOPY W/ URETEROSCOPY W/ LITHOTRIPSY Right 12/30/2015    FOOT SURGERY Right     Amputation of toe, Onset - 10/1/12    HEMORROIDECTOMY      KNEE SURGERY Left     PCL and MCL - meniscus, Onset - 1981    LUMBAR FUSION      Transverse lumbar interbody fusion L2-L3 and METRx Lt hemilaminectomy and ema foraminotomy-decompressive at L4-L5 Dr Mela Goel and Dr Serene Ballard; Onset - 5/13/13       TRANSURETHRAL RESECTION OF PROSTATE  01/2014       Family History   Problem Relation Age of Onset    Dementia Mother     Hypertension Father     Charcot-Bre-Tooth disease Father     Diabetes Father     Pneumonia Maternal Grandfather     Cancer Family     Diabetes Family      I have reviewed and agree with the history as documented  Social History   Substance Use Topics    Smoking status: Light Tobacco Smoker     Years: 4 00     Types: Cigars     Last attempt to quit: 1995    Smokeless tobacco: Never Used      Comment: occasional cigar    Alcohol use No        Review of Systems   Constitutional: Positive for chills and fever  HENT: Negative for congestion, rhinorrhea and sore throat  Respiratory: Positive for shortness of breath  Negative for cough, chest tightness and wheezing  Cardiovascular: Negative for chest pain and leg swelling  Gastrointestinal: Positive for nausea  Negative for abdominal pain, diarrhea and vomiting  Genitourinary: Negative for dysuria and flank pain  Chronic indwelling suprapubic catheter   Musculoskeletal: Positive for back pain          Has chronic back pain for which he takes chronic opiates  Neurological: Negative for weakness and headaches  All other systems reviewed and are negative  Physical Exam  Physical Exam   Constitutional: He appears well-developed and well-nourished  He is cooperative  Non-toxic appearance  He does not have a sickly appearance  He does not appear ill  No distress  HENT:   Head: Normocephalic and atraumatic  Right Ear: Hearing normal  No drainage or swelling  Left Ear: Hearing normal  No drainage or swelling  Mouth/Throat: Uvula is midline and oropharynx is clear and moist  Mucous membranes are dry  No oropharyngeal exudate  Eyes: Pupils are equal, round, and reactive to light  Conjunctivae and lids are normal  Right eye exhibits no discharge  Left eye exhibits no discharge  Neck: Trachea normal and normal range of motion  No JVD present  Cardiovascular: Normal rate, regular rhythm, normal heart sounds, intact distal pulses and normal pulses  Exam reveals no gallop and no friction rub  No murmur heard  Pulmonary/Chest: Effort normal  No stridor  No respiratory distress  He has wheezes  He has rhonchi  He has rales  He exhibits no tenderness  Right-sided symptoms worse than left  Abdominal: Soft  Normal appearance  He exhibits no ascites and no mass  There is no hepatosplenomegaly  There is no tenderness  There is no rebound, no guarding and no CVA tenderness  Musculoskeletal: Normal range of motion  He exhibits no edema or tenderness  Lymphadenopathy:        Right: No inguinal adenopathy present  Left: No inguinal adenopathy present  Neurological: He is alert  He has normal strength  He exhibits normal muscle tone  Gait normal  GCS eye subscore is 4  GCS verbal subscore is 5  GCS motor subscore is 6  Skin: Skin is warm, dry and intact  No rash noted  He is not diaphoretic  No pallor  Psychiatric: He has a normal mood and affect   His speech is normal  Cognition and memory are normal    Nursing note and vitals reviewed        Vital Signs  ED Triage Vitals [11/22/18 0628]   Temperature Pulse Respirations Blood Pressure SpO2   (!) 101 6 °F (38 7 °C) 100 18 124/60 (!) 87 %      Temp Source Heart Rate Source Patient Position - Orthostatic VS BP Location FiO2 (%)   Oral Monitor Lying Right arm --      Pain Score       9           Vitals:    11/22/18 0730 11/22/18 0800 11/22/18 0900 11/22/18 0943   BP: 96/52 106/52 106/53 96/53   Pulse: 90 92 88 82   Patient Position - Orthostatic VS: Lying Lying Lying Lying       Visual Acuity      ED Medications  Medications   sodium chloride 0 9 % infusion (125 mL/hr Intravenous New Bag 11/22/18 0807)   vancomycin (VANCOCIN) 1,250 mg in sodium chloride 0 9 % 250 mL IVPB (1,250 mg Intravenous New Bag 11/22/18 0905)   acetaminophen (TYLENOL) tablet 650 mg (not administered)   DULoxetine (CYMBALTA) delayed release capsule 60 mg (not administered)   gabapentin (NEURONTIN) capsule 600 mg (not administered)   morphine (MS CONTIN) ER tablet 15 mg (not administered)   oxyCODONE (ROXICODONE) immediate release tablet 10 mg (not administered)   polyethylene glycol (MIRALAX) packet 17 g (not administered)   traZODone (DESYREL) tablet 100 mg (not administered)   chlorhexidine (PERIDEX) 0 12 % oral rinse 15 mL (not administered)   heparin (porcine) subcutaneous injection 5,000 Units (not administered)   insulin lispro (HumaLOG) 100 units/mL subcutaneous injection 10 Units (not administered)   insulin glargine (LANTUS) subcutaneous injection 30 Units 0 3 mL (not administered)   insulin lispro (HumaLOG) 100 units/mL subcutaneous injection 2-12 Units (not administered)   insulin lispro (HumaLOG) 100 units/mL subcutaneous injection 1-5 Units (not administered)   levalbuterol (XOPENEX) inhalation solution 1 25 mg (not administered)   cefepime (MAXIPIME) injection 1,000 mg (not administered)   vancomycin (VANCOCIN) IVPB (premix) 1,000 mg (not administered)   sodium chloride 0 9 % bolus 500 mL (0 mL Intravenous Stopped 11/22/18 0723)   albuterol inhalation solution 5 mg (5 mg Nebulization Given 11/22/18 0658)   acetaminophen (TYLENOL) tablet 650 mg (650 mg Oral Given 11/22/18 0658)   cefepime (MAXIPIME) 2 g/50 mL dextrose IVPB (0 mg Intravenous Stopped 11/22/18 0902)   sodium chloride 0 9 % bolus 500 mL (0 mL Intravenous Stopped 11/22/18 0843)   sodium chloride 0 9 % bolus 1,400 mL (1,400 mL Intravenous New Bag 11/22/18 0821)       Diagnostic Studies  Results Reviewed     Procedure Component Value Units Date/Time    Sputum culture and Gram stain [[id number]]     Lab Status:  No result Specimen:  Sputum     Urine Microscopic [[id number]]  (Abnormal) Collected:  11/22/18 0903    Lab Status:  Final result Specimen:  Urine from Urine, Suprapubic catheter Updated:  11/22/18 0916     RBC, UA 1-2 (A) /hpf      WBC, UA 30-50 (A) /hpf      Epithelial Cells Occasional /hpf      Bacteria, UA Moderate (A) /hpf     Urine culture [[id number]] Collected:  11/22/18 0903    Lab Status: In process Specimen:  Urine from Urine, Suprapubic catheter Updated:  11/22/18 0916    POCT urinalysis dipstick [[id number]]  (Abnormal) Resulted:  11/22/18 0912    Lab Status:  Final result Specimen:  Urine Updated:  11/22/18 0912    Legionella antigen, urine [[id number]] Collected:  11/22/18 0834    Lab Status: In process Specimen:  Urine from Urine, Catheter Updated:  11/22/18 0840    Strep Pneumoniae, Urine [[id number]] Collected:  11/22/18 0834    Lab Status:   In process Specimen:  Urine from Urine, Catheter Updated:  11/22/18 0840    ED Urine Macroscopic [[id number]]  (Abnormal) Collected:  11/22/18 0851    Lab Status:  Final result Specimen:  Urine Updated:  11/22/18 0837     Color, UA Yellow     Clarity, UA Clear     pH, UA 5 0     Leukocytes, UA Small (A)     Nitrite, UA Negative     Protein,  (2+) (A) mg/dl      Glucose, UA Negative mg/dl      Ketones, UA Negative mg/dl      Urobilinogen, UA 0 2 E U /dl      Bilirubin, UA Negative Blood, UA Trace (A)     Specific Menard, UA 1 020    Narrative:       CLINITEK RESULT    Procalcitonin [[id number]] Collected:  11/22/18 0829    Lab Status: In process Specimen:  Blood from Arm, Left Updated:  11/22/18 0832    B-type natriuretic peptide [[id number]]  (Abnormal) Collected:  11/22/18 0649    Lab Status:  Final result Specimen:  Blood from Arm, Right Updated:  11/22/18 0728     NT-proBNP 1,922 (H) pg/mL     Comprehensive metabolic panel [[id number]]  (Abnormal) Collected:  11/22/18 0649    Lab Status:  Final result Specimen:  Blood from Arm, Right Updated:  11/22/18 0801     Sodium 137 mmol/L      Potassium 4 7 mmol/L      Chloride 101 mmol/L      CO2 27 mmol/L      ANION GAP 9 mmol/L      BUN 46 (H) mg/dL      Creatinine 2 47 (H) mg/dL      Glucose 127 mg/dL      Calcium 8 9 mg/dL      AST 25 U/L      ALT 20 U/L      Alkaline Phosphatase 78 U/L      Total Protein 7 3 g/dL      Albumin 2 9 (L) g/dL      Total Bilirubin 0 40 mg/dL      eGFR 26 ml/min/1 73sq m     Narrative:         National Kidney Disease Education Program recommendations are as follows:  GFR calculation is accurate only with a steady state creatinine  Chronic Kidney disease less than 60 ml/min/1 73 sq  meters  Kidney failure less than 15 ml/min/1 73 sq  meters      Magnesium [[id number]]  (Normal) Collected:  11/22/18 0649    Lab Status:  Final result Specimen:  Blood from Arm, Right Updated:  11/22/18 0728     Magnesium 1 7 mg/dL     Ammonia [[id number]]  (Normal) Collected:  11/22/18 0701    Lab Status:  Final result Specimen:  Blood from Arm, Left Updated:  11/22/18 0727     Ammonia 19 umol/L     Troponin I [[id number]]  (Normal) Collected:  11/22/18 0649    Lab Status:  Final result Specimen:  Blood from Arm, Right Updated:  11/22/18 0721     Troponin I 0 02 ng/mL     Lactic acid x2 Q2H [[id number]]  (Normal) Collected:  11/22/18 0648    Lab Status:  Final result Specimen:  Blood from Arm, Right Updated:  11/22/18 0721     LACTIC ACID 1 5 mmol/L     Narrative:         Result may be elevated if tourniquet was used during collection  Protime-INR [[id number]]  (Normal) Collected:  11/22/18 0649    Lab Status:  Final result Specimen:  Blood from Arm, Right Updated:  11/22/18 0718     Protime 14 2 seconds      INR 1 09    APTT [[id number]]  (Normal) Collected:  11/22/18 0649    Lab Status:  Final result Specimen:  Blood from Arm, Right Updated:  11/22/18 0718     PTT 33 seconds     Rapid Influenza Screen with Reflex PCR [[id number]]  (Normal) Collected:  11/22/18 0648    Lab Status:  Final result Specimen:  Nasopharyngeal from Nasopharyngeal Swab Updated:  11/22/18 0714     Rapid Influenza A Ag Negative     Rapid Influenza B Ag Negative    Influenza A/B and RSV by PCR [[id number]] Collected:  11/22/18 7616    Lab Status: In process Specimen:  Nasopharyngeal from Nasopharyngeal Swab Updated:  11/22/18 0714    CBC and differential [[id number]]  (Abnormal) Collected:  11/22/18 0648    Lab Status:  Final result Specimen:  Blood from Arm, Right Updated:  11/22/18 0659     WBC 13 28 (H) Thousand/uL      RBC 3 46 (L) Million/uL      Hemoglobin 10 5 (L) g/dL      Hematocrit 33 5 (L) %      MCV 97 fL      MCH 30 3 pg      MCHC 31 3 (L) g/dL      RDW 13 5 %      MPV 9 5 fL      Platelets 615 Thousands/uL      nRBC 0 /100 WBCs      Neutrophils Relative 88 (H) %      Immat GRANS % 1 %      Lymphocytes Relative 5 (L) %      Monocytes Relative 5 %      Eosinophils Relative 1 %      Basophils Relative 0 %      Neutrophils Absolute 11 68 (H) Thousands/µL      Immature Grans Absolute 0 07 Thousand/uL      Lymphocytes Absolute 0 69 Thousands/µL      Monocytes Absolute 0 72 Thousand/µL      Eosinophils Absolute 0 10 Thousand/µL      Basophils Absolute 0 02 Thousands/µL     Blood culture #2 [[id number]] Collected:  11/22/18 4667    Lab Status:   In process Specimen:  Blood from Arm, Left Updated:  11/22/18 0654    Blood culture #1 [[id number]] Collected:  11/22/18 6676 Lab Status: In process Specimen:  Blood from Arm, Right Updated:  11/22/18 0654               I have personally reviewed the x-ray and my findings are: pneumonia RLL  XR chest 2 views   Final Result by Arcadio Ellison DO (11/22 0161)      New right lung infiltrate consistent with pneumonia              Workstation performed: [id number]                    Procedures  ECG 12 Lead Documentation  Date/Time: 11/22/2018 10:00 AM  Performed by: Yenni Tapia by: Jai Horne     Indications / Diagnosis:  Fever  ECG reviewed by me, the ED Provider: yes    Patient location:  ED  Interpretation:     Interpretation: non-specific    Rate:     ECG rate assessment: normal    Rhythm:     Rhythm: sinus rhythm    Ectopy:     Ectopy: none    QRS:     QRS axis:  Normal    QRS intervals:  Normal  Conduction:     Conduction: abnormal      Abnormal conduction: complete RBBB    ST segments:     ST segments:  Normal  T waves:     T waves: normal    CriticalCare Time  Performed by: Yenni Tapia by: Jai Horne     Critical care provider statement:     Critical care time (minutes):  40    Critical care time was exclusive of:  Separately billable procedures and treating other patients and teaching time    Critical care was necessary to treat or prevent imminent or life-threatening deterioration of the following conditions:  Sepsis    Critical care was time spent personally by me on the following activities:  Obtaining history from patient or surrogate, discussions with consultants, development of treatment plan with patient or surrogate, evaluation of patient's response to treatment, examination of patient, review of old charts, re-evaluation of patient's condition, ordering and review of radiographic studies and ordering and review of laboratory studies    I assumed direction of critical care for this patient from another provider in my specialty: no             Phone Contacts  ED Phone Contact    ED Course                         Initial Sepsis Screening     [address] Name 11/22/18 0753                Is the patient's history suggestive of a new or worsening infection? (!)  Yes (Proceed)  -CS        Suspected source of infection pneumonia  -CS        Are two or more of the following signs & symptoms of infection both present and new to the patient? (!)  Yes (Proceed)  -CS        Indicate SIRS criteria Leukocytosis (WBC > 69883 IJL); Hyperthemia > 38 3C (100 9F)  -CS        If the answer is yes to both questions, suspicion of sepsis is present          If severe sepsis is present AND tissue hypoperfusion perists in the hour after fluid resuscitation or lactate > 4, the patient meets criteria for SEPTIC SHOCK          Are any of the following organ dysfunction criteria present within 6 hours of suspected infection and SIRS criteria that are NOT considered to be chronic conditions? (!)  Yes  -CS        Organ dysfunction Creatinine > 0 5 mg/dl ABOVE BASELINE  -CS        Date of presentation of severe sepsis          Time of presentation of severe sepsis          Tissue hypoperfusion persists in the hour after crystalloid fluid administration, evidenced, by either:          Was hypotension present within one hour of the conclusion of crystalloid fluid administration?  No  -CS        Date of presentation of septic shock          Time of presentation of septic shock            User Key  (r) = Recorded By, (t) = Taken By, (c) = Cosigned By    [address] Name Provider Type    CS Frances Darden MD Physician           Default Flowsheet Data (last 720 hours)      Sepsis Reassess     Row Name 11/22/18 0653                   Repeat Volume Status and Tissue Perfusion Assessment Performed    Repeat Volume Status and Tissue Perfusion Assessment Performed Yes  -CS           Volume Status and Tissue Perfusion Post Fluid Resuscitation- Must Document 5 of the Following 7:    Vital Signs Reviewed (HR, RR, BP, T) Yes  -CS        Arterial Oxygen Saturation Reviewed (POx, SaO2 or SpO2) Yes (comment %)   94  -CS        Cardio Normal S1/S2  -CS        Pulmonary (!)  Normal effort;Rhonchi  -CS        Capillary Refill Brisk  -CS        Peripheral Pulses Radial  -CS        Peripheral Pulse +2  -CS        Skin Warm  -CS        Urine output assessed Adequate  -CS           *OR*   Intensive Monitoring- Must Document One of the Following Four *:    Vital Signs Reviewed          * Central Venous Pressure (CVP or RAP)          * Central Venous Oxygen (SVO2, ScvO2 or Oxygen saturation via central catheter)          * Bedside Cardiovascular US in IVC diameter and % collapse          * Passive Leg Raise OR Crystalloid Challenge            User Key  (r) = Recorded By, (t) = Taken By, (c) = Cosigned By    [address] Name Provider Type    Any Rosa MD Physician                MDM  Number of Diagnoses or Management Options  Hypoxia:   Pneumonia:   Renal insufficiency:   Severe sepsis [facility]):   Diagnosis management comments: Patient with some hypoxia that is improved with oxygen  Meets criteria for severe sepsis based on fever elevated white count and renal failure which is worse  Blood pressure transiently in the 90s responded to some IV fluid bolus  30 cc/kilogram ordered for ideal body weight of 79 9 kilos  Cefepime and vancomycin ordered blood culture sent as well as procalcitonin  Lactate was less than 2  Patient has no history of having aspiration  He is on chronic opiates and did receive his last dose last night unclear if he could about altered mental status from this potentially aspirated  Rapid flu test is negative and that will be sent off for PCR  Case was discussed with the intensive care unit physician who agrees to accept the patient into the intensive care unit and I discussed the case also with the rapid response nurse practitioner         Amount and/or Complexity of Data Reviewed  Clinical lab tests: reviewed and ordered  Tests in the radiology section of CPT®: ordered and reviewed  Tests in the medicine section of CPT®: ordered and reviewed  Obtain history from someone other than the patient: yes  Review and summarize past medical records: yes  Discuss the patient with other providers: yes  Independent visualization of images, tracings, or specimens: yes    Patient Progress  Patient progress: (Serious)    The patient presented with a condition in which there was a high probability of imminent or life-threatening deterioration, and critical care services (excluding separately billable procedures) totalled 30-74 minutes          Disposition  Final diagnoses:   Pneumonia   Severe sepsis ([facility] [address] )   Renal insufficiency   Hypoxia     Time reflects when diagnosis was documented in both MDM as applicable and the Disposition within this note     Time User Action Codes Description Comment    11/22/2018  8:11 AM Darnelle Client Add [J18 9] Pneumonia     11/22/2018  8:11 AM Darnelle Client Add [A41 9,  R65 20] Severe sepsis ([facility] [address] )     11/22/2018  8:11 AM Darnelle Client Add [N28 9] Renal insufficiency     11/22/2018  8:11 AM Darnelle Client Add [R09 02] Hypoxia     11/22/2018  8:48 AM Darren Fulling Add [Z93 59] Suprapubic catheter ([facility] [address] )     11/22/2018  8:48 AM Darren Fulling Modify [Z93 59] Suprapubic catheter ([facility] [address] )     11/22/2018  8:48 AM Darren Fulling Add [N31 9] Neurogenic bladder     11/22/2018  8:48 AM Darren Fulling Modify [N31 9] Neurogenic bladder     11/22/2018  8:49 AM Darren Fulling Add [N17 9] [facility]     11/22/2018  8:49 AM Darren Fulling Add [T83 510A,  N39 0] Urinary tract infection associated with cystostomy catheter, initial encounter ([facility] )     11/22/2018  8:49 AM Darren Fulling Add [E11 69,  E78 5] Type 2 diabetes mellitus with hyperlipidemia [facility])       ED Disposition     ED Disposition Condition Comment    Admit  Case was discussed with Aziza and the patient's admission status was agreed to be Admission Status: inpatient status to the service of Dr Naomi Polk   Follow-up Information    None         Current Discharge Medication List      CONTINUE these medications which have NOT CHANGED    Details   acetaminophen (TYLENOL) 325 mg tablet Take 2 tablets (650 mg total) by mouth every 4 (four) hours as needed for mild pain, headaches or fever  Qty: 30 tablet, Refills: 0    Associated Diagnoses: Chronic pain syndrome      amLODIPine (NORVASC) 5 mg tablet Take 1 tablet (5 mg total) by mouth daily  Refills: 0    Associated Diagnoses: Essential hypertension      Cholecalciferol (VITAMIN D3) 1000 units CAPS Take by mouth      dextran 70-hypromellose (GENTEAL TEARS) 0 1-0 3 % ophthalmic solution Administer 1 drop to both eyes every 3 (three) hours as needed (dry eyes)  Refills: 0    Associated Diagnoses: Dry eye      dimethicone (REMEDY NUTRASHIELD) 1 % cream Apply topically 2 (two) times a day as needed for dry skin      DULoxetine (CYMBALTA) 60 mg delayed release capsule Take 1 capsule (60 mg total) by mouth daily  Qty: 14 capsule, Refills: 0    Comments:  Will need 14 days supply, Patient had been taking 2 tablets daily, he states he did not realize he should have only taken 1 tablet daily  Associated Diagnoses: Diabetic polyneuropathy associated with type 2 diabetes mellitus (HCC)      gabapentin (NEURONTIN) 600 MG tablet 1 TABLET TWICE A DAY & 3 TABLETS AT BEDTIME  Qty: 450 tablet, Refills: 0    Associated Diagnoses: Neuropathy      insulin aspart protamine-insulin aspart (NovoLOG 70/30) 100 units/mL injection Inject under the skin 4 (four) times a day      levofloxacin (LEVAQUIN) 500 mg tablet Take 1 tablet (500 mg total) by mouth every 24 hours for 5 days  Qty: 5 tablet, Refills: 0    Associated Diagnoses: Urinary tract infection associated with cystostomy catheter, initial encounter (Formerly [facility])      magnesium hydroxide (MILK OF MAGNESIA) 400 mg/5 mL oral suspension Take by mouth daily as needed for constipation      metoprolol succinate (TOPROL-XL) 25 mg 24 hr tablet Take 1 tablet (25 mg total) by mouth daily  Refills: 0    Associated Diagnoses: Essential hypertension      morphine (MS CONTIN) 15 mg 12 hr tablet Take 1 tablet (15 mg total) by mouth every 12 (twelve) hours for 10 days Max Daily Amount: 30 mg  Qty: 10 tablet, Refills: 0    Associated Diagnoses: Chronic pain syndrome      oxyCODONE (ROXICODONE) 10 MG TABS Take 1 tablet (10 mg total) by mouth every 6 (six) hours as needed for moderate pain for up to 10 days Max Daily Amount: 40 mg  Qty: 10 tablet, Refills: 0    Associated Diagnoses: Chronic pain syndrome      Polyethylene Glycol 3350 (MIRALAX PO) Take by mouth      simvastatin (ZOCOR) 40 mg tablet Take 1 tablet by mouth daily      traZODone (DESYREL) 100 mg tablet TAKE 1 TABLET (100 MG TOTAL) BY MOUTH DAILY AT BEDTIME  Qty: 90 tablet, Refills: 1    Associated Diagnoses: Insomnia, unspecified type           No discharge procedures on file      ED Provider  Electronically Signed by           Mireya Almonte MD  11/22/18 [id number]

## 2023-01-16 NOTE — SOCIAL WORK
CM spoke with Pattie Martinez, liaison from [facility]  She reports that she would have a bed available for pt by tomorrow  She spoke with pt as well and is agreeable at this time  Will discuss with attending  No

## 2023-09-18 NOTE — PROGRESS NOTES
After an amp of 50% dextrose, a glucerna and 237ml of milk the patient's blood glucose is 145  Will recheck at 1600  Patient is more alert and oriented  Answering questions appropriately  Discussed the changes in his insulin for the remainder of the day  I reviewed labs & will discuss at upcoming appt.